# Patient Record
Sex: FEMALE | Race: WHITE | NOT HISPANIC OR LATINO | Employment: OTHER | URBAN - METROPOLITAN AREA
[De-identification: names, ages, dates, MRNs, and addresses within clinical notes are randomized per-mention and may not be internally consistent; named-entity substitution may affect disease eponyms.]

---

## 2017-01-02 DIAGNOSIS — R25.2 CRAMP OF LIMB: Primary | ICD-10-CM

## 2017-01-02 DIAGNOSIS — K21.9 GASTROESOPHAGEAL REFLUX DISEASE WITHOUT ESOPHAGITIS: Primary | ICD-10-CM

## 2017-01-02 NOTE — TELEPHONE ENCOUNTER
Patient called clinic, she would like to get Prilosec as a 90 days supply and not 30 day.   This 90 day supply Rx will replace 8/30/16 30 day supply Rx.     omeprazole (PRILOSEC) 20 MG capsule  Last Written Prescription Date: 8/30/16   Last Fill Quantity: 30,  # refills: 10    Last Office Visit with [facility], [facility] or [facility] prescribing provider: 11/28/16     Prescription approved per [facility] Refill Protocol.

## 2017-01-03 RX ORDER — TIZANIDINE 2 MG/1
2 TABLET ORAL 3 TIMES DAILY PRN
Qty: 60 TABLET | Refills: 0 | Status: SHIPPED | OUTPATIENT
Start: 2017-01-03 | End: 2017-03-08

## 2017-01-03 NOTE — TELEPHONE ENCOUNTER
tiZANidine (ZANAFLEX) 2 MG tablet    Last Written Prescription Date:  11/4/16  Last Fill Quantity: 60,   # refills: 0  Last Office Visit with [facility], [facility] or [facility] prescribing provider: 11/28/16    Future Office visit:       Routing refill request to provider for review/approval because:  Drug not on the [facility], [facility] or [facility] refill protocol or controlled substance

## 2017-02-06 DIAGNOSIS — G89.4 CHRONIC PAIN DISORDER: Primary | ICD-10-CM

## 2017-02-06 NOTE — TELEPHONE ENCOUNTER
Reason for Call:  Medication or medication refill:    Do you use a [facility]?  Name of the pharmacy and phone number for the current request:  [facility] #[id number] - [place], [place] - [address] [place], [place] [phone] (Phone)    Name of the medication requested: Oxycodone 5 mg    Other request: Patient requests that the Prescription be mailed to   [facility] #[id number] - [place], [place] - [address] [phone] (Phone)  [phone] (Fax               Can we leave a detailed message on this number? YES    Phone number patient can be reached at: Home number on file [phone] (home)    Best Time: When Prescription is approved    Call taken on 2/6/2017 at 10:54 AM by MINI KAUFFMAN

## 2017-02-07 ENCOUNTER — TELEPHONE (OUTPATIENT)
Dept: FAMILY MEDICINE | Facility: CLINIC | Age: 77
End: 2017-02-07

## 2017-02-07 RX ORDER — OXYCODONE HYDROCHLORIDE 5 MG/1
5 TABLET ORAL 2 TIMES DAILY PRN
Qty: 180 TABLET | Refills: 0 | Status: SHIPPED | OUTPATIENT
Start: 2017-02-07 | End: 2017-11-04

## 2017-02-07 NOTE — TELEPHONE ENCOUNTER
States small red bumps on both arms that itch.  Wrists to arms.  States uses Aveno lotion and that has not changed.  States someone else does laundry and does not know if laundry soap has changed denies: body soap, shampoo, perfume changes    Rash     Onset: started 2 weeks ago     Description:   Location: both arms wrist to elbow on the top  Character: red, dry, raised  Itching (Pruritis): YES    Progression of Symptoms:  same    Accompanying Signs & Symptoms:  Fever: no   Body aches or joint pain: no   Sore throat symptoms: no   Recent cold symptoms: no    History:   Previous similar rash: YES - had before that she thinks just went away.      Precipitating factors:   Exposure to similar rash: no   New exposures: None   Recent travel: no   Denies: fever, purple color, med changes, hives,      Therapies Tried and outcome: Aveno lotion which she has used previously without concern for about a month.       Advised to try a different lotion for a couple days to see if that helps relieve the rash.  hydrocortisone cream as directed OTC, avoid scratching, benadryl if itches.  Call clinic if does not get better or relief in 2 days.  Patient agreed with plan.    Please advise if further recommendations are appropriate,  Bhumika Amanda RN  [facility] [employer]

## 2017-02-07 NOTE — TELEPHONE ENCOUNTER
If does not improve, we can try a stronger steroid cream but she should try OTC hydrocortisone first.

## 2017-02-07 NOTE — TELEPHONE ENCOUNTER
Reason for Call:  Other call back    Detailed comments: Patient calling to report that she has bumps on both of her arms and that it itches.  She states she has been using a lotion (Aveeno) on it but it is not effective and actually causes more bumps.  She is requesting a call back with directives.    Phone Number Patient can be reached at: Home number on file [phone] (home)    Best Time: ASAP    Can we leave a detailed message on this number? YES    Call taken on 2/7/2017 at 12:10 PM by MINI KAUFFMAN

## 2017-02-07 NOTE — TELEPHONE ENCOUNTER
Controlled Substance Refill Request for oxycodone 5 mg  Patient is followed by BAM BARRON PA-C  for ongoing prescription of pain medication.  All refills should be approved by this provider, or covering partner.    Medication(s): OXycodone IR 5 mg.   Maximum quantity per month: 180 lasts almost a year  Clinic visit frequency required: Q 6  months     Controlled substance agreement:  Encounter-Level CSA:     There are no encounter-level csa.        Pain Clinic evaluation in the past: Yes       Date/Location:   \[facility]\""  Pain clinic 1992    DIRE Total Score(s):  No flowsheet data found.    Last [facility] website verification:  done on 2/7/2017   [email or web address]    In pt at \[facility]\""  Pain clinic 1992; narcotic Rx since then; low dose        See [facility] Dr. Granado 6/13    Problem List Complete:  No     PROVIDER TO CONSIDER COMPLETION OF PROBLEM LIST AND OVERVIEW/CONTROLLED SUBSTANCE AGREEMENT    Last Written Prescription Date:  4/4/2016  Last Fill Quantity: 180,   # refills: 0    Last Office Visit with [facility] primary care provider: 4/4/2016    Future Office visit:     Controlled substance agreement on file: No.     Processing:  yes   checked in past 6 months?  Yes 2/7/2017  Other request: Patient requests that the Prescription be mailed to   [facility][id number] - [facility] [address]

## 2017-03-08 DIAGNOSIS — R25.2 CRAMP OF LIMB: ICD-10-CM

## 2017-03-08 RX ORDER — TIZANIDINE 2 MG/1
2 TABLET ORAL 3 TIMES DAILY PRN
Qty: 60 TABLET | Refills: 3 | Status: SHIPPED | OUTPATIENT
Start: 2017-03-08 | End: 2017-10-20

## 2017-03-08 NOTE — TELEPHONE ENCOUNTER
tiZANidine (ZANAFLEX) 2 MG tablet      Last Written Prescription Date:  1/3/17  Last Fill Quantity: 60,   # refills: 0  Last Office Visit with [facility], [facility] or [facility] prescribing provider: 11/28/16  Future Office visit:       Routing refill request to provider for review/approval because:  Drug not on the [facility], [facility] or [facility] refill protocol or controlled substance

## 2017-03-22 ENCOUNTER — OFFICE VISIT (OUTPATIENT)
Dept: FAMILY MEDICINE | Facility: CLINIC | Age: 77
End: 2017-03-22
Payer: COMMERCIAL

## 2017-03-22 VITALS
DIASTOLIC BLOOD PRESSURE: 80 MMHG | BODY MASS INDEX: 26.61 KG/M2 | RESPIRATION RATE: 14 BRPM | SYSTOLIC BLOOD PRESSURE: 134 MMHG | HEART RATE: 78 BPM | TEMPERATURE: 96.7 F | WEIGHT: 155 LBS | OXYGEN SATURATION: 97 %

## 2017-03-22 DIAGNOSIS — R30.0 DYSURIA: ICD-10-CM

## 2017-03-22 DIAGNOSIS — Z23 NEED FOR PROPHYLACTIC VACCINATION WITH TETANUS-DIPHTHERIA (TD): ICD-10-CM

## 2017-03-22 DIAGNOSIS — N30.01 ACUTE CYSTITIS WITH HEMATURIA: Primary | ICD-10-CM

## 2017-03-22 DIAGNOSIS — Z23 NEED FOR PROPHYLACTIC VACCINATION AGAINST STREPTOCOCCUS PNEUMONIAE (PNEUMOCOCCUS): ICD-10-CM

## 2017-03-22 DIAGNOSIS — R82.90 NONSPECIFIC FINDING ON EXAMINATION OF URINE: ICD-10-CM

## 2017-03-22 LAB
ALBUMIN UR-MCNC: NEGATIVE MG/DL
APPEARANCE UR: CLEAR
BACTERIA #/AREA URNS HPF: ABNORMAL /HPF
BILIRUB UR QL STRIP: NEGATIVE
COLOR UR AUTO: YELLOW
GLUCOSE UR STRIP-MCNC: NEGATIVE MG/DL
HGB UR QL STRIP: ABNORMAL
KETONES UR STRIP-MCNC: NEGATIVE MG/DL
LEUKOCYTE ESTERASE UR QL STRIP: ABNORMAL
NITRATE UR QL: NEGATIVE
NON-SQ EPI CELLS #/AREA URNS LPF: ABNORMAL /LPF
PH UR STRIP: 5.5 PH (ref 5–7)
RBC #/AREA URNS AUTO: ABNORMAL /HPF (ref 0–2)
SP GR UR STRIP: 1.01 (ref 1–1.03)
URN SPEC COLLECT METH UR: ABNORMAL
UROBILINOGEN UR STRIP-ACNC: 0.2 EU/DL (ref 0.2–1)
WBC #/AREA URNS AUTO: >100 /HPF (ref 0–2)

## 2017-03-22 PROCEDURE — 87186 SC STD MICRODIL/AGAR DIL: CPT | Performed by: PHYSICIAN ASSISTANT

## 2017-03-22 PROCEDURE — G0009 ADMIN PNEUMOCOCCAL VACCINE: HCPCS | Performed by: PHYSICIAN ASSISTANT

## 2017-03-22 PROCEDURE — 90471 IMMUNIZATION ADMIN: CPT | Mod: 59 | Performed by: PHYSICIAN ASSISTANT

## 2017-03-22 PROCEDURE — 87086 URINE CULTURE/COLONY COUNT: CPT | Performed by: PHYSICIAN ASSISTANT

## 2017-03-22 PROCEDURE — 90715 TDAP VACCINE 7 YRS/> IM: CPT | Performed by: PHYSICIAN ASSISTANT

## 2017-03-22 PROCEDURE — 87088 URINE BACTERIA CULTURE: CPT | Performed by: PHYSICIAN ASSISTANT

## 2017-03-22 PROCEDURE — 81001 URINALYSIS AUTO W/SCOPE: CPT | Performed by: PHYSICIAN ASSISTANT

## 2017-03-22 PROCEDURE — 90670 PCV13 VACCINE IM: CPT | Performed by: PHYSICIAN ASSISTANT

## 2017-03-22 PROCEDURE — 99213 OFFICE O/P EST LOW 20 MIN: CPT | Mod: 25 | Performed by: PHYSICIAN ASSISTANT

## 2017-03-22 NOTE — NURSING NOTE
"Chief Complaint   Patient presents with     UTI       Initial /80 (BP Location: Left arm, Patient Position: Chair, Cuff Size: Adult Regular)  Pulse 78  Temp 96.7  F (35.9  C) (Tympanic)  Resp 14  Wt 155 lb (70.3 kg)  LMP  (LMP Unknown)  SpO2 97%  BMI 26.61 kg/m2 Estimated body mass index is 26.61 kg/(m^2) as calculated from the following:    Height as of 11/16/16: 5' 4\" (1.626 m).    Weight as of this encounter: 155 lb (70.3 kg).  Medication Reconciliation: complete    "

## 2017-03-22 NOTE — PROGRESS NOTES
SUBJECTIVE:                                                    Marcela Sandhu is a 76 year old female who presents to clinic today for the following health issues:    URINARY TRACT SYMPTOMS     Onset: 2 weeks    Description:   Painful urination (Dysuria): no   Blood in urine (Hematuria): no   Delay in urine (Hesitency): no     Intensity: moderate    Progression of Symptoms:  same    Accompanying Signs & Symptoms:  Fever/chills: no   Flank pain no   Nausea and vomiting: no   Any vaginal symptoms: vaginal itching  Abdominal/Pelvic Pain: YES- pressure   History:   History of frequent UTI's: no   History of kidney stones: no   Sexually Active: no   Possibility of pregnancy: No    Precipitating factors:   n/a         Therapies Tried and outcome: monistat  Reviewed and updated as needed this visit by clinical staff  Tobacco  Allergies  Meds  Problems  Med Hx  Surg Hx  Fam Hx  Soc Hx        Reviewed and updated as needed this visit by Provider  Allergies  Meds  Problems       Additional complaints: Due for vaccines today    HPI additional notes: Marcela presents today with   Chief Complaint   Patient presents with     UTI   Notes urinary urgency and frequency, some discomfort so took monistat thinking she had a yeast infection.  This did not improve her symptoms.  Last UTI was about 5 years ago.     ROS:  C: Negative for fever, chills, recent change in weight  CV: Negative for chest pain or peripheral edema  GI:NEGATIVE for nausea, abdominal pain, heartburn, or change in bowel habits  : POSITIVE for frequency, urgency and vaginal itching/ irritation. Negative for dysuria and hematuria  MS: Negative for flank pain  P: Negative for changes in mood or affect  ROS otherwise negative.    Chart Review:  History   Smoking Status     Never Smoker   Smokeless Tobacco     Never Used     PHQ-9 SCORE 3/30/2016 9/9/2016 3/22/2017   Total Score - - -   Total Score 3 0 1     PATRICIA-7 SCORE 2/10/2015 5/8/2015 10/21/2016    Total Score 0 6 -   Total Score - - 0     Patient Active Problem List   Diagnosis     Osteoporosis     Postmenopausal vaginal bleeding     Breast cancer (H)     RA (rheumatoid arthritis) (H)     Fibromyalgia     Restless legs syndrome     Health Care Home     Urethral prolapse     Pulmonary nodules     Chronic pain syndrome     Preventive measure     Constipation, chronic     Anemia     Hepatitis-C     Advance Care Planning     Major depression in complete remission (H)     Abnormal gait     Gastroesophageal reflux disease without esophagitis     Cramp of limb     Pain in both feet     Unspecified symptoms and signs involving cognitive functions and awareness     Past Surgical History:   Procedure Laterality Date     APPENDECTOMY       BACK SURGERY      2004   multiple back surgeries; 5 total     BACK SURGERY      5 back surgeries     BUNIONECTOMY CHEVRON AND PEBBLES BILATERAL, COMBINED  12/2/2011    Procedure:COMBINED BUNIONECTOMY CHEVRON AND PEBBLES BILATERAL; BILATERAL THIRD AND FOURTH CLAWTOE RECONSTRUCTION WITH EXOSTECTOMY, LEFT FIRST TARSOMETATARSAL JOINT and 2nd toe Right foot reconstruction; Surgeon:ASHWIN FARMER; Location: OR     COLONOSCOPY  10/18/2011    Procedure:COLONOSCOPY; COLONOSCOPY; Surgeon:BRENDA RODRIGUEZ; Location: GI     EYE SURGERY      eye lid surgery     GENITOURINARY SURGERY      bladder suspension     MASTECTOMY, BILATERAL      bilateral mastectomy     ORTHOPEDIC SURGERY      knee replacement, hand, elbow and foot surgery     Problem list, Medication list, Allergies, Medical/Social/Surg hx reviewed in [facility], updated as appropriate.   OBJECTIVE:                                                    /80 (BP Location: Left arm, Patient Position: Chair, Cuff Size: Adult Regular)  Pulse 78  Temp 96.7  F (35.9  C) (Tympanic)  Resp 14  Wt 155 lb (70.3 kg)  LMP  (LMP Unknown)  SpO2 97%  BMI 26.61 kg/m2  Body mass index is 26.61 kg/(m^2).  GENERAL: healthy, alert, in no acute  distress  HENT: Mucous mebranes moist.  MS: no gross deformities noted.  No CVA tenderness. No paralumbar tenderness.  SKIN: no suspicious lesions, no rashes  PSYCH: Alert and oriented times 3;  Able to articulate logical thoughts. Affect is normal.    Diagnostic test results:   Results for orders placed or performed in visit on 03/22/17 (from the past 24 hour(s))   UA reflex to Microscopic and Culture   Result Value Ref Range    Color Urine Yellow     Appearance Urine Clear     Glucose Urine Negative NEG mg/dL    Bilirubin Urine Negative NEG    Ketones Urine Negative NEG mg/dL    Specific Gravity Urine 1.010 1.003 - 1.035    Blood Urine Moderate (A) NEG    pH Urine 5.5 5.0 - 7.0 pH    Protein Albumin Urine Negative NEG mg/dL    Urobilinogen Urine 0.2 0.2 - 1.0 EU/dL    Nitrite Urine Negative NEG    Leukocyte Esterase Urine Large (A) NEG    Source Midstream Urine    Urine Microscopic   Result Value Ref Range    WBC Urine >100 (A) 0 - 2 /HPF    RBC Urine 10-25 (A) 0 - 2 /HPF    Squamous Epithelial /LPF Urine Few FEW /LPF    Bacteria Urine Few (A) NEG /HPF        ASSESSMENT/PLAN:                                                          ICD-10-CM    1. Acute cystitis with hematuria N30.01 cephALEXin (KEFLEX) 250 MG capsule   2. Dysuria R30.0 UA reflex to Microscopic and Culture     Urine Microscopic   3. Need for prophylactic vaccination against Streptococcus pneumoniae (pneumococcus) Z23 PNEUMOCOCCAL CONJ VACCINE 13 VALENT IM     VACCINE ADMINISTRATION, EACH ADDITIONAL   4. Need for prophylactic vaccination with tetanus-diphtheria (TD) Z23 TDAP (ADACEL AGES 11-64)     VACCINE ADMINISTRATION, INITIAL   5. Nonspecific finding on examination of urine R82.90 Urine Culture Aerobic Bacterial       Please see patient instructions for treatment details.    Follow up in 7-10 days if not improving as anticipated.    Fay Tang PA-C  [facility]

## 2017-03-22 NOTE — LETTER
[facility]  [address]  [address]  [place] [address]  [phone]                                                                                                           Marcela Sandhu  [address]   [place] [address]    March 24, 2017      Dear Marcela,    The results of your recent tests were reviewed and are enclosed.     -Urine culture is abnormal and grew out bacteria that are sensitive to the antibiotic you have been given.  Complete the medication as prescribed and if you experience new, worsening or persistent symptoms, you should call or return for a recheck.      Thank you for choosing [facility].  We appreciate the opportunity to serve you and look forward to supporting your healthcare needs in the future.    If you have any questions or concerns, please call me or my staff at [phone].      Sincerely,        Fay Tang PA-C

## 2017-03-22 NOTE — MR AVS SNAPSHOT
After Visit Summary   3/22/2017    Marcela Sandhu    MRN: [id number]           Patient Information     Date Of Birth          1940        Visit Information        Provider Department      3/22/2017 11:10 AM Fay Tang PA-C [facility]        Today's Diagnoses     Acute cystitis with hematuria    -  1    Dysuria        Need for prophylactic vaccination against Streptococcus pneumoniae (pneumococcus)        Need for prophylactic vaccination with tetanus-diphtheria (TD)        Nonspecific finding on examination of urine          Care Instructions    1. Start antibiotic today.  2. Increase fluids to help flush urinary tract.    3. Take ibuprofen (600mg every 6 hours with food or water) or tylenol (500mg every 6 hours) for pain.   4. OTC urinary pain reliever Azo/Uristat can be used to prevent discomfort. It changes the color of the urine (usually to orange or red) and may stain fabric. Do not use for longer than 48 hours since symptoms should have cleared by this time once antibiotics have been started.  If you are still having severe symptoms, you need to follow up with the clinic.  5. When culture results return, we will call and change your medication if needed.  Follow up immediately if you start to have high fever, severe back pain, nausea or vomiting.  To prevent future infections:  Drink plenty of water.   Do not delay urinating when you feel the need to urinate.   Keep the vaginal area clean. Wipe from front to back after using the toilet. Be sure to gently wash the genital area each time you bathe or shower. However, use soap only on the outside of your vagina. The chemicals in soap may cause additional irritation.   Urinate after intercourse. Never combine anal and vaginal intercourse.   Wear cotton underwear, which allows better air circulation than nylon. Wear pantyhose with cotton crotches.   Avoid tight clothes in the genital area, such  "as control-top pantyhose and tight jeans. Do not wear a wet bathing suit for long periods of time.             Follow-ups after your visit        Who to contact     If you have questions or need follow up information about today's clinic visit or your schedule please contact [facility] directly at [phone].  Normal or non-critical lab and imaging results will be communicated to you by FameBithart, letter or phone within 4 business days after the clinic has received the results. If you do not hear from us within 7 days, please contact the clinic through FameBithart or phone. If you have a critical or abnormal lab result, we will notify you by phone as soon as possible.  Submit refill requests through Animoto or call your pharmacy and they will forward the refill request to us. Please allow 3 business days for your refill to be completed.          Additional Information About Your Visit        Animoto Information     Animoto lets you send messages to your doctor, view your test results, renew your prescriptions, schedule appointments and more. To sign up, go to [email or web address] . Click on \"Log in\" on the left side of the screen, which will take you to the Welcome page. Then click on \"Sign up Now\" on the right side of the page.     You will be asked to enter the access code listed below, as well as some personal information. Please follow the directions to create your username and password.     Your access code is: [email or web address]  Expires: 2017 11:38 AM     Your access code will  in 90 days. If you need help or a new code, please call your [facility] clinic or [phone].        Care EveryWhere ID     This is your Care EveryWhere ID. This could be used by other organizations to access your [facility] medical records  [id number]        Your Vitals Were     Pulse Temperature Respirations Last Period Pulse Oximetry BMI (Body Mass Index)    78 96.7  F (35.9  C) (Tympanic) 14 (LMP " Unknown) 97% 26.61 kg/m2       Blood Pressure from Last 3 Encounters:   03/22/17 134/80   11/28/16 108/70   11/26/16 129/77    Weight from Last 3 Encounters:   03/22/17 155 lb (70.3 kg)   11/28/16 152 lb (68.9 kg)   11/26/16 152 lb 8 oz (69.2 kg)              We Performed the Following     DEPRESSION ACTION PLAN (DAP) Order [[id number]]     PNEUMOCOCCAL CONJ VACCINE 13 VALENT IM     TDAP (ADACEL AGES 11-64)     UA reflex to Microscopic and Culture     Urine Culture Aerobic Bacterial     Urine Microscopic     VACCINE ADMINISTRATION, EACH ADDITIONAL     VACCINE ADMINISTRATION, INITIAL          Today's Medication Changes          These changes are accurate as of: 3/22/17 11:59 PM.  If you have any questions, ask your nurse or doctor.               Start taking these medicines.        Dose/Directions    cephalexin 250 MG capsule   Commonly known as:  KEFLEX   Used for:  Acute cystitis with hematuria   Started by:  Fay Tang PA-C        Dose:  250 mg   Take 1 capsule (250 mg) by mouth 2 times daily   Quantity:  20 capsule   Refills:  0            Where to get your medicines      These medications were sent to [facility] #[id number] - [facility] [address]   [address] [facility] [address]     Phone:  [phone]     cephalexin 250 MG capsule                Primary Care Provider Office Phone # Fax #    Fay Tang PA-C [phone]       [facility] [address] [facility] [address]  [facility] [place] [address]        Thank you!     Thank you for choosing [facility]  for your care. Our goal is always to provide you with excellent care. Hearing back from our patients is one way we can continue to improve our services. Please take a few minutes to complete the written survey that you may receive in the mail after your visit with us. Thank you!             Your Updated Medication List - Protect others around you: Learn how  to safely use, store and throw away your medicines at www.disposemymeds.org.          This list is accurate as of: 3/22/17 11:59 PM.  Always use your most recent med list.                   Brand Name Dispense Instructions for use    AMOXICILLIN PO      Take 500 mg by mouth Only before dental procedures       buPROPion 150 MG 24 hr tablet    WELLBUTRIN XL    60 tablet    Take 1 tablet (150 mg) by mouth every morning for 2 weeks, then increase to 2 tabs PO.       CENTRUM PO      Take  by mouth.       cephalexin 250 MG capsule    KEFLEX    20 capsule    Take 1 capsule (250 mg) by mouth 2 times daily       clonazePAM 2 MG tablet    klonoPIN    90 tablet    Take 1 tablet (2 mg) by mouth nightly as needed (Taking at night for restless legs)       CULTURELLE PO          gabapentin 600 MG tablet    NEURONTIN    270 tablet    TAKE 1 TABLET THREE TIMES A DAY       glucosamine FORTE Caps      Take  by mouth.       l-lysine HCl 500 MG Tabs tablet      Take 500 mg by mouth daily.       methotrexate sodium 2.5 MG Tabs      Take 5 tablets once a week       NABUMETONE PO      Take 750 mg by mouth 2 times daily       omeprazole 20 MG CR capsule    priLOSEC    90 capsule    Take 1 capsule (20 mg) by mouth daily       oxyCODONE 5 MG IR tablet    ROXICODONE    180 tablet    Take 1 tablet (5 mg) by mouth 2 times daily as needed for moderate to severe pain       polyethylene glycol powder    MIRALAX/GLYCOLAX    850 g    Take 17 g by mouth daily       predniSONE 5 MG tablet    DELTASONE         * sertraline 50 MG tablet    ZOLOFT    90 tablet    TAKE 1 TABLET DAILY       * sertraline 100 MG tablet    ZOLOFT    90 tablet    TAKE 1 TABLET DAILY       tiZANidine 2 MG tablet    ZANAFLEX    60 tablet    Take 1 tablet (2 mg) by mouth 3 times daily as needed for muscle spasms       * Notice:  This list has 2 medication(s) that are the same as other medications prescribed for you. Read the directions carefully, and ask your doctor or other care  provider to review them with you.

## 2017-03-22 NOTE — PATIENT INSTRUCTIONS
1. Start antibiotic today.  2. Increase fluids to help flush urinary tract.    3. Take ibuprofen (600mg every 6 hours with food or water) or tylenol (500mg every 6 hours) for pain.   4. OTC urinary pain reliever Azo/Uristat can be used to prevent discomfort. It changes the color of the urine (usually to orange or red) and may stain fabric. Do not use for longer than 48 hours since symptoms should have cleared by this time once antibiotics have been started.  If you are still having severe symptoms, you need to follow up with the clinic.  5. When culture results return, we will call and change your medication if needed.  Follow up immediately if you start to have high fever, severe back pain, nausea or vomiting.  To prevent future infections:  Drink plenty of water.   Do not delay urinating when you feel the need to urinate.   Keep the vaginal area clean. Wipe from front to back after using the toilet. Be sure to gently wash the genital area each time you bathe or shower. However, use soap only on the outside of your vagina. The chemicals in soap may cause additional irritation.   Urinate after intercourse. Never combine anal and vaginal intercourse.   Wear cotton underwear, which allows better air circulation than nylon. Wear pantyhose with cotton crotches.   Avoid tight clothes in the genital area, such as control-top pantyhose and tight jeans. Do not wear a wet bathing suit for long periods of time.

## 2017-03-22 NOTE — NURSING NOTE
Screening Questionnaire for Adult Immunization    Are you sick today?   No   Do you have allergies to medications, food, a vaccine component or latex?   No   Have you ever had a serious reaction after receiving a vaccination?   No   Do you have a long-term health problem with heart disease, lung disease, asthma, kidney disease, metabolic disease (e.g. diabetes), anemia, or other blood disorder?   No   Do you have cancer, leukemia, HIV/AIDS, or any other immune system problem?   No   In the past 3 months, have you taken medications that affect  your immune system, such as prednisone, other steroids, or anticancer drugs; drugs for the treatment of rheumatoid arthritis, Crohn s disease, or psoriasis; or have you had radiation treatments?   No   Have you had a seizure, or a brain or other nervous system problem?   No   During the past year, have you received a transfusion of blood or blood     products, or been given immune (gamma) globulin or antiviral drug?   No   For women: Are you pregnant or is there a chance you could become        pregnant during the next month?   No   Have you received any vaccinations in the past 4 weeks?   No     Immunization questionnaire answers were all negative.      MNVFC doesn't apply on this patient    Per orders of JOAQUIN Ansari, injection of TDAP and prevnar 13 given by Denise Hammer. Patient instructed to remain in clinic for 20 minutes afterwards, and to report any adverse reaction to me immediately.       Screening performed by Denise Hammer on 3/22/2017 at 11:23 AM.

## 2017-03-23 ASSESSMENT — PATIENT HEALTH QUESTIONNAIRE - PHQ9: SUM OF ALL RESPONSES TO PHQ QUESTIONS 1-9: 1

## 2017-03-24 LAB
BACTERIA SPEC CULT: ABNORMAL
Lab: ABNORMAL
MICRO REPORT STATUS: ABNORMAL
MICROORGANISM SPEC CULT: ABNORMAL
SPECIMEN SOURCE: ABNORMAL

## 2017-04-18 ENCOUNTER — TRANSFERRED RECORDS (OUTPATIENT)
Dept: HEALTH INFORMATION MANAGEMENT | Facility: CLINIC | Age: 77
End: 2017-04-18

## 2017-04-29 DIAGNOSIS — F32.5 MAJOR DEPRESSION IN COMPLETE REMISSION (H): Chronic | ICD-10-CM

## 2017-05-01 NOTE — TELEPHONE ENCOUNTER
sertraline (ZOLOFT) 100 MG tablet     Last Written Prescription Date: 9/9/16  Last Fill Quantity: 90, # refills: 1  Last Office Visit with G primary care provider:  3/22/17        Last PHQ-9 score on record=   PHQ-9 SCORE 3/22/2017   Total Score -   Total Score 1

## 2017-05-02 RX ORDER — SERTRALINE HYDROCHLORIDE 100 MG/1
TABLET, FILM COATED ORAL
Qty: 90 TABLET | Refills: 3 | Status: SHIPPED | OUTPATIENT
Start: 2017-05-02 | End: 2017-11-04

## 2017-05-30 DIAGNOSIS — F32.5 MAJOR DEPRESSION IN COMPLETE REMISSION (H): Chronic | ICD-10-CM

## 2017-05-31 NOTE — TELEPHONE ENCOUNTER
sertraline (ZOLOFT) 100 MG tablet     Last Written Prescription Date: 5/2/17  Last Fill Quantity: 90, # refills: 3  Last Office Visit with G primary care provider:  3/22/17        Last PHQ-9 score on record=   PHQ-9 SCORE 3/22/2017   Total Score -   Total Score 1

## 2017-06-24 ENCOUNTER — HEALTH MAINTENANCE LETTER (OUTPATIENT)
Age: 77
End: 2017-06-24

## 2017-08-03 ENCOUNTER — TELEPHONE (OUTPATIENT)
Dept: FAMILY MEDICINE | Facility: CLINIC | Age: 77
End: 2017-08-03

## 2017-08-03 NOTE — TELEPHONE ENCOUNTER
Reason for Call: Medication Question - clonazePAM (KLONOPIN) 2 MG tablet    Detailed comments: Patient is questioning if she should continue to take this medication to treat her restless leg syndrome     Phone Number Patient can be reached at: Home number on file [phone] (home)    Best Time: Anytime    Can we leave a detailed message on this number? YES    Call taken on 8/3/2017 at 4:37 PM by Darrel Larsen

## 2017-08-04 NOTE — TELEPHONE ENCOUNTER
Patient called this AM and waiting for call back.  Would like someone to call her after 10:00 Am about her medication  And cutting back and getting off all together.

## 2017-08-04 NOTE — TELEPHONE ENCOUNTER
Taking klonopin for RLS, was being weaned off, down to her last one, is it okay for her to stop now? She is on other medication for spasms in her legs.     Routing to providers for review and advice.  Slime Leroy RN  08/04/17  10:57 AM

## 2017-08-08 DIAGNOSIS — F32.5 MAJOR DEPRESSION IN COMPLETE REMISSION (H): Chronic | ICD-10-CM

## 2017-08-08 NOTE — TELEPHONE ENCOUNTER
sertraline       Last Written Prescription Date: 5/31/17  Last Fill Quantity: 90; # refills: 0  Last Office Visit with [facility], [facility] or  Health prescribing provider:  3/22/17        Last PHQ-9 score on record=   PHQ-9 SCORE 3/22/2017   Total Score -   Total Score 1       Lab Results   Component Value Date    AST 16 11/26/2016     Lab Results   Component Value Date    ALT 20 11/26/2016

## 2017-08-30 ENCOUNTER — TELEPHONE (OUTPATIENT)
Dept: FAMILY MEDICINE | Facility: CLINIC | Age: 77
End: 2017-08-30

## 2017-08-30 DIAGNOSIS — G89.4 CHRONIC PAIN DISORDER: ICD-10-CM

## 2017-08-30 DIAGNOSIS — G89.4 CHRONIC PAIN SYNDROME: Chronic | ICD-10-CM

## 2017-08-30 RX ORDER — OXYCODONE HYDROCHLORIDE 5 MG/1
5 TABLET ORAL 2 TIMES DAILY PRN
Qty: 180 TABLET | Refills: 0 | Status: CANCELLED | OUTPATIENT
Start: 2017-08-30

## 2017-08-30 NOTE — TELEPHONE ENCOUNTER
Reason for Call:  Medication or medication refill:    Do you use a [facility]?  Name of the pharmacy and phone number for the current request:  [facility]    Name of the medication requested: oxyCODONE (ROXICODONE) 5 MG IR tablet    Other request:     Can we leave a detailed message on this number? YES    Phone number patient can be reached at: Home number on file [phone] (home)    Best Time:     Call taken on 8/30/2017 at 11:30 AM by DAVID JACOBO

## 2017-08-30 NOTE — TELEPHONE ENCOUNTER
Patient was informed OV needed for refill. She has scheduled OV 09/06/2017.  Please advice on refill.    Controlled Substance Refill Request for Oxycodone 5 mg      Problem List Complete:  Yes  Patient is followed by BAM BARRON PA-C  for ongoing prescription of pain medication.  All refills should be approved by this provider, or covering partner.    Medication(s): OXycodone IR 5 mg.   Maximum quantity per month: 180 lasts almost a year  Clinic visit frequency required: Q 6  months     Controlled substance agreement:  Encounter-Level CSA:     There are no encounter-level csa.        Pain Clinic evaluation in the past: Yes       Date/Location:   [facility]  Pain clinic 1992    DIRE Total Score(s):  No flowsheet data found.    Last [facility] website verification:  done on 4/4/16   [email or web address]    In pt at [facility]  Pain clinic 1992; narcotic Rx since then; low dose        See [facility] Dr. Granado 6/13     checked in past 6 months?  Yes 08/30/2017- patient filled Rx from doctor tramadol from Doctor Johan 08/07/2017 no other concerns

## 2017-08-30 NOTE — TELEPHONE ENCOUNTER
Oxycodone 5 mg      Last Written Prescription Date:  2/7/17  Last Fill Quantity: 1870,   # refills: 0  Last Office Visit with [facility], [facility] or [facility] prescribing provider: 3/22/17  Future Office visit:       Routing refill request to provider for review/approval because:  Drug not on the [facility], [facility] or [facility] refill protocol or controlled substance

## 2017-09-06 NOTE — TELEPHONE ENCOUNTER
Patient called back, she is not able to come in every 3 months for refills. She has decided to go off of oxycodone and see how she does. She will continue to take Extra Strength Tylenol. She will make an OV if she feels like she has to restart Oxycodone. KENIA sent to a providers team 1.

## 2017-10-11 ENCOUNTER — TELEPHONE (OUTPATIENT)
Dept: FAMILY MEDICINE | Facility: CLINIC | Age: 77
End: 2017-10-11

## 2017-10-11 NOTE — TELEPHONE ENCOUNTER
Patient reports she does have allergies and has not taken medication daily. Suggested she take allergy medication and follow up with provider if symptoms persist for possible referral. Pt verbalized agreement with plan.

## 2017-10-11 NOTE — TELEPHONE ENCOUNTER
Lack of taste is usually due to increased mucous production from a URI, if she does not have any cold or allergy symptoms, I do not know what could be causing it.

## 2017-10-11 NOTE — TELEPHONE ENCOUNTER
Patient reports lack of taste for a few days this morning she noticed upset stomach with diarrhea. Pt denies cold, medication or diet changes. Denies problems with smell. She is unable to schedule OV b/c she don't drive and dependent on someone else to bring her to clinic. She was advised to push fluids and BRAT diet for stomach symptoms. Please advice if any other recommendations for lack of taste.

## 2017-10-11 NOTE — TELEPHONE ENCOUNTER
Reason for call:  Patient reporting a symptom    Symptom or request: pt states that she hasnt had any taste buds for a week and woke up this morning with an upset stomach.  Pt would like to speak with a nurse about what she should do.    Duration (how long have symptoms been present): about a week    Have you been treated for this before? No    Additional comments: none    Phone Number patient can be reached at:  Home number on file [phone] (home)    Best Time:  Any     Can we leave a detailed message on this number:  YES    Call taken on 10/11/2017 at 9:51 AM by Arielle Concepcion

## 2017-10-17 ENCOUNTER — TRANSFERRED RECORDS (OUTPATIENT)
Dept: HEALTH INFORMATION MANAGEMENT | Facility: CLINIC | Age: 77
End: 2017-10-17

## 2017-10-20 DIAGNOSIS — R25.2 CRAMP OF LIMB: ICD-10-CM

## 2017-10-20 DIAGNOSIS — F32.5 MAJOR DEPRESSION IN COMPLETE REMISSION (H): Chronic | ICD-10-CM

## 2017-10-23 RX ORDER — TIZANIDINE 2 MG/1
TABLET ORAL
Qty: 60 TABLET | Refills: 2 | Status: SHIPPED | OUTPATIENT
Start: 2017-10-23 | End: 2018-01-08

## 2017-10-23 RX ORDER — BUPROPION HYDROCHLORIDE 300 MG/1
TABLET ORAL
Qty: 30 TABLET | Refills: 0 | Status: SHIPPED | OUTPATIENT
Start: 2017-10-23 | End: 2017-11-02

## 2017-10-23 ASSESSMENT — PATIENT HEALTH QUESTIONNAIRE - PHQ9: SUM OF ALL RESPONSES TO PHQ QUESTIONS 1-9: 0

## 2017-10-23 NOTE — TELEPHONE ENCOUNTER
zanaflex      Last Written Prescription Date:  3-8-17  Last Fill Quantity: 60,   # refills: 3  Last OV 3-22-17    Routing refill request to provider for review/approval because:  Drug not on the [facility], [facility] or [facility] refill protocol or controlled substance

## 2017-10-23 NOTE — TELEPHONE ENCOUNTER
Last Office Visit with [facility], [facility] or [facility] prescribing provider:  03/22/2017        Last PHQ-9 score on record=   PHQ-9 SCORE 3/22/2017   Total Score -   Total Score 1       Lab Results   Component Value Date    AST 16 11/26/2016     Lab Results   Component Value Date    ALT 20 11/26/2016     Prescription approved per [facility] Refill Protocol.

## 2017-11-04 ENCOUNTER — OFFICE VISIT (OUTPATIENT)
Dept: FAMILY MEDICINE | Facility: CLINIC | Age: 77
End: 2017-11-04
Payer: COMMERCIAL

## 2017-11-04 DIAGNOSIS — L30.9 DERMATITIS: ICD-10-CM

## 2017-11-04 DIAGNOSIS — F32.5 MAJOR DEPRESSION IN COMPLETE REMISSION (H): Chronic | ICD-10-CM

## 2017-11-04 DIAGNOSIS — Z00.00 ROUTINE GENERAL MEDICAL EXAMINATION AT A HEALTH CARE FACILITY: Primary | ICD-10-CM

## 2017-11-04 LAB
ANION GAP SERPL CALCULATED.3IONS-SCNC: 11 MMOL/L (ref 3–14)
BUN SERPL-MCNC: 21 MG/DL (ref 7–30)
CALCIUM SERPL-MCNC: 9.1 MG/DL (ref 8.5–10.1)
CHLORIDE SERPL-SCNC: 105 MMOL/L (ref 94–109)
CHOLEST SERPL-MCNC: 192 MG/DL
CO2 SERPL-SCNC: 23 MMOL/L (ref 20–32)
CREAT SERPL-MCNC: 1.12 MG/DL (ref 0.52–1.04)
GFR SERPL CREATININE-BSD FRML MDRD: 47 ML/MIN/1.7M2
GLUCOSE SERPL-MCNC: 84 MG/DL (ref 70–99)
HDLC SERPL-MCNC: 55 MG/DL
LDLC SERPL CALC-MCNC: 105 MG/DL
NONHDLC SERPL-MCNC: 137 MG/DL
POTASSIUM SERPL-SCNC: 3.8 MMOL/L (ref 3.4–5.3)
SODIUM SERPL-SCNC: 139 MMOL/L (ref 133–144)
TRIGL SERPL-MCNC: 159 MG/DL

## 2017-11-04 PROCEDURE — 36415 COLL VENOUS BLD VENIPUNCTURE: CPT | Performed by: PHYSICIAN ASSISTANT

## 2017-11-04 PROCEDURE — 80061 LIPID PANEL: CPT | Performed by: PHYSICIAN ASSISTANT

## 2017-11-04 PROCEDURE — 99397 PER PM REEVAL EST PAT 65+ YR: CPT | Performed by: PHYSICIAN ASSISTANT

## 2017-11-04 PROCEDURE — 80048 BASIC METABOLIC PNL TOTAL CA: CPT | Performed by: PHYSICIAN ASSISTANT

## 2017-11-04 RX ORDER — SERTRALINE HYDROCHLORIDE 100 MG/1
100 TABLET, FILM COATED ORAL DAILY
Qty: 90 TABLET | Refills: 3 | Status: SHIPPED | OUTPATIENT
Start: 2017-11-04 | End: 2018-05-01

## 2017-11-04 RX ORDER — TRIAMCINOLONE ACETONIDE 1 MG/G
CREAM TOPICAL
Qty: 15 G | Refills: 1 | Status: SHIPPED | OUTPATIENT
Start: 2017-11-04 | End: 2018-01-17

## 2017-11-04 RX ORDER — BUPROPION HYDROCHLORIDE 300 MG/1
TABLET ORAL
Qty: 90 TABLET | Refills: 3 | Status: SHIPPED | OUTPATIENT
Start: 2017-11-04 | End: 2019-01-05

## 2017-11-04 ASSESSMENT — ANXIETY QUESTIONNAIRES
1. FEELING NERVOUS, ANXIOUS, OR ON EDGE: NOT AT ALL
6. BECOMING EASILY ANNOYED OR IRRITABLE: NOT AT ALL
4. TROUBLE RELAXING: NOT AT ALL
GAD7 TOTAL SCORE: 0
5. BEING SO RESTLESS THAT IT IS HARD TO SIT STILL: NOT AT ALL
7. FEELING AFRAID AS IF SOMETHING AWFUL MIGHT HAPPEN: NOT AT ALL
7. FEELING AFRAID AS IF SOMETHING AWFUL MIGHT HAPPEN: NOT AT ALL
GAD7 TOTAL SCORE: 0
3. WORRYING TOO MUCH ABOUT DIFFERENT THINGS: NOT AT ALL
GAD7 TOTAL SCORE: 0
2. NOT BEING ABLE TO STOP OR CONTROL WORRYING: NOT AT ALL

## 2017-11-04 NOTE — LETTER
November 6, 2017      Marcela Sandhu  [address]   [place] [address]        Dear ,    We are writing to inform you of your test results.    - Your Cholesterol is normal.  - Your metabolic panel shows:  normal electrolytes (sodium, potassium, calcium) decreased but stable kidney function (creatinine and GFR) and a normal fasting blood sugar level (<100)    Resulted Orders   Basic metabolic panel   Result Value Ref Range    Sodium 139 133 - 144 mmol/L    Potassium 3.8 3.4 - 5.3 mmol/L    Chloride 105 94 - 109 mmol/L    Carbon Dioxide 23 20 - 32 mmol/L    Anion Gap 11 3 - 14 mmol/L    Glucose 84 70 - 99 mg/dL      Comment:      Fasting specimen    Urea Nitrogen 21 7 - 30 mg/dL    Creatinine 1.12 (H) 0.52 - 1.04 mg/dL    GFR Estimate 47 (L) >60 mL/min/1.7m2      Comment:      Non  GFR Calc    Calcium 9.1 8.5 - 10.1 mg/dL   Lipid panel reflex to direct LDL Fasting   Result Value Ref Range    Cholesterol 192 <200 mg/dL    Triglycerides 159 (H) <150 mg/dL      Comment:      Borderline high:  150-199 mg/dl  High:             200-499 mg/dl  Very high:       >499 mg/dl  Fasting specimen    HDL Cholesterol 55 >49 mg/dL    LDL Cholesterol Calculated 105 (H) <100 mg/dL      Comment:      Above desirable:  100-129 mg/dl  Borderline High:  130-159 mg/dL  High:             160-189 mg/dL  Very high:       >189 mg/dl    Non HDL Cholesterol 137 (H) <130 mg/dL      Comment:      Above Desirable:  130-159 mg/dl  Borderline high:  160-189 mg/dl  High:             190-219 mg/dl  Very high:       >219 mg/dl       If you have any questions or concerns, please call the clinic at the number listed above.       Sincerely,        Fay Tang PA-C

## 2017-11-04 NOTE — PROGRESS NOTES
"SUBJECTIVE:   CC: Marcela Sandhu is an 77 year old woman who presents for preventive health visit.      Well Child   Physical   Annual:     Getting at least 3 servings of Calcium per day::  NO    Bi-annual eye exam::  Yes    Dental care twice a year::  Yes    Sleep apnea or symptoms of sleep apnea::  None    Diet::  Low salt and Breakfast skipped    Taking medications regularly::  Yes    Medication side effects::  None    Additional concerns today::  No              {additional problems to add (Optional):224712}     Today's PHQ-2 Score: PHQ-2 ( 1999 [employer]) 11/4/2017   Q1: Little interest or pleasure in doing things 0   Q2: Feeling down, depressed or hopeless 0   PHQ-2 Score 0   Q1: Little interest or pleasure in doing things Not at all   Q2: Feeling down, depressed or hopeless Not at all   PHQ-2 Score 0      Abuse: Current or Past(Physical, Sexual or Emotional)- no  Do you feel safe in your environment - yes          Social History   Substance Use Topics     Smoking status: Never Smoker     Smokeless tobacco: Never Used     Alcohol use No      The patient does not drink >3 drinks per day nor >7 drinks per week.     Reviewed orders with patient.  Reviewed health maintenance and updated orders accordingly - Yes  {Chronicprobdata (Optional):454308}     {Mammo Decision Support (Optional):574499}    Pertinent mammograms are reviewed under the imaging tab.  History of abnormal Pap smear: {PAP HX:119675}     Reviewed and updated as needed this visit by clinical staff  Tobacco  Allergies  Meds  Med Hx  Surg Hx  Fam Hx  Soc Hx         Reviewed and updated as needed this visit by Provider         {HISTORY OPTIONS (Optional):208004}     Review of Systems  {FEMALE PREVENTATIVE ROS:750979}      OBJECTIVE:   LMP  (LMP Unknown)  Physical Exam  {Exam Choices:030208}     ASSESSMENT/PLAN:   {Diag Picklist:571540}     COUNSELING:  {FEMALE COUNSELING MESSAGES:102078::\"Reviewed preventive health counseling, as reflected in " "patient instructions\"}     {BP Counseling- Complete if BP >= 120/80  (Optional):009517}      reports that she has never smoked. She has never used smokeless tobacco.  {Tobacco Cessation -- Complete if patient is a smoker (Optional):928518}  Estimated body mass index is 26.61 kg/(m^2) as calculated from the following:    Height as of 11/16/16: 5' 4\" (1.626 m).    Weight as of 3/22/17: 155 lb (70.3 kg).   {Weight Management Plan (ACO) Complete if BMI is abnormal-  Ages 18-64  BMI >24.9.  Age 65+ with BMI <23 or >30 (Optional):897857}     Counseling Resources:  ATP IV Guidelines  Pooled Cohorts Equation Calculator  Breast Cancer Risk Calculator  FRAX Risk Assessment  ICSI Preventive Guidelines  Dietary Guidelines for Americans, 2010  USDA's MyPlate  ASA Prophylaxis  Lung CA Screening     Fay Tang PA-C  [facility]  Answers for HPI/ROS submitted by the patient on 11/4/2017   PHQ-2 Score: 0  PATRICIA 7 TOTAL SCORE: 0                Deleted by: Jackelyn Kline CMA    [11/04/2017 8:30 AM]                    "

## 2017-11-04 NOTE — MR AVS SNAPSHOT
After Visit Summary   11/4/2017    Marcela Sandhu    MRN: [id number]           Patient Information     Date Of Birth          1940        Visit Information        Provider Department      11/4/2017 8:20 AM Fay Tang PA-C [facility]        Today's Diagnoses     Routine general medical examination at a health care facility    -  1    Major depression in complete remission (H)        Dermatitis          Care Instructions      Preventive Health Recommendations    Female Ages 65 +    Yearly exam:     See your health care provider every year in order to  o Review health changes.   o Discuss preventive care.    o Review your medicines if your doctor has prescribed any.      You no longer need a yearly Pap test unless you've had an abnormal Pap test in the past 10 years. If you have vaginal symptoms, such as bleeding or discharge, be sure to talk with your provider about a Pap test.      Every 1 to 2 years, have a mammogram.  If you are over 69, talk with your health care provider about whether or not you want to continue having screening mammograms.      Every 10 years, have a colonoscopy. Or, have a yearly FIT test (stool test). These exams will check for colon cancer.       Have a cholesterol test every 5 years, or more often if your doctor advises it.       Have a diabetes test (fasting glucose) every three years. If you are at risk for diabetes, you should have this test more often.       At age 65, have a bone density scan (DEXA) to check for osteoporosis (brittle bone disease).    Shots:    Get a flu shot each year.    Get a tetanus shot every 10 years.    Talk to your doctor about your pneumonia vaccines. There are now two you should receive - Pneumovax (PPSV 23) and Prevnar (PCV 13).    Talk to your doctor about the shingles vaccine.    Talk to your doctor about the hepatitis B vaccine.    Nutrition:     Eat at least 5 servings of fruits and  vegetables each day.      Eat whole-grain bread, whole-wheat pasta and brown rice instead of white grains and rice.      Talk to your provider about Calcium and Vitamin D.     Lifestyle    Exercise at least 150 minutes a week (30 minutes a day, 5 days a week). This will help you control your weight and prevent disease.      Limit alcohol to one drink per day.      No smoking.       Wear sunscreen to prevent skin cancer.       See your dentist twice a year for an exam and cleaning.      See your eye doctor every 1 to 2 years to screen for conditions such as glaucoma, macular degeneration and cataracts.    Consider antihistamines to help with your allergy symptoms.    During the day choose one of the following:       Loratadine (Claritin)10mg daily       Fexofenadine (Allegra) 180mg daily                                                         Cetirizine (Zyrtec)10mg daily    For severe symptoms, you can also use diphenhydramine (Benardryl), 25-50 mg.        -Use only at bedtime because it will cause drowsiness.      For nasal congestion:    OTC pseudoephedrine (Sudafed)- 60 mg every 4-6 hours. Avoid using before bedtime as it may keep you awake.  May cause an increase in blood pressure.    OTC Afrin nasal spray- DO NOT use for longer than 3 days.  This will cause rebound congestion and worsening of symptoms.    OTC Nasacort Allergy 24h (Triamcinolone): medication that helps with chronic congestion.  Must be used daily and may take up to 2 weeks before improvement is noted.    Fluticasone (Flonase) nasal spray- prescription medication that helps with chronic congestion.  Must be used daily and may take up to 2 weeks before improvement is noted.  How to use nasal sprays:    Place the nozzle into each nostril and point away from the center of your nose.  Apply 1 or 2 sprays into each nostril.  Use once daily.      Some spotting of blood may occur, if these occurs with two sprays, you may need to decrease to 1  "spray.    If your nose is plugged with mucous, blow your nose or rinse with nasal saline before using your nasal spray.    Nasal Saline Rinses:  Rinses help keep your sinuses and nose moist. Mix a teaspoon of salt in eight ounces of fresh, warm water. Use a bulb syringe to gently squirt the water into your nose a few times a day. You can also buy ready-made saline nasal sprays.             Follow-ups after your visit        Who to contact     If you have questions or need follow up information about today's clinic visit or your schedule please contact [facility] directly at [phone].  Normal or non-critical lab and imaging results will be communicated to you by MyChart, letter or phone within 4 business days after the clinic has received the results. If you do not hear from us within 7 days, please contact the clinic through [facility] or phone. If you have a critical or abnormal lab result, we will notify you by phone as soon as possible.  Submit refill requests through [employer] or call your pharmacy and they will forward the refill request to us. Please allow 3 business days for your refill to be completed.          Additional Information About Your Visit        [facility] Information     [employer] lets you send messages to your doctor, view your test results, renew your prescriptions, schedule appointments and more. To sign up, go to [email or web address] . Click on \"Log in\" on the left side of the screen, which will take you to the Welcome page. Then click on \"Sign up Now\" on the right side of the page.     You will be asked to enter the access code listed below, as well as some personal information. Please follow the directions to create your username and password.     Your access code is: [email or web address]  Expires: 2018  8:45 AM     Your access code will  in 90 days. If you need help or a new code, please call your [facility]3] or [phone].        Care EveryWhere ID     " This is your Care EveryWhere ID. This could be used by other organizations to access your [facility] medical records  [id number]        Your Vitals Were     Last Period                   (LMP Unknown)            Blood Pressure from Last 3 Encounters:   03/22/17 134/80   11/28/16 108/70   11/26/16 129/77    Weight from Last 3 Encounters:   03/22/17 155 lb (70.3 kg)   11/28/16 152 lb (68.9 kg)   11/26/16 152 lb 8 oz (69.2 kg)              We Performed the Following     Basic metabolic panel     Lipid panel reflex to direct LDL Fasting          Today's Medication Changes          These changes are accurate as of: 11/4/17  8:45 AM.  If you have any questions, ask your nurse or doctor.               Start taking these medicines.        Dose/Directions    triamcinolone 0.1 % cream   Commonly known as:  KENALOG   Used for:  Dermatitis   Started by:  Fay Tang PA-C        Apply topically 1-3 times a day prn.   Quantity:  15 g   Refills:  1         These medicines have changed or have updated prescriptions.        Dose/Directions    buPROPion 300 MG 24 hr tablet   Commonly known as:  WELLBUTRIN XL   This may have changed:  See the new instructions.   Used for:  Major depression in complete remission (H)   Changed by:  Fay Tang PA-C        Take 1 tablet (300mg) by mouth once daily in the morning   Quantity:  90 tablet   Refills:  3       * sertraline 100 MG tablet   Commonly known as:  ZOLOFT   This may have changed:  Another medication with the same name was changed. Make sure you understand how and when to take each.   Used for:  Major depression in complete remission (H)   Changed by:  Fay Tang PA-C        TAKE 1 TABLET DAILY   Quantity:  90 tablet   Refills:  3       * sertraline 50 MG tablet   Commonly known as:  ZOLOFT   This may have changed:  See the new instructions.   Used for:  Major depression in complete remission (H)   Changed by:  Fay Tang  LINDA Ayers        Dose:  50 mg   Take 1 tablet (50 mg) by mouth daily take with 100 mg tab for 150 mg total.   Quantity:  90 tablet   Refills:  3       * Notice:  This list has 2 medication(s) that are the same as other medications prescribed for you. Read the directions carefully, and ask your doctor or other care provider to review them with you.      Stop taking these medicines if you haven't already. Please contact your care team if you have questions.     cephalexin 250 MG capsule   Commonly known as:  KEFLEX   Stopped by:  Fay Tang PA-C           oxyCODONE IR 5 MG tablet   Commonly known as:  ROXICODONE   Stopped by:  Fay Tang PA-C           polyethylene glycol powder   Commonly known as:  MIRALAX/GLYCOLAX   Stopped by:  Fay Tang PA-C                Where to get your medicines      These medications were sent to [facility][id number] - [facility] [address]  [address] [facility] [address]     Phone:  [phone]     buPROPion 300 MG 24 hr tablet    sertraline 50 MG tablet    triamcinolone 0.1 % cream                Primary Care Provider Office Phone # Fax #    Fay Tang PA-C [phone] [address] [facility] [address]  [facility] [address]        Equal Access to Services     [facility] AH: Hadii aad ku hadasho Soomaali, waaxda luqadaha, qaybta kaalmada adeegyada, gustavo stanley hayerica forrest . So [facility] [phone].    ATENCIÓN: Si habla español, tiene a bustillos disposición servicios gratuitos de asistencia lingüística. Llame al [phone].    We comply with applicable federal civil rights laws and [place] laws. We do not discriminate on the basis of race, color, national origin, age, disability, sex, sexual orientation, or gender identity.            Thank you!     Thank you for choosing [facility]  for your care. Our goal is always to provide  you with excellent care. Hearing back from our patients is one way we can continue to improve our services. Please take a few minutes to complete the written survey that you may receive in the mail after your visit with us. Thank you!             Your Updated Medication List - Protect others around you: Learn how to safely use, store and throw away your medicines at www.disposemymeds.org.          This list is accurate as of: 11/4/17  8:45 AM.  Always use your most recent med list.                   Brand Name Dispense Instructions for use Diagnosis    AMOXICILLIN PO      Take 500 mg by mouth Only before dental procedures        ARICEPT PO      Take 5 mg by mouth        buPROPion 300 MG 24 hr tablet    WELLBUTRIN XL    90 tablet    Take 1 tablet (300mg) by mouth once daily in the morning    Major depression in complete remission (H)       CENTRUM PO      Take  by mouth.        clonazePAM 2 MG tablet    klonoPIN    90 tablet    Take 1 tablet (2 mg) by mouth nightly as needed (Taking at night for restless legs)    Restless legs syndrome       CULTURELLE PO           gabapentin 600 MG tablet    NEURONTIN    270 tablet    TAKE 1 TABLET THREE TIMES A DAY    Chronic pain disorder       glucosamine FORTE Caps      Take  by mouth.        l-lysine HCl 500 MG Tabs tablet      Take 500 mg by mouth daily.        methotrexate sodium 2.5 MG Tabs      Take 5 tablets once a week        NABUMETONE PO      Take 750 mg by mouth 2 times daily        omeprazole 20 MG CR capsule    priLOSEC    90 capsule    Take 1 capsule (20 mg) by mouth daily    Gastroesophageal reflux disease without esophagitis       predniSONE 5 MG tablet    DELTASONE          * sertraline 100 MG tablet    ZOLOFT    90 tablet    TAKE 1 TABLET DAILY    Major depression in complete remission (H)       * sertraline 50 MG tablet    ZOLOFT    90 tablet    Take 1 tablet (50 mg) by mouth daily take with 100 mg tab for 150 mg total.    Major depression in complete remission  (H)       tiZANidine 2 MG tablet    ZANAFLEX    60 tablet    Take 1 tablet (2mg) by mouth three times daily as needed for muscle spasms    Cramp of limb       triamcinolone 0.1 % cream    KENALOG    15 g    Apply topically 1-3 times a day prn.    Dermatitis       * Notice:  This list has 2 medication(s) that are the same as other medications prescribed for you. Read the directions carefully, and ask your doctor or other care provider to review them with you.

## 2017-11-04 NOTE — PROGRESS NOTES
SUBJECTIVE:   Marcela Sandhu is a 77 year old female who presents for Preventive Visit.    re you in the first 12 months of your Medicare coverage?  No    Physical   Annual:     Getting at least 3 servings of Calcium per day::  NO    Bi-annual eye exam::  Yes    Dental care twice a year::  Yes    Sleep apnea or symptoms of sleep apnea::  None    Diet::  Low salt and Breakfast skipped    Taking medications regularly::  Yes    Medication side effects::  None    Additional concerns today::  No      COGNITIVE SCREEN  1) Repeat 3 items (Banana, Sunrise, Chair)    2) Clock draw: NORMAL  3) 3 item recall: Recalls 2 objects   Results: NORMAL clock, 1-2 items recalled: COGNITIVE IMPAIRMENT LESS LIKELY    Mini-CogTM Copyright S Nuria. Licensed by the author for use in [facility] [employer]; reprinted with permission (delia@[facility]). All rights reserved.      Dry skin on left elbow, will trial triamcinolone cream.    Left subjectivial hemorrhage  Runny nose.  Claritin will try once daily.                 Reviewed and updated as needed this visit by clinical staffTobacco  Allergies  Meds  Problems  Med Hx  Surg Hx  Fam Hx  Soc Hx          Reviewed and updated as needed this visit by Provider  Tobacco  Allergies  Meds  Problems  Med Hx  Surg Hx  Fam Hx  Soc Hx         Social History   Substance Use Topics     Smoking status: Never Smoker     Smokeless tobacco: Never Used     Alcohol use No       The patient does not drink >3 drinks per day nor >7 drinks per week.            Today's PHQ-2 Score:   PHQ-2 ( 1999 [employer]) 11/4/2017   Q1: Little interest or pleasure in doing things 0   Q2: Feeling down, depressed or hopeless 0   PHQ-2 Score 0   Q1: Little interest or pleasure in doing things Not at all   Q2: Feeling down, depressed or hopeless Not at all   PHQ-2 Score 0       Do you feel safe in your environment - Yes    Do you have a Health Care Directive?: No: Advance care planning reviewed with patient;  information given to patient to review.      Current providers sharing in care for this patient include: Patient Care Team:  Fay Tang PA-C as PCP - General (Physician Assistant)      Hearing impairment: No    Ability to successfully perform activities of daily living: Yes, no assistance needed     Fall risk:  Fallen 2 or more times in the past year?: No  Any fall with injury in the past year?: No      Home safety:  none identified      The following health maintenance items are reviewed in Epic and correct as of today:  Health Maintenance   Topic Date Due     FALL RISK ASSESSMENT  12/18/2016     INFLUENZA VACCINE (SYSTEM ASSIGNED)  09/01/2017     PATRICIA QUESTIONNAIRE 1 YEAR  10/21/2017     LIPID SCREEN Q5 YR FEMALE (SYSTEM ASSIGNED)  10/18/2017     DEPRESSION ACTION PLAN Q1 YR  03/22/2018     PHQ-9 Q6 MONTHS  04/20/2018     ADVANCE DIRECTIVE PLANNING Q5 YRS  10/23/2019     COLON CANCER SCREEN (SYSTEM ASSIGNED)  10/18/2021     TETANUS IMMUNIZATION (SYSTEM ASSIGNED)  03/22/2027     DEXA SCAN SCREENING (SYSTEM ASSIGNED)  Addressed     PNEUMOCOCCAL  Completed     BP Readings from Last 3 Encounters:   03/22/17 134/80   11/28/16 108/70   11/26/16 129/77    Wt Readings from Last 3 Encounters:   03/22/17 155 lb (70.3 kg)   11/28/16 152 lb (68.9 kg)   11/26/16 152 lb 8 oz (69.2 kg)                  Recent Labs   Lab Test  11/26/16   1019  03/30/16   0828  12/18/15   1204  07/21/15  10/22/12   1440  10/18/12   0914   08/25/11   1838   LDL   --    --    --    --    --    --   82   --   108*   HDL   --    --    --    --    --    --   50   --   57   TRIG   --    --    --    --    --    --   110   --    --    ALT  20   --   19   --    --   28  24   < >   --    CR  0.93  1.00   --    < >  0.9  0.64  1.00   < >   --    GFRESTIMATED  59*  54*   --    < >  65.4  >90  55*   --    --    GFRESTBLACK  71  66   --    < >   --   >90  66   --    --    POTASSIUM  3.8  4.0   --    --   3.7  3.3*  4.5   < >   --    TSH   --    " --    --    --   1.10   --    --    --    --     < > = values in this interval not displayed.            Mammogram Screening: Mammo discussed, not appropriate for or declined by this patient. Patient over age 75, has elected to stop mammography screening.Review of Systems  C: NEGATIVE for fever, chills, change in weight  INTEGUMENTARY/SKIN: POSITIVE for dry skin  E: NEGATIVE for vision changes or irritation  ENT/MOUTH: POSITIVE for rhinorrhea-clear  R: NEGATIVE for significant cough or SOB  B: NEGATIVE for masses, tenderness or discharge  CV: NEGATIVE for chest pain, palpitations or peripheral edema  GI: NEGATIVE for nausea, abdominal pain, heartburn, or change in bowel habits  : NEGATIVE for frequency, dysuria, or hematuria  M: NEGATIVE for significant arthralgias or myalgia  N: NEGATIVE for weakness, dizziness or paresthesias  E: NEGATIVE for temperature intolerance, skin/hair changes  H: NEGATIVE for bleeding problems  P: NEGATIVE for changes in mood or affect    OBJECTIVE:   LMP  (LMP Unknown) Estimated body mass index is 26.61 kg/(m^2) as calculated from the following:    Height as of 11/16/16: 5' 4\" (1.626 m).    Weight as of 3/22/17: 155 lb (70.3 kg).  Physical Exam  GENERAL APPEARANCE: healthy, alert and no distress  EYES: Eyes grossly normal to inspection, PERRL and conjunctivae and sclerae normal  HENT: ear canals and TM's normal, nose and mouth without ulcers or lesions, oropharynx clear and oral mucous membranes moist  NECK: no adenopathy, no asymmetry, masses, or scars and thyroid normal to palpation  RESP: lungs clear to auscultation - no rales, rhonchi or wheezes  CV: regular rate and rhythm, normal S1 S2, no S3 or S4, no murmur, click or rub, no peripheral edema and peripheral pulses strong  ABDOMEN: soft, nontender, no hepatosplenomegaly, no masses and bowel sounds normal  MS: no musculoskeletal defects are noted and gait is age appropriate without ataxia  SKIN: no suspicious lesions or " "rashes  NEURO: Normal strength and tone, sensory exam grossly normal, mentation intact and speech normal  PSYCH: mentation appears normal and affect normal/bright    ASSESSMENT / PLAN:       ICD-10-CM    1. Routine general medical examination at a health care facility Z00.00 Basic metabolic panel     Lipid panel reflex to direct LDL Fasting   2. 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 Dermatitis L30.9 triamcinolone (KENALOG) 0.1 % cream   Triamcinolone for left elbow dryness    Bilateral mastectomy, mammos not needed.    Claritin for rhinorrhea.      End of Life Planning:  Patient currently has an advanced directive: No.  I have verified the patient's ablity to prepare an advanced directive/make health care decisions.  Literature was provided to assist patient in preparing an advanced directive.    COUNSELING:  Reviewed preventive health counseling, as reflected in patient instructions      Estimated body mass index is 26.61 kg/(m^2) as calculated from the following:    Height as of 11/16/16: 5' 4\" (1.626 m).    Weight as of 3/22/17: 155 lb (70.3 kg).     reports that she has never smoked. She has never used smokeless tobacco.        Appropriate preventive services were discussed with this patient, including applicable screening as appropriate for cardiovascular disease, diabetes, osteopenia/osteoporosis, and glaucoma.  As appropriate for age/gender, discussed screening for colorectal cancer, prostate cancer, breast cancer, and cervical cancer. Checklist reviewing preventive services available has been given to the patient.    Reviewed patients plan of care and provided an AVS. The Basic Care Plan (routine screening as documented in Health Maintenance) for Marcela meets the Care Plan requirement. This Care Plan has been established and reviewed with the Patient.    Counseling " Resources:  ATP IV Guidelines  Pooled Cohorts Equation Calculator  Breast Cancer Risk Calculator  FRAX Risk Assessment  ICSI Preventive Guidelines  Dietary Guidelines for Americans, 2010  USDA's MyPlate  ASA Prophylaxis  Lung CA Screening    Fay Tang PA-C  [facility] XERXESAnswers for HPI/ROS submitted by the patient on 11/4/2017   PHQ-2 Score: 0  PATRICIA 7 TOTAL SCORE: 0

## 2017-11-04 NOTE — PATIENT INSTRUCTIONS
Preventive Health Recommendations    Female Ages 65 +    Yearly exam:     See your health care provider every year in order to  o Review health changes.   o Discuss preventive care.    o Review your medicines if your doctor has prescribed any.      You no longer need a yearly Pap test unless you've had an abnormal Pap test in the past 10 years. If you have vaginal symptoms, such as bleeding or discharge, be sure to talk with your provider about a Pap test.      Every 1 to 2 years, have a mammogram.  If you are over 69, talk with your health care provider about whether or not you want to continue having screening mammograms.      Every 10 years, have a colonoscopy. Or, have a yearly FIT test (stool test). These exams will check for colon cancer.       Have a cholesterol test every 5 years, or more often if your doctor advises it.       Have a diabetes test (fasting glucose) every three years. If you are at risk for diabetes, you should have this test more often.       At age 65, have a bone density scan (DEXA) to check for osteoporosis (brittle bone disease).    Shots:    Get a flu shot each year.    Get a tetanus shot every 10 years.    Talk to your doctor about your pneumonia vaccines. There are now two you should receive - Pneumovax (PPSV 23) and Prevnar (PCV 13).    Talk to your doctor about the shingles vaccine.    Talk to your doctor about the hepatitis B vaccine.    Nutrition:     Eat at least 5 servings of fruits and vegetables each day.      Eat whole-grain bread, whole-wheat pasta and brown rice instead of white grains and rice.      Talk to your provider about Calcium and Vitamin D.     Lifestyle    Exercise at least 150 minutes a week (30 minutes a day, 5 days a week). This will help you control your weight and prevent disease.      Limit alcohol to one drink per day.      No smoking.       Wear sunscreen to prevent skin cancer.       See your dentist twice a year for an exam and cleaning.      See your  eye doctor every 1 to 2 years to screen for conditions such as glaucoma, macular degeneration and cataracts.    Consider antihistamines to help with your allergy symptoms.    During the day choose one of the following:       Loratadine (Claritin)10mg daily       Fexofenadine (Allegra) 180mg daily                                                         Cetirizine (Zyrtec)10mg daily    For severe symptoms, you can also use diphenhydramine (Benardryl), 25-50 mg.        -Use only at bedtime because it will cause drowsiness.      For nasal congestion:    OTC pseudoephedrine (Sudafed)- 60 mg every 4-6 hours. Avoid using before bedtime as it may keep you awake.  May cause an increase in blood pressure.    OTC Afrin nasal spray- DO NOT use for longer than 3 days.  This will cause rebound congestion and worsening of symptoms.    OTC Nasacort Allergy 24h (Triamcinolone): medication that helps with chronic congestion.  Must be used daily and may take up to 2 weeks before improvement is noted.    Fluticasone (Flonase) nasal spray- prescription medication that helps with chronic congestion.  Must be used daily and may take up to 2 weeks before improvement is noted.  How to use nasal sprays:    Place the nozzle into each nostril and point away from the center of your nose.  Apply 1 or 2 sprays into each nostril.  Use once daily.      Some spotting of blood may occur, if these occurs with two sprays, you may need to decrease to 1 spray.    If your nose is plugged with mucous, blow your nose or rinse with nasal saline before using your nasal spray.    Nasal Saline Rinses:  Rinses help keep your sinuses and nose moist. Mix a teaspoon of salt in eight ounces of fresh, warm water. Use a bulb syringe to gently squirt the water into your nose a few times a day. You can also buy ready-made saline nasal sprays.

## 2017-11-04 NOTE — PROGRESS NOTES
"   SUBJECTIVE:   CC: Marcela Sandhu is an 77 year old woman who presents for preventive health visit.     Well Child     Physical   Annual:     Getting at least 3 servings of Calcium per day::  NO    Bi-annual eye exam::  Yes    Dental care twice a year::  Yes    Sleep apnea or symptoms of sleep apnea::  None    Diet::  Low salt and Breakfast skipped    Taking medications regularly::  Yes    Medication side effects::  None    Additional concerns today::  No          {additional problems to add (Optional):985984}    Today's PHQ-2 Score: PHQ-2 ( 1999 Pfizer) 11/4/2017   Q1: Little interest or pleasure in doing things 0   Q2: Feeling down, depressed or hopeless 0   PHQ-2 Score 0   Q1: Little interest or pleasure in doing things Not at all   Q2: Feeling down, depressed or hopeless Not at all   PHQ-2 Score 0       Abuse: Current or Past(Physical, Sexual or Emotional)- no  Do you feel safe in your environment - yes    Social History   Substance Use Topics     Smoking status: Never Smoker     Smokeless tobacco: Never Used     Alcohol use No     The patient does not drink >3 drinks per day nor >7 drinks per week.    Reviewed orders with patient.  Reviewed health maintenance and updated orders accordingly - Yes  {Chronicprobdata (Optional):141534}    {Mammo Decision Support (Optional):785275}    Pertinent mammograms are reviewed under the imaging tab.  History of abnormal Pap smear: {PAP HX:542619}    Reviewed and updated as needed this visit by clinical staff  Tobacco  Allergies  Meds  Med Hx  Surg Hx  Fam Hx  Soc Hx        Reviewed and updated as needed this visit by Provider        {HISTORY OPTIONS (Optional):042765}    Review of Systems  {FEMALE PREVENTATIVE ROS:957883}     OBJECTIVE:   LMP  (LMP Unknown)  Physical Exam  {Exam Choices:755031}    ASSESSMENT/PLAN:   {Diag Picklist:836366}    COUNSELING:  {FEMALE COUNSELING MESSAGES:999003::\"Reviewed preventive health counseling, as reflected in patient " "instructions\"}    {BP Counseling- Complete if BP >= 120/80  (Optional):668420}     reports that she has never smoked. She has never used smokeless tobacco.  {Tobacco Cessation -- Complete if patient is a smoker (Optional):255638}  Estimated body mass index is 26.61 kg/(m^2) as calculated from the following:    Height as of 11/16/16: 5' 4\" (1.626 m).    Weight as of 3/22/17: 155 lb (70.3 kg).   {Weight Management Plan (ACO) Complete if BMI is abnormal-  Ages 18-64  BMI >24.9.  Age 65+ with BMI <23 or >30 (Optional):902412}    Counseling Resources:  ATP IV Guidelines  Pooled Cohorts Equation Calculator  Breast Cancer Risk Calculator  FRAX Risk Assessment  ICSI Preventive Guidelines  Dietary Guidelines for Americans, 2010  USDA's MyPlate  ASA Prophylaxis  Lung CA Screening    Fay Tang PA-C  [facility]  Answers for HPI/ROS submitted by the patient on 11/4/2017   PHQ-2 Score: 0  PATRICIA 7 TOTAL SCORE: 0    "

## 2017-11-05 ASSESSMENT — ANXIETY QUESTIONNAIRES: GAD7 TOTAL SCORE: 0

## 2017-12-27 ENCOUNTER — OFFICE VISIT (OUTPATIENT)
Dept: FAMILY MEDICINE | Facility: CLINIC | Age: 77
End: 2017-12-27
Payer: COMMERCIAL

## 2017-12-27 VITALS
TEMPERATURE: 97.1 F | OXYGEN SATURATION: 99 % | DIASTOLIC BLOOD PRESSURE: 86 MMHG | RESPIRATION RATE: 14 BRPM | SYSTOLIC BLOOD PRESSURE: 122 MMHG | BODY MASS INDEX: 26.61 KG/M2 | WEIGHT: 155 LBS | HEART RATE: 92 BPM

## 2017-12-27 DIAGNOSIS — H61.22 IMPACTED CERUMEN OF LEFT EAR: ICD-10-CM

## 2017-12-27 DIAGNOSIS — B07.0 PLANTAR WARTS: Primary | ICD-10-CM

## 2017-12-27 PROCEDURE — 69210 REMOVE IMPACTED EAR WAX UNI: CPT | Performed by: PHYSICIAN ASSISTANT

## 2017-12-27 PROCEDURE — 17110 DESTRUCTION B9 LES UP TO 14: CPT | Performed by: PHYSICIAN ASSISTANT

## 2017-12-27 NOTE — PROCEDURES
Lesions were pared down using a #11 sterile blade.  Liquid nitrogen was applied for 10 seconds x3  to the skin lesions. The expected blistering/scabbing reaction was explained. Patient is not to pick at the areas. Patient reminded to expect hypopigmented scars from the procedure. Return in 2-3 weeks for repeat treatment if lesions fail to fully resolve.  At home treatment discussed in patient instructions.

## 2017-12-27 NOTE — MR AVS SNAPSHOT
After Visit Summary   12/27/2017    Marcela Sandhu    MRN: [id number]           Patient Information     Date Of Birth          1940        Visit Information        Provider Department      12/27/2017 10:30 AM Fay Tang PA-C [facility]        Today's Diagnoses     Plantar warts    -  1    Impacted cerumen of left ear           Follow-ups after your visit        Who to contact     If you have questions or need follow up information about today's clinic visit or your schedule please contact [facility] directly at [phone].  Normal or non-critical lab and imaging results will be communicated to you by MyChart, letter or phone within 4 business days after the clinic has received the results. If you do not hear from us within 7 days, please contact the clinic through MyChart or phone. If you have a critical or abnormal lab result, we will notify you by phone as soon as possible.  Submit refill requests through [facility] or call your pharmacy and they will forward the refill request to us. Please allow 3 business days for your refill to be completed.          Additional Information About Your Visit        Care EveryWhere ID     This is your Care EveryWhere ID. This could be used by other organizations to access your [facility] medical records  [id number]        Your Vitals Were     Pulse Temperature Respirations Last Period Pulse Oximetry BMI (Body Mass Index)    92 97.1  F (36.2  C) (Tympanic) 14 (LMP Unknown) 99% 26.61 kg/m2       Blood Pressure from Last 3 Encounters:   12/27/17 122/86   03/22/17 134/80   11/28/16 108/70    Weight from Last 3 Encounters:   12/27/17 155 lb (70.3 kg)   03/22/17 155 lb (70.3 kg)   11/28/16 152 lb (68.9 kg)              We Performed the Following     DESTRUCT BENIGN LESION, UP TO 14     PAF COMPLETED     REMOVE IMPACTED CERUMEN        Primary Care Provider Office Phone # Fax #    Fay  Andria Tang PA-C [phone]       [address]  [facility] [address]        Equal Access to Services     DEMETRIO WAN : Hadtamiko bill hinton hadalixo Sokimberlyali, waaxda luqadaha, qaybta kaalmada adeegyada, gustavo rodriguez laMahaderica wilson. So [place] -[phone].    ATENCIÓN: Si habla español, tiene a bustillos disposición servicios gratuitos de asistencia lingüística. Llame al [phone].    We comply with applicable federal civil rights laws and [place] laws. We do not discriminate on the basis of race, color, national origin, age, disability, sex, sexual orientation, or gender identity.            Thank you!     Thank you for choosing [facility]  for your care. Our goal is always to provide you with excellent care. Hearing back from our patients is one way we can continue to improve our services. Please take a few minutes to complete the written survey that you may receive in the mail after your visit with us. Thank you!             Your Updated Medication List - Protect others around you: Learn how to safely use, store and throw away your medicines at www.disposemymeds.org.          This list is accurate as of: 12/27/17 10:53 AM.  Always use your most recent med list.                   Brand Name Dispense Instructions for use Diagnosis    AMOXICILLIN PO      Take 500 mg by mouth Only before dental procedures        ARICEPT PO      Take 5 mg by mouth        buPROPion 300 MG 24 hr tablet    WELLBUTRIN XL    90 tablet    Take 1 tablet (300mg) by mouth once daily in the morning    Major depression in complete remission (H)       CENTRUM PO      Take  by mouth.        clonazePAM 2 MG tablet    klonoPIN    90 tablet    Take 1 tablet (2 mg) by mouth nightly as needed (Taking at night for restless legs)    Restless legs syndrome       CULTURELLE PO           gabapentin 600 MG tablet    NEURONTIN    270 tablet    TAKE 1 TABLET THREE TIMES A DAY    Chronic pain  disorder       glucosamine FORTE Caps      Take  by mouth.        l-lysine HCl 500 MG Tabs tablet      Take 500 mg by mouth daily.        methotrexate sodium 2.5 MG Tabs      Take 5 tablets once a week        NABUMETONE PO      Take 750 mg by mouth 2 times daily        predniSONE 5 MG tablet    DELTASONE          * sertraline 100 MG tablet    ZOLOFT    90 tablet    Take 1 tablet (100 mg) by mouth daily with 50 mg for 150 mg total.    Major depression in complete remission (H)       * sertraline 50 MG tablet    ZOLOFT    90 tablet    TAKE 1 TABLET DAILY    Major depression in complete remission (H)       tiZANidine 2 MG tablet    ZANAFLEX    60 tablet    Take 1 tablet (2mg) by mouth three times daily as needed for muscle spasms    Cramp of limb       triamcinolone 0.1 % cream    KENALOG    15 g    Apply topically 1-3 times a day prn.    Dermatitis       * Notice:  This list has 2 medication(s) that are the same as other medications prescribed for you. Read the directions carefully, and ask your doctor or other care provider to review them with you.

## 2017-12-27 NOTE — PROCEDURES
Cerumen is noted via otoscopic examination in the left external ear causing severe obstruction with impaction.  Removal deemed medically necessary due loss of hearing secondary to obstruction.  Cerumen was removed by provider with syringing and manual debridement with wax curette. Instructions for home care to prevent wax buildup are given. Pt tolerated procedure well without complication.

## 2017-12-27 NOTE — PROGRESS NOTES
SUBJECTIVE:   Marclea Sandhu is a 77 year old female who presents to clinic today for the following health issues:    WART(S)  Onset: 1 month    Description:   Location: big toe right foot  Number of warts: 1  Painful: YES    Accompanying Signs & Symptoms:  Signs of infection: no     History:   History of trauma: no   Prior warts: YES    Therapies Tried and outcome: none  Concern - ear pain  Onset: ongoing    Description:   Patient states that she had a cold and now just has left ear pain, no drainage    Intensity: mild, moderate    Progression of Symptoms:  worsening    Accompanying Signs & Symptoms:  See above    Previous history of similar problem:   N/a    Precipitating factors:   Worsened by: N/A    Alleviating factors:  Improved by: N/A    Therapies Tried and outcome: tylenol    Reviewed and updated as needed this visit by clinical staff  Tobacco  Allergies  Meds  Med Hx  Surg Hx  Fam Hx  Soc Hx      Reviewed and updated as needed this visit by Provider       Additional complaints: None    HPI additional notes: Marcela presents today with   Chief Complaint   Patient presents with     Ear Problem     Derm Problem   Left ear pain when blowing her nose, had her a cold about a month ago.           ROS:  C: Negative for fever, chills, recent change in weight  Skin: POSITIVE for verruca. Negative for warmth, swelling or redness  ENT/MOUTH:POSITIVE for congestion and hearing loss.  Negative for ear pain and sore throat.  Resp: Negative for significant cough or SOB  CV: Negative for chest pain or peripheral edema  GI: Negative for nausea, abdominal pain, heartburn, or change in bowel habits  MS: Negative for significant arthralgias or myalgias  NEURO: Negative  for headaches or dizziness.  P: Negative for changes in mood or affect  ROS all other systems negative.    Chart Review:  History   Smoking Status     Never Smoker   Smokeless Tobacco     Never Used     PHQ-9 SCORE 9/9/2016 3/22/2017 10/23/2017    Total Score - - -   Total Score 0 1 0     PATRICIA-7 SCORE 5/8/2015 10/21/2016 11/4/2017   Total Score 6 - -   Total Score - - 0 (minimal anxiety)   Total Score - 0 0     Patient Active Problem List   Diagnosis     Osteoporosis     Postmenopausal vaginal bleeding     Breast cancer (H)     RA (rheumatoid arthritis) (H)     Fibromyalgia     Restless legs syndrome     Health Care Home     Urethral prolapse     Pulmonary nodules     Chronic pain syndrome     Preventive measure     Constipation, chronic     Anemia     Hepatitis-C     Advance Care Planning     Major depression in complete remission (H)     Abnormal gait     Gastroesophageal reflux disease without esophagitis     Cramp of limb     Pain in both feet     Unspecified symptoms and signs involving cognitive functions and awareness     Past Surgical History:   Procedure Laterality Date     APPENDECTOMY       BACK SURGERY      2004   multiple back surgeries; 5 total     BACK SURGERY      5 back surgeries     BUNIONECTOMY CHEVRON AND PEBBLES BILATERAL, COMBINED  12/2/2011    Procedure:COMBINED BUNIONECTOMY CHEVRON AND PEBBLES BILATERAL; BILATERAL THIRD AND FOURTH CLAWTOE RECONSTRUCTION WITH EXOSTECTOMY, LEFT FIRST TARSOMETATARSAL JOINT and 2nd toe Right foot reconstruction; Surgeon:ASHWIN FARMER; Location: OR     COLONOSCOPY  10/18/2011    Procedure:COLONOSCOPY; COLONOSCOPY; Surgeon:BRENDA RODRIGUEZ; Location: GI     EYE SURGERY      eye lid surgery     GENITOURINARY SURGERY      bladder suspension     MASTECTOMY, BILATERAL      bilateral mastectomy     ORTHOPEDIC SURGERY      knee replacement, hand, elbow and foot surgery     Problem list, Medication list, Allergies, Medical/Social/Surg hx reviewed in [facility], updated as appropriate.   OBJECTIVE:                                                    /86  Pulse 92  Temp 97.1  F (36.2  C) (Tympanic)  Resp 14  Wt 155 lb (70.3 kg)  LMP  (LMP Unknown)  SpO2 99%  BMI 26.61 kg/m2  Body mass index is 26.61  kg/(m^2).  GENERAL: healthy, alert, in no acute distress  EYES: Grossly normal to inspection, EOMI, PERRL  HENT: Ear canals severe obstruction with cerumen left. TM pearly gray without effusion bilaterally visible after irrigation.  NECK: Non-tender, no adenopathy.  RESP: lungs clear to auscultation - no rales, no rhonchi, no wheezes  CV: regular rate and rhythm, normal S1 S2.  No peripheral edema.  SKIN:6 mm callous on right great toe with 3 mm verruca with exposed capillaries  PSYCH: Alert and oriented times 3;  Able to articulate logical thoughts. Affect is normal.    Diagnostic test results: none      ASSESSMENT/PLAN:                                                          ICD-10-CM    1. Plantar warts B07.0 DESTRUCT BENIGN LESION, UP TO 14   2. Impacted cerumen of left ear H61.22 REMOVE IMPACTED CERUMEN       Please see patient instructions for treatment details.    Follow up as needed.    Fay Tang PA-C  [facility]

## 2018-01-05 ENCOUNTER — TELEPHONE (OUTPATIENT)
Dept: FAMILY MEDICINE | Facility: CLINIC | Age: 78
End: 2018-01-05

## 2018-01-05 NOTE — TELEPHONE ENCOUNTER
Patient was called and informed to keep are clean with soap and water. Keep wart covered and change bandage if wet. Continue to monitor for signs of infections. Follow up with PCP Monday. Pt verbalized understanding and agreement with plan.

## 2018-01-05 NOTE — TELEPHONE ENCOUNTER
Big toe of right foot there is an open sore. Redness and irritation are around the area where the warts were removed. Wants to know how to treat them or if she should be seen again.

## 2018-01-05 NOTE — TELEPHONE ENCOUNTER
Pt called back. She made an appt with UMESH Tang for Monday.   She cannot get a ride sooner.  She does not have any bacitracin   and has no one to get it for her.  Anything she should do in the mean time?

## 2018-01-08 ENCOUNTER — OFFICE VISIT (OUTPATIENT)
Dept: FAMILY MEDICINE | Facility: CLINIC | Age: 78
End: 2018-01-08
Payer: COMMERCIAL

## 2018-01-08 VITALS
WEIGHT: 155 LBS | SYSTOLIC BLOOD PRESSURE: 116 MMHG | TEMPERATURE: 96.3 F | DIASTOLIC BLOOD PRESSURE: 74 MMHG | RESPIRATION RATE: 16 BRPM | OXYGEN SATURATION: 98 % | HEART RATE: 86 BPM | BODY MASS INDEX: 26.61 KG/M2

## 2018-01-08 DIAGNOSIS — B07.0 PLANTAR WARTS: ICD-10-CM

## 2018-01-08 DIAGNOSIS — M54.50 ACUTE BILATERAL LOW BACK PAIN WITHOUT SCIATICA: Primary | ICD-10-CM

## 2018-01-08 PROCEDURE — 99213 OFFICE O/P EST LOW 20 MIN: CPT | Performed by: PHYSICIAN ASSISTANT

## 2018-01-08 RX ORDER — TIZANIDINE 2 MG/1
TABLET ORAL
Qty: 60 TABLET | Refills: 2 | Status: SHIPPED | OUTPATIENT
Start: 2018-01-08 | End: 2018-06-09

## 2018-01-08 NOTE — NURSING NOTE
"Chief Complaint   Patient presents with     Wound Check       Initial /74  Pulse 86  Temp 96.3  F (35.7  C) (Tympanic)  Resp 16  Wt 155 lb (70.3 kg)  LMP  (LMP Unknown)  SpO2 98%  BMI 26.61 kg/m2 Estimated body mass index is 26.61 kg/(m^2) as calculated from the following:    Height as of 11/16/16: 5' 4\" (1.626 m).    Weight as of this encounter: 155 lb (70.3 kg).  Medication Reconciliation: complete    "

## 2018-01-08 NOTE — PROGRESS NOTES
SUBJECTIVE:   Marcela Sandhu is a 77 year old female who presents to clinic today for the following health issues:    wound      Duration: n/a    Description (location/character/radiation): patient states that after she had a wart removed on the bottom of her right foot, and is now a small wound. Drainage, some pain    Intensity:  mild, moderate    Accompanying signs and symptoms: see above    History (similar episodes/previous evaluation): None    Precipitating or alleviating factors: None    Therapies tried and outcome: bacitracin      -LBP banding all the way across, sharp pain, does not radiate, worsens when bending over to look at her foot.  Reviewed and updated as needed this visit by clinical staff  Tobacco  Allergies  Meds  Problems  Med Hx  Surg Hx  Fam Hx  Soc Hx        Reviewed and updated as needed this visit by Provider  Tobacco  Allergies  Meds  Problems  Med Hx  Surg Hx  Fam Hx  Soc Hx        Additional complaints: None    HPI additional notes: Marcela presents today with   Chief Complaint   Patient presents with     Wound Check   last seen 12/27/18 for cryotherapy on plantar wart.  Notes open sore now and is concerned that it may be infected.  Has had no pain or discharge.    Started having back pain yesterday on both sides, worse when bending over to look at her foot.     ROS:  C: Negative for fever, chills, recent change in weight  Skin: POSITIVE for verruca. Negative for discharge  ENT: Negative for ear, mouth and throat problems  MS: Positive for lower back pain  P: Negative for changes in mood or affect    Chart Review:  History   Smoking Status     Never Smoker   Smokeless Tobacco     Never Used     PHQ-9 SCORE 9/9/2016 3/22/2017 10/23/2017   Total Score - - -   Total Score 0 1 0     PATRICIA-7 SCORE 5/8/2015 10/21/2016 11/4/2017   Total Score 6 - -   Total Score - - 0 (minimal anxiety)   Total Score - 0 0     Patient Active Problem List   Diagnosis     Osteoporosis      Postmenopausal vaginal bleeding     Breast cancer (H)     RA (rheumatoid arthritis) (H)     Fibromyalgia     Restless legs syndrome     Health Care Home     Urethral prolapse     Pulmonary nodules     Chronic pain syndrome     Preventive measure     Constipation, chronic     Anemia     Hepatitis-C     Advance Care Planning     Major depression in complete remission (H)     Abnormal gait     Gastroesophageal reflux disease without esophagitis     Cramp of limb     Pain in both feet     Unspecified symptoms and signs involving cognitive functions and awareness     Past Surgical History:   Procedure Laterality Date     APPENDECTOMY       BACK SURGERY      2004   multiple back surgeries; 5 total     BACK SURGERY      5 back surgeries     BUNIONECTOMY CHEVRON AND PEBBLES BILATERAL, COMBINED  12/2/2011    Procedure:COMBINED BUNIONECTOMY CHEVRON AND PEBBLES BILATERAL; BILATERAL THIRD AND FOURTH CLAWTOE RECONSTRUCTION WITH EXOSTECTOMY, LEFT FIRST TARSOMETATARSAL JOINT and 2nd toe Right foot reconstruction; Surgeon:ASHWIN FARMER; Location: OR     COLONOSCOPY  10/18/2011    Procedure:COLONOSCOPY; COLONOSCOPY; Surgeon:BRENDA RODRIGUEZ; Location: GI     EYE SURGERY      eye lid surgery     GENITOURINARY SURGERY      bladder suspension     MASTECTOMY, BILATERAL      bilateral mastectomy     ORTHOPEDIC SURGERY      knee replacement, hand, elbow and foot surgery     Problem list, Medication list, Allergies, Medical/Social/Surg hx reviewed in [facility], updated as appropriate.   OBJECTIVE:                                                    /74  Pulse 86  Temp 96.3  F (35.7  C) (Tympanic)  Resp 16  Wt 155 lb (70.3 kg)  LMP  (LMP Unknown)  SpO2 98%  BMI 26.61 kg/m2  Body mass index is 26.61 kg/(m^2).  GENERAL: healthy, alert, in no acute distress  MS: tenderness to palpation of bilateral paralumbar muscles  SKIN: 3 mm exposed dermis at site of previous cryotherapy, small verruca still present, no surrounding erythema or  purulent discharge.  PSYCH: Alert and oriented times 3;  Able to articulate logical thoughts. Affect is normal.    Diagnostic test results: none      ASSESSMENT/PLAN:                                                          ICD-10-CM    1. Acute bilateral low back pain without sciatica M54.5 tiZANidine (ZANAFLEX) 2 MG tablet   2. Plantar warts B07.0      Will trial zanaflex and heat for back pain.    Discussed normal post cryotherapy changes on foot.  Keep covered with bacitracin daily, no sign of infection.    Please see patient instructions for treatment details.    Follow up in 7-10 days if not improving as anticipated, sooner PRN.    Fay Tang PA-C  [facility]

## 2018-01-08 NOTE — MR AVS SNAPSHOT
After Visit Summary   1/8/2018    Marcela Sandhu    MRN: [id number]           Patient Information     Date Of Birth          1940        Visit Information        Provider Department      1/8/2018 8:30 AM Fay Tang PA-C [facility]        Today's Diagnoses     Acute bilateral low back pain without sciatica    -  1    Plantar warts           Follow-ups after your visit        Who to contact     If you have questions or need follow up information about today's clinic visit or your schedule please contact [facility] directly at [phone].  Normal or non-critical lab and imaging results will be communicated to you by MyChart, letter or phone within 4 business days after the clinic has received the results. If you do not hear from us within 7 days, please contact the clinic through MyChart or phone. If you have a critical or abnormal lab result, we will notify you by phone as soon as possible.  Submit refill requests through [employer] or call your pharmacy and they will forward the refill request to us. Please allow 3 business days for your refill to be completed.          Additional Information About Your Visit        Care EveryWhere ID     This is your Care EveryWhere ID. This could be used by other organizations to access your [place] medical records  [id number]        Your Vitals Were     Pulse Temperature Respirations Last Period Pulse Oximetry BMI (Body Mass Index)    86 96.3  F (35.7  C) (Tympanic) 16 (LMP Unknown) 98% 26.61 kg/m2       Blood Pressure from Last 3 Encounters:   01/08/18 116/74   12/27/17 122/86   03/22/17 134/80    Weight from Last 3 Encounters:   01/08/18 155 lb (70.3 kg)   12/27/17 155 lb (70.3 kg)   03/22/17 155 lb (70.3 kg)              Today, you had the following     No orders found for display         Today's Medication Changes          These changes are accurate as of: 1/8/18  9:05 AM.  If  you have any questions, ask your nurse or doctor.               These medicines have changed or have updated prescriptions.        Dose/Directions    tiZANidine 2 MG tablet   Commonly known as:  ZANAFLEX   This may have changed:  See the new instructions.   Used for:  Acute bilateral low back pain without sciatica   Changed by:  Fay Tang PA-C        Take 1 tablet (2mg) by mouth three times daily as needed for muscle spasms   Quantity:  60 tablet   Refills:  2            Where to get your medicines      These medications were sent to [id number] - [facility] [address] AT [facility] [address]  [address], [facility] [address]     Phone:  [phone]     tiZANidine 2 MG tablet                Primary Care Provider Office Phone # Fax #    Fay Tang PA-C [phone] [address]  [facility] [address]        Equal Access to Services     KRISHNA WAN : Hadii aad ku hadasho Soomaali, waaxda luqadaha, qaybta kaalmada adeegyada, waxay idiin hayaan adeeg kharakath lakaterin . So [facility] [phone].    ATENCIÓN: Si habla español, tiene a bustillos disposición servicios gratuitos de asistencia lingüística. [facility] [phone].    We comply with applicable federal civil rights laws and [place] laws. We do not discriminate on the basis of race, color, national origin, age, disability, sex, sexual orientation, or gender identity.            Thank you!     Thank you for choosing [facility]  for your care. Our goal is always to provide you with excellent care. Hearing back from our patients is one way we can continue to improve our services. Please take a few minutes to complete the written survey that you may receive in the mail after your visit with us. Thank you!             Your Updated Medication List - Protect others around you: Learn how to safely use, store and throw away your medicines at  www.disposemymeds.org.          This list is accurate as of: 1/8/18  9:05 AM.  Always use your most recent med list.                   Brand Name Dispense Instructions for use Diagnosis    AMOXICILLIN PO      Take 500 mg by mouth Only before dental procedures        ARICEPT PO      Take 5 mg by mouth        buPROPion 300 MG 24 hr tablet    WELLBUTRIN XL    90 tablet    Take 1 tablet (300mg) by mouth once daily in the morning    Major depression in complete remission (H)       CENTRUM PO      Take  by mouth.        clonazePAM 2 MG tablet    klonoPIN    90 tablet    Take 1 tablet (2 mg) by mouth nightly as needed (Taking at night for restless legs)    Restless legs syndrome       CULTURELLE PO           gabapentin 600 MG tablet    NEURONTIN    270 tablet    TAKE 1 TABLET THREE TIMES A DAY    Chronic pain disorder       glucosamine FORTE Caps      Take  by mouth.        l-lysine HCl 500 MG Tabs tablet      Take 500 mg by mouth daily.        methotrexate sodium 2.5 MG Tabs      Take 5 tablets once a week        NABUMETONE PO      Take 750 mg by mouth 2 times daily        predniSONE 5 MG tablet    DELTASONE          * sertraline 100 MG tablet    ZOLOFT    90 tablet    Take 1 tablet (100 mg) by mouth daily with 50 mg for 150 mg total.    Major depression in complete remission (H)       * sertraline 50 MG tablet    ZOLOFT    90 tablet    TAKE 1 TABLET DAILY    Major depression in complete remission (H)       tiZANidine 2 MG tablet    ZANAFLEX    60 tablet    Take 1 tablet (2mg) by mouth three times daily as needed for muscle spasms    Acute bilateral low back pain without sciatica       triamcinolone 0.1 % cream    KENALOG    15 g    Apply topically 1-3 times a day prn.    Dermatitis       * Notice:  This list has 2 medication(s) that are the same as other medications prescribed for you. Read the directions carefully, and ask your doctor or other care provider to review them with you.

## 2018-01-17 ENCOUNTER — TELEPHONE (OUTPATIENT)
Dept: FAMILY MEDICINE | Facility: CLINIC | Age: 78
End: 2018-01-17

## 2018-01-17 ENCOUNTER — OFFICE VISIT (OUTPATIENT)
Dept: URGENT CARE | Facility: URGENT CARE | Age: 78
End: 2018-01-17
Payer: COMMERCIAL

## 2018-01-17 VITALS
OXYGEN SATURATION: 94 % | SYSTOLIC BLOOD PRESSURE: 132 MMHG | BODY MASS INDEX: 26.3 KG/M2 | WEIGHT: 153.2 LBS | DIASTOLIC BLOOD PRESSURE: 74 MMHG | HEART RATE: 85 BPM

## 2018-01-17 DIAGNOSIS — M54.50 ACUTE BILATERAL LOW BACK PAIN WITHOUT SCIATICA: Primary | ICD-10-CM

## 2018-01-17 PROCEDURE — 99214 OFFICE O/P EST MOD 30 MIN: CPT | Performed by: FAMILY MEDICINE

## 2018-01-17 RX ORDER — HYDROCODONE BITARTRATE AND ACETAMINOPHEN 5; 325 MG/1; MG/1
1 TABLET ORAL EVERY 6 HOURS PRN
Qty: 12 TABLET | Refills: 0 | Status: SHIPPED | OUTPATIENT
Start: 2018-01-17 | End: 2018-01-20

## 2018-01-17 RX ORDER — METHYLPREDNISOLONE 4 MG
TABLET, DOSE PACK ORAL
Qty: 21 TABLET | Refills: 0 | Status: SHIPPED | OUTPATIENT
Start: 2018-01-17 | End: 2018-01-23

## 2018-01-17 NOTE — PROGRESS NOTES
SUBJECTIVEHPI: Marcela Sandhu is a 77 year old female who presents for evaluation of back pain.  Symptoms began 2 week(s) ago, have been onset gradual and are worse.  Pain is located in the low back bilateral but L>R region, with radiation to radiates into the right buttocks and radiates into the left buttocks.  Recent injury:none recalled by the patient  Personal hx of back pain is previous herniated disc and previous spinal surgery.  Pain is exacerbated by: bending and changing position.  Pain is relieved by: none.  Associated sx include: none.  Red flag symptoms: negative.    Past Medical History:   Diagnosis Date     Chronic pain disorder 7/3/2013    In pt at [facility]  Pain clinic ; narcotic Rx since then     Constipation, chronic      Depression with anxiety      Hepatitis A      Hepatitis-C     onset ;  Rx interferon ; cured per pt     Rheumatoid arthritis(714.0)      Urethral prolapse     cause of hematuria---see urology for excision if gross hematuria       Past Surgical History:   Procedure Laterality Date     APPENDECTOMY       BACK SURGERY         multiple back surgeries; 5 total     BACK SURGERY      5 back surgeries     BUNIONECTOMY CHEVRON AND PEBBLES BILATERAL, COMBINED  2011    Procedure:COMBINED BUNIONECTOMY CHEVRON AND PEBBLES BILATERAL; BILATERAL THIRD AND FOURTH CLAWTOE RECONSTRUCTION WITH EXOSTECTOMY, LEFT FIRST TARSOMETATARSAL JOINT and 2nd toe Right foot reconstruction; Surgeon:ASHWIN FARMER; Location: OR     COLONOSCOPY  10/18/2011    Procedure:COLONOSCOPY; COLONOSCOPY; Surgeon:BRENDA RODRIGUEZ; Location: GI     EYE SURGERY      eye lid surgery     GENITOURINARY SURGERY      bladder suspension     MASTECTOMY, BILATERAL      bilateral mastectomy     ORTHOPEDIC SURGERY      knee replacement, hand, elbow and foot surgery       Family History   Problem Relation Age of Onset     Cancer - colorectal Mother 55      age 94     C.A.D. Father       age 89       Social  History   Substance Use Topics     Smoking status: Never Smoker     Smokeless tobacco: Never Used     Alcohol use No     ROS:CONSTITUTIONAL:NEGATIVE for fever, chills, change in weight  GI: NEGATIVE for nausea, abdominal pain, heartburn, or change in bowel habits  NEURO: NEGATIVE for weakness, dizziness or paresthesias    OBJECTIVE:  /74 (BP Location: Right arm, Patient Position: Chair, Cuff Size: Adult Regular)  Pulse 85  Wt 153 lb 3.2 oz (69.5 kg)  LMP  (LMP Unknown)  SpO2 94%  BMI 26.3 kg/m2  Back examination: Back symmetric, no curvature. ROM normal. No CVA tenderness., positive findings: limitation of motion - flexion: Moderate and extension: Moderate, paraspinal muscle spasm, tenderness to palpation.  Straight leg raise test: negative.  GENERAL APPEARANCE: healthy, alert and no distress  ABDOMEN:  soft, nontender, no HSM or masses and bowel sounds normal  NEURO: Normal strength and tone with no weakness or sensory deficit noted, reflexes normal   SKIN: no suspicious lesions or rashes      ICD-10-CM    1. Acute bilateral low back pain without sciatica M54.5 methylPREDNISolone (MEDROL DOSEPAK) 4 MG tablet     HYDROcodone-acetaminophen (NORCO) 5-325 MG per tablet     Has muscle relaxewrs  Continue prn heat or ice application.    F/U PCP/IM/FP if persists, ED if worse

## 2018-01-17 NOTE — TELEPHONE ENCOUNTER
Is she still taking oxycodone?  It might be helpful to have her visit the walk in spine PT clinic at [facility], they should be able to see her today.  I can put in an order if she would like.

## 2018-01-17 NOTE — NURSING NOTE
"Chief Complaint   Patient presents with     Musculoskeletal Problem     x 1 week - bilateral legs - Current 10/10- discomfort -        Initial /74 (BP Location: Right arm, Patient Position: Chair, Cuff Size: Adult Regular)  Pulse 85  Wt 153 lb 3.2 oz (69.5 kg)  LMP  (LMP Unknown)  SpO2 94%  BMI 26.3 kg/m2 Estimated body mass index is 26.3 kg/(m^2) as calculated from the following:    Height as of 11/16/16: 5' 4\" (1.626 m).    Weight as of this encounter: 153 lb 3.2 oz (69.5 kg).  Medication Reconciliation: complete     Lin Anna MA     "

## 2018-01-17 NOTE — TELEPHONE ENCOUNTER
Pt has stopped the oxycodone last fall sometime and has not had back pain until now. Pt willing to go to the walk in PT clinic at [facility]. Huddled with JOAQUIN Jimenez who will place an order.

## 2018-01-17 NOTE — TELEPHONE ENCOUNTER
Patient called reporting her wart on right bottom of toe has healed. But, because she was putting right foot over the left leg to check on the wart she started having 10/10 pain and difficulty twisting, pain in the hips/bottom, and pain across the whole stomach. Denies numbness or tingling. Patient sees arthritis doctor every 3 months who is prescribing NABUMETONE. Also, taking tizanidine and OTC tylenol with no relief. Pt is asking if she can take anything extra for pain or be referred somewhere? Will route to PCP.

## 2018-01-17 NOTE — MR AVS SNAPSHOT
After Visit Summary   1/17/2018    Marcela Sandhu    MRN: [id number]           Patient Information     Date Of Birth          1940        Visit Information        Provider Department      1/17/2018 1:05 PM Enoch Thomas,  [facility]        Today's Diagnoses     Acute bilateral low back pain without sciatica    -  1       Follow-ups after your visit        Who to contact     If you have questions or need follow up information about today's clinic visit or your schedule please contact [facility] directly at [phone].  Normal or non-critical lab and imaging results will be communicated to you by MyChart, letter or phone within 4 business days after the clinic has received the results. If you do not hear from us within 7 days, please contact the clinic through MyChart or phone. If you have a critical or abnormal lab result, we will notify you by phone as soon as possible.  Submit refill requests through [employer] or call your pharmacy and they will forward the refill request to us. Please allow 3 business days for your refill to be completed.          Additional Information About Your Visit        Care EveryWhere ID     This is your Care EveryWhere ID. This could be used by other organizations to access your [place] medical records  [id number]        Your Vitals Were     Pulse Last Period Pulse Oximetry BMI (Body Mass Index)          85 (LMP Unknown) 94% 26.3 kg/m2         Blood Pressure from Last 3 Encounters:   01/17/18 132/74   01/08/18 116/74   12/27/17 122/86    Weight from Last 3 Encounters:   01/17/18 153 lb 3.2 oz (69.5 kg)   01/08/18 155 lb (70.3 kg)   12/27/17 155 lb (70.3 kg)              Today, you had the following     No orders found for display         Today's Medication Changes          These changes are accurate as of: 1/17/18  1:38 PM.  If you have any questions, ask your nurse or doctor.               Start taking  these medicines.        Dose/Directions    HYDROcodone-acetaminophen 5-325 MG per tablet   Commonly known as:  NORCO   Used for:  Acute bilateral low back pain without sciatica   Started by:  Enoch Thomas DO        Dose:  1 tablet   Take 1 tablet by mouth every 6 hours as needed   Quantity:  12 tablet   Refills:  0       methylPREDNISolone 4 MG tablet   Commonly known as:  MEDROL DOSEPAK   Used for:  Acute bilateral low back pain without sciatica   Started by:  Enoch Thomas DO        Follow package instructions   Quantity:  21 tablet   Refills:  0            Where to get your medicines      These medications were sent to [id number] - [place], [place] - [address] AT [facility] [address]  [address], [facility] [address]    Hours:  24-hours Phone:  [phone]     methylPREDNISolone 4 MG tablet         Some of these will need a paper prescription and others can be bought over the counter.  Ask your nurse if you have questions.     Bring a paper prescription for each of these medications     HYDROcodone-acetaminophen 5-325 MG per tablet                Primary Care Provider Office Phone # Fax #    Fay Tang PA-C [phone]       [address] [facility] [address]  [facility] [address]        Equal Access to Services     DEMETRIO WAN AH: Hadii bill ku hadasho Soomaali, waaxda luqadaha, qaybta kaalmada adeegyada, waxay khushbooin haygelan yonatan wilson. So [facility] [phone].    ATENCIÓN: Si habla español, tiene a bustillos disposición servicios gratuitos de asistencia lingüística. Llame al [phone].    We comply with applicable federal civil rights laws and [place] laws. We do not discriminate on the basis of race, color, national origin, age, disability, sex, sexual orientation, or gender identity.            Thank you!     Thank you for choosing [facility]  for your care. Our goal is always to provide you with excellent  care. Hearing back from our patients is one way we can continue to improve our services. Please take a few minutes to complete the written survey that you may receive in the mail after your visit with us. Thank you!             Your Updated Medication List - Protect others around you: Learn how to safely use, store and throw away your medicines at www.disposemymeds.org.          This list is accurate as of: 1/17/18  1:38 PM.  Always use your most recent med list.                   Brand Name Dispense Instructions for use Diagnosis    AMOXICILLIN PO      Take 500 mg by mouth Only before dental procedures        ARICEPT PO      Take 5 mg by mouth        buPROPion 300 MG 24 hr tablet    WELLBUTRIN XL    90 tablet    Take 1 tablet (300mg) by mouth once daily in the morning    Major depression in complete remission (H)       CENTRUM PO      Take  by mouth.        clonazePAM 2 MG tablet    klonoPIN    90 tablet    Take 1 tablet (2 mg) by mouth nightly as needed (Taking at night for restless legs)    Restless legs syndrome       CULTURELLE PO           gabapentin 600 MG tablet    NEURONTIN    270 tablet    TAKE 1 TABLET THREE TIMES A DAY    Chronic pain disorder       glucosamine FORTE Caps      Take  by mouth.        HYDROcodone-acetaminophen 5-325 MG per tablet    NORCO    12 tablet    Take 1 tablet by mouth every 6 hours as needed    Acute bilateral low back pain without sciatica       l-lysine HCl 500 MG Tabs tablet      Take 500 mg by mouth daily.        methotrexate sodium 2.5 MG Tabs      Take 5 tablets once a week        methylPREDNISolone 4 MG tablet    MEDROL DOSEPAK    21 tablet    Follow package instructions    Acute bilateral low back pain without sciatica       NABUMETONE PO      Take 750 mg by mouth 2 times daily        predniSONE 5 MG tablet    DELTASONE          * sertraline 100 MG tablet    ZOLOFT    90 tablet    Take 1 tablet (100 mg) by mouth daily with 50 mg for 150 mg total.    Major depression in  complete remission (H)       * sertraline 50 MG tablet    ZOLOFT    90 tablet    TAKE 1 TABLET DAILY    Major depression in complete remission (H)       tiZANidine 2 MG tablet    ZANAFLEX    60 tablet    Take 1 tablet (2mg) by mouth three times daily as needed for muscle spasms    Acute bilateral low back pain without sciatica       * Notice:  This list has 2 medication(s) that are the same as other medications prescribed for you. Read the directions carefully, and ask your doctor or other care provider to review them with you.

## 2018-01-19 ENCOUNTER — TELEPHONE (OUTPATIENT)
Dept: URGENT CARE | Facility: URGENT CARE | Age: 78
End: 2018-01-19

## 2018-01-19 NOTE — TELEPHONE ENCOUNTER
patient is calling and is requesting a message be sent to Dr. Thomas. Patent states methylepednisolone medication is not working and patient would like to know if she can do water therapy at her place where she lives. Patient would like a follow up call.

## 2018-01-21 ENCOUNTER — NURSE TRIAGE (OUTPATIENT)
Dept: NURSING | Facility: CLINIC | Age: 78
End: 2018-01-21

## 2018-01-21 NOTE — TELEPHONE ENCOUNTER
It's hard to get up. Right foot wart. Used Compound W. In trying to see her foot, she twisted something in her back. Saw MD at [facility] urgent care. She has a history of back surgery. In urgent care given prednisone taper. Almost done with the meds. Pain is at an 8. Wants something stronger for pain. PT will be water therapy.  It hurts when she breathes.  Luzma Gann RN-[facility]

## 2018-01-21 NOTE — TELEPHONE ENCOUNTER
Reason for Disposition    [1] SEVERE abdominal pain AND [2] present > 1 hour    Protocols used: BACK PAIN-ADULT-AH    She can't get in until Tuesday at the clinic. She won't go to the ER. She wants to see Enoch Thomas again.  She was hoping they could prescribe more prednisone and a narcotic for pain. I advised they would want her to be seen again. I mentioned that she may want to talk with the MD about getting a refill on prednisone for an as needed treatment with flare ups in back pain.  Luzma Gann RN-[facility]

## 2018-01-23 ENCOUNTER — TRANSFERRED RECORDS (OUTPATIENT)
Dept: HEALTH INFORMATION MANAGEMENT | Facility: CLINIC | Age: 78
End: 2018-01-23

## 2018-01-23 ENCOUNTER — OFFICE VISIT (OUTPATIENT)
Dept: FAMILY MEDICINE | Facility: CLINIC | Age: 78
End: 2018-01-23
Payer: COMMERCIAL

## 2018-01-23 VITALS
OXYGEN SATURATION: 99 % | WEIGHT: 154 LBS | SYSTOLIC BLOOD PRESSURE: 130 MMHG | DIASTOLIC BLOOD PRESSURE: 76 MMHG | BODY MASS INDEX: 26.29 KG/M2 | HEART RATE: 84 BPM | RESPIRATION RATE: 16 BRPM | HEIGHT: 64 IN

## 2018-01-23 DIAGNOSIS — M79.7 FIBROMYALGIA: ICD-10-CM

## 2018-01-23 DIAGNOSIS — E78.2 MIXED HYPERLIPIDEMIA: ICD-10-CM

## 2018-01-23 DIAGNOSIS — M54.50 ACUTE BILATERAL LOW BACK PAIN WITHOUT SCIATICA: Primary | ICD-10-CM

## 2018-01-23 DIAGNOSIS — N18.30 CKD (CHRONIC KIDNEY DISEASE) STAGE 3, GFR 30-59 ML/MIN (H): ICD-10-CM

## 2018-01-23 DIAGNOSIS — E66.3 OVERWEIGHT (BMI 25.0-29.9): ICD-10-CM

## 2018-01-23 DIAGNOSIS — F32.5 MAJOR DEPRESSION IN COMPLETE REMISSION (H): Chronic | ICD-10-CM

## 2018-01-23 PROCEDURE — 99214 OFFICE O/P EST MOD 30 MIN: CPT | Performed by: FAMILY MEDICINE

## 2018-01-23 ASSESSMENT — ANXIETY QUESTIONNAIRES
1. FEELING NERVOUS, ANXIOUS, OR ON EDGE: NOT AT ALL
IF YOU CHECKED OFF ANY PROBLEMS ON THIS QUESTIONNAIRE, HOW DIFFICULT HAVE THESE PROBLEMS MADE IT FOR YOU TO DO YOUR WORK, TAKE CARE OF THINGS AT HOME, OR GET ALONG WITH OTHER PEOPLE: NOT DIFFICULT AT ALL
2. NOT BEING ABLE TO STOP OR CONTROL WORRYING: NOT AT ALL
3. WORRYING TOO MUCH ABOUT DIFFERENT THINGS: NOT AT ALL
6. BECOMING EASILY ANNOYED OR IRRITABLE: NOT AT ALL
7. FEELING AFRAID AS IF SOMETHING AWFUL MIGHT HAPPEN: NOT AT ALL
5. BEING SO RESTLESS THAT IT IS HARD TO SIT STILL: NOT AT ALL
GAD7 TOTAL SCORE: 0

## 2018-01-23 ASSESSMENT — PATIENT HEALTH QUESTIONNAIRE - PHQ9
5. POOR APPETITE OR OVEREATING: NOT AT ALL
SUM OF ALL RESPONSES TO PHQ QUESTIONS 1-9: 16

## 2018-01-23 NOTE — MR AVS SNAPSHOT
After Visit Summary   1/23/2018    Marcela Sandhu    MRN: [id number]           Patient Information     Date Of Birth          1940        Visit Information        Provider Department      1/23/2018 11:00 AM Chio Gracia MD [facility]        Today's Diagnoses     Major depression in complete remission (H)    -  1    Fibromyalgia        Mixed hyperlipidemia        CKD (chronic kidney disease) stage 3, GFR 30-59 ml/min        Overweight (BMI 25.0-29.9)        Acute bilateral low back pain without sciatica since early 1-18 w hx of 5 back surgeries in 1980s          Care Instructions    1. ICE  decreases inflammation & kills the pain coming from the nerves     WARM SOAKS/PACKS  Relax & loosen up tight muscles to reduce the pain of the        muscle tightness & spasm    2. Walking helps realign the back     3. No difference was  Noted by patients in a double blind study when given codeine, tylenol ( acetaminophen) or ibuprofen (all in identical pills). They felt no difference in pain relief. Since ibuprofen and the NSAIDs  causes kidney damage, esophageal damage with heartburn, and can increase the risk of esophageal and stomach cancer and ulcers,and colonic strictures. They also cause increased risk of heart attack .   I recommend that you use tylenol(acetaminophen) for pain. Use the acetaminophen ES  Which has 500mgm/tablet You can take up to 2 tablets 4 times a day as need for pain.  If this is not enough, you can add in ibuprofen or aleve(naprosyn) with 2 glasses of fluid and some food-to protect the stomach and esophagus. Please let us know if you are continuing to take ibuprofen or aleve, as we will need to periodically check your kidney function with a blood test.   take 2 tabs 4 x  Ad ay =8 a day   4. Take the 300mgm tab 2 x  A day of gabapentin for the present back pain     5. Filled in to get pool and other therapy at [facility]   SEE if  "this is physical therapy           Follow-ups after your visit        Who to contact     If you have questions or need follow up information about today's clinic visit or your schedule please contact [facility] directly at [phone].  Normal or non-critical lab and imaging results will be communicated to you by MyChart, letter or phone within 4 business days after the clinic has received the results. If you do not hear from us within 7 days, please contact the clinic through MyChart or phone. If you have a critical or abnormal lab result, we will notify you by phone as soon as possible.  Submit refill requests through [employer] or call your pharmacy and they will forward the refill request to us. Please allow 3 business days for your refill to be completed.          Additional Information About Your Visit        Care EveryWhere ID     This is your Care EveryWhere ID. This could be used by other organizations to access your [place] medical records  [id number]        Your Vitals Were     Pulse Respirations Height Last Period Pulse Oximetry BMI (Body Mass Index)    84 16 5' 4\" (1.626 m) (LMP Unknown) 99% 26.43 kg/m2       Blood Pressure from Last 3 Encounters:   01/23/18 130/76   01/17/18 132/74   01/08/18 116/74    Weight from Last 3 Encounters:   01/23/18 154 lb (69.9 kg)   01/17/18 153 lb 3.2 oz (69.5 kg)   01/08/18 155 lb (70.3 kg)              We Performed the Following     DEPRESSION ACTION PLAN (DAP)        Primary Care Provider Office Phone # Fax #    Fay Tang PA-C [phone]       [address] [address]  [facility] [address]        Equal Access to Services     DEMETRIO WAN : Hadii bill esteban Somateo, waaxda luqadaha, qaybta kaalmada ernie, gustavo wilson. So [facility] [phone].    ATENCIÓN: Si habla español, tiene a bustillos disposición servicios gratuitos de asistencia lingüística. Llame al [phone].    We " comply with applicable federal civil rights laws and [place] laws. We do not discriminate on the basis of race, color, national origin, age, disability, sex, sexual orientation, or gender identity.            Thank you!     Thank you for choosing [facility]  for your care. Our goal is always to provide you with excellent care. Hearing back from our patients is one way we can continue to improve our services. Please take a few minutes to complete the written survey that you may receive in the mail after your visit with us. Thank you!             Your Updated Medication List - Protect others around you: Learn how to safely use, store and throw away your medicines at www.disposemymeds.org.          This list is accurate as of: 1/23/18 11:52 AM.  Always use your most recent med list.                   Brand Name Dispense Instructions for use Diagnosis    AMOXICILLIN PO      Take 500 mg by mouth Only before dental procedures        ARICEPT PO      Take 5 mg by mouth        buPROPion 300 MG 24 hr tablet    WELLBUTRIN XL    90 tablet    Take 1 tablet (300mg) by mouth once daily in the morning    Major depression in complete remission (H)       CENTRUM PO      Take  by mouth.        clonazePAM 2 MG tablet    klonoPIN    90 tablet    Take 1 tablet (2 mg) by mouth nightly as needed (Taking at night for restless legs)    Restless legs syndrome       CULTURELLE PO           gabapentin 600 MG tablet    NEURONTIN    270 tablet    TAKE 1 TABLET THREE TIMES A DAY    Chronic pain disorder       glucosamine FORTE Caps      Take  by mouth.        l-lysine HCl 500 MG Tabs tablet      Take 500 mg by mouth daily.        methotrexate sodium 2.5 MG Tabs      Take 5 tablets once a week        methylPREDNISolone 4 MG tablet    MEDROL DOSEPAK    21 tablet    Follow package instructions    Acute bilateral low back pain without sciatica       NABUMETONE PO      Take 750 mg by mouth 2 times daily        predniSONE  5 MG tablet    DELTASONE          * sertraline 100 MG tablet    ZOLOFT    90 tablet    Take 1 tablet (100 mg) by mouth daily with 50 mg for 150 mg total.    Major depression in complete remission (H)       * sertraline 50 MG tablet    ZOLOFT    90 tablet    TAKE 1 TABLET DAILY    Major depression in complete remission (H)       tiZANidine 2 MG tablet    ZANAFLEX    60 tablet    Take 1 tablet (2mg) by mouth three times daily as needed for muscle spasms    Acute bilateral low back pain without sciatica       * Notice:  This list has 2 medication(s) that are the same as other medications prescribed for you. Read the directions carefully, and ask your doctor or other care provider to review them with you.

## 2018-01-23 NOTE — PATIENT INSTRUCTIONS
1. ICE  decreases inflammation & kills the pain coming from the nerves     WARM SOAKS/PACKS  Relax & loosen up tight muscles to reduce the pain of the        muscle tightness & spasm    2. Walking helps realign the back     3. No difference was  Noted by patients in a double blind study when given codeine, tylenol ( acetaminophen) or ibuprofen (all in identical pills). They felt no difference in pain relief. Since ibuprofen and the NSAIDs  causes kidney damage, esophageal damage with heartburn, and can increase the risk of esophageal and stomach cancer and ulcers,and colonic strictures. They also cause increased risk of heart attack .   I recommend that you use tylenol(acetaminophen) for pain. Use the acetaminophen ES  Which has 500mgm/tablet You can take up to 2 tablets 4 times a day as need for pain.  If this is not enough, you can add in ibuprofen or aleve(naprosyn) with 2 glasses of fluid and some food-to protect the stomach and esophagus. Please let us know if you are continuing to take ibuprofen or aleve, as we will need to periodically check your kidney function with a blood test.   take 2 tabs 4 x  Ad ay =8 a day   4. Take the 300mgm tab 2 x  A day of gabapentin for the present back pain     5. Filled in to get pool and other therapy at [facility]   SEE if this is physical therapy

## 2018-01-23 NOTE — PROGRESS NOTES
SUBJECTIVE:   Marcela Sandhu is a 77 year old female who presents to clinic today for the following health issues:      Musculoskeletal problem/pain:LBP felix Sciatica       Duration: ongoing simce 1-1-18     PMH of 5 back surgeries for Rt disc in 1980s     Description  Location: pt is having worsening pain in right side lower back and into buttocks and radiating into legs.  Pt also has a form for poll therapy    Intensity:  moderate    Accompanying signs and symptoms: none    History  Previous similar problem: YES  Previous evaluation:  YES    Precipitating or alleviating factors:  Trauma or overuse: no   Aggravating factors include: standing and walking    Therapies tried and outcome: rest/inactivity; prednisone on 1-17-18 , zaniflex, gabapentin 300mgm a day         Musculoskeletal problem/pain:Fibromyalgia       Duration: yrs     Description  Location: all over     Intensity:  moderate    Accompanying signs and symptoms: none    History  Previous similar problem: YES  Previous evaluation:  none    Precipitating or alleviating factors:  Trauma or overuse: no   Aggravating factors include: sitting, standing and walking    Therapies tried and outcome: exercises      OVERWEIGHT    -BMI= 26.5   -comorbid CKD , hi LDL       MIxed Hyperlipidemia Follow-Up      Rate your low fat/cholesterol diet?: not monitoring fat    Taking statin?  No    Other lipid medications/supplements?:  none    Depression and Anxiety Follow-Up    Status since last visit: Improved -0 in remission       Fur her associated symptoms:None    Complicating factors:     Significant life event: No     Current substance abuse: None    PHQ-9 Score and MyChart F/U Questions 3/22/2017 10/23/2017 1/23/2018   Total Score 1 0 0   Q9: Suicide Ideation Not at all Not at all Not at all     PATRICIA-7 SCORE 10/21/2016 11/4/2017 1/23/2018   Total Score - - -   Total Score - 0 (minimal anxiety) -   Total Score 0 0 0       PHQ-9  English  PHQ-9   Any  "Language  GAD7  Suicide Assessment Five-step Evaluation and Treatment (SAFE-T)        Chronic Kidney:Stage 3  Disease Follow-up      Current NSAID use?  No  -comorbid , hi LDL           Problem list and histories reviewed & adjusted, as indicated.  Additional history: as documented    Labs reviewed in EPIC    Reviewed and updated as needed this visit by clinical staff     Reviewed and updated as needed this visit by Provider         ROS:  C: NEGATIVE for fever, chills, change in weight  I: NEGATIVE for worrisome rashes, moles or lesions  E: NEGATIVE for vision changes or irritation  E/M: NEGATIVE for ear, mouth and throat problems  R: NEGATIVE for significant cough or SOB  B: NEGATIVE for masses, tenderness or discharge  CV: NEGATIVE for chest pain, palpitations or peripheral edema  GI: NEGATIVE for nausea, abdominal pain, heartburn, or change in bowel habits  : NEGATIVE for frequency, dysuria, or hematuria  MUSCULOSKELETAL:POSITIVE  for , arthralgias , back pain and myalgia  N: NEGATIVE for weakness, dizziness or paresthesias  E: NEGATIVE for temperature intolerance, skin/hair changes  H: NEGATIVE for bleeding problems  P: NEGATIVE for changes in mood or affect    OBJECTIVE:     /76  Pulse 84  Resp 16  Ht 5' 4\" (1.626 m)  Wt 154 lb (69.9 kg)  LMP  (LMP Unknown)  SpO2 99%  BMI 26.43 kg/m2  Body mass index is 26.43 kg/(m^2).  GENERAL: healthy, alert, from LBP =  distress, over weight, frail, elderly and fatigued  EYES: Eyes grossly normal to inspection, PERRL and conjunctivae and sclerae normal  RESP: lungs clear to auscultation - no rales, rhonchi or wheezes  CV: regular rate and rhythm, normal S1 S2, no S3 or S4, no murmur, click or rub, no peripheral edema and peripheral pulses strong  MS: no gross musculoskeletal defects noted, no edema  SKIN: no suspicious lesions or rashes  NEURO: Normal strength and tone, mentation intact and speech normal  PSYCH: mentation appears normal, affect " normal/bright    Diagnostic Test Results:  none     ASSESSMENT/PLAN:               ICD-10-CM    1. Acute bilateral low back pain without sciatica since early 1-18 w hx of 5 back surgeries in 1980s- w sciatica at that time  M54.5    2. Major depression in complete remission (H) F32.5    3. Fibromyalgia M79.7    4. Mixed hyperlipidemia E78.2    5. CKD (chronic kidney disease) stage 3, GFR 30-59 ml/min N18.3    6. Overweight (BMI 25.0-29.9) E66.3        Patient Instructions   1. ICE  decreases inflammation & kills the pain coming from the nerves     WARM SOAKS/PACKS  Relax & loosen up tight muscles to reduce the pain of the        muscle tightness & spasm    2. Walking helps realign the back     3. No difference was  Noted by patients in a double blind study when given codeine, tylenol ( acetaminophen) or ibuprofen (all in identical pills). They felt no difference in pain relief. Since ibuprofen and the NSAIDs  causes kidney damage, esophageal damage with heartburn, and can increase the risk of esophageal and stomach cancer and ulcers,and colonic strictures. They also cause increased risk of heart attack .   I recommend that you use tylenol(acetaminophen) for pain. Use the acetaminophen ES  Which has 500mgm/tablet You can take up to 2 tablets 4 times a day as need for pain.  If this is not enough, you can add in ibuprofen or aleve(naprosyn) with 2 glasses of fluid and some food-to protect the stomach and esophagus. Please let us know if you are continuing to take ibuprofen or aleve, as we will need to periodically check your kidney function with a blood test.   take 2 tabs 4 x  Ad ay =8 a day   4. Take the 300mgm tab 2 x  A day of gabapentin for the present back pain     5. Filled in to get pool and other therapy at [facility]   SEE if this is physical therapy       Chio Gracia MD  [facility]  Weight management plan: Discussed healthy diet and exercise guidelines and patient  will follow up in 3 months in clinic to re-evaluate.    Pt wanted narcotics   Discussed all with pt  And the patient expresses understanding  And hersister that they are NOT Indicated in fibromyalgia   Chio Gracia MD

## 2018-01-23 NOTE — NURSING NOTE
"Chief Complaint   Patient presents with     Musculoskeletal Problem       Initial /76  Pulse 84  Resp 16  Ht 5' 4\" (1.626 m)  Wt 154 lb (69.9 kg)  LMP  (LMP Unknown)  SpO2 99%  BMI 26.43 kg/m2 Estimated body mass index is 26.43 kg/(m^2) as calculated from the following:    Height as of this encounter: 5' 4\" (1.626 m).    Weight as of this encounter: 154 lb (69.9 kg).  Medication Reconciliation: mingo Concepicon CMA      "

## 2018-01-23 NOTE — LETTER
My Depression Action Plan  Name: Marcela Sandhu   Date of Birth 1940  Date: 1/23/2018    My doctor: Fay Tang   My clinic: [address] [address]  [phone]          GREEN    ZONE   Good Control    What it looks like:     Things are going generally well. You have normal up s and down s. You may even feel depressed from time to time, but bad moods usually last less than a day.   What you need to do:  1. Continue to care for yourself (see self care plan)  2. Check your depression survival kit and update it as needed  3. Follow your physician s recommendations including any medication.  4. Do not stop taking medication unless you consult with your physician first.           YELLOW         ZONE Getting Worse    What it looks like:     Depression is starting to interfere with your life.     It may be hard to get out of bed; you may be starting to isolate yourself from others.    Symptoms of depression are starting to last most all day and this has happened for several days.     You may have suicidal thoughts but they are not constant.   What you need to do:     1. Call your care team, your response to treatment will improve if you keep your care team informed of your progress. Yellow periods are signs an adjustment may need to be made.     2. Continue your self-care, even if you have to fake it!    3. Talk to someone in your support network    4. Open up your depression survival kit           RED    ZONE Medical Alert - Get Help    What it looks like:     Depression is seriously interfering with your life.     You may experience these or other symptoms: You can t get out of bed most days, can t work or engage in other necessary activities, you have trouble taking care of basic hygiene, or basic responsibilities, thoughts of suicide or death that will not go away, self-injurious behavior.     What  you need to do:  1. Call your care team and request a same-day appointment. If they are not available (weekends or after hours) call your local crisis line, emergency room or 911.      Electronically signed by: Chio Gracia, January 23, 2018    Depression Self Care Plan / Survival Kit    Self-Care for Depression  Here s the deal. Your body and mind are really not as separate as most people think.  What you do and think affects how you feel and how you feel influences what you do and think. This means if you do things that people who feel good do, it will help you feel better.  Sometimes this is all it takes.  There is also a place for medication and therapy depending on how severe your depression is, so be sure to consult with your medical provider and/ or Behavioral Health Consultant if your symptoms are worsening or not improving.     In order to better manage my stress, I will:    Exercise  Get some form of exercise, every day. This will help reduce pain and release endorphins, the  feel good  chemicals in your brain. This is almost as good as taking antidepressants!  This is not the same as joining a gym and then never going! (they count on that by the way ) It can be as simple as just going for a walk or doing some gardening, anything that will get you moving.      Hygiene   Maintain good hygiene (Get out of bed in the morning, Make your bed, Brush your teeth, Take a shower, and Get dressed like you were going to work, even if you are unemployed).  If your clothes don't fit try to get ones that do.    Diet  I will strive to eat foods that are good for me, drink plenty of water, and avoid excessive sugar, caffeine, alcohol, and other mood-altering substances.  Some foods that are helpful in depression are: complex carbohydrates, B vitamins, flaxseed, fish or fish oil, fresh fruits and vegetables.    Psychotherapy  I agree to participate in Individual Therapy (if recommended).    Medication  If prescribed  medications, I agree to take them.  Missing doses can result in serious side effects.  I understand that drinking alcohol, or other illicit drug use, may cause potential side effects.  I will not stop my medication abruptly without first discussing it with my provider.    Staying Connected With Others  I will stay in touch with my friends, family members, and my primary care provider/team.    Use your imagination  Be creative.  We all have a creative side; it doesn t matter if it s oil painting, sand castles, or mud pies! This will also kick up the endorphins.    Witness Beauty  (AKA stop and smell the roses) Take a look outside, even in mid-winter. Notice colors, textures. Watch the squirrels and birds.     Service to others  Be of service to others.  There is always someone else in need.  By helping others we can  get out of ourselves  and remember the really important things.  This also provides opportunities for practicing all the other parts of the program.    Humor  Laugh and be silly!  Adjust your TV habits for less news and crime-drama and more comedy.    Control your stress  Try breathing deep, massage therapy, biofeedback, and meditation. Find time to relax each day.     My support system    Clinic Contact:  Phone number:    Contact 1:  Phone number:    Contact 2:  Phone number:    Orthodox/:  Phone number:    Therapist:  Phone number:    [facility] crisis center:    Phone number:    Other community support:  Phone number:

## 2018-01-24 ASSESSMENT — ANXIETY QUESTIONNAIRES: GAD7 TOTAL SCORE: 0

## 2018-02-19 DIAGNOSIS — K21.9 GASTROESOPHAGEAL REFLUX DISEASE WITHOUT ESOPHAGITIS: ICD-10-CM

## 2018-02-19 NOTE — TELEPHONE ENCOUNTER
"Requested Prescriptions   Pending Prescriptions Disp Refills     omeprazole (PRILOSEC) 20 MG CR capsule [Pharmacy Med Name: Omeprazole Oral Capsule Delayed Release 20 MG]  Last Written Prescription Date:  8/30/16  Last Fill Quantity: 30,  # refills: 10   Last office visit: 1/23/2018 with prescribing provider:  Samia   Future Office Visit:     90 capsule 2     Sig: Take 1 capsule (20mg) by mouth once daily    PPI Protocol Failed    2/19/2018 10:48 AM       Failed - No diagnosis of osteoporosis on record       Passed - Not on Clopidogrel (unless Pantoprazole ordered)       Passed - Recent or future visit with authorizing provider's specialty    Patient had office visit in the last year or has a visit in the next 30 days with authorizing provider.  See \"Patient Info\" tab in inbasket, or \"Choose Columns\" in Meds & Orders section of the refill encounter.            Passed - Patient is age 18 or older       Passed - No active pregnacy on record       Passed - No positive pregnancy test in past 12 months          "

## 2018-03-12 ENCOUNTER — TRANSFERRED RECORDS (OUTPATIENT)
Dept: HEALTH INFORMATION MANAGEMENT | Facility: CLINIC | Age: 78
End: 2018-03-12

## 2018-03-20 ENCOUNTER — TRANSFERRED RECORDS (OUTPATIENT)
Dept: HEALTH INFORMATION MANAGEMENT | Facility: CLINIC | Age: 78
End: 2018-03-20

## 2018-04-08 DIAGNOSIS — R25.2 CRAMP OF LIMB: ICD-10-CM

## 2018-04-09 NOTE — TELEPHONE ENCOUNTER
TiZANidine HCl Oral Tablet 2 MG  Last Written Prescription Date:  01/08/2018  Last Fill Quantity: 60,   # refills: 2  Last Office Visit: 01/23/2018  Future Office visit:       Routing refill request to provider for review/approval because:  [facility] website verification:   08/30/2017g not on the [facility], [facility] or [facility] refill protocol or controlled substance

## 2018-04-11 RX ORDER — TIZANIDINE 2 MG/1
TABLET ORAL
Qty: 60 TABLET | Refills: 1 | Status: SHIPPED | OUTPATIENT
Start: 2018-04-11 | End: 2018-08-22

## 2018-05-01 DIAGNOSIS — F32.5 MAJOR DEPRESSION IN COMPLETE REMISSION (H): Chronic | ICD-10-CM

## 2018-05-02 RX ORDER — SERTRALINE HYDROCHLORIDE 100 MG/1
TABLET, FILM COATED ORAL
Qty: 90 TABLET | Refills: 3 | Status: SHIPPED | OUTPATIENT
Start: 2018-05-02 | End: 2018-06-09

## 2018-05-02 NOTE — TELEPHONE ENCOUNTER
"Requested Prescriptions   Pending Prescriptions Disp Refills     sertraline (ZOLOFT) 100 MG tablet [Pharmacy Med Name: SERTRALINE HCL TABS 100MG]  Last Written Prescription Date:  11/4/2017  Last Fill Quantity: 90 tablet,  # refills: 3   Last Office Visit  1/23/2018        with  [facility], [facility] or [facility] prescribing provider:     Future Office Visit:        90 tablet 3     Sig: TAKE 1 TABLET DAILY    SSRIs Protocol Failed    5/1/2018  7:31 PM       Failed - PHQ-9 score less than 5 in past 6 months    Please review last PHQ-9 score.   PHQ-9 SCORE 3/22/2017 10/23/2017 1/23/2018   Total Score - - -   Total Score 1 0 16     PATRICIA-7 SCORE 10/21/2016 11/4/2017 1/23/2018   Total Score - - -   Total Score - 0 (minimal anxiety) -   Total Score 0 0 0          Passed - Patient is age 18 or older       Passed - No active pregnancy on record       Passed - No positive pregnancy test in last 12 months       Passed - Recent (6 mo) or future (30 days) visit within the authorizing provider's specialty    Patient had office visit in the last 6 months or has a visit in the next 30 days with authorizing provider or within the authorizing provider's specialty.  See \"Patient Info\" tab in inbasket, or \"Choose Columns\" in Meds & Orders section of the refill encounter.              "

## 2018-05-23 ENCOUNTER — TELEPHONE (OUTPATIENT)
Dept: FAMILY MEDICINE | Facility: CLINIC | Age: 78
End: 2018-05-23

## 2018-05-24 ASSESSMENT — PATIENT HEALTH QUESTIONNAIRE - PHQ9: SUM OF ALL RESPONSES TO PHQ QUESTIONS 1-9: 2

## 2018-06-09 ENCOUNTER — OFFICE VISIT (OUTPATIENT)
Dept: FAMILY MEDICINE | Facility: CLINIC | Age: 78
End: 2018-06-09
Payer: COMMERCIAL

## 2018-06-09 VITALS
WEIGHT: 148 LBS | HEIGHT: 64 IN | BODY MASS INDEX: 25.27 KG/M2 | SYSTOLIC BLOOD PRESSURE: 130 MMHG | RESPIRATION RATE: 20 BRPM | OXYGEN SATURATION: 98 % | TEMPERATURE: 97.8 F | HEART RATE: 95 BPM | DIASTOLIC BLOOD PRESSURE: 64 MMHG

## 2018-06-09 DIAGNOSIS — H61.21 IMPACTED CERUMEN OF RIGHT EAR: ICD-10-CM

## 2018-06-09 DIAGNOSIS — R20.0 NUMBNESS OF TOES: ICD-10-CM

## 2018-06-09 DIAGNOSIS — F32.5 MAJOR DEPRESSION IN COMPLETE REMISSION (H): Primary | Chronic | ICD-10-CM

## 2018-06-09 DIAGNOSIS — G89.4 CHRONIC PAIN DISORDER: ICD-10-CM

## 2018-06-09 PROCEDURE — 99214 OFFICE O/P EST MOD 30 MIN: CPT | Performed by: PHYSICIAN ASSISTANT

## 2018-06-09 RX ORDER — SERTRALINE HYDROCHLORIDE 100 MG/1
200 TABLET, FILM COATED ORAL DAILY
Qty: 180 TABLET | Refills: 3 | Status: SHIPPED | OUTPATIENT
Start: 2018-06-09 | End: 2019-08-14

## 2018-06-09 ASSESSMENT — ANXIETY QUESTIONNAIRES
7. FEELING AFRAID AS IF SOMETHING AWFUL MIGHT HAPPEN: NOT AT ALL
5. BEING SO RESTLESS THAT IT IS HARD TO SIT STILL: NOT AT ALL
3. WORRYING TOO MUCH ABOUT DIFFERENT THINGS: NOT AT ALL
2. NOT BEING ABLE TO STOP OR CONTROL WORRYING: NOT AT ALL
GAD7 TOTAL SCORE: 0
IF YOU CHECKED OFF ANY PROBLEMS ON THIS QUESTIONNAIRE, HOW DIFFICULT HAVE THESE PROBLEMS MADE IT FOR YOU TO DO YOUR WORK, TAKE CARE OF THINGS AT HOME, OR GET ALONG WITH OTHER PEOPLE: NOT DIFFICULT AT ALL
6. BECOMING EASILY ANNOYED OR IRRITABLE: NOT AT ALL
1. FEELING NERVOUS, ANXIOUS, OR ON EDGE: NOT AT ALL

## 2018-06-09 ASSESSMENT — PATIENT HEALTH QUESTIONNAIRE - PHQ9: 5. POOR APPETITE OR OVEREATING: NOT AT ALL

## 2018-06-09 NOTE — MR AVS SNAPSHOT
"              After Visit Summary   6/9/2018    Marcela Sandhu    MRN: [id number]           Patient Information     Date Of Birth          1940        Visit Information        Provider Department      6/9/2018 8:20 AM Fay Tang PA-C [facility]        Today's Diagnoses     Major depression in complete remission (H)    -  1    Chronic pain disorder        Numbness of toes        Impacted cerumen of right ear           Follow-ups after your visit        Who to contact     If you have questions or need follow up information about today's clinic visit or your schedule please contact [facility] directly at [phone].  Normal or non-critical lab and imaging results will be communicated to you by MyChart, letter or phone within 4 business days after the clinic has received the results. If you do not hear from us within 7 days, please contact the clinic through MyChart or phone. If you have a critical or abnormal lab result, we will notify you by phone as soon as possible.  Submit refill requests through [facility] or call your pharmacy and they will forward the refill request to us. Please allow 3 business days for your refill to be completed.          Additional Information About Your Visit        Care EveryWhere ID     This is your Care EveryWhere ID. This could be used by other organizations to access your [facility] medical records  [id number]        Your Vitals Were     Pulse Temperature Respirations Height Last Period Pulse Oximetry    95 97.8  F (36.6  C) 20 5' 4\" (1.626 m) (LMP Unknown) 98%    Breastfeeding? BMI (Body Mass Index)                No 25.4 kg/m2           Blood Pressure from Last 3 Encounters:   06/09/18 130/64   01/23/18 130/76   01/17/18 132/74    Weight from Last 3 Encounters:   06/09/18 148 lb (67.1 kg)   01/23/18 154 lb (69.9 kg)   01/17/18 153 lb 3.2 oz (69.5 kg)              Today, you had the following     No " orders found for display         Today's Medication Changes          These changes are accurate as of 6/9/18  8:35 AM.  If you have any questions, ask your nurse or doctor.               These medicines have changed or have updated prescriptions.        Dose/Directions    gabapentin 600 MG tablet   Commonly known as:  NEURONTIN   This may have changed:  See the new instructions.   Used for:  Chronic pain disorder        TAKE 1 TABLET THREE TIMES A DAY   Quantity:  270 tablet   Refills:  3       sertraline 100 MG tablet   Commonly known as:  ZOLOFT   This may have changed:    - See the new instructions.  - Another medication with the same name was removed. Continue taking this medication, and follow the directions you see here.   Used for:  Major depression in complete remission (H)   Changed by:  Fay Tang PA-C        Dose:  200 mg   Take 2 tablets (200 mg) by mouth daily   Quantity:  180 tablet   Refills:  3       tiZANidine 2 MG tablet   Commonly known as:  ZANAFLEX   This may have changed:  Another medication with the same name was removed. Continue taking this medication, and follow the directions you see here.   Used for:  Cramp of limb   Changed by:  Fay Tang PA-C        TAKE ONE TABLET BY MOUTH THREE TIMES DAILY as needed for muscle spasms   Quantity:  60 tablet   Refills:  1         Stop taking these medicines if you haven't already. Please contact your care team if you have questions.     clonazePAM 2 MG tablet   Commonly known as:  klonoPIN   Stopped by:  Fay Tang PA-C                Where to get your medicines      These medications were sent to [id number] - [place], [place] - [address]  [address] [address]     Phone:  [phone]     sertraline 100 MG tablet                Primary Care Provider Office Phone # Fax #    Fay Tang PA-C [phone] [address]  [address]  [facility] [address]        Equal Access to Services     DEMETRIO WAN : Hadii bill hinton basilkee Ameliaali, waaxda luqadaha, qaybta ginaeliogustavo lua. So [facility] [phone].    ATENCIÓN: Si habla español, tiene a bustillos disposición servicios gratuitos de asistencia lingüística. Ramyaame al [phone].    We comply with applicable federal civil rights laws and [place] laws. We do not discriminate on the basis of race, color, national origin, age, disability, sex, sexual orientation, or gender identity.            Thank you!     Thank you for choosing [facility]  for your care. Our goal is always to provide you with excellent care. Hearing back from our patients is one way we can continue to improve our services. Please take a few minutes to complete the written survey that you may receive in the mail after your visit with us. Thank you!             Your Updated Medication List - Protect others around you: Learn how to safely use, store and throw away your medicines at www.disposemymeds.org.          This list is accurate as of 6/9/18  8:35 AM.  Always use your most recent med list.                   Brand Name Dispense Instructions for use Diagnosis    AMOXICILLIN PO      Take 500 mg by mouth Only before dental procedures        ARICEPT PO      Take 5 mg by mouth        buPROPion 300 MG 24 hr tablet    WELLBUTRIN XL    90 tablet    Take 1 tablet (300mg) by mouth once daily in the morning    Major depression in complete remission (H)       CENTRUM PO      Take  by mouth.        CULTURELLE PO           gabapentin 600 MG tablet    NEURONTIN    270 tablet    TAKE 1 TABLET THREE TIMES A DAY    Chronic pain disorder       glucosamine FORTE Caps      Take  by mouth.        l-lysine HCl 500 MG Tabs tablet      Take 500 mg by mouth daily.        methotrexate sodium 2.5 MG Tabs      Take 5 tablets once a week        NABUMETONE PO      Take 750 mg by  mouth 2 times daily        omeprazole 20 MG CR capsule    priLOSEC    90 capsule    Take 1 capsule (20mg) by mouth once daily    Gastroesophageal reflux disease without esophagitis       predniSONE 5 MG tablet    DELTASONE          sertraline 100 MG tablet    ZOLOFT    180 tablet    Take 2 tablets (200 mg) by mouth daily    Major depression in complete remission (H)       tiZANidine 2 MG tablet    ZANAFLEX    60 tablet    TAKE ONE TABLET BY MOUTH THREE TIMES DAILY as needed for muscle spasms    Cramp of limb

## 2018-06-09 NOTE — PROGRESS NOTES
SUBJECTIVE:   Marcela Sandhu is a 77 year old female who presents to clinic today for the following health issues:      Medication Followup of multiple meds    Taking Medication as prescribed: NO-off Klonopin and and a nutritional supplement, and Gabapentin is 0.5 tablet daily    Side Effects:  Has concerns    Medication Helping Symptoms:  Has concerns     Also talk re: other medical concerns  Reviewed and updated as needed this visit by clinical staff  Tobacco  Allergies  Meds  Problems  Med Hx  Surg Hx  Fam Hx  Soc Hx        Reviewed and updated as needed this visit by Provider  Tobacco  Allergies  Meds  Problems  Med Hx  Surg Hx  Fam Hx  Soc Hx        Additional complaints: None    HPI additional notes: Marcela presents today with   Chief Complaint   Patient presents with     Recheck Medication     Consult          ROS:  C: Negative for fever, chills, recent change in weight  Skin: Negative for worrisome rashes or lesions  ENT: Negative for ear, mouth and throat problems  Resp: Negative for significant cough or SOB  CV: Negative for chest pain or peripheral edema  GI: Negative for nausea, abdominal pain, heartburn, or change in bowel habits  MS: Positive for chronic pain  NEURO: POSITIVE for numbness, tingling, radicular pain.  P: Negative for changes in mood or affect  ROS all other systems negative.    Chart Review:  History   Smoking Status     Never Smoker   Smokeless Tobacco     Never Used     Recent Labs   Lab Test  11/04/17   0848  11/26/16   1019   12/18/15   1204  07/21/15  10/22/12   1440  10/18/12   0914   08/25/11   1838   LDL  105*   --    --    --    --    --    --   82   --   108*   HDL  55   --    --    --    --    --    --   50   --   57   TRIG  159*   --    --    --    --    --    --   110   --    --    ALT   --   20   --   19   --    --   28  24   < >   --    CR  1.12*  0.93   < >   --    < >  0.9  0.64  1.00   < >   --    GFRESTIMATED  47*  59*   < >   --    < >  65.4  >90   55*   --    --    GFRESTBLACK  57*  71   < >   --    < >   --   >90  66   --    --    POTASSIUM  3.8  3.8   < >   --    --   3.7  3.3*  4.5   < >   --    TSH   --    --    --    --    --   1.10   --    --    --    --     < > = values in this interval not displayed.      BP Readings from Last 3 Encounters:   06/09/18 130/64   01/23/18 130/76   01/17/18 132/74    Wt Readings from Last 3 Encounters:   06/09/18 148 lb (67.1 kg)   01/23/18 154 lb (69.9 kg)   01/17/18 153 lb 3.2 oz (69.5 kg)                  Patient Active Problem List   Diagnosis     Osteoporosis     Postmenopausal vaginal bleeding     Breast cancer (H)     RA (rheumatoid arthritis) (H)     Fibromyalgia     Restless legs syndrome     Health Care Home     Urethral prolapse     Pulmonary nodules     Chronic pain syndrome     Preventive measure     Constipation, chronic     Anemia     Hepatitis-C     Advance Care Planning     Major depression in complete remission (H)     Abnormal gait     Gastroesophageal reflux disease without esophagitis     Cramp of limb     Pain in both feet     Unspecified symptoms and signs involving cognitive functions and awareness     Mixed hyperlipidemia     Numbness of toes     Past Surgical History:   Procedure Laterality Date     APPENDECTOMY       BACK SURGERY      2004   multiple back surgeries; 5 total     BACK SURGERY      5 back surgeries     BUNIONECTOMY CHEVRON AND PEBBLES BILATERAL, COMBINED  12/2/2011    Procedure:COMBINED BUNIONECTOMY CHEVRON AND PEBBLES BILATERAL; BILATERAL THIRD AND FOURTH CLAWTOE RECONSTRUCTION WITH EXOSTECTOMY, LEFT FIRST TARSOMETATARSAL JOINT and 2nd toe Right foot reconstruction; Surgeon:ASHWIN FARMER; Location: OR     COLONOSCOPY  10/18/2011    Procedure:COLONOSCOPY; COLONOSCOPY; Surgeon:BRENDA RODRIGUEZ; Location: GI     EYE SURGERY      eye lid surgery     GENITOURINARY SURGERY      bladder suspension     MASTECTOMY, BILATERAL      bilateral mastectomy     ORTHOPEDIC SURGERY      knee  "replacement, hand, elbow and foot surgery     Problem list, Medication list, Allergies, Medical/Social/Surg hx reviewed in [facility], updated as appropriate.   OBJECTIVE:                                                    /64 (BP Location: Left arm, Patient Position: Chair, Cuff Size: Adult Regular)  Pulse 95  Temp 97.8  F (36.6  C)  Resp 20  Ht 5' 4\" (1.626 m)  Wt 148 lb (67.1 kg)  LMP  (LMP Unknown)  SpO2 98%  Breastfeeding? No  BMI 25.4 kg/m2  Body mass index is 25.4 kg/(m^2).  GENERAL: healthy, alert, in no acute distress  EYES: Grossly normal to inspection, EOMI, PERRL  HENT: Ear canals severe obstruction with cerumen right.   NECK: Non-tender, no adenopathy.  RESP: lungs clear to auscultation - no rales, no rhonchi, no wheezes  CV: regular rate and rhythm, normal S1 S2. No peripheral edema.  MS: ambulates with assistance onto exam table.  Uses cane or walker.  SKIN: no suspicious lesions, no rashes  PSYCH: Alert and oriented times 3;  Able to articulate logical thoughts. Affect is normal.    Diagnostic test results: none      ASSESSMENT/PLAN:                                                          ICD-10-CM    1. Major depression in complete remission (H) F32.5 sertraline (ZOLOFT) 100 MG tablet   2. Chronic pain disorder G89.4    3. Numbness of toes R20.0    4. Impacted cerumen of right ear H61.21          Pt has been doing well with zoloft 200 mg daily for the last month.  Will keep her at that dose, refill provided.    Having some right ear itching/pressure, discussed hard wax build up.  She has debrox at home which she will use for the next couple days and will come back early next week if she cannot get the wax out.  Discussed that I did not want to try and flush it out today because it looked hard and I didn't want to cause her more pain.    Discussed toe numbness is likely related to her DDD, she has no problems with movement or muscle atrophy.  Discussed inspecting her feet daily which she " already does.    Please see patient instructions for treatment details.    Follow up in 7-10 days if not improving as anticipated, sooner PRN.    Fay Tagn PA-C  [facility]

## 2018-06-10 ASSESSMENT — ANXIETY QUESTIONNAIRES: GAD7 TOTAL SCORE: 0

## 2018-06-10 ASSESSMENT — PATIENT HEALTH QUESTIONNAIRE - PHQ9: SUM OF ALL RESPONSES TO PHQ QUESTIONS 1-9: 0

## 2018-06-13 ENCOUNTER — TRANSFERRED RECORDS (OUTPATIENT)
Dept: HEALTH INFORMATION MANAGEMENT | Facility: CLINIC | Age: 78
End: 2018-06-13

## 2018-06-21 ENCOUNTER — TELEPHONE (OUTPATIENT)
Dept: FAMILY MEDICINE | Facility: CLINIC | Age: 78
End: 2018-06-21

## 2018-06-21 NOTE — TELEPHONE ENCOUNTER
Reason for call:  Patient reporting a symptom    Symptom or request: urination issues    Duration (how long have symptoms been present): few weeks to a months    Have you been treated for this before? No    Additional comments: Patient states when she goes to the bathroom she van go a little bit and then its just drips. Takes a while for her to go. No pain.    Phone Number patient can be reached at:  Home number on file [phone] (home)    Best Time:  Any. Patient states she will be home tonight until 4:45pm and then gone tomorrow morning but back tomorrow afternoon    Can we leave a detailed message on this number:  YES    Call taken on 6/21/2018 at 3:51 PM by ROB ZAFAR

## 2018-06-21 NOTE — TELEPHONE ENCOUNTER
Pt complains of slow urine flow while voiding. States sometimes she is in the bathroom for 10 minutes. Denies dysuria, hematuria, abdominal or back pain. Denies other symptoms. OV was scheduled for further evaluation symptoms.

## 2018-06-25 ENCOUNTER — OFFICE VISIT (OUTPATIENT)
Dept: FAMILY MEDICINE | Facility: CLINIC | Age: 78
End: 2018-06-25
Payer: COMMERCIAL

## 2018-06-25 VITALS
OXYGEN SATURATION: 96 % | HEART RATE: 79 BPM | WEIGHT: 150.5 LBS | RESPIRATION RATE: 16 BRPM | BODY MASS INDEX: 25.83 KG/M2 | SYSTOLIC BLOOD PRESSURE: 136 MMHG | TEMPERATURE: 98.1 F | DIASTOLIC BLOOD PRESSURE: 78 MMHG

## 2018-06-25 DIAGNOSIS — R35.0 URINARY FREQUENCY: ICD-10-CM

## 2018-06-25 DIAGNOSIS — R30.0 DYSURIA: Primary | ICD-10-CM

## 2018-06-25 LAB
ALBUMIN UR-MCNC: NEGATIVE MG/DL
APPEARANCE UR: CLEAR
BILIRUB UR QL STRIP: NEGATIVE
COLOR UR AUTO: YELLOW
GLUCOSE UR STRIP-MCNC: NEGATIVE MG/DL
HGB UR QL STRIP: NEGATIVE
KETONES UR STRIP-MCNC: NEGATIVE MG/DL
LEUKOCYTE ESTERASE UR QL STRIP: ABNORMAL
NITRATE UR QL: NEGATIVE
PH UR STRIP: 5 PH (ref 5–7)
RBC #/AREA URNS AUTO: NORMAL /HPF
SOURCE: ABNORMAL
SP GR UR STRIP: 1.01 (ref 1–1.03)
UROBILINOGEN UR STRIP-ACNC: 0.2 EU/DL (ref 0.2–1)
WBC #/AREA URNS AUTO: NORMAL /HPF

## 2018-06-25 PROCEDURE — 87086 URINE CULTURE/COLONY COUNT: CPT | Performed by: PHYSICIAN ASSISTANT

## 2018-06-25 PROCEDURE — 99213 OFFICE O/P EST LOW 20 MIN: CPT | Performed by: PHYSICIAN ASSISTANT

## 2018-06-25 PROCEDURE — 81001 URINALYSIS AUTO W/SCOPE: CPT | Performed by: PHYSICIAN ASSISTANT

## 2018-06-25 NOTE — MR AVS SNAPSHOT
After Visit Summary   6/25/2018    Marcela Sandhu    MRN: [id number]           Patient Information     Date Of Birth          1940        Visit Information        Provider Department      6/25/2018 9:10 AM Fay Tang PA-C [facility]        Today's Diagnoses     Dysuria    -  1    Urinary frequency           Follow-ups after your visit        Additional Services     UROLOGY ADULT REFERRAL       Your provider has referred you to: N: [place] [facility] [phone]   [email or web address]    Please be aware that coverage of these services is subject to the terms and limitations of your health insurance plan.  Call member services at your health plan with any benefit or coverage questions.      Please bring the following with you to your appointment:    (1) Any X-Rays, CTs or MRIs which have been performed.  Contact the facility where they were done to arrange for  prior to your scheduled appointment.    (2) List of current medications  (3) This referral request   (4) Any documents/labs given to you for this referral                  Who to contact     If you have questions or need follow up information about today's clinic visit or your schedule please contact [facility] directly at [phone].  Normal or non-critical lab and imaging results will be communicated to you by MyChart, letter or phone within 4 business days after the clinic has received the results. If you do not hear from us within 7 days, please contact the clinic through MyChart or phone. If you have a critical or abnormal lab result, we will notify you by phone as soon as possible.  Submit refill requests through [employer] or call your pharmacy and they will forward the refill request to us. Please allow 3 business days for your refill to be completed.          Additional Information About Your Visit        Care EveryWhere ID     This  is your Care EveryWhere ID. This could be used by other organizations to access your [facility] medical records  [id number]        Your Vitals Were     Pulse Temperature Respirations Last Period Pulse Oximetry BMI (Body Mass Index)    79 98.1  F (36.7  C) (Tympanic) 16 (LMP Unknown) 96% 25.83 kg/m2       Blood Pressure from Last 3 Encounters:   06/25/18 136/78   06/09/18 130/64   01/23/18 130/76    Weight from Last 3 Encounters:   06/25/18 150 lb 8 oz (68.3 kg)   06/09/18 148 lb (67.1 kg)   01/23/18 154 lb (69.9 kg)              We Performed the Following     UA reflex to Microscopic and Culture     Urine Culture Aerobic Bacterial     Urine Microscopic     UROLOGY ADULT REFERRAL          Today's Medication Changes          These changes are accurate as of 6/25/18  9:28 AM.  If you have any questions, ask your nurse or doctor.               These medicines have changed or have updated prescriptions.        Dose/Directions    gabapentin 600 MG tablet   Commonly known as:  NEURONTIN   This may have changed:  See the new instructions.   Used for:  Chronic pain disorder        TAKE 1 TABLET THREE TIMES A DAY   Quantity:  270 tablet   Refills:  3                Primary Care Provider Office Phone # Fax #    Fay Tang PA-C [phone]       [address] [address]        Equal Access to Services     DEMETRIO WAN : Hadii bill hinton hadasho Sokimberlyali, waaxda luqadaha, qaybta kaalmada adeclayyada, gustavo wilson. So [facility] [phone].    ATENCIÓN: Si habla español, tiene a bustillos disposición servicios gratuitos de asistencia lingüística. Llame al [phone].    We comply with applicable federal civil rights laws and [place] laws. We do not discriminate on the basis of race, color, national origin, age, disability, sex, sexual orientation, or gender identity.            Thank you!     Thank you for choosing [facility]  for  your care. Our goal is always to provide you with excellent care. Hearing back from our patients is one way we can continue to improve our services. Please take a few minutes to complete the written survey that you may receive in the mail after your visit with us. Thank you!             Your Updated Medication List - Protect others around you: Learn how to safely use, store and throw away your medicines at www.disposemymeds.org.          This list is accurate as of 6/25/18  9:28 AM.  Always use your most recent med list.                   Brand Name Dispense Instructions for use Diagnosis    AMOXICILLIN PO      Take 500 mg by mouth Only before dental procedures        ARICEPT PO      Take 5 mg by mouth        buPROPion 300 MG 24 hr tablet    WELLBUTRIN XL    90 tablet    Take 1 tablet (300mg) by mouth once daily in the morning    Major depression in complete remission (H)       CENTRUM PO      Take  by mouth.        CULTURELLE PO           gabapentin 600 MG tablet    NEURONTIN    270 tablet    TAKE 1 TABLET THREE TIMES A DAY    Chronic pain disorder       glucosamine FORTE Caps      Take  by mouth.        l-lysine HCl 500 MG Tabs tablet      Take 500 mg by mouth daily.        methotrexate sodium 2.5 MG Tabs      Take 5 tablets once a week        NABUMETONE PO      Take 750 mg by mouth 2 times daily        omeprazole 20 MG CR capsule    priLOSEC    90 capsule    Take 1 capsule (20mg) by mouth once daily    Gastroesophageal reflux disease without esophagitis       predniSONE 5 MG tablet    DELTASONE          sertraline 100 MG tablet    ZOLOFT    180 tablet    Take 2 tablets (200 mg) by mouth daily    Major depression in complete remission (H)       tiZANidine 2 MG tablet    ZANAFLEX    60 tablet    TAKE ONE TABLET BY MOUTH THREE TIMES DAILY as needed for muscle spasms    Cramp of limb

## 2018-06-25 NOTE — PROGRESS NOTES
SUBJECTIVE:   Marcela Sandhu is a 77 year old female who presents to clinic today for the following health issues:    Patient is accompanied by sister.    URINARY TRACT SYMPTOMS  Onset: several months    Description:   Painful urination (Dysuria): no   Blood in urine (Hematuria): no   Delay in urine (Hesitency): YES    Intensity: moderate    Progression of Symptoms:  worsening and intermittent    Accompanying Signs & Symptoms:  Fever/chills: no   Flank pain no   Nausea and vomiting: no   Any vaginal symptoms: none  Abdominal/Pelvic Pain: no     History:   History of frequent UTI's: no   History of kidney stones: no   Sexually Active: no   Possibility of pregnancy: No    Precipitating factors:   none    Therapies Tried and outcome: Increase fluid intake  Reviewed and updated as needed this visit by clinical staff  Tobacco  Allergies  Meds  Problems  Med Hx  Surg Hx  Fam Hx  Soc Hx        Reviewed and updated as needed this visit by Provider  Tobacco  Allergies  Meds  Problems  Med Hx  Surg Hx  Fam Hx  Soc Hx        Additional complaints: None    HPI additional notes: Marcela presents today with   Chief Complaint   Patient presents with     Urinary Problem     Lab Results   Component Value Date    CR 1.12 11/04/2017   Pt notes urinary frequency and sensation that she is not emptying her bladder.  Denies blood or CVA tenderness.       ROS:  C: Negative for fever, chills, recent change in weight  CV: Negative for chest pain or peripheral edema  : POSITIVE for frequency, urgency. Negative for dysuria and hematuria  MS: Negative for flank pain  P: Negative for changes in mood or affect  ROS otherwise negative.    Chart Review:  History   Smoking Status     Never Smoker   Smokeless Tobacco     Never Used     PHQ-9 SCORE 1/23/2018 5/23/2018 6/9/2018   Total Score - - -   Total Score 16 2 0     PATRICIA-7 SCORE 11/4/2017 1/23/2018 6/9/2018   Total Score - - -   Total Score 0 (minimal anxiety) - -   Total Score  0 0 0       Patient Active Problem List   Diagnosis     Osteoporosis     Postmenopausal vaginal bleeding     Breast cancer (H)     RA (rheumatoid arthritis) (H)     Fibromyalgia     Restless legs syndrome     Health Care Home     Urethral prolapse     Pulmonary nodules     Chronic pain syndrome     Preventive measure     Constipation, chronic     Anemia     Hepatitis-C     Advance Care Planning     Major depression in complete remission (H)     Abnormal gait     Gastroesophageal reflux disease without esophagitis     Cramp of limb     Pain in both feet     Unspecified symptoms and signs involving cognitive functions and awareness     Mixed hyperlipidemia     Numbness of toes     Past Surgical History:   Procedure Laterality Date     APPENDECTOMY       BACK SURGERY      2004   multiple back surgeries; 5 total     BACK SURGERY      5 back surgeries     BUNIONECTOMY [place] AND PEBBLES BILATERAL, COMBINED  12/2/2011    Procedure:COMBINED BUNIONECTOMY CHEVR[place] AND PEBBLES BILATERAL; BILATERAL THIRD AND FOURTH CLAWTOE RECONSTRUCTION WITH EXOSTECTOMY, LEFT FIRST TARSOMETATARSAL JOINT and 2nd toe Right foot reconstruction; Surgeon:ASHWIN FARMER; Location: OR     COLONOSCOPY  10/18/2011    Procedure:COLONOSCOPY; COLONOSCOPY; Surgeon:BRENDA RODRIGUEZ; Location: GI     EYE SURGERY      eye lid surgery     GENITOURINARY SURGERY      bladder suspension     MASTECTOMY, BILATERAL      bilateral mastectomy     ORTHOPEDIC SURGERY      knee replacement, hand, elbow and foot surgery     Problem list, Medication list, Allergies, Medical/Social/Surg hx reviewed in [facility], updated as appropriate.   OBJECTIVE:                                                    /78 (BP Location: Right arm, Patient Position: Sitting, Cuff Size: Adult Regular)  Pulse 79  Temp 98.1  F (36.7  C) (Tympanic)  Resp 16  Wt 150 lb 8 oz (68.3 kg)  LMP  (LMP Unknown)  SpO2 96%  BMI 25.83 kg/m2  Body mass index is 25.83 kg/(m^2).  GENERAL: healthy,  alert, in no acute distress  HENT: Mucous mebranes moist.  MS: no gross deformities noted.  No CVA tenderness.  SKIN: no suspicious lesions, no rashes  PSYCH: Alert and oriented times 3;  Able to articulate logical thoughts. Affect is normal.    Diagnostic test results:   Results for orders placed or performed in visit on 06/25/18 (from the past 24 hour(s))   UA reflex to Microscopic and Culture   Result Value Ref Range    Color Urine Yellow     Appearance Urine Clear     Glucose Urine Negative NEG^Negative mg/dL    Bilirubin Urine Negative NEG^Negative    Ketones Urine Negative NEG^Negative mg/dL    Specific Gravity Urine 1.010 1.003 - 1.035    Blood Urine Negative NEG^Negative    pH Urine 5.0 5.0 - 7.0 pH    Protein Albumin Urine Negative NEG^Negative mg/dL    Urobilinogen Urine 0.2 0.2 - 1.0 EU/dL    Nitrite Urine Negative NEG^Negative    Leukocyte Esterase Urine Trace (A) NEG^Negative    Source Midstream Urine    Urine Microscopic   Result Value Ref Range    WBC Urine 0 - 5 OTO5^0 - 5 /HPF    RBC Urine O - 2 OTO2^O - 2 /HPF        ASSESSMENT/PLAN:                                                          ICD-10-CM    1. Dysuria R30.0 UA reflex to Microscopic and Culture     Urine Microscopic     UROLOGY ADULT REFERRAL     Urine Culture Aerobic Bacterial   2. Urinary frequency R35.0      Pt having difficulty emptying her bladder.  Discussed negative UA but will send for culture just to be sure.  If result returns negative, will have pt follow up with urology with the referral she was given today for additional testing to determine why she is not emptying her bladder.  will call with results either way.      Please see patient instructions for treatment details.    Follow up in 7-10 days if not improving as anticipated.    Fay Tang PA-C  [facility]

## 2018-06-26 LAB
BACTERIA SPEC CULT: NORMAL
Lab: NORMAL
SPECIMEN SOURCE: NORMAL

## 2018-06-28 ENCOUNTER — TELEPHONE (OUTPATIENT)
Dept: FAMILY MEDICINE | Facility: CLINIC | Age: 78
End: 2018-06-28

## 2018-06-28 DIAGNOSIS — R35.0 URINARY FREQUENCY: Primary | ICD-10-CM

## 2018-06-28 NOTE — TELEPHONE ENCOUNTER
Left voice message asking pt to call triage back.       Urine culture is normal.  There is no need for antibiotics at this point.  If new, worsening or persistent symptoms, then you should call or return for a recheck.

## 2018-06-28 NOTE — TELEPHONE ENCOUNTER
Patient called reporting symptoms are persistant  URINARY TRACT SYMPTOMS  Onset: since last appointment    Description:   Painful urination (Dysuria): no   Blood in urine (Hematuria): no   Delay in urine (Hesitency): YES, says sits on the toilet for a long time, feels like she has to go out of the time, and says that the PCP said that she may need to have some tests run to see if there is some sort of blockage    Intensity: moderate    Progression of Symptoms:  same    Accompanying Signs & Symptoms:  Fever/chills: no   Flank pain no   Nausea and vomiting: no   Any vaginal symptoms: none  Abdominal/Pelvic Pain: no     History:   History of frequent UTI's: no   History of kidney stones: no   Sexually Active: no   Possibility of pregnancy: No    Precipitating factors:   None identified     Per chart review, patient has already had a urology referral placed. Gave the patient the information to schedule.         Slime Leroy, RN  Triage RN  [facility]

## 2018-06-28 NOTE — TELEPHONE ENCOUNTER
I forgot I was going to refer her to urology, [facility]: [facility] [place] [phone]   [email or web address]

## 2018-07-06 ENCOUNTER — TELEPHONE (OUTPATIENT)
Dept: FAMILY MEDICINE | Facility: CLINIC | Age: 78
End: 2018-07-06

## 2018-07-06 NOTE — TELEPHONE ENCOUNTER
Pt complains of right foot swelling an pain. Denies injury, redness, breathing problems or chest pain. Notes swelling is improved when she elevates leg. She was offered an appt for further evaluation but declines due to transportation problems. Advised she elevate legs, limit sodium intake, wear comfortable shoes. Stressed need to seek care at  or clinic if symptoms persist. She verbalized understanding and agreement with plan.

## 2018-07-06 NOTE — TELEPHONE ENCOUNTER
Reason for call:  Patient reporting a symptom    Symptom or request: Part of rt foot swollen    Duration (how long have symptoms been present): 2 days    Have you been treated for this before? No    Additional comments: Pt declined appt    Phone Number patient can be reached at:  Home number on file [phone] (home)    Best Time:  any    Can we leave a detailed message on this number:  YES    Call taken on 7/6/2018 at 11:14 AM by OLGA CARTY

## 2018-07-16 ENCOUNTER — TRANSFERRED RECORDS (OUTPATIENT)
Dept: HEALTH INFORMATION MANAGEMENT | Facility: CLINIC | Age: 78
End: 2018-07-16

## 2018-07-25 ENCOUNTER — TELEPHONE (OUTPATIENT)
Dept: FAMILY MEDICINE | Facility: CLINIC | Age: 78
End: 2018-07-25

## 2018-07-25 NOTE — TELEPHONE ENCOUNTER
Reason for call:  Patient reporting a symptom  Symptom or request: left foot itching  Duration (how long have symptoms been present): 3 days  Have you been treated for this before? No  Additional comments: please call patient  Phone Number patient can be reached at:  Home number on file [phone] (home)  Best Time:  any  Can we leave a detailed message on this number:  YES  Call taken on 7/25/2018 at 8:28 AM by WES LYMAN

## 2018-08-21 ENCOUNTER — TRANSFERRED RECORDS (OUTPATIENT)
Dept: HEALTH INFORMATION MANAGEMENT | Facility: CLINIC | Age: 78
End: 2018-08-21

## 2018-08-22 DIAGNOSIS — R25.2 CRAMP OF LIMB: ICD-10-CM

## 2018-08-22 NOTE — TELEPHONE ENCOUNTER
Requested Prescriptions   Pending Prescriptions Disp Refills     tiZANidine (ZANAFLEX) 2 MG tablet [Pharmacy Med Name: TiZANidine HCl Oral Tablet 2 MG]      Last Written Prescription Date:  4/11/18  Last Fill Quantity: 60,   # refills: 1  Last Office Visit: 6/25/18 Kitty  Future Office visit:       Routing refill request to provider for review/approval because:  Drug not on the [facility], [facility] or [facility] refill protocol or controlled substance   60 tablet 0     Sig: TAKE ONE TABLET BY MOUTH THREE TIMES DAILY as needed for muscle spasms    There is no refill protocol information for this order

## 2018-08-23 RX ORDER — TIZANIDINE 2 MG/1
TABLET ORAL
Qty: 60 TABLET | Refills: 3 | Status: SHIPPED | OUTPATIENT
Start: 2018-08-23 | End: 2019-03-01

## 2018-11-21 DIAGNOSIS — K21.9 GASTROESOPHAGEAL REFLUX DISEASE WITHOUT ESOPHAGITIS: ICD-10-CM

## 2018-11-21 NOTE — TELEPHONE ENCOUNTER
"Requested Prescriptions   Pending Prescriptions Disp Refills     omeprazole (PRILOSEC) 20 MG CR capsule [Pharmacy Med Name: Omeprazole Oral Capsule Delayed Release 20 MG]  Last Written Prescription Date:  02/19/2018  Last Fill Quantity: 90,  # refills: 2   Last office visit: 6/25/2018 with prescribing provider:  ANDERSON   Future Office Visit:     90 capsule 1     Sig: TAKE ONE CAPSULE BY MOUTH ONE TIME DAILY    PPI Protocol Failed    11/21/2018  3:58 PM       Failed - No diagnosis of osteoporosis on record       Passed - Not on Clopidogrel (unless Pantoprazole ordered)       Passed - Recent (12 mo) or future (30 days) visit within the authorizing provider's specialty    Patient had office visit in the last 12 months or has a visit in the next 30 days with authorizing provider or within the authorizing provider's specialty.  See \"Patient Info\" tab in inbasket, or \"Choose Columns\" in Meds & Orders section of the refill encounter.             Passed - Patient is age 18 or older       Passed - No active pregnacy on record       Passed - No positive pregnancy test in past 12 months          "

## 2018-12-27 ENCOUNTER — TELEPHONE (OUTPATIENT)
Dept: FAMILY MEDICINE | Facility: CLINIC | Age: 78
End: 2018-12-27

## 2018-12-27 NOTE — TELEPHONE ENCOUNTER
Our goal is to have forms completed within 72 hours, however some forms may require a visit or additional information.    What clinic location was the form placed at [facility] or [place].?     Who is the form from?   Where did the form come from? Faxed to clinic   The form was placed in the inbox of JOAQUIN Ansari      Please fax to [phone]  Phone number: [phone]    Additional comments: [facility] medical release & consent for exercise participation    Call take on 12/27/2018 at 3:24 PM by Joy Cornelius

## 2019-01-15 ENCOUNTER — OFFICE VISIT (OUTPATIENT)
Dept: FAMILY MEDICINE | Facility: CLINIC | Age: 79
End: 2019-01-15
Payer: COMMERCIAL

## 2019-01-15 VITALS
SYSTOLIC BLOOD PRESSURE: 136 MMHG | TEMPERATURE: 96 F | BODY MASS INDEX: 25.4 KG/M2 | RESPIRATION RATE: 14 BRPM | WEIGHT: 148 LBS | OXYGEN SATURATION: 97 % | DIASTOLIC BLOOD PRESSURE: 74 MMHG | HEART RATE: 91 BPM

## 2019-01-15 DIAGNOSIS — J01.00 ACUTE NON-RECURRENT MAXILLARY SINUSITIS: Primary | ICD-10-CM

## 2019-01-15 DIAGNOSIS — H61.23 EXCESSIVE CERUMEN IN BOTH EAR CANALS: ICD-10-CM

## 2019-01-15 PROCEDURE — 99213 OFFICE O/P EST LOW 20 MIN: CPT | Performed by: PHYSICIAN ASSISTANT

## 2019-01-15 RX ORDER — AMOXICILLIN 500 MG/1
500 CAPSULE ORAL 3 TIMES DAILY
Qty: 21 CAPSULE | Refills: 0 | Status: SHIPPED | OUTPATIENT
Start: 2019-01-15 | End: 2019-03-23

## 2019-01-15 NOTE — PROGRESS NOTES
SUBJECTIVE:   Marcela Sandhu is a 78 year old female who presents to clinic today for the following health issues:    ENT Symptoms             Symptoms: cc Present Absent Comment   Fever/Chills   x    Fatigue  x     Muscle Aches   x    Eye Irritation   x watering of eyes   Sneezing   x    Nasal Syed/Drg  x     Sinus Pressure/Pain   x    Loss of smell   x    Dental pain  x     Sore Throat  x     Swollen Glands  x     Ear Pain/Fullness  x  Right ear, jaw pain   Cough   x    Wheeze   x    Chest Pain   x    Shortness of breath   x    Rash   x    Other   x      Symptom duration:  2 weeks   Symptom severity:  moderate   Treatments tried:  warm salt water   Contacts:  none     Reviewed and updated as needed this visit by clinical staff  Tobacco  Allergies  Meds  Problems  Med Hx  Surg Hx  Fam Hx  Soc Hx        Reviewed and updated as needed this visit by Provider  Tobacco  Allergies  Meds  Problems  Med Hx  Surg Hx  Fam Hx  Soc Hx        Additional complaints: None    HPI additional notes: Marcela presents today with   Chief Complaint   Patient presents with     URI   Pain is all on the right side of her face.  Symptoms have been worsening since onset.  First symptom was ear fullness and sensation that she had water in her ear.           ROS:  C: Negative for fever, chills, recent change in weight  Skin: Negative for worrisome rashes or lesions  ENT/MOUTH:POSITIVE for runny nose, ear pressure, sore throat, post-nasal drainage, sinus pressure, hearing loss and dental pain.  Negative for tinnitus.  Resp: Negative for significant cough or SOB  MS: Negative for significant arthralgias or myalgias  NEURO: Negative  for headaches or dizziness.  P: Negative for changes in mood or affect  ROS otherwise negative.    Chart Review:  History   Smoking Status     Never Smoker   Smokeless Tobacco     Never Used     Recent Labs   Lab Test 11/04/17  0848 11/26/16  1019  12/18/15  1204  07/21/15 10/22/12  1440  10/18/12  0914  08/25/11  1838   *  --   --   --   --   --   --  82  --  108*   HDL 55  --   --   --   --   --   --  50  --  57   TRIG 159*  --   --   --   --   --   --  110  --   --    ALT  --  20  --  19  --   --  28 24   < >  --    CR 1.12* 0.93   < >  --    < > 0.9 0.64 1.00   < >  --    GFRESTIMATED 47* 59*   < >  --    < > 65.4 >90 55*   < >  --    GFRESTBLACK 57* 71   < >  --    < >  --  >90 66   < >  --    POTASSIUM 3.8 3.8   < >  --   --  3.7 3.3* 4.5   < >  --    TSH  --   --   --   --   --  1.10  --   --   --   --     < > = values in this interval not displayed.      BP Readings from Last 3 Encounters:   01/15/19 136/74   06/25/18 136/78   06/09/18 130/64    Wt Readings from Last 3 Encounters:   01/15/19 67.1 kg (148 lb)   06/25/18 68.3 kg (150 lb 8 oz)   06/09/18 67.1 kg (148 lb)                  Patient Active Problem List   Diagnosis     Osteoporosis     Postmenopausal vaginal bleeding     Breast cancer (H)     RA (rheumatoid arthritis) (H)     Fibromyalgia     Restless legs syndrome     Health Care Home     Urethral prolapse     Pulmonary nodules     Chronic pain syndrome     Preventive measure     Constipation, chronic     Anemia     Hepatitis-C     Advance Care Planning     Major depression in complete remission (H)     Abnormal gait     Gastroesophageal reflux disease without esophagitis     Cramp of limb     Pain in both feet     Unspecified symptoms and signs involving cognitive functions and awareness     Mixed hyperlipidemia     Numbness of toes     Past Surgical History:   Procedure Laterality Date     APPENDECTOMY       BACK SURGERY      2004   multiple back surgeries; 5 total     BACK SURGERY      5 back surgeries     BUNIONECTOMY [place] AND PEBBLES BILATERAL, COMBINED  12/2/2011    Procedure:COMBINED BUNIONECTOMY CHEVRON AND PEBBLES BILATERAL; BILATERAL THIRD AND FOURTH CLAWTOE RECONSTRUCTION WITH EXOSTECTOMY, LEFT FIRST TARSOMETATARSAL JOINT and 2nd toe Right foot reconstruction;  Surgeon:ASHWIN FARMER; Location: OR     COLONOSCOPY  10/18/2011    Procedure:COLONOSCOPY; COLONOSCOPY; Surgeon:BRENDA RODRIGUEZ; Location: GI     EYE SURGERY      eye lid surgery     GENITOURINARY SURGERY      bladder suspension     MASTECTOMY, BILATERAL      bilateral mastectomy     ORTHOPEDIC SURGERY      knee replacement, hand, elbow and foot surgery     Problem list, Medication list, Allergies, Medical/Social/Surg hx reviewed in [facility], updated as appropriate.   OBJECTIVE:                                                    /74   Pulse 91   Temp 96  F (35.6  C) (Tympanic)   Resp 14   Wt 67.1 kg (148 lb)   LMP  (LMP Unknown)   SpO2 97%   BMI 25.40 kg/m    Body mass index is 25.4 kg/m .  GENERAL: healthy, alert, in no acute distress  EYES: Grossly normal to inspection, EOMI, PERRL  HENT: Ear canals moderate obstruction with cerumen bilaterally. TM mildly injected  retracted with serous effusion bilaterally.  Nasal mucosa mildly edematous with purulent rhinorrhea.  No septal deviation.  Mouth- mucous membranes moist, no lesions or ulcerations. Pharynx pink. and No tonsillary hypertrophy. Uvula midline, clear post-nasal drainage.  Maxillary and frontal sinuses tender to palpation right  NECK:tender and 1+ posterior cervical LAD bilaterally  RESP: lungs clear to auscultation - no rales, no rhonchi, no wheezes  CV: regular rate and rhythm, normal S1 S2.  No peripheral edema.  SKIN: no suspicious lesions, no rashes  PSYCH: Alert and oriented times 3;  Able to articulate logical thoughts. Affect is normal.    Diagnostic test results: None     ASSESSMENT/PLAN:                                                          ICD-10-CM    1. Acute non-recurrent maxillary sinusitis J01.00 amoxicillin (AMOXIL) 500 MG capsule   2. Excessive cerumen in both ear canals H61.23       Will start on antibiotics for sinusitis, comfort care measures discussed.  Pt will  debrox for ear wax at her pharmacy, name  written down for her.  Will follow up with us if she cannot remove the wax on her own.    Please see patient instructions for treatment details.    Return in about 1 week (around 1/22/2019) for if not improving.      Fay Tang PA-C  [facility]

## 2019-02-12 ENCOUNTER — TRANSFERRED RECORDS (OUTPATIENT)
Dept: HEALTH INFORMATION MANAGEMENT | Facility: CLINIC | Age: 79
End: 2019-02-12

## 2019-03-01 DIAGNOSIS — R25.2 CRAMP OF LIMB: ICD-10-CM

## 2019-03-01 NOTE — TELEPHONE ENCOUNTER
tiZANidine HCl Oral Tablet 2 MG  Last Written Prescription Date:  08/23/18  Last Fill Quantity: 60,  # refills: 3   Last office visit: 1/15/2019 with prescribing provider:  01/15/19   Future Office Visit:    Requested Prescriptions   Pending Prescriptions Disp Refills     tiZANidine (ZANAFLEX) 2 MG tablet [Pharmacy Med Name: tiZANidine HCl Oral Tablet 2 MG] 60 tablet 2     Sig: Take 1 tablet by mouth 3 times daily as needed for muscle spasms    There is no refill protocol information for this order

## 2019-03-04 RX ORDER — TIZANIDINE 2 MG/1
TABLET ORAL
Qty: 60 TABLET | Refills: 2 | Status: SHIPPED | OUTPATIENT
Start: 2019-03-04 | End: 2019-03-05

## 2019-03-05 DIAGNOSIS — R25.2 CRAMP OF LIMB: ICD-10-CM

## 2019-03-05 NOTE — TELEPHONE ENCOUNTER
tiZANidine (ZANAFLEX) 2 MG tablet      Last Written Prescription Date:  03/04/19  Last Fill Quantity: 60,   # refills: 2  Last Office Visit: 01/15/19  Future Office visit:       Routing refill request to provider for review/approval because:  Drug not on the [facility], [facility] or [facility] refill protocol or controlled substance  Requested Prescriptions   Pending Prescriptions Disp Refills     tiZANidine (ZANAFLEX) 2 MG tablet 90 tablet 2     Sig: Take 1 tablet by mouth 3 times daily as needed for muscle spasms    There is no refill protocol information for this order

## 2019-03-06 RX ORDER — TIZANIDINE 2 MG/1
TABLET ORAL
Qty: 90 TABLET | Refills: 2 | Status: SHIPPED | OUTPATIENT
Start: 2019-03-06 | End: 2019-11-18

## 2019-03-23 ENCOUNTER — OFFICE VISIT (OUTPATIENT)
Dept: FAMILY MEDICINE | Facility: CLINIC | Age: 79
End: 2019-03-23
Payer: COMMERCIAL

## 2019-03-23 VITALS
WEIGHT: 150 LBS | HEART RATE: 81 BPM | SYSTOLIC BLOOD PRESSURE: 164 MMHG | OXYGEN SATURATION: 96 % | BODY MASS INDEX: 25.75 KG/M2 | RESPIRATION RATE: 16 BRPM | TEMPERATURE: 97.5 F | DIASTOLIC BLOOD PRESSURE: 82 MMHG

## 2019-03-23 DIAGNOSIS — K13.79 MOUTH SORES: Primary | ICD-10-CM

## 2019-03-23 PROCEDURE — 99213 OFFICE O/P EST LOW 20 MIN: CPT | Performed by: PHYSICIAN ASSISTANT

## 2019-03-23 ASSESSMENT — ENCOUNTER SYMPTOMS
CARDIOVASCULAR NEGATIVE: 1
GASTROINTESTINAL NEGATIVE: 1
RESPIRATORY NEGATIVE: 1
NEUROLOGICAL NEGATIVE: 1
MUSCULOSKELETAL NEGATIVE: 1
EYES NEGATIVE: 1
CONSTITUTIONAL NEGATIVE: 1
PSYCHIATRIC NEGATIVE: 1
ENDOCRINE NEGATIVE: 1

## 2019-03-23 NOTE — PROGRESS NOTES
SUBJECTIVE:   Marcela Sandhu is a 78 year old female who presents to clinic today for the following health issues:    Concern - sores in mouth  Onset: 1 year    Description:   Painful sores inside mouth. Has dentures.    Intensity: severe    Progression of Symptoms:  worsening    Accompanying Signs & Symptoms:  Biting tongue or side of cheeks, trouble sleeping, unable to eat solids or liquids due to pain    Previous history of similar problem:   No    Precipitating factors:   Worsened by: eating, sleeping    Alleviating factors:  Improved by: nothing    Therapies Tried and outcome: none    She gets sores in different locations in the mouth - they are painful and cause swelling  Now she has three on her lower lip  She does not have any adjacent to her dentures and does not have any pain when wearing the dentures    She had a skin lesion removed from her lip once that was diagnosed as precancerous   Today she denies any lesions that have been present for more than 1-2 weeks    Problem list and histories reviewed & adjusted, as indicated.  Additional history: as documented    Patient Active Problem List   Diagnosis     Osteoporosis     Postmenopausal vaginal bleeding     Breast cancer (H)     RA (rheumatoid arthritis) (H)     Fibromyalgia     Restless legs syndrome     Health Care Home     Urethral prolapse     Pulmonary nodules     Chronic pain syndrome     Preventive measure     Constipation, chronic     Anemia     Hepatitis-C     Advance Care Planning     Major depression in complete remission (H)     Abnormal gait     Gastroesophageal reflux disease without esophagitis     Cramp of limb     Pain in both feet     Unspecified symptoms and signs involving cognitive functions and awareness     Mixed hyperlipidemia     Numbness of toes     Past Surgical History:   Procedure Laterality Date     APPENDECTOMY       BACK SURGERY      2004   multiple back surgeries; 5 total     BACK SURGERY      5 back surgeries      BUNIONECTOMY CHEVRON AND PEBBLES BILATERAL, COMBINED  2011    Procedure:COMBINED BUNIONECTOMY CHEVRON AND PEBBLES BILATERAL; BILATERAL THIRD AND FOURTH CLAWTOE RECONSTRUCTION WITH EXOSTECTOMY, LEFT FIRST TARSOMETATARSAL JOINT and 2nd toe Right foot reconstruction; Surgeon:ASHWIN FARMER; Location: OR     COLONOSCOPY  10/18/2011    Procedure:COLONOSCOPY; COLONOSCOPY; Surgeon:BRENDA RODRIGUEZ; Location: GI     EYE SURGERY      eye lid surgery     GENITOURINARY SURGERY      bladder suspension     MASTECTOMY, BILATERAL      bilateral mastectomy     ORTHOPEDIC SURGERY      knee replacement, hand, elbow and foot surgery       Social History     Tobacco Use     Smoking status: Never Smoker     Smokeless tobacco: Never Used   Substance Use Topics     Alcohol use: No     Family History   Problem Relation Age of Onset     Cancer - colorectal Mother 55         age 94     C.A.D. Father          age 89         Current Outpatient Medications   Medication Sig Dispense Refill     AMOXICILLIN PO Take 500 mg by mouth Only before dental procedures       buPROPion (WELLBUTRIN XL) 300 MG 24 hr tablet TAKE 1 TABLET (300 mg) BY MOUTH ONCE DAILY IN THE MORNING 90 tablet 2     Donepezil HCl (ARICEPT PO) Take 5 mg by mouth       l-lysine HCl 500 MG TABS Take 500 mg by mouth daily.       Lactobacillus Rhamnosus, GG, (CULTURELLE PO)        magic mouthwash (ENTER INGREDIENTS IN COMMENTS) suspension Take 5 mLs by mouth every 4 hours as needed (sores in mouth) 237 mL 1     methotrexate sodium 2.5 MG TABS Take 5 tablets once a week       Multiple Vitamins-Minerals (CENTRUM PO) Take  by mouth.       NABUMETONE PO Take 750 mg by mouth 2 times daily       omeprazole (PRILOSEC) 20 MG CR capsule TAKE ONE CAPSULE BY MOUTH ONE TIME DAILY  90 capsule 1     predniSONE (DELTASONE) 5 MG tablet        sertraline (ZOLOFT) 100 MG tablet Take 2 tablets (200 mg) by mouth daily 180 tablet 3     tiZANidine (ZANAFLEX) 2 MG tablet Take 1 tablet by  mouth 3 times daily as needed for muscle spasms 90 tablet 2     Allergies   Allergen Reactions     Sulfa Drugs Cross Reactors Unknown     Vicodin [Hydrocodone-Acetaminophen] Nausea     Levaquin [Levofloxacin Hemihydrate] Other (See Comments) and Rash     chest pain       Reviewed and updated as needed this visit by clinical staff  Tobacco  Allergies  Meds  Med Hx  Surg Hx  Fam Hx  Soc Hx      Reviewed and updated as needed this visit by Provider         Review of Systems   Constitutional: Negative.    HENT:        As in HPI   Eyes: Negative.    Respiratory: Negative.    Cardiovascular: Negative.    Gastrointestinal: Negative.    Endocrine: Negative.    Genitourinary: Negative.    Musculoskeletal: Negative.    Skin: Negative.    Neurological: Negative.    Psychiatric/Behavioral: Negative.        OBJECTIVE:     /82 (BP Location: Right arm, Patient Position: Sitting, Cuff Size: Adult Regular)   Pulse 81   Temp 97.5  F (36.4  C) (Tympanic)   Resp 16   Wt 68 kg (150 lb)   LMP  (LMP Unknown)   SpO2 96%   BMI 25.75 kg/m    Body mass index is 25.75 kg/m .    Physical Exam   Constitutional: She is oriented to person, place, and time. She appears well-developed and well-nourished. No distress.   HENT:   Head: Normocephalic.   Right Ear: External ear normal.   Left Ear: External ear normal.   Nose: Nose normal.   Mouth/Throat: Oral lesions (multiple raised lesions along the inner right cheek and inner lower lip - minimally tender, no erythma or ulceration) present.   Eyes: Conjunctivae and EOM are normal.   Neck: Normal range of motion.   Pulmonary/Chest: Effort normal.   Neurological: She is alert and oriented to person, place, and time.   Skin: She is not diaphoretic.   Psychiatric: She has a normal mood and affect. Judgment normal.       No results found for this or any previous visit (from the past 24 hour(s)).    ASSESSMENT/PLAN:       ICD-10-CM    1. Mouth sores K13.79 magic mouthwash (ENTER  INGREDIENTS IN COMMENTS) suspension      These are most consistent with canker sores.    They are not consistent with denture sores  Since they move around, unlikely to be malignant  She will trial magic mouthwash and follow up as needed.     Return for a recheck if you are not improved.    There are no Patient Instructions on file for this visit.    Arturo Gimenez PA-C  [facility]

## 2019-03-25 ASSESSMENT — PATIENT HEALTH QUESTIONNAIRE - PHQ9: SUM OF ALL RESPONSES TO PHQ QUESTIONS 1-9: 6

## 2019-06-10 ENCOUNTER — TELEPHONE (OUTPATIENT)
Dept: FAMILY MEDICINE | Facility: CLINIC | Age: 79
End: 2019-06-10

## 2019-06-10 ENCOUNTER — OFFICE VISIT (OUTPATIENT)
Dept: FAMILY MEDICINE | Facility: CLINIC | Age: 79
End: 2019-06-10
Payer: COMMERCIAL

## 2019-06-10 VITALS
TEMPERATURE: 98.5 F | BODY MASS INDEX: 25.75 KG/M2 | HEART RATE: 81 BPM | OXYGEN SATURATION: 98 % | SYSTOLIC BLOOD PRESSURE: 128 MMHG | WEIGHT: 150 LBS | RESPIRATION RATE: 14 BRPM | DIASTOLIC BLOOD PRESSURE: 80 MMHG

## 2019-06-10 DIAGNOSIS — F32.5 MAJOR DEPRESSION IN COMPLETE REMISSION (H): Primary | Chronic | ICD-10-CM

## 2019-06-10 DIAGNOSIS — K13.79 MOUTH SORES: ICD-10-CM

## 2019-06-10 PROCEDURE — 99214 OFFICE O/P EST MOD 30 MIN: CPT | Performed by: PHYSICIAN ASSISTANT

## 2019-06-10 RX ORDER — MIRTAZAPINE 7.5 MG/1
7.5 TABLET, FILM COATED ORAL AT BEDTIME
Qty: 60 TABLET | Refills: 1 | Status: SHIPPED | OUTPATIENT
Start: 2019-06-10 | End: 2019-09-09

## 2019-06-10 ASSESSMENT — PATIENT HEALTH QUESTIONNAIRE - PHQ9: SUM OF ALL RESPONSES TO PHQ QUESTIONS 1-9: 17

## 2019-06-10 NOTE — TELEPHONE ENCOUNTER
Reason for Call:  Other call back    Detailed comments: Patient states she has been laid up for a while and is on depression medication and feels like she needs something more. Please call patient to advise.    Phone Number Patient can be reached at: Home number on file [phone] (home)    Best Time: any    Can we leave a detailed message on this number? YES    Call taken on 6/10/2019 at 8:42 AM by ROB ZAFAR

## 2019-06-10 NOTE — PROGRESS NOTES
Subjective     Marcela Sandhu is a 78 year old female who presents to clinic today for the following health issues:    HPI   Depression Followup    How are you doing with your depression since your last visit? Worsened-due to pain    Are you having other symptoms that might be associated with depression? No    Have you had a significant life event?  Health Concerns     Are you feeling anxious or having panic attacks?   Yes:  due to pain    Do you have any concerns with your use of alcohol or other drugs? No    Social History     Tobacco Use     Smoking status: Never Smoker     Smokeless tobacco: Never Used   Substance Use Topics     Alcohol use: No     Drug use: No     PHQ 6/9/2018 3/25/2019 6/10/2019   PHQ-9 Total Score 0 6 17   Q9: Thoughts of better off dead/self-harm past 2 weeks Not at all Not at all Nearly every day   F/U: Thoughts of suicide or self-harm - - No   F/U: Safety concerns - - No     PATRICIA-7 SCORE 11/4/2017 1/23/2018 6/9/2018   Total Score - - -   Total Score 0 (minimal anxiety) - -   Total Score 0 0 0     Suicide Assessment Five-step Evaluation and Treatment (SAFE-T)    Amount of exercise or physical activity: 3 days per week before the leg and hip pain began    Problems taking medications regularly: No    Medication side effects: none    Diet: regular (no restrictions)    PHQ-9 SCORE 6/9/2018 3/25/2019 6/10/2019   PHQ-9 Total Score - - -   PHQ-9 Total Score 0 6 17     PATRICIA-7 SCORE 11/4/2017 1/23/2018 6/9/2018   Total Score - - -   Total Score 0 (minimal anxiety) - -   Total Score 0 0 0       Reviewed and updated as needed this visit by Provider  Tobacco  Allergies  Meds  Problems  Med Hx  Surg Hx  Fam Hx  Soc Hx          Additional complaints: None    HPI additional notes: Marcela presents today with   Chief Complaint   Patient presents with     Depression     Here today with her friend.  Worsening of depression especially because of worsening pain.  Has been on wellbutrin and zoloft since  about 2004 or earlier.  Having trouble sleeping as well due to pain.     Review of Systems   C: Negative for fever, chills, recent change in weight  Skin: Negative for worrisome rashes or lesions  ENT: Negative for ear, mouth and throat problems  Resp: Negative for significant cough or SOB  CV: Negative for chest pain or peripheral edema  GI: Negative for nausea, abdominal pain, heartburn, or change in bowel habits  MS: POSITIVE for generalized fatigue and malaise.  PSYCHIATRIC: POSITIVE for depressed mood, anxiety or insomnia. Negative for {thoughts of self harm or suicide or concerns for safety of self or others  ROS all other systems negative.          History   Smoking Status     Never Smoker   Smokeless Tobacco     Never Used     Patient Active Problem List   Diagnosis     Osteoporosis     Postmenopausal vaginal bleeding     Breast cancer (H)     RA (rheumatoid arthritis) (H)     Fibromyalgia     Restless legs syndrome     Health Care Home     Urethral prolapse     Pulmonary nodules     Chronic pain syndrome     Preventive measure     Constipation, chronic     Anemia     Hepatitis-C     Advance Care Planning     Major depression in complete remission (H)     Abnormal gait     Gastroesophageal reflux disease without esophagitis     Cramp of limb     Pain in both feet     Unspecified symptoms and signs involving cognitive functions and awareness     Mixed hyperlipidemia     Numbness of toes     Past Surgical History:   Procedure Laterality Date     APPENDECTOMY       BACK SURGERY      2004   multiple back surgeries; 5 total     BACK SURGERY      5 back surgeries     BUNIONECTOMY CHEVRON AND PEBBLES BILATERAL, COMBINED  12/2/2011    Procedure:COMBINED BUNIONECTOMY CHEVRON AND PEBBLES BILATERAL; BILATERAL THIRD AND FOURTH CLAWTOE RECONSTRUCTION WITH EXOSTECTOMY, LEFT FIRST TARSOMETATARSAL JOINT and 2nd toe Right foot reconstruction; Surgeon:ASHWIN FARMER; Location:[facility] OR     COLONOSCOPY  10/18/2011     Procedure:COLONOSCOPY; COLONOSCOPY; Surgeon:BRENDA RODRIGUEZ; Location: GI     EYE SURGERY      eye lid surgery     GENITOURINARY SURGERY      bladder suspension     MASTECTOMY, BILATERAL      bilateral mastectomy     ORTHOPEDIC SURGERY      knee replacement, hand, elbow and foot surgery       Objective    /80   Pulse 81   Temp 98.5  F (36.9  C) (Tympanic)   Resp 14   Wt 68 kg (150 lb)   LMP  (LMP Unknown)   SpO2 98%   BMI 25.75 kg/m    Body mass index is 25.75 kg/m .  GENERAL: healthy, alert, in no acute distress  SKIN: no suspicious lesions, no rashes  PSYCH:Mental Status Exam  Behavior: pleasant, calm, passive and guarded  Speech: slowed  Mood: depressed  Affect: Dysphoric  Thought Processes: Logical and Goal directed  Thought Content: no evidence of suicidal or homicidal ideation and no overt psychosis  Insight: Good  Judgment: Good    Diagnostic test results: none         Assessment & Plan       Problem List Items Addressed This Visit        Medium    Major depression in complete remission (H) - Primary (Chronic)    Relevant Medications    mirtazapine (REMERON) 7.5 MG tablet      Other Visit Diagnoses     Mouth sores        Relevant Medications    magic mouthwash (ENTER INGREDIENTS IN COMMENTS) suspension         Will add on remeron at low dose to see if helps with mood and sleep.  Discussed possible side effects of weight gain.  If noting some improvement without side effects can increase to 15 mg after 1 week.  Will follow up for recheck in 1 month.  May consider switching her zoloft to a different medication like cymbalta at that point to see if it also helps with her pain.          Please see patient instructions for treatment details.    Return in about 1 month (around 7/10/2019) for Depression Recheck.      Fay Tang PA-C  [facility]

## 2019-06-10 NOTE — TELEPHONE ENCOUNTER
"Patient states she is being seen at [facility] for bilateral sacroiliitis. She had shots in her back, but they were not beneficial, so she is waiting for second round. Having to wait 18 days from initial injections; she is scheduled for second dose of injections one week from Tuesday.     Patient states she has been \"feeling down\" every day due to pain. Appointment scheduled with provider to discuss depression mgmt. Routing as FYI    PHQ-9 score:    PHQ-9 SCORE 6/10/2019   PHQ-9 Total Score -   PHQ-9 Total Score 17               "

## 2019-06-17 ENCOUNTER — TELEPHONE (OUTPATIENT)
Dept: FAMILY MEDICINE | Facility: CLINIC | Age: 79
End: 2019-06-17

## 2019-06-17 NOTE — TELEPHONE ENCOUNTER
Called patient and she states that she had one big episode of diarrhea that was very messy. Patient was informed that her Remeron was not the likely cause of the diarrhea, and that she may have a stomach bug. Patient will continue to take rhe medication and will keep us updated if she is continuing to have diarrhea.

## 2019-06-17 NOTE — TELEPHONE ENCOUNTER
Less than 1% of people of had diarrhea from this.  Is it possible she has a stomach bug?  There has been one going around.  Can she try it for three more days?

## 2019-06-17 NOTE — TELEPHONE ENCOUNTER
Reason for Call:  Other     Detailed comments: in last week given a RX fomirtazapine (REMERON) 7.5 MG tabletr gives her diarrhea  Is there something else she could take    Phone Number Patient can be reached at: Home number on file [phone] (home)    Best Time: today    Can we leave a detailed message on this number? YES    Call taken on 6/17/2019 at 8:28 AM by KERWIN TERAN

## 2019-06-22 DIAGNOSIS — K21.9 GASTROESOPHAGEAL REFLUX DISEASE WITHOUT ESOPHAGITIS: ICD-10-CM

## 2019-06-24 NOTE — TELEPHONE ENCOUNTER
"Requested Prescriptions   Pending Prescriptions Disp Refills     omeprazole (PRILOSEC) 20 MG DR capsule [Pharmacy Med Name: Omeprazole Oral Capsule Delayed Release 20 MG]  Last Written Prescription Date:  11/23/2018  Last Fill Quantity: 90 capsule,  # refills: 1   Last office visit: 6/10/2019 with prescribing provider:  Kitty   Future Office Visit:     90 capsule 0     Sig: TAKE ONE CAPSULE BY MOUTH ONE TIME DAILY       PPI Protocol Failed - 6/22/2019  2:53 PM        Failed - No diagnosis of osteoporosis on record        Passed - Not on Clopidogrel (unless Pantoprazole ordered)        Passed - Recent (12 mo) or future (30 days) visit within the authorizing provider's specialty     Patient had office visit in the last 12 months or has a visit in the next 30 days with authorizing provider or within the authorizing provider's specialty.  See \"Patient Info\" tab in inbasket, or \"Choose Columns\" in Meds & Orders section of the refill encounter.              Passed - Medication is active on med list        Passed - Patient is age 18 or older        Passed - No active pregnacy on record        Passed - No positive pregnancy test in past 12 months           "

## 2019-07-05 ENCOUNTER — OFFICE VISIT (OUTPATIENT)
Dept: FAMILY MEDICINE | Facility: CLINIC | Age: 79
End: 2019-07-05
Payer: COMMERCIAL

## 2019-07-05 VITALS
HEART RATE: 83 BPM | SYSTOLIC BLOOD PRESSURE: 124 MMHG | DIASTOLIC BLOOD PRESSURE: 80 MMHG | RESPIRATION RATE: 16 BRPM | WEIGHT: 154 LBS | TEMPERATURE: 98 F | BODY MASS INDEX: 26.43 KG/M2

## 2019-07-05 DIAGNOSIS — H61.23 BILATERAL IMPACTED CERUMEN: Primary | ICD-10-CM

## 2019-07-05 PROCEDURE — 99207 ZZC NO CHARGE LOS: CPT | Performed by: PHYSICIAN ASSISTANT

## 2019-07-05 PROCEDURE — 69210 REMOVE IMPACTED EAR WAX UNI: CPT | Performed by: PHYSICIAN ASSISTANT

## 2019-07-05 ASSESSMENT — ENCOUNTER SYMPTOMS
RESPIRATORY NEGATIVE: 1
CONSTITUTIONAL NEGATIVE: 1
GASTROINTESTINAL NEGATIVE: 1
EYES NEGATIVE: 1
ENDOCRINE NEGATIVE: 1
NEUROLOGICAL NEGATIVE: 1
MUSCULOSKELETAL NEGATIVE: 1
PSYCHIATRIC NEGATIVE: 1
CARDIOVASCULAR NEGATIVE: 1

## 2019-07-05 NOTE — PROGRESS NOTES
Subjective     Marcela Sandhu is a 78 year old female who presents to clinic today for the following health issues:    HPI   Ear Problem      Duration: 2 weeks    Description (location/character/radiation): lt ear    Intensity:  moderate    Accompanying signs and symptoms: pain and fullness    History (similar episodes/previous evaluation): None    Precipitating or alleviating factors: None    Therapies tried and outcome: debrux and cotton balls, tried flushing out ear at home     Hearing is effected by ear wax        Patient Active Problem List   Diagnosis     Osteoporosis     Postmenopausal vaginal bleeding     Breast cancer (H)     RA (rheumatoid arthritis) (H)     Fibromyalgia     Restless legs syndrome     Health Care Home     Urethral prolapse     Pulmonary nodules     Chronic pain syndrome     Preventive measure     Constipation, chronic     Anemia     Hepatitis-C     Advance Care Planning     Major depression in complete remission (H)     Abnormal gait     Gastroesophageal reflux disease without esophagitis     Cramp of limb     Pain in both feet     Unspecified symptoms and signs involving cognitive functions and awareness     Mixed hyperlipidemia     Numbness of toes     Past Surgical History:   Procedure Laterality Date     APPENDECTOMY       BACK SURGERY      2004   multiple back surgeries; 5 total     BACK SURGERY      5 back surgeries     BUNIONECTOMY CHEVRON AND PEBBLES BILATERAL, COMBINED  12/2/2011    Procedure:COMBINED BUNIONECTOMY CHEVRON AND PEBBLES BILATERAL; BILATERAL THIRD AND FOURTH CLAWTOE RECONSTRUCTION WITH EXOSTECTOMY, LEFT FIRST TARSOMETATARSAL JOINT and 2nd toe Right foot reconstruction; Surgeon:ASHWIN FARMER; Location: OR     COLONOSCOPY  10/18/2011    Procedure:COLONOSCOPY; COLONOSCOPY; Surgeon:BRENDA RODRIGUEZ; Location: GI     EYE SURGERY      eye lid surgery     GENITOURINARY SURGERY      bladder suspension     MASTECTOMY, BILATERAL      bilateral mastectomy     ORTHOPEDIC  SURGERY      knee replacement, hand, elbow and foot surgery       Social History     Tobacco Use     Smoking status: Never Smoker     Smokeless tobacco: Never Used   Substance Use Topics     Alcohol use: No     Family History   Problem Relation Age of Onset     Cancer - colorectal Mother 55         age 94     C.A.D. Father          age 89         Current Outpatient Medications   Medication Sig Dispense Refill     AMOXICILLIN PO Take 500 mg by mouth Only before dental procedures       buPROPion (WELLBUTRIN XL) 300 MG 24 hr tablet TAKE 1 TABLET (300 mg) BY MOUTH ONCE DAILY IN THE MORNING 90 tablet 2     Donepezil HCl (ARICEPT PO) Take 5 mg by mouth       l-lysine HCl 500 MG TABS Take 500 mg by mouth daily.       Lactobacillus Rhamnosus, GG, (CULTURELLE PO)        magic mouthwash (ENTER INGREDIENTS IN COMMENTS) suspension Take 5 mLs by mouth every 4 hours as needed (sores in mouth) 237 mL 1     methotrexate sodium 2.5 MG TABS Take 5 tablets once a week       mirtazapine (REMERON) 7.5 MG tablet Take 1 tablet (7.5 mg) by mouth At Bedtime , can increase to 15 mg PO at bedtime after 2 weeks. 60 tablet 1     Multiple Vitamins-Minerals (CENTRUM PO) Take  by mouth.       NABUMETONE PO Take 750 mg by mouth 2 times daily       omeprazole (PRILOSEC) 20 MG DR capsule TAKE ONE CAPSULE BY MOUTH ONE TIME DAILY  90 capsule 0     predniSONE (DELTASONE) 5 MG tablet        sertraline (ZOLOFT) 100 MG tablet Take 2 tablets (200 mg) by mouth daily 180 tablet 3     tiZANidine (ZANAFLEX) 2 MG tablet Take 1 tablet by mouth 3 times daily as needed for muscle spasms 90 tablet 2     Allergies   Allergen Reactions     Sulfa Drugs Cross Reactors Unknown     Vicodin [Hydrocodone-Acetaminophen] Nausea     Levaquin [Levofloxacin Hemihydrate] Other (See Comments) and Rash     chest pain       Reviewed and updated as needed this visit by Provider         Review of Systems   Constitutional: Negative.    HENT:        As in HPI   Eyes: Negative.     Respiratory: Negative.    Cardiovascular: Negative.    Gastrointestinal: Negative.    Endocrine: Negative.    Genitourinary: Negative.    Musculoskeletal: Negative.    Skin: Negative.    Neurological: Negative.    Psychiatric/Behavioral: Negative.          Objective    /80 (Cuff Size: Adult Regular)   Pulse 83   Temp 98  F (36.7  C) (Tympanic)   Resp 16   Wt 69.9 kg (154 lb)   LMP  (LMP Unknown)   BMI 26.43 kg/m    Physical Exam   Constitutional: She is oriented to person, place, and time. She appears well-developed and well-nourished. No distress.   HENT:   Head: Normocephalic.   Right Ear: External ear normal.   Left Ear: External ear normal.   Nose: Nose normal.   Bilateral cerumen impaction   Eyes: Conjunctivae and EOM are normal.   Neck: Normal range of motion.   Pulmonary/Chest: Effort normal.   Neurological: She is alert and oriented to person, place, and time.   Skin: She is not diaphoretic.   Psychiatric: She has a normal mood and affect. Judgment normal.       PROCEDURE NOTE:  Bilateral ear canals were irrigated with water, cerumen successfully removed, patient tolerated the procedure well and can hear clearly after the cerumen is removed.     Diagnostic Test Results:  No results found for this or any previous visit (from the past 24 hour(s)).        Assessment & Plan   Problem List Items Addressed This Visit     None      Visit Diagnoses     Bilateral impacted cerumen    -  Primary    Relevant Orders    HC REMOVAL IMPACTED CERUMEN IRRIGATION/LVG UNILAT (Completed)               Patient Instructions   I recommend getting the Shingrix vaccine.  This is a vaccine to prevent shingles and the pain associated with shingles.  It is more effective than previous shingles vaccines.  Ask for the vaccine at any pharmacy, just call ahead for availability.     https://www.cdc.gov/vaccines/vpd/shingles/public/shingrix/index.html        Return in about 2 weeks (around 7/19/2019) for Preventive Care  Visit.    Yamileth Gimenez PA-C  [facility]

## 2019-07-05 NOTE — PATIENT INSTRUCTIONS
I recommend getting the Shingrix vaccine.  This is a vaccine to prevent shingles and the pain associated with shingles.  It is more effective than previous shingles vaccines.  Ask for the vaccine at any pharmacy, just call ahead for availability.     https://www.cdc.gov/vaccines/vpd/shingles/public/shingrix/index.html

## 2019-08-13 DIAGNOSIS — F32.5 MAJOR DEPRESSION IN COMPLETE REMISSION (H): Chronic | ICD-10-CM

## 2019-08-14 RX ORDER — SERTRALINE HYDROCHLORIDE 100 MG/1
200 TABLET, FILM COATED ORAL DAILY
Qty: 180 TABLET | Refills: 3 | Status: SHIPPED | OUTPATIENT
Start: 2019-08-14 | End: 2020-10-15

## 2019-08-14 NOTE — TELEPHONE ENCOUNTER
Routing refill request to provider for review/approval because:  Labs out of range:  Phq9.    LAINA RodgersN, RN  [facility]

## 2019-09-09 ENCOUNTER — OFFICE VISIT (OUTPATIENT)
Dept: FAMILY MEDICINE | Facility: CLINIC | Age: 79
End: 2019-09-09
Payer: COMMERCIAL

## 2019-09-09 VITALS
HEART RATE: 86 BPM | SYSTOLIC BLOOD PRESSURE: 134 MMHG | TEMPERATURE: 96 F | WEIGHT: 159 LBS | OXYGEN SATURATION: 98 % | BODY MASS INDEX: 27.14 KG/M2 | HEIGHT: 64 IN | RESPIRATION RATE: 14 BRPM | DIASTOLIC BLOOD PRESSURE: 68 MMHG

## 2019-09-09 DIAGNOSIS — Z00.00 ROUTINE GENERAL MEDICAL EXAMINATION AT A HEALTH CARE FACILITY: Primary | ICD-10-CM

## 2019-09-09 DIAGNOSIS — K21.9 GASTROESOPHAGEAL REFLUX DISEASE WITHOUT ESOPHAGITIS: ICD-10-CM

## 2019-09-09 DIAGNOSIS — F32.5 MAJOR DEPRESSION IN COMPLETE REMISSION (H): Chronic | ICD-10-CM

## 2019-09-09 LAB
ALBUMIN SERPL-MCNC: 3.5 G/DL (ref 3.4–5)
ALP SERPL-CCNC: 46 U/L (ref 40–150)
ALT SERPL W P-5'-P-CCNC: 17 U/L (ref 0–50)
ANION GAP SERPL CALCULATED.3IONS-SCNC: 7 MMOL/L (ref 3–14)
AST SERPL W P-5'-P-CCNC: 18 U/L (ref 0–45)
BILIRUB SERPL-MCNC: 0.8 MG/DL (ref 0.2–1.3)
BUN SERPL-MCNC: 26 MG/DL (ref 7–30)
CALCIUM SERPL-MCNC: 9.3 MG/DL (ref 8.5–10.1)
CHLORIDE SERPL-SCNC: 108 MMOL/L (ref 94–109)
CHOLEST SERPL-MCNC: 214 MG/DL
CO2 SERPL-SCNC: 27 MMOL/L (ref 20–32)
CREAT SERPL-MCNC: 0.88 MG/DL (ref 0.52–1.04)
ERYTHROCYTE [DISTWIDTH] IN BLOOD BY AUTOMATED COUNT: 13.3 % (ref 10–15)
GFR SERPL CREATININE-BSD FRML MDRD: 64 ML/MIN/{1.73_M2}
GLUCOSE SERPL-MCNC: 96 MG/DL (ref 70–99)
HCT VFR BLD AUTO: 33.2 % (ref 35–47)
HDLC SERPL-MCNC: 50 MG/DL
HGB BLD-MCNC: 10.7 G/DL (ref 11.7–15.7)
LDLC SERPL CALC-MCNC: 142 MG/DL
MCH RBC QN AUTO: 34.9 PG (ref 26.5–33)
MCHC RBC AUTO-ENTMCNC: 32.2 G/DL (ref 31.5–36.5)
MCV RBC AUTO: 108 FL (ref 78–100)
NONHDLC SERPL-MCNC: 164 MG/DL
PLATELET # BLD AUTO: 181 10E9/L (ref 150–450)
POTASSIUM SERPL-SCNC: 3.8 MMOL/L (ref 3.4–5.3)
PROT SERPL-MCNC: 7.2 G/DL (ref 6.8–8.8)
RBC # BLD AUTO: 3.07 10E12/L (ref 3.8–5.2)
SODIUM SERPL-SCNC: 142 MMOL/L (ref 133–144)
TRIGL SERPL-MCNC: 111 MG/DL
WBC # BLD AUTO: 6.1 10E9/L (ref 4–11)

## 2019-09-09 PROCEDURE — 80061 LIPID PANEL: CPT | Performed by: PHYSICIAN ASSISTANT

## 2019-09-09 PROCEDURE — 99397 PER PM REEVAL EST PAT 65+ YR: CPT | Performed by: PHYSICIAN ASSISTANT

## 2019-09-09 PROCEDURE — 80053 COMPREHEN METABOLIC PANEL: CPT | Performed by: PHYSICIAN ASSISTANT

## 2019-09-09 PROCEDURE — 36415 COLL VENOUS BLD VENIPUNCTURE: CPT | Performed by: PHYSICIAN ASSISTANT

## 2019-09-09 PROCEDURE — 85027 COMPLETE CBC AUTOMATED: CPT | Performed by: PHYSICIAN ASSISTANT

## 2019-09-09 RX ORDER — BUPROPION HYDROCHLORIDE 300 MG/1
TABLET ORAL
Qty: 90 TABLET | Refills: 3 | Status: SHIPPED | OUTPATIENT
Start: 2019-09-09 | End: 2020-12-14

## 2019-09-09 RX ORDER — MIRTAZAPINE 7.5 MG/1
7.5 TABLET, FILM COATED ORAL AT BEDTIME
Qty: 90 TABLET | Refills: 3 | Status: SHIPPED | OUTPATIENT
Start: 2019-09-09 | End: 2020-08-24

## 2019-09-09 ASSESSMENT — ANXIETY QUESTIONNAIRES
6. BECOMING EASILY ANNOYED OR IRRITABLE: SEVERAL DAYS
3. WORRYING TOO MUCH ABOUT DIFFERENT THINGS: SEVERAL DAYS
IF YOU CHECKED OFF ANY PROBLEMS ON THIS QUESTIONNAIRE, HOW DIFFICULT HAVE THESE PROBLEMS MADE IT FOR YOU TO DO YOUR WORK, TAKE CARE OF THINGS AT HOME, OR GET ALONG WITH OTHER PEOPLE: NOT DIFFICULT AT ALL
2. NOT BEING ABLE TO STOP OR CONTROL WORRYING: SEVERAL DAYS
5. BEING SO RESTLESS THAT IT IS HARD TO SIT STILL: SEVERAL DAYS
GAD7 TOTAL SCORE: 6
1. FEELING NERVOUS, ANXIOUS, OR ON EDGE: SEVERAL DAYS
7. FEELING AFRAID AS IF SOMETHING AWFUL MIGHT HAPPEN: SEVERAL DAYS

## 2019-09-09 ASSESSMENT — PATIENT HEALTH QUESTIONNAIRE - PHQ9
SUM OF ALL RESPONSES TO PHQ QUESTIONS 1-9: 2
5. POOR APPETITE OR OVEREATING: NOT AT ALL

## 2019-09-09 ASSESSMENT — MIFFLIN-ST. JEOR: SCORE: 1187.25

## 2019-09-09 ASSESSMENT — ACTIVITIES OF DAILY LIVING (ADL): CURRENT_FUNCTION: TRANSPORTATION REQUIRES ASSISTANCE

## 2019-09-09 NOTE — PROGRESS NOTES
The patient was provided with suggestions to help her develop a healthy physical lifestyle.  The patient reports that she has difficulty with activities of daily living. I have asked that the patient make a follow up appointment in 4 weeks where this issue will be further evaluated and addressed.  Information on urinary incontinence and treatment options given to patient.

## 2019-09-09 NOTE — PROGRESS NOTES
"SUBJECTIVE:   Marcela Sandhu is a 79 year old female who presents for Preventive Visit.    Are you in the first 12 months of your Medicare coverage?  No    Healthy Habits:    In general, how would you rate your overall health?  Fair    Frequency of exercise:  2-3 days/week    Duration of exercise:  30-45 minutes    Do you usually eat at least 4 servings of fruit and vegetables a day, include whole grains    & fiber and avoid regularly eating high fat or \"junk\" foods?  Yes    Taking medications regularly:  No    Barriers to taking medications:  None    Medication side effects:  None    Ability to successfully perform activities of daily living:  Transportation requires assistance    Home Safety:  No safety concerns identified    Hearing Impairment:  No hearing concerns    In the past 6 months, have you been bothered by leaking of urine? Yes    In general, how would you rate your overall mental or emotional health?  Good      PHQ-2 Total Score:    Additional concerns today:  No    PHQ-9 SCORE 3/25/2019 6/10/2019 9/9/2019   PHQ-9 Total Score - - -   PHQ-9 Total Score 6 17 2     PATRICIA-7 SCORE 1/23/2018 6/9/2018 9/9/2019   Total Score - - -   Total Score - - -   Total Score 0 0 6      Do you feel safe in your environment? Yes    Do you have a Health Care Directive? Yes: Advance Directive has been received and scanned.      Fall risk  Fallen 2 or more times in the past year?: No  Any fall with injury in the past year?: No    Cognitive Screening   1) Repeat 3 items (Leader, Season, Table)    2) Clock draw: NORMAL  3) 3 item recall: Recalls 2 objects   Results: NORMAL clock, 1-2 items recalled: COGNITIVE IMPAIRMENT LESS LIKELY    Mini-CogTM Copyright DIONI Quiñones. Licensed by the author for use in [facility]; reprinted with permission (delia@.[facility]). All rights reserved.      Do you have sleep apnea, excessive snoring or daytime drowsiness?: no    Reviewed and updated as needed this visit by clinical staff  Tobacco "  Allergies  Meds  Problems  Med Hx  Surg Hx  Fam Hx  Soc Hx          Reviewed and updated as needed this visit by Provider  Tobacco  Allergies  Meds  Problems  Med Hx  Surg Hx  Fam Hx  Soc Hx         Social History     Tobacco Use     Smoking status: Never Smoker     Smokeless tobacco: Never Used   Substance Use Topics     Alcohol use: No     If you drink alcohol do you typically have >3 drinks per day or >7 drinks per week? No    No flowsheet data found.      Current providers sharing in care for this patient include:   Patient Care Team:  Fay Tang PA-C as PCP - General (Physician Assistant)  Fay Tang PA-C as Assigned PCP    The following health maintenance items are reviewed in Epic and correct as of today:  Health Maintenance   Topic Date Due     URINE DRUG SCREEN  1940     ZOSTER IMMUNIZATION (2 of 3) 08/03/2007     MEDICARE ANNUAL WELLNESS VISIT  11/04/2018     PATRICIA ASSESSMENT  06/09/2019     INFLUENZA VACCINE (1) 09/01/2019     ADVANCE CARE PLANNING  10/23/2019     PHQ-9  12/10/2019     FALL RISK ASSESSMENT  03/23/2020     LIPID  11/04/2022     DTAP/TDAP/TD IMMUNIZATION (5 - Td) 03/22/2027     DEXA  Completed     DEPRESSION ACTION PLAN  Completed     PNEUMOCOCCAL IMMUNIZATION 65+ HIGH/HIGHEST RISK  Completed     IPV IMMUNIZATION  Aged Out     MENINGITIS IMMUNIZATION  Aged Out     BP Readings from Last 3 Encounters:   09/09/19 134/68   07/05/19 124/80   06/10/19 128/80    Wt Readings from Last 3 Encounters:   09/09/19 72.1 kg (159 lb)   07/05/19 69.9 kg (154 lb)   06/10/19 68 kg (150 lb)                  Recent Labs   Lab Test 11/04/17  0848 11/26/16  1019  12/18/15  1204  07/21/15 10/22/12  1440 10/18/12  0914  08/25/11  1838   *  --   --   --   --   --   --  82  --  108*   HDL 55  --   --   --   --   --   --  50  --  57   TRIG 159*  --   --   --   --   --   --  110  --   --    ALT  --  20  --  19  --   --  28 24   < >  --    CR 1.12* 0.93   <  ">  --    < > 0.9 0.64 1.00   < >  --    GFRESTIMATED 47* 59*   < >  --    < > 65.4 >90 55*   < >  --    GFRESTBLACK 57* 71   < >  --    < >  --  >90 66   < >  --    POTASSIUM 3.8 3.8   < >  --   --  3.7 3.3* 4.5   < >  --    TSH  --   --   --   --   --  1.10  --   --   --   --     < > = values in this interval not displayed.      Mammogram Screening: Patient over age 75, has elected to stop mammography screening.    Review of Systems  Constitutional, HEENT, cardiovascular, pulmonary, GI, , musculoskeletal, neuro, skin, endocrine and psych systems are negative, except as otherwise noted.    OBJECTIVE:   /68   Pulse 86   Temp 96  F (35.6  C) (Tympanic)   Resp 14   Ht 1.619 m (5' 3.75\")   Wt 72.1 kg (159 lb)   LMP  (LMP Unknown)   SpO2 98%   BMI 27.51 kg/m   Estimated body mass index is 27.51 kg/m  as calculated from the following:    Height as of this encounter: 1.619 m (5' 3.75\").    Weight as of this encounter: 72.1 kg (159 lb).  Physical Exam  GENERAL APPEARANCE: healthy, alert and no distress  EYES: Eyes grossly normal to inspection, PERRL and conjunctivae and sclerae normal  HENT: ear canals and TM's normal, nose and mouth without ulcers or lesions, oropharynx clear and oral mucous membranes moist  NECK: no adenopathy, no asymmetry, masses, or scars and thyroid normal to palpation  RESP: lungs clear to auscultation - no rales, rhonchi or wheezes  BREAST: normal without masses, tenderness or nipple discharge and no palpable axillary masses or adenopathy  CV: regular rate and rhythm, normal S1 S2, no S3 or S4, no murmur, click or rub, no peripheral edema and peripheral pulses strong  ABDOMEN: soft, nontender, no hepatosplenomegaly, no masses and bowel sounds normal  MS: ambulates with walker, unable to do step to exam table.  Swollen DIP, PIP and MCP joints.  SKIN: no suspicious lesions or rashes  NEURO: Normal strength and tone, sensory exam grossly normal, mentation intact and speech " "normal  PSYCH: mentation appears normal and affect normal/bright    Diagnostic Test Results:  Labs reviewed in Epic    ASSESSMENT / PLAN:       ICD-10-CM    1. Routine general medical examination at a health care facility Z00.00 Lipid panel reflex to direct LDL Non-fasting     CBC with platelets     Comprehensive metabolic panel   2. 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 Gastroesophageal reflux disease without esophagitis K21.9 omeprazole (PRILOSEC) 20 MG DR capsule   Mirtazapine working well, will keep her on it.    Declines flu shot, gets at her home.    End of Life Planning:  Patient currently has an advanced directive: No.  I have verified the patient's ablity to prepare an advanced directive/make health care decisions.  Literature was provided to assist patient in preparing an advanced directive.    COUNSELING:  Reviewed preventive health counseling, as reflected in patient instructions    Estimated body mass index is 27.51 kg/m  as calculated from the following:    Height as of this encounter: 1.619 m (5' 3.75\").    Weight as of this encounter: 72.1 kg (159 lb).         reports that she has never smoked. She has never used smokeless tobacco.      Appropriate preventive services were discussed with this patient, including applicable screening as appropriate for cardiovascular disease, diabetes, osteopenia/osteoporosis, and glaucoma.  As appropriate for age/gender, discussed screening for colorectal cancer, prostate cancer, breast cancer, and cervical cancer. Checklist reviewing preventive services available has been given to the patient.    Reviewed patients plan of care and provided an AVS. The Basic Care Plan (routine screening as documented in Health Maintenance) for Marcela meets the Care Plan requirement. This Care Plan has been established and reviewed with the Patient and sister.    Counseling " Resources:  ATP IV Guidelines  Pooled Cohorts Equation Calculator  Breast Cancer Risk Calculator  FRAX Risk Assessment  ICSI Preventive Guidelines  Dietary Guidelines for Americans, 2010  USDA's MyPlate  ASA Prophylaxis  Lung CA Screening    Fay Tang PA-C  [facility]    Identified Health Risks:

## 2019-09-09 NOTE — LETTER
September 10, 2019      Marcela Casie Snadhu  [address]   [place] [place] [facility] [address]        Dear ,    We are writing to inform you of your test results.    Your test results fall within the expected range(s) or remain unchanged from previous results.  Please continue with current treatment plan.    Resulted Orders   Lipid panel reflex to direct LDL Non-fasting   Result Value Ref Range    Cholesterol 214 (H) <200 mg/dL      Comment:      Desirable:       <200 mg/dl    Triglycerides 111 <150 mg/dL      Comment:      Non Fasting    HDL Cholesterol 50 >49 mg/dL    LDL Cholesterol Calculated 142 (H) <100 mg/dL      Comment:      Above desirable:  100-129 mg/dl  Borderline High:  130-159 mg/dL  High:             160-189 mg/dL  Very high:       >189 mg/dl      Non HDL Cholesterol 164 (H) <130 mg/dL      Comment:      Above Desirable:  130-159 mg/dl  Borderline high:  160-189 mg/dl  High:             190-219 mg/dl  Very high:       >219 mg/dl     CBC with platelets   Result Value Ref Range    WBC 6.1 4.0 - 11.0 10e9/L    RBC Count 3.07 (L) 3.8 - 5.2 10e12/L    Hemoglobin 10.7 (L) 11.7 - 15.7 g/dL    Hematocrit 33.2 (L) 35.0 - 47.0 %     (H) 78 - 100 fl    MCH 34.9 (H) 26.5 - 33.0 pg    MCHC 32.2 31.5 - 36.5 g/dL    RDW 13.3 10.0 - 15.0 %    Platelet Count 181 150 - 450 10e9/L   Comprehensive metabolic panel   Result Value Ref Range    Sodium 142 133 - 144 mmol/L    Potassium 3.8 3.4 - 5.3 mmol/L    Chloride 108 94 - 109 mmol/L    Carbon Dioxide 27 20 - 32 mmol/L    Anion Gap 7 3 - 14 mmol/L    Glucose 96 70 - 99 mg/dL      Comment:      Non Fasting    Urea Nitrogen 26 7 - 30 mg/dL    Creatinine 0.88 0.52 - 1.04 mg/dL    GFR Estimate 64 >60 mL/min/[1.73_m2]      Comment:      Non  GFR Calc  Starting 12/18/2018, serum creatinine based estimated GFR (eGFR) will be   calculated using the Chronic Kidney Disease Epidemiology Collaboration   (CKD-EPI) equation.      GFR Estimate If Black 74 >60  mL/min/[1.73_m2]      Comment:       GFR Calc  Starting 12/18/2018, serum creatinine based estimated GFR (eGFR) will be   calculated using the Chronic Kidney Disease Epidemiology Collaboration   (CKD-EPI) equation.      Calcium 9.3 8.5 - 10.1 mg/dL    Bilirubin Total 0.8 0.2 - 1.3 mg/dL    Albumin 3.5 3.4 - 5.0 g/dL    Protein Total 7.2 6.8 - 8.8 g/dL    Alkaline Phosphatase 46 40 - 150 U/L    ALT 17 0 - 50 U/L    AST 18 0 - 45 U/L       If you have any questions or concerns, please call the clinic at the number listed above.       Sincerely,        Fay Tang PA-C

## 2019-09-09 NOTE — PATIENT INSTRUCTIONS
Patient Education   Personalized Prevention Plan  You are due for the preventive services outlined below.  Your care team is available to assist you in scheduling these services.  If you have already completed any of these items, please share that information with your care team to update in your medical record.  Health Maintenance Due   Topic Date Due     URINE DRUG SCREEN  1940     Zoster (Shingles) Vaccine (2 of 3) 08/03/2007     Annual Wellness Visit  11/04/2018     Anxiety Assessment  06/09/2019     Flu Vaccine (1) 09/01/2019     Your Health Risk Assessment indicates you feel you are not in good health    A healthy lifestyle helps keep the body fit and the mind alert. It helps protect you from disease, helps you fight disease, and helps prevent chronic disease (disease that doesn't go away) from getting worse. This is important as you get older and begin to notice twinges in muscles and joints and a decline in the strength and stamina you once took for granted. A healthy lifestyle includes good healthcare, good nutrition, weight control, recreation, and regular exercise. Avoid harmful substances and do what you can to keep safe. Another part of a healthy lifestyle is stay mentally active and socially involved.    Good healthcare     Have a wellness visit every year.     If you have new symptoms, let us know right away. Don't wait until the next checkup.     Take medicines exactly as prescribed and keep your medicines in a safe place. Tell us if your medicine causes problems.   Healthy diet and weight control     Eat 3 or 4 small, nutritious, low-fat, high-fiber meals a day. Include a variety of fruits, vegetables, and whole-grain foods.     Make sure you get enough calcium in your diet. Calcium, vitamin D, and exercise help prevent osteoporosis (bone thinning).     If you live alone, try eating with others when you can. That way you get a good meal and have company while you eat it.     Try to keep a  healthy weight. If you eat more calories than your body uses for energy, it will be stored as fat and you will gain weight.     Recreation   Recreation is not limited to sports and team events. It includes any activity that provides relaxation, interest, enjoyment, and exercise. Recreation provides an outlet for physical, mental, and social energy. It can give a sense of worth and achievement. It can help you stay healthy.    Mental Exercise and Social Involvement  Mental and emotional health is as important as physical health. Keep in touch with friends and family. Stay as active as possible. Continue to learn and challenge yourself.   Things you can do to stay mentally active are:    Learn something new, like a foreign language or musical instrument.     Play SCRABBLE or do crossword puzzles. If you cannot find people to play these games with you at home, you can play them with others on your computer through the Internet.     Join a games club--anything from card games to chess or checkers or lawn bowling.     Start a new hobby.     Go back to school.     Volunteer.     Read.   Keep up with world events.  Activities of Daily Living    Your Health Risk Assessment indicates you have difficulties with activities of daily living such as housework, bathing, preparing meals, taking medication, etc. Please make a follow up appointment for us to address this issue in more detail.    Urinary Incontinence, Female (Adult)  Urinary incontinence means loss of control of the bladder. This problem affects many women, especially as they get older. If you have incontinence, you may be embarrassed to ask for help. But know that this problem can be treated.  Types of Incontinence  There are different types of incontinence. Two of the main types are described here. You can have more than one type.    Stress incontinence. With this type, urine leaks when pressure (stress) is put on the bladder. This may happen when you cough, sneeze,  or laugh. Stress incontinence most often occurs because the pelvic floor muscles that support the bladder and urethra are weak. This can happen after pregnancy and vaginal childbirth or a hysterectomy. It can also be due to excess body weight or hormone changes.    Urge incontinence (also called overactive bladder). With this type, a sudden urge to urinate is felt often. This may happen even though there may not be much urine in the bladder. The need to urinate often during the night is common. Urge incontinence most often occurs because of bladder spasms. This may be due to bladder irritation or infection. Damage to bladder nerves or pelvic muscles, constipation, and certain medicines can also lead to urge incontinence.  Treatment of urinary incontinence depends on the cause. Further evaluation is needed to find the type you have. This will likely include an exam and certain tests. Based on the results, you and your healthcare provider can then plan treatment. Until a diagnosis is made, the home care tips below can help relieve symptoms.  Home care    Do pelvic floor muscle exercises, if they are prescribed. The pelvic floor muscles help support the bladder and urethra. Many women find that their symptoms improve when doing special exercises that strengthen these muscles. To do the exercises contract the muscles you would use to stop your stream of urine, but do this when you re not urinating. Hold for 10 seconds, then relax. Repeat 10 to 20 times in a row, at least 3 times a day. Your provider may give you other instructions for how to do the exercises and how often.    Keep a bladder diary. This helps track how often and how much you urinate over a set period of time. Bring this diary with you to your next visit with the provider. The information can help your provider learn more about your bladder problem.    Lose weight, if advised to by your provider. Excess weight puts pressure on the bladder. Your provider  can help you create a weight-loss plan that s right for you. This may include exercising more and making certain diet changes.    Don't consume foods and drinks that may irritate the bladder. These can include alcohol and caffeinated drinks.    Quit smoking. Smoking and other tobacco use can lead to chronic cough that strains the pelvic floor muscles. Smoking may also damage the bladder and urethra. Talk with your provider about treatments or methods you can use to quit smoking.    If drinking large amounts of fluid causes you to have symptoms, you may be advised to limit your fluid intake. You may also be advised to drink most of your fluids during the day and to limit fluids at night.    If you re worried about urine leakage or accidents, you may wear absorbent pads to catch urine. Change the pads often. This helps reduce discomfort. It may also reduce the risk of skin or bladder infections.  Follow-up care  Follow up with your healthcare provider, or as directed. It may take some to find the right treatment for your problem. Your treatment plan may include special therapies or medicines. Certain procedures or surgery may also be options. Be sure to discuss any questions you have with your provider.  When to seek medical advice  Call the healthcare provider right away if any of these occur:    Fever of 100.4 F (38 C) or higher, or as directed by your provider    Bladder pain or fullness    Abdominal swelling    Nausea or vomiting    Back pain    Weakness, dizziness or fainting  Date Last Reviewed: 10/1/2017    4005-0693 [employer] [address] [place] [address], [place] [address]. All rights reserved. This information is not intended as a substitute for professional medical care. Always follow your healthcare professional's instructions.            Declined

## 2019-09-10 ASSESSMENT — ANXIETY QUESTIONNAIRES: GAD7 TOTAL SCORE: 6

## 2019-10-18 ENCOUNTER — TRANSFERRED RECORDS (OUTPATIENT)
Dept: HEALTH INFORMATION MANAGEMENT | Facility: CLINIC | Age: 79
End: 2019-10-18

## 2019-11-18 DIAGNOSIS — R25.2 CRAMP OF LIMB: ICD-10-CM

## 2019-11-18 NOTE — TELEPHONE ENCOUNTER
tiZANidine (ZANAFLEX) 2 MG tablet    Last Written Prescription Date:  3/6/2019  Last Fill Quantity: 90 tablet,  # refills: 2   Last office visit: 9/9/2019 with prescribing provider:  Kitty Quinn Office Visit:        Routing refill request to provider for review/approval because:  Drug not on the [facility], P or [facility] refill protocol or controlled substance

## 2019-11-19 RX ORDER — TIZANIDINE 2 MG/1
TABLET ORAL
Qty: 90 TABLET | Refills: 0 | Status: SHIPPED | OUTPATIENT
Start: 2019-11-19 | End: 2020-02-11

## 2019-12-20 ENCOUNTER — TELEPHONE (OUTPATIENT)
Dept: FAMILY MEDICINE | Facility: CLINIC | Age: 79
End: 2019-12-20

## 2019-12-20 ASSESSMENT — PATIENT HEALTH QUESTIONNAIRE - PHQ9: SUM OF ALL RESPONSES TO PHQ QUESTIONS 1-9: 0

## 2019-12-20 NOTE — TELEPHONE ENCOUNTER
Panel Management Review      Patient has the following on her problem list:     Depression / Dysthymia review    Measure:  Needs PHQ-9 score of 4 or less during index window.  Administer PHQ-9 and if score is 5 or more, send encounter to provider for next steps.    5 - 7 month window range:     PHQ-9 SCORE 6/10/2019 9/9/2019 12/20/2019   PHQ-9 Total Score - - -   PHQ-9 Total Score 17 2 0       If PHQ-9 recheck is 5 or more, route to provider for next steps.    Patient is due for:  PHQ9      Composite cancer screening  Chart review shows that this patient is due/due soon for the following None  Summary:    Patient is due/failing the following:   phq9    Action needed:   Patient needs to do PHQ9.    Type of outreach:    Phone, spoke to patient.  Pt scored 0 on PHQ9    Questions for provider review:    None

## 2019-12-20 NOTE — TELEPHONE ENCOUNTER
----- Message from Fay Tang PA-C sent at 12/19/2019  1:16 PM CST -----  Please call pt.  They are due for Phq-9.

## 2020-02-10 DIAGNOSIS — R25.2 CRAMP OF LIMB: ICD-10-CM

## 2020-02-11 RX ORDER — TIZANIDINE 2 MG/1
TABLET ORAL
Qty: 90 TABLET | Refills: 0 | Status: SHIPPED | OUTPATIENT
Start: 2020-02-11 | End: 2020-04-27

## 2020-02-11 NOTE — TELEPHONE ENCOUNTER
tiZANidine (ZANAFLEX) 2 MG tablet     Last Written Prescription Date:  11/19/2019  Last Fill Quantity: 90 tablet,  # refills: 0   Last office visit: 9/9/2019 with prescribing provider:  Kitty Quinn Office Visit:        Routing refill request to provider for review/approval because:  Drug not on the [facility], P or [facility] refill protocol or controlled substance

## 2020-02-24 ENCOUNTER — NURSE TRIAGE (OUTPATIENT)
Dept: FAMILY MEDICINE | Facility: CLINIC | Age: 80
End: 2020-02-24

## 2020-02-24 NOTE — TELEPHONE ENCOUNTER
"Pt calling in to triage.    Hx: Fell a few weeks ago. -blood thinners, -LOC, -hit head. +mechanical falls. Has not had more falls \"since the one a few weeks ago\". Has been having ankle swelling since her fall. Today swelling did NOT get worse, but pt is now concerned about her ankle. States she was concerned about her butt because that was giving her the most pain.     Pt states she is being treated for back pain at [facility]. RN unable to see records in chart, but pt states, \"I broke my butt! Who knew you could even do that!\" Pt was started on [id number] TID on Saturday. Pt states this medication makes her tired, but she does not get dizzy or have falls. RN advised pt that it would be good to get pt into clinic for the falls and ankle swelling. Pt declined.    Pt endorses intact CMS. Able to bear weight along with her usual walker. States she has NOT been elevating, or icing. States she is taking Nabumetone which her arthritis provider had prescribed. Pt endorsed having ace wrap at home.     RN advised pt on RICE. Advised to stay off ankle as much as possible. RN advised pt to come in to clinic. Pt declined. States she does not want to leave her house. RN advised pt to follow up with this clinic, [facility], if pt changes her mind. Advised to do this if with RICE, swelling does not go down. Pt states she will likely follow up with [facility] since they are treating her back. RN additionally advised for pt to be seen at [facility] if pain or swelling becomes so severe pt is not able to put weight on ankle. Pt states understanding.    Pt states she is with her sister, who drives. Has a ride to come to clinic/[facility] if not getting better. Pt will call back if swelling gets worse or is not being resolved with RICE.     Additional Information    Negative: Major bleeding (actively dripping or spurting) that can't be stopped    Negative: Amputation or bone sticking through the skin    Negative: Looks like a dislocated joint (crooked or " "deformed)    Negative: Sounds like a life-threatening emergency to the triager    Negative: Wound looks infected    Negative: Caused by an animal bite    Negative: Puncture wound of foot    Negative: Toe injury is the main symptom    Negative: Cast problems or questions    Negative: Bullet, stabbed by knife or other serious penetrating wound    Negative: Can't stand (bear weight) or walk (e.g., 4 steps)    Negative: Skin is split open or gaping (length > 1/2 inch or 12 mm)    Negative: Bleeding won't stop after 10 minutes of direct pressure (using correct technique)    Negative: Dirt in the wound and not removed after 15 minutes of scrubbing    Negative: Numbness (new loss of sensation) of toe(s)    Negative: Looks infected (e.g., spreading redness, pus, red streak)    Negative: Sounds like a serious injury to the triager    Negative: SEVERE pain (e.g., excruciating)    Negative: A 'snap' or 'pop' was heard at the time of injury    Large swelling or bruise and size > palm of person's hand    Answer Assessment - Initial Assessment Questions  1. MECHANISM: \"How did the injury happen?\" (e.g., twisting injury, direct blow)       Pt believes she twisted her ankle    2. ONSET: \"When did the injury happen?\" (Minutes or hours ago)       A few weeks ago    3. LOCATION: \"Where is the injury located?\"       R ankle    4. APPEARANCE of INJURY: \"What does the injury look like?\"       Ankle swelling. Significantly more swollen than usual    5. WEIGHT-BEARING: \"Can you put weight on that foot?\" \"Can you walk (four steps or more)?\"        Yes, but it is painful    6. SIZE: For cuts, bruises, or swelling, ask: \"How large is it?\" (e.g., inches or centimeters;  entire joint)       Swelling from ankle up 6 inches    7. PAIN: \"Is there pain?\" If so, ask: \"How bad is the pain?\"    (e.g., Scale 1-10; or mild, moderate, severe)      Mild/moderate     8. TETANUS: For any breaks in the skin, ask: \"When was the last tetanus booster?\"      " "N/a    9. OTHER SYMPTOMS: \"Do you have any other symptoms?\"       No    10. PREGNANCY: \"Is there any chance you are pregnant?\" \"When was your last menstrual period?\"        n/a    Protocols used: ANKLE AND FOOT INJURY-A-OH      "

## 2020-03-02 ENCOUNTER — TELEPHONE (OUTPATIENT)
Dept: FAMILY MEDICINE | Facility: CLINIC | Age: 80
End: 2020-03-02

## 2020-03-02 NOTE — TELEPHONE ENCOUNTER
Reason for Call:  Other Information    Detailed comments: Pt states she recently fell on her bottom and was seen at [facility] for a broken bone/s. They recommended some Home Health Care with bathing for 6 weeks through [employer] and that they will be having someone contact Dr. Tang's office.    Phone Number Patient can be reached at: Home number on file [phone] (home)    Best Time: Any time, but not needed.    Can we leave a detailed message on this number? YES    Call taken on 3/2/2020 at 3:00 PM by Jhonny Gaxiola

## 2020-03-03 ENCOUNTER — TELEPHONE (OUTPATIENT)
Dept: FAMILY MEDICINE | Facility: CLINIC | Age: 80
End: 2020-03-03

## 2020-03-03 NOTE — TELEPHONE ENCOUNTER
Patient Contact (Concha, [facility])    Attempt # 1     Was call answered?  No.  Left message on voicemail with information to call the clinic back to speak with triage.

## 2020-03-03 NOTE — TELEPHONE ENCOUNTER
Concha called back requesting private pay [facility] 2x/week for bathing assistance. Family is requesting this. Patient has not been initiating some bathing tasks. Verbal Okay given, they will fax orders for provider's signaure. Last OV 9/9/19 and [employer] Faxed to [phone].

## 2020-03-03 NOTE — TELEPHONE ENCOUNTER
Reason for Call: Request for an order or referral:  Order or referral being requested: home care orders  Date needed: as soon as possible  Has the patient been seen by the PCP for this problem? YES  Additional comments: please call HARRY FROM [facility]  Phone number Patient can be reached at:  Other phone number:  [phone]  Best Time:  ANY  Can we leave a detailed message on this number?  YES  Call taken on 3/3/2020 at 9:44 AM by WES LYMAN

## 2020-03-04 ENCOUNTER — MEDICAL CORRESPONDENCE (OUTPATIENT)
Dept: HEALTH INFORMATION MANAGEMENT | Facility: CLINIC | Age: 80
End: 2020-03-04

## 2020-03-04 ENCOUNTER — TELEPHONE (OUTPATIENT)
Dept: FAMILY MEDICINE | Facility: CLINIC | Age: 80
End: 2020-03-04

## 2020-03-04 NOTE — TELEPHONE ENCOUNTER
Reason for Call:  Home Health Care    Pura with [facility] called regarding (reason for call): orders    Orders are needed for this patient. Skilled nursing and home health aide    PT: valery    OT: na    Skilled Nursin x every 60 days for recertification, assessment and supervisory visit for home health aide.  Also home health aide 2 x a week every 60 days.  This is private pay.    Pt Provider: UMESH Tang    Phone Number Homecare Nurse can be reached at: [phone]    Can we leave a detailed message on this number? YES    Phone number patient can be reached at: Home number on file [phone] (home)    Best Time: any    Call taken on 3/4/2020 at 2:20 PM by OLGA CARTY

## 2020-03-04 NOTE — TELEPHONE ENCOUNTER
Reason for Call:  Form, our goal is to have forms completed with 72 hours, however, some forms may require a visit or additional information.    Type of letter, form or note:  medical    Who is the form from?: Home care    Where did the form come from: form was faxed in    What clinic location was the form placed at?: [facility]    Where the form was placed: Given to physician    What number is listed as a contact on the form?: [phone] (P) [phone]       Additional comments: [facility]: Okay for Private Pay A twice weekly    Call taken on 3/4/2020 at 11:46 AM by Nkechi Pearce

## 2020-03-04 NOTE — TELEPHONE ENCOUNTER
Reason for Call:  Form, our goal is to have forms completed with 72 hours, however, some forms may require a visit or additional information.    Type of letter, form or note:  Home Health Certification    Who is the form from?: Home care    Where did the form come from: form was faxed in    What clinic location was the form placed at?: [facility]    Where the form was placed: Given to physician    What number is listed as a contact on the form?: [phone] (P) [phone]       Additional comments: [employer]: [facility] POC 3/4/20-5/2/20    Call taken on 3/4/2020 at 3:26 PM by Nkechi Pearce

## 2020-03-05 ENCOUNTER — TELEPHONE (OUTPATIENT)
Dept: FAMILY MEDICINE | Facility: CLINIC | Age: 80
End: 2020-03-05

## 2020-03-05 ENCOUNTER — MEDICAL CORRESPONDENCE (OUTPATIENT)
Dept: HEALTH INFORMATION MANAGEMENT | Facility: CLINIC | Age: 80
End: 2020-03-05

## 2020-03-05 DIAGNOSIS — Z53.9 DIAGNOSIS NOT YET DEFINED: Primary | ICD-10-CM

## 2020-03-05 PROCEDURE — G0180 MD CERTIFICATION HHA PATIENT: HCPCS | Performed by: FAMILY MEDICINE

## 2020-03-05 NOTE — TELEPHONE ENCOUNTER
Reason for Call:  Form, our goal is to have forms completed with 72 hours, however, some forms may require a visit or additional information.    Type of letter, form or note:  medical    Who is the form from?: Home care    Where did the form come from: form was faxed in    What clinic location was the form placed at?: [facility]    Where the form was placed: Given to physician    What number is listed as a contact on the form?: [phone] (P) [phone]       Additional comments: [employer]: Clarification Order for SN, HHA; [facility] 2xweekly    Call taken on 3/5/2020 at 10:58 AM by Nkechi Pearce

## 2020-04-24 DIAGNOSIS — R25.2 CRAMP OF LIMB: ICD-10-CM

## 2020-04-27 RX ORDER — TIZANIDINE 2 MG/1
TABLET ORAL
Qty: 90 TABLET | Refills: 0 | Status: SHIPPED | OUTPATIENT
Start: 2020-04-27 | End: 2020-05-11

## 2020-04-28 ENCOUNTER — NURSE TRIAGE (OUTPATIENT)
Dept: FAMILY MEDICINE | Facility: CLINIC | Age: 80
End: 2020-04-28

## 2020-04-28 DIAGNOSIS — R60.0 PERIPHERAL EDEMA: Primary | ICD-10-CM

## 2020-04-28 NOTE — TELEPHONE ENCOUNTER
Spoke with patient.    Provider message was shared.     Order was printed and faxed to [facility].     Patient will call to schedule appointment for today or tomorrow.

## 2020-04-28 NOTE — TELEPHONE ENCOUNTER
"Rhoda FISHER triaged per your request.    2 mo onset. Happens daily. No pain, no redness, no weeping. No numbness or tingling. R side only. Biggest concern from pt is that she can not put her pants on and \"I am tired of this swelling!\".   "

## 2020-04-28 NOTE — TELEPHONE ENCOUNTER
I'd like her to get an US to rule out a dvt.  Order signed to be printed and [facility] to [facility], would like her to try to get in tomorrow if possible:    [facility]: [address], [address]    [address] [place] [address]  Scheduling Phone: [phone]      Scheduling Fax: [phone]

## 2020-04-28 NOTE — TELEPHONE ENCOUNTER
"Triaging as per PCP.    \"I hurt my butt about 2 months ago, and I was in bed for 6 to 8 weeks. My leg was swollen even then. I wear size 12 jeans, but when I lay down by leg gets really big. I have to use my hands to get it down.\"    Pt had called [facility]. \"They said something about an ultrasound\".     ROM and sensation intact. No pain. No redness, just swelling. Knee all the way down to toes.     PCP please advise on next steps. Protocol says see today in clinic.     Additional Information    Negative: Sounds like a life-threatening emergency to the triager    Negative: Chest pain    Negative: Small area of swelling and followed an insect bite to the area    Negative: Followed a knee injury    Negative: Ankle or foot injury    Negative: Pregnant with leg swelling or edema    Negative: Difficulty breathing at rest    Negative: Entire foot is cool or blue in comparison to other side    Negative: SEVERE swelling (e.g., swelling extends above knee, entire leg is swollen, weeping fluid)    Negative: Thigh or calf pain and only 1 side and present > 1 hour    Negative: Thigh, calf, or ankle swelling in only one leg    Negative: Thigh, calf, or ankle swelling in both legs, but one side is definitely more swollen    Negative: Cast on leg or ankle and has increasing pain    Negative: Can't walk or can barely stand (new onset)    Negative: Fever and red area (or area very tender to touch)    Negative: Patient sounds very sick or weak to the triager    Negative: Swelling of face, arm or hands (Exception: slight puffiness of fingers during hot weather)    Negative: Pregnant > 20 weeks and sudden weight gain (i.e., > 2 lbs, 1 kg in one week)    MODERATE swelling of both ankles (e.g., swelling extends up to the knees) AND new onset or worsening    Answer Assessment - Initial Assessment Questions  1. ONSET: \"When did the swelling start?\" (e.g., minutes, hours, days)      2 months    2. LOCATION: \"What part of the leg is " "swollen?\"  \"Are both legs swollen or just one leg?\"      R leg, below the knee    3. SEVERITY: \"How bad is the swelling?\" (e.g., localized; mild, moderate, severe)   - Localized - small area of swelling localized to one leg   - MILD pedal edema - swelling limited to foot and ankle, pitting edema < 1/4 inch (6 mm) deep, rest and elevation eliminate most or all swelling   - MODERATE edema - swelling of lower leg to knee, pitting edema > 1/4 inch (6 mm) deep, rest and elevation only partially reduce swelling   - SEVERE edema - swelling extends above knee, facial or hand swelling present       \"It's hard as a rock\". No edema    4. REDNESS: \"Does the swelling look red or infected?\"      No redness    5. PAIN: \"Is the swelling painful to touch?\" If so, ask: \"How painful is it?\"   (Scale 1-10; mild, moderate or severe)      Very mild    6. FEVER: \"Do you have a fever?\" If so, ask: \"What is it, how was it measured, and when did it start?\"       No fever, assisted living checks twice a day    7. CAUSE: \"What do you think is causing the leg swelling?\"      Unknown    8. MEDICAL HISTORY: \"Do you have a history of heart failure, kidney disease, liver failure, or cancer?\"      No    9. RECURRENT SYMPTOM: \"Have you had leg swelling before?\" If so, ask: \"When was the last time?\" \"What happened that time?\"      Never    10. OTHER SYMPTOMS: \"Do you have any other symptoms?\" (e.g., chest pain, difficulty breathing)        No    11. PREGNANCY: \"Is there any chance you are pregnant?\" \"When was your last menstrual period?\"        N/a    Protocols used: LEG SWELLING AND EDEMA-A-OH      "

## 2020-04-28 NOTE — TELEPHONE ENCOUNTER
Patient Contact    Attempt # 1    Was call answered?  No.  Left message on voicemail with information to call me back.    On call back:    -Please relay message    Flagging to try and call back again before end of day

## 2020-04-28 NOTE — TELEPHONE ENCOUNTER
Pt was calling about leg swelling.   Tried calling her back after she hung up, but no answer, and no VM to leave a message.

## 2020-04-29 ENCOUNTER — TELEPHONE (OUTPATIENT)
Dept: FAMILY MEDICINE | Facility: CLINIC | Age: 80
End: 2020-04-29

## 2020-04-29 ENCOUNTER — TRANSFERRED RECORDS (OUTPATIENT)
Dept: HEALTH INFORMATION MANAGEMENT | Facility: CLINIC | Age: 80
End: 2020-04-29

## 2020-04-29 DIAGNOSIS — R60.0 PERIPHERAL EDEMA: Primary | ICD-10-CM

## 2020-04-29 RX ORDER — FUROSEMIDE 20 MG
20 TABLET ORAL DAILY
Qty: 14 TABLET | Refills: 0 | Status: SHIPPED | OUTPATIENT
Start: 2020-04-29 | End: 2020-05-11

## 2020-04-29 NOTE — TELEPHONE ENCOUNTER
Patient called back.    Informed patient provider sent prescription for a diuretic to [facility]. Advised patient to take for two weeks either in the morning or at lunch time.    If not improved after she finishes, she should come in to be seen.    Patient voiced understanding.    Annabelle RN-BSN-PHN  [facility]  [phone]

## 2020-04-29 NOTE — TELEPHONE ENCOUNTER
I'm sending a prescription for a diuretic to [facility] which I want her to take for two weeks to see if that helps with the swelling in her leg.  She should take it in the morning or at lunch time.  If not improved after she finishes, she should come in to be seen.

## 2020-05-20 ENCOUNTER — TELEPHONE (OUTPATIENT)
Dept: FAMILY MEDICINE | Facility: CLINIC | Age: 80
End: 2020-05-20

## 2020-05-20 NOTE — TELEPHONE ENCOUNTER
Reason for Call:  Other call back    Detailed comments: The patient called and stated that she has questions about a prescription for furosemide (LASIX) 20 MG tablet that she was recently prescribed. She would like a call back to discuss these questions.       Phone Number Patient can be reached at: Home number on file [phone] (home)    Best Time: Any    Can we leave a detailed message on this number? YES    Call taken on 5/20/2020 at 8:21 AM by Nkechi Rubalcava

## 2020-05-20 NOTE — TELEPHONE ENCOUNTER
Patient was called back. Pt took lasix (14 tablets) for right leg swelling starting 4/29/20. After 3 days the welling went down and the legs looked good. Pt stopped it and restarted again with new 30 tablet script. The right leg is still swollen after several days, no pain, no redness, and pt can't get her shoes on. Pt is wiling to give it a couple more days. Should patient try something else?

## 2020-05-22 ENCOUNTER — TRANSFERRED RECORDS (OUTPATIENT)
Dept: HEALTH INFORMATION MANAGEMENT | Facility: CLINIC | Age: 80
End: 2020-05-22

## 2020-05-29 ENCOUNTER — TELEPHONE (OUTPATIENT)
Dept: FAMILY MEDICINE | Facility: CLINIC | Age: 80
End: 2020-05-29

## 2020-05-29 NOTE — TELEPHONE ENCOUNTER
Patient Contact    Attempt # 2    Was call answered?  No.  Unable to leave message. Phone just continued to right and ring.

## 2020-05-29 NOTE — TELEPHONE ENCOUNTER
Patient Contact    Attempt # 1    Was call answered?  No.  No answer.     Triage: try again 5/29/2020.

## 2020-05-29 NOTE — TELEPHONE ENCOUNTER
Reason for Call:  Other call back    Detailed comments: The patient called and stated that she recently started taking furosemide (LASIX) 20 MG tablet for swelling in her leg. She was originally taking only one pill per day but she did not feel that it was helping her at all. She stated that Marii Tang had instructed her to try taking two pills per day for three days to see if that would help. She has taken three pills for the last three days and would like a call back to discuss how it is working for her and also what the best next steps for her would be.     Phone Number Patient can be reached at: Home number on file [phone] (home)    Best Time: Any    Can we leave a detailed message on this number? YES    Call taken on 5/29/2020 at 8:35 AM by Nkechi Rubalcava

## 2020-06-01 DIAGNOSIS — R60.0 PERIPHERAL EDEMA: ICD-10-CM

## 2020-06-01 RX ORDER — FUROSEMIDE 20 MG
20 TABLET ORAL DAILY
Qty: 30 TABLET | Refills: 3 | Status: SHIPPED | OUTPATIENT
Start: 2020-06-01 | End: 2021-02-18

## 2020-06-01 NOTE — TELEPHONE ENCOUNTER
Spoke with patient today.     Took 2 for 3 days. That really helped. She has now since reduced back down to 1 Lasix pill.     Also states that she needs a refill on her lasix medication.   This will be pended and sent to the refills team in another encounter. This encounter is being closed.

## 2020-06-01 NOTE — TELEPHONE ENCOUNTER
Spoke with patient today.     States that she is running low on the medication.     Would likes sent to [facility]    Pended and sent to the refill pool.

## 2020-07-21 ENCOUNTER — TRANSFERRED RECORDS (OUTPATIENT)
Dept: HEALTH INFORMATION MANAGEMENT | Facility: CLINIC | Age: 80
End: 2020-07-21

## 2020-07-24 ENCOUNTER — TELEPHONE (OUTPATIENT)
Dept: FAMILY MEDICINE | Facility: CLINIC | Age: 80
End: 2020-07-24

## 2020-07-24 ASSESSMENT — PATIENT HEALTH QUESTIONNAIRE - PHQ9: SUM OF ALL RESPONSES TO PHQ QUESTIONS 1-9: 0

## 2020-07-24 NOTE — TELEPHONE ENCOUNTER
----- Message from Fay Tang PA-C sent at 7/23/2020  2:37 PM CDT -----  Please contact pt.  They are due for Phq-9.

## 2020-07-24 NOTE — TELEPHONE ENCOUNTER
Panel Management Review      Patient has the following on her problem list:     Depression / Dysthymia review    Measure:  Needs PHQ-9 score of 4 or less during index window.  Administer PHQ-9 and if score is 5 or more, send encounter to provider for next steps.    5 - 7 month window range:     PHQ-9 SCORE 6/10/2019 9/9/2019 12/20/2019   PHQ-9 Total Score - - -   PHQ-9 Total Score 17 2 0       If PHQ-9 recheck is 5 or more, route to provider for next steps.    Patient is due for:  PHQ9      Composite cancer screening  Chart review shows that this patient is due/due soon for the following None  Summary:    Patient is due/failing the following:   PHQ9    Action needed:   Patient needs to do PHQ9.    Type of outreach:    Phone, spoke to patient.  see flowsheets    Questions for provider review:    None

## 2020-08-24 DIAGNOSIS — F32.5 MAJOR DEPRESSION IN COMPLETE REMISSION (H): Chronic | ICD-10-CM

## 2020-08-24 RX ORDER — MIRTAZAPINE 7.5 MG/1
7.5 TABLET, FILM COATED ORAL AT BEDTIME
Qty: 90 TABLET | Refills: 3 | Status: SHIPPED | OUTPATIENT
Start: 2020-08-24 | End: 2021-08-18

## 2020-09-11 DIAGNOSIS — K13.79 MOUTH SORES: ICD-10-CM

## 2020-10-12 DIAGNOSIS — R25.2 CRAMP OF LIMB: ICD-10-CM

## 2020-10-13 RX ORDER — TIZANIDINE 2 MG/1
TABLET ORAL
Qty: 90 TABLET | Refills: 3 | Status: SHIPPED | OUTPATIENT
Start: 2020-10-13 | End: 2021-11-30

## 2020-10-14 DIAGNOSIS — F32.5 MAJOR DEPRESSION IN COMPLETE REMISSION (H): Chronic | ICD-10-CM

## 2020-10-15 RX ORDER — SERTRALINE HYDROCHLORIDE 100 MG/1
200 TABLET, FILM COATED ORAL DAILY
Qty: 180 TABLET | Refills: 0 | Status: SHIPPED | OUTPATIENT
Start: 2020-10-15 | End: 2021-01-04

## 2020-10-15 NOTE — TELEPHONE ENCOUNTER
Routing refill request to provider for review/approval because:  Drug interaction warning  Patient needs to be seen because it has been more than 1 year since last office visit.

## 2020-10-30 ENCOUNTER — TELEPHONE (OUTPATIENT)
Dept: FAMILY MEDICINE | Facility: CLINIC | Age: 80
End: 2020-10-30

## 2020-12-12 DIAGNOSIS — F32.5 MAJOR DEPRESSION IN COMPLETE REMISSION (H): Chronic | ICD-10-CM

## 2020-12-14 RX ORDER — BUPROPION HYDROCHLORIDE 300 MG/1
TABLET ORAL
Qty: 90 TABLET | Refills: 3 | Status: SHIPPED | OUTPATIENT
Start: 2020-12-14 | End: 2021-11-22

## 2021-01-04 DIAGNOSIS — F32.5 MAJOR DEPRESSION IN COMPLETE REMISSION (H): Chronic | ICD-10-CM

## 2021-01-04 RX ORDER — SERTRALINE HYDROCHLORIDE 100 MG/1
200 TABLET, FILM COATED ORAL DAILY
Qty: 180 TABLET | Refills: 0 | Status: SHIPPED | OUTPATIENT
Start: 2021-01-04 | End: 2021-04-14

## 2021-01-04 NOTE — TELEPHONE ENCOUNTER
Routing refill request to provider for review/approval because:  Drug interaction warning  Patient needs to be seen because it has been more than 1 year since last office visit.    Routing to team to assist with scheduling annual visit.     Gail Rae RN Flex

## 2021-01-04 NOTE — TELEPHONE ENCOUNTER
Gave patient the message, she will call the [facility] to est care with a provider. Michelle Morgan

## 2021-02-17 PROBLEM — E78.2 MIXED HYPERLIPIDEMIA: Status: RESOLVED | Noted: 2018-01-23 | Resolved: 2021-02-17

## 2021-02-17 PROBLEM — R20.0 NUMBNESS OF TOES: Status: RESOLVED | Noted: 2018-06-09 | Resolved: 2021-02-17

## 2021-02-18 ENCOUNTER — OFFICE VISIT (OUTPATIENT)
Dept: INTERNAL MEDICINE | Facility: CLINIC | Age: 81
End: 2021-02-18
Payer: COMMERCIAL

## 2021-02-18 VITALS
HEART RATE: 100 BPM | HEIGHT: 64 IN | SYSTOLIC BLOOD PRESSURE: 135 MMHG | RESPIRATION RATE: 18 BRPM | DIASTOLIC BLOOD PRESSURE: 85 MMHG | WEIGHT: 154 LBS | BODY MASS INDEX: 26.29 KG/M2 | TEMPERATURE: 98.9 F | OXYGEN SATURATION: 97 %

## 2021-02-18 DIAGNOSIS — Z11.59 ENCOUNTER FOR HEPATITIS C SCREENING TEST FOR LOW RISK PATIENT: ICD-10-CM

## 2021-02-18 DIAGNOSIS — Z78.0 ASYMPTOMATIC MENOPAUSE: ICD-10-CM

## 2021-02-18 DIAGNOSIS — R04.0 EPISTAXIS: ICD-10-CM

## 2021-02-18 DIAGNOSIS — Z00.00 MEDICARE ANNUAL WELLNESS VISIT, SUBSEQUENT: Primary | ICD-10-CM

## 2021-02-18 DIAGNOSIS — Z13.220 SCREENING FOR CHOLESTEROL LEVEL: ICD-10-CM

## 2021-02-18 DIAGNOSIS — E55.9 VITAMIN D DEFICIENCY: ICD-10-CM

## 2021-02-18 DIAGNOSIS — Z13.1 SCREENING FOR DIABETES MELLITUS: ICD-10-CM

## 2021-02-18 DIAGNOSIS — M06.00 RHEUMATOID ARTHRITIS WITHOUT ELEVATED RHEUMATOID FACTOR (H): ICD-10-CM

## 2021-02-18 DIAGNOSIS — F32.5 MAJOR DEPRESSION IN COMPLETE REMISSION (H): ICD-10-CM

## 2021-02-18 PROCEDURE — 99397 PER PM REEVAL EST PAT 65+ YR: CPT | Performed by: INTERNAL MEDICINE

## 2021-02-18 SDOH — ECONOMIC STABILITY: TRANSPORTATION INSECURITY
IN THE PAST 12 MONTHS, HAS THE LACK OF TRANSPORTATION KEPT YOU FROM MEDICAL APPOINTMENTS OR FROM GETTING MEDICATIONS?: NOT ASKED

## 2021-02-18 SDOH — ECONOMIC STABILITY: INCOME INSECURITY: HOW HARD IS IT FOR YOU TO PAY FOR THE VERY BASICS LIKE FOOD, HOUSING, MEDICAL CARE, AND HEATING?: NOT ASKED

## 2021-02-18 SDOH — ECONOMIC STABILITY: FOOD INSECURITY: WITHIN THE PAST 12 MONTHS, YOU WORRIED THAT YOUR FOOD WOULD RUN OUT BEFORE YOU GOT MONEY TO BUY MORE.: NOT ASKED

## 2021-02-18 SDOH — ECONOMIC STABILITY: TRANSPORTATION INSECURITY
IN THE PAST 12 MONTHS, HAS LACK OF TRANSPORTATION KEPT YOU FROM MEETINGS, WORK, OR FROM GETTING THINGS NEEDED FOR DAILY LIVING?: NOT ASKED

## 2021-02-18 SDOH — ECONOMIC STABILITY: FOOD INSECURITY: WITHIN THE PAST 12 MONTHS, THE FOOD YOU BOUGHT JUST DIDN'T LAST AND YOU DIDN'T HAVE MONEY TO GET MORE.: NOT ASKED

## 2021-02-18 ASSESSMENT — MIFFLIN-ST. JEOR: SCORE: 1159.57

## 2021-02-18 ASSESSMENT — PATIENT HEALTH QUESTIONNAIRE - PHQ9: SUM OF ALL RESPONSES TO PHQ QUESTIONS 1-9: 3

## 2021-02-18 NOTE — PROGRESS NOTES
ASSESSMENT/PLAN                                                       (Z00.00) Medicare annual wellness visit, subsequent  (primary encounter diagnosis)  Comment: PMH, PSH, FH, SH, medications, allergies, immunizations, and preventative health measures reviewed and updated as appropriate.  Plan: see below for plans.      (Z78.0) Asymptomatic menopause  Plan: DEXA ordered - patient to schedule.     (Z13.220) Screening for cholesterol level  (Z13.1) Screening for diabetes mellitus  (E55.9) Vitamin D deficiency  (Z11.59) Encounter for hepatitis C screening test for low risk patient  Plan: fasting labs ordered - patient to schedule.     (R04.0) Epistaxis  Plan: referred to ENT for further evaluation - patient to schedule.     (M06.00) Rheumatoid arthritis without elevated rheumatoid factor (H)  Comment: well-controlled on current regimen; followed by rheumatology.     (F32.5) Major depression in complete remission (H)  Comment: well-controlled on current regimen.      Kelley Haney MD   [address] [address]  T: [phone], F: [phone]    SUBJECTIVE                                                      Marcela Sandhu is a very pleasant 80 year old female who presents for her AWV:    Accompanied by sister, who assists with history.    Complains of frequent nose bleeds.     PMH, PSH, FH, SH, medications, allergies, immunizations, preventative health, and health risk assessment reviewed.     Past Medical History:   Diagnosis Date     Acid reflux disease      History of hepatitis C virus infection     treated with interferon     MDD (major depressive disorder)      Osteoporosis      Peripheral neuropathy      Personal history of malignant neoplasm of breast 2007     Restless legs syndrome (RLS)      Rheumatoid arthritis without elevated rheumatoid factor (H)      Past Surgical History:   Procedure Laterality Date     APPENDECTOMY       ARTHROPLASTY  CARPOMETACARPAL (THUMB JOINT) Bilateral      ARTHROPLASTY KNEE Right      BLEPHAROPLASTY BILATERAL       BUNIONECTOMY CHEVRON AND PEBBLES BILATERAL, COMBINED  12/02/2011    Procedure:COMBINED BUNIONECTOMY CHEVRON AND PEBBLES BILATERAL; BILATERAL THIRD AND FOURTH CLAWTOE RECONSTRUCTION WITH EXOSTECTOMY, LEFT FIRST TARSOMETATARSAL JOINT and 2nd toe Right foot reconstruction; Surgeon:ASHWIN FARMER; Location: OR     CARPAL TUNNEL RELEASE RT/LT Bilateral      COLONOSCOPY  10/18/2011    Procedure:COLONOSCOPY; COLONOSCOPY; Surgeon:BRENDA RODRIGUEZ; Location: GI     ELBOW SURGERY      tendon lengthening surgery     FOOT SURGERY Bilateral     toe straightening     MASTECTOMY, BILATERAL       SLING BLADDER SUSPENSION WITH FASCIA ZACH       SPINE SURGERY      multiple lumbar surgeries; most hardware removed     Family History   Problem Relation Age of Onset     Hypertension Mother      Hypertension Father      Multiple myeloma Brother      Bone Cancer Brother      Cerebrovascular Disease Brother 60     Diabetes No family hx of      Myocardial Infarction No family hx of      Breast Cancer No family hx of      Ovarian Cancer No family hx of      Colon Cancer No family hx of      Social History     Occupational History     Occupation: Retired - [employer], 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    Adult son.    Two grandchildren.    Partial assisted living - [facility].     Occasional swimming.      Allergies   Allergen Reactions     Levaquin [Levofloxacin Hemihydrate] Other (See Comments) and Rash     chest pain     Sulfa Drugs GI Disturbance     Vicodin [Hydrocodone-Acetaminophen] Nausea     Current Outpatient Medications   Medication Sig     AMOXICILLIN PO Take 500 mg by mouth Only before dental procedures     buPROPion  (WELLBUTRIN XL) 300 MG 24 hr tablet TAKE 1 TABLET (300 MG) BY MOUTH ONCE DAILY IN THE MORNING     magic mouthwash (ENTER INGREDIENTS IN COMMENTS) suspension Take 5 mLs by mouth every 4 hours as needed     methotrexate sodium 2.5 MG TABS Take 5 tablets once a week     mirtazapine (REMERON) 7.5 MG tablet TAKE 1 TABLET (7.5 MG) BY MOUTH AT BEDTIME     NABUMETONE PO Take 750 mg by mouth 2 times daily     omeprazole (PRILOSEC) 20 MG DR capsule Take 1 capsule (20 mg) by mouth daily     predniSONE (DELTASONE) 5 MG tablet      sertraline (ZOLOFT) 100 MG tablet TAKE 2 TABLETS (200 MG) BY MOUTH DAILY     tiZANidine (ZANAFLEX) 2 MG tablet TAKE 1 TABLET BY MOUTH THREE TIMES DAILY AS NEEDED FOR MUSCLE SPASMS     Immunization History   Administered Date(s) Administered     COVID-19,PF,Moderna 01/09/2021, 01/20/2021     HEPA 06/17/2002, 12/06/2002     HepB 06/17/2002, 12/06/2002, 07/18/2007     Influenza (High Dose) 3 valent vaccine 10/08/2015, 10/11/2016, 09/28/2017     Influenza (IIV3) PF 04/16/2001, 12/10/2004, 10/24/2005, 12/04/2007, 09/24/2010, 09/04/2012, 09/03/2013, 10/01/2014     Influenza Vaccine Im 4yrs+ 4 Valent CCIIV4 09/26/2018     Influenza, Quad, High Dose, Pf, 65yr + 10/30/2020     Pneumo Conj 13-V (2010&after) 03/22/2017     Pneumococcal 23 valent 01/01/1997, 02/19/2007, 07/08/2013     TD (ADULT, 7+) 01/01/1997, 02/19/2007     TDAP Vaccine (Adacel) 03/22/2017     Tdap (Adacel,Boostrix) 01/01/2007     Zoster vaccine recombinant adjuvanted (SHINGRIX) 10/24/2019, 12/24/2019     Zoster vaccine, live 06/08/2007     PREVENTATIVE HEALTH                                                      BMI: within normal limits   Blood pressure: within normal limits   Breast CA screening: screening no longer indicated  Colon CA screening: screening no longer indicated  Lung CA screening: n/a   Dexa: DUE  Screening HCV: DUE  Screening HIV: n/a   Screening cholesterol: screening no longer indicated   Screening diabetes: DUE  Alcohol  "misuse screening: alcohol use reviewed - no intervention indicated at this time  Immunizations: reviewed; up to date     HEALTH RISK ASSESSMENT                                                      In general, how would you rate your overall physical health? poor  Outside of work, how many days during the week do you exercise? None  Outside of work, approximately how many minutes a day do you exercise? n/a     If you drink alcohol do you typically have >3 drinks per day or >7 drinks per week? No  Do you usually eat at least 4 servings of fruit and vegetables a day, include whole grains & fiber and avoid regularly eating high fat or \"junk\" foods? Yes     Do you have any problems taking medications regularly? No  Do you have any side effects from medications? No    Assistance with daily activities: YES - Housework requires assistance, laundry requires assistance, shopping requires assistance and transportation requires assistance    Safety concerns: No    Hearing concerns: No    In the past 6 months, have you been bothered by leaking of urine: Yes    In general, how would you rate your overall mental or emotional health: fair    Additional concerns today: YES - see above    OBJECTIVE                                                      BP (!) 150/82 (BP Location: Left arm, Patient Position: Chair, Cuff Size: Adult Regular)   Pulse 100   Temp 98.9  F (37.2  C) (Temporal)   Resp 18   Ht 1.619 m (5' 3.75\")   Wt 69.9 kg (154 lb)   LMP  (LMP Unknown)   SpO2 97%   BMI 26.64 kg/m    Constitutional: well-appearing  Psych: normal mood and affect    Cognitive impairment noted: No  ---  (Note was completed, in part, with [email or web address] voice-recognition software. Documentation was reviewed, but some grammatical, spelling, and word errors may remain.)    "

## 2021-03-03 ENCOUNTER — ANCILLARY PROCEDURE (OUTPATIENT)
Dept: BONE DENSITY | Facility: CLINIC | Age: 81
End: 2021-03-03
Attending: INTERNAL MEDICINE
Payer: COMMERCIAL

## 2021-03-03 DIAGNOSIS — E55.9 VITAMIN D DEFICIENCY: ICD-10-CM

## 2021-03-03 DIAGNOSIS — Z11.59 ENCOUNTER FOR HEPATITIS C SCREENING TEST FOR LOW RISK PATIENT: ICD-10-CM

## 2021-03-03 DIAGNOSIS — M85.80 OSTEOPENIA, UNSPECIFIED LOCATION: ICD-10-CM

## 2021-03-03 DIAGNOSIS — Z13.1 SCREENING FOR DIABETES MELLITUS: ICD-10-CM

## 2021-03-03 DIAGNOSIS — Z78.0 ASYMPTOMATIC MENOPAUSE: ICD-10-CM

## 2021-03-03 LAB
ALBUMIN SERPL-MCNC: 3.7 G/DL (ref 3.4–5)
ALP SERPL-CCNC: 46 U/L (ref 40–150)
ALT SERPL W P-5'-P-CCNC: 20 U/L (ref 0–50)
ANION GAP SERPL CALCULATED.3IONS-SCNC: 4 MMOL/L (ref 3–14)
AST SERPL W P-5'-P-CCNC: 25 U/L (ref 0–45)
BILIRUB SERPL-MCNC: 0.6 MG/DL (ref 0.2–1.3)
BUN SERPL-MCNC: 31 MG/DL (ref 7–30)
CALCIUM SERPL-MCNC: 9.8 MG/DL (ref 8.5–10.1)
CHLORIDE SERPL-SCNC: 110 MMOL/L (ref 94–109)
CO2 SERPL-SCNC: 26 MMOL/L (ref 20–32)
CREAT SERPL-MCNC: 1.12 MG/DL (ref 0.52–1.04)
GFR SERPL CREATININE-BSD FRML MDRD: 47 ML/MIN/{1.73_M2}
GLUCOSE SERPL-MCNC: 96 MG/DL (ref 70–99)
POTASSIUM SERPL-SCNC: 4.3 MMOL/L (ref 3.4–5.3)
PROT SERPL-MCNC: 7.5 G/DL (ref 6.8–8.8)
SODIUM SERPL-SCNC: 140 MMOL/L (ref 133–144)

## 2021-03-03 PROCEDURE — 36415 COLL VENOUS BLD VENIPUNCTURE: CPT | Performed by: INTERNAL MEDICINE

## 2021-03-03 PROCEDURE — 80053 COMPREHEN METABOLIC PANEL: CPT | Performed by: INTERNAL MEDICINE

## 2021-03-03 PROCEDURE — 87522 HEPATITIS C REVRS TRNSCRPJ: CPT | Performed by: INTERNAL MEDICINE

## 2021-03-03 PROCEDURE — 82306 VITAMIN D 25 HYDROXY: CPT | Performed by: INTERNAL MEDICINE

## 2021-03-03 PROCEDURE — 86803 HEPATITIS C AB TEST: CPT | Performed by: INTERNAL MEDICINE

## 2021-03-03 PROCEDURE — 77081 DXA BONE DENSITY APPENDICULR: CPT | Mod: 59 | Performed by: INTERNAL MEDICINE

## 2021-03-03 PROCEDURE — 77080 DXA BONE DENSITY AXIAL: CPT | Performed by: INTERNAL MEDICINE

## 2021-03-04 LAB
DEPRECATED CALCIDIOL+CALCIFEROL SERPL-MC: 83 UG/L (ref 20–75)
HCV AB SERPL QL IA: REACTIVE

## 2021-03-06 LAB
HCV RNA SERPL NAA+PROBE-ACNC: NORMAL [IU]/ML
HCV RNA SERPL NAA+PROBE-LOG IU: NORMAL LOG IU/ML

## 2021-04-12 DIAGNOSIS — F32.5 MAJOR DEPRESSION IN COMPLETE REMISSION (H): Chronic | ICD-10-CM

## 2021-04-12 RX ORDER — SERTRALINE HYDROCHLORIDE 100 MG/1
200 TABLET, FILM COATED ORAL DAILY
Qty: 180 TABLET | Refills: 0 | OUTPATIENT
Start: 2021-04-12

## 2021-04-14 DIAGNOSIS — F32.5 MAJOR DEPRESSION IN COMPLETE REMISSION (H): Chronic | ICD-10-CM

## 2021-04-14 RX ORDER — SERTRALINE HYDROCHLORIDE 100 MG/1
200 TABLET, FILM COATED ORAL DAILY
Qty: 180 TABLET | Refills: 0 | Status: SHIPPED | OUTPATIENT
Start: 2021-04-14 | End: 2021-07-27

## 2021-06-14 ENCOUNTER — TELEPHONE (OUTPATIENT)
Dept: INTERNAL MEDICINE | Facility: CLINIC | Age: 81
End: 2021-06-14

## 2021-06-14 NOTE — TELEPHONE ENCOUNTER
Pt calling stating that she has heartburn after she eats. Pt niece is an RN and told her to taking OTC heart burn probiotic which she has been taking for the last 2 weeks and states that it helped a lot.     Pt calling to make sure that it's okay for her to keep taking probiotic.     Pt would like a call back with Dr. Haney's response.     Julee Rojas RN

## 2021-07-09 ENCOUNTER — NURSE TRIAGE (OUTPATIENT)
Dept: INTERNAL MEDICINE | Facility: CLINIC | Age: 81
End: 2021-07-09

## 2021-07-09 DIAGNOSIS — K13.79 MOUTH SORES: ICD-10-CM

## 2021-07-09 NOTE — TELEPHONE ENCOUNTER
"Patient contacted clinic c/o significant number of painful mouth sores in her mouth, under her tongue and on her lips.See full assessment below. Patient has been taking methotrexate for arthritis for many years and has developed same sores many times in the past. Previous PCP prescribed Magic Mouthwash which provided good relief.  Is is on active med list.  Last fille 9/11 with 1 refill.  Last OV was 2/18/21    Care you okay with refill?  Larger bottle?  Thanks!!    Michelle Aviles, MSN, RN       Answer Assessment - Initial Assessment Questions  1. APPEARANCE of BLISTERS: \"Describe the sores.\"        Red sores inside her mouth, under her tongue, on her lips    Red, no pus or drainage    Is taking methotrexate.  Doesn't remember these sores prior to taking this med.  Has taken \"Mabig Mouthwash\" and this was effective in treating these sores.     2. SIZE: \"How large an area is involved with the cold sores?\" (e.g., inches, cm or compare to coins)      *No Answer*  3. LOCATION: \"Which part of the lip is involved?\"        Yes    4. ONSET: \"When did the fever blisters begin?        One months ago    5. RECURRENT BLISTERS: \"Have you had fever blisters before?\" If so, ask: \"When was the last time?\" \"How many times a year?\"    Yes, any time of the year.  Isn't sure what time of the year.        6. OTHER SYMPTOMS: \"Do you have any other symptoms?\" (e.g., fever, sores inside mouth)            7. PREGNANCY: \"Is there any chance you are pregnant?\" \"When was your last menstrual period?\"    NA*    Protocols used: COLD SORES - FEVER BLISTERS OF LIP-A-OH      "

## 2021-07-26 DIAGNOSIS — F32.5 MAJOR DEPRESSION IN COMPLETE REMISSION (H): Chronic | ICD-10-CM

## 2021-07-27 RX ORDER — SERTRALINE HYDROCHLORIDE 100 MG/1
200 TABLET, FILM COATED ORAL DAILY
Qty: 180 TABLET | Refills: 0 | Status: SHIPPED | OUTPATIENT
Start: 2021-07-27 | End: 2021-11-08

## 2021-07-27 NOTE — TELEPHONE ENCOUNTER
Routing refill request to provider for review/approval because:  Drug interaction warning  High warning for 2 medications     Arpit Corona RN  [facility] Triage Nurse

## 2021-08-12 ENCOUNTER — NURSE TRIAGE (OUTPATIENT)
Dept: INTERNAL MEDICINE | Facility: CLINIC | Age: 81
End: 2021-08-12

## 2021-08-12 NOTE — TELEPHONE ENCOUNTER
"S; \"I have extra saliva in my mouth. It makes me want to vomit. \"  O: Patient is only having the nausea issue after she takes her Prilosec.   A: Possible SE of medication.  P: Patient would like to eat breakfast, fast the rest of the    morning  ( 6 hours) take her PPI and wait 30 minutes before eating lunch. She will ballard a provider appointment if the nausea does not improve.             Additional Information    Negative: Shock suspected (e.g., cold/pale/clammy skin, too weak to stand, low BP, rapid pulse)    Negative: Sounds like a life-threatening emergency to the triager    Negative: [1] Nausea or vomiting AND [2] pregnancy < 20 weeks    Negative: Menstrual Period - Missed or Late (i.e., pregnancy suspected)    Negative: Heat exhaustion suspected (i.e., dehydration from heat exposure)    Negative: Motion sickness suspected (i.e., nausea with car, plane, boat, or train travel)    Negative: Anxiety or stress suspected (i.e., nausea with anxiety attacks or stressful situations)    Negative: Traumatic Brain Injury (TBI) suspected    Negative: Nausea (or Vomiting) in a cancer patient who is currently (or recently) receiving chemotherapy or radiation therapy, or cancer patient who has metastatic or end-stage cancer and is receiving palliative care    Negative: Vomiting occurs    Negative: Other symptom is present, see that guideline.  (e.g., chest pain, headache, dizziness, abdominal pain, colds, sore throat, etc.).    Negative: [1] Insulin-dependent diabetes (Type I) AND [2] glucose > 400 mg/dl (22 mmol/l)    Negative: [1] Drinking very little AND [2] dehydration suspected (e.g., no urine > 12 hours, very dry mouth, very lightheaded)    Negative: Patient sounds very sick or weak to the triager    Negative: Fever > 104 F (40 C)    Negative: [1] Fever > 101 F (38.3 C) AND [2] age > 60    Negative: [1] Fever > 100.0 F (37.8 C) AND [2] bedridden (e.g., nursing home patient, CVA, chronic illness, recovering from " surgery)    Negative: [1] Fever > 100.0 F (37.8 C) AND [2] diabetes mellitus or weak immune system (e.g., HIV positive, cancer chemo, splenectomy, organ transplant, chronic steroids)    Negative: Taking any of the following medications: digoxin (Lanoxin), lithium, theophylline, phenytoin (Dilantin)    Negative: Yellowish color of the skin or white of the eye (i.e., jaundice)    Negative: Fever present > 3 days (72 hours)    Protocols used: [facility]      Larisa Verdugo RN  [facility]

## 2021-08-16 DIAGNOSIS — F32.5 MAJOR DEPRESSION IN COMPLETE REMISSION (H): Chronic | ICD-10-CM

## 2021-08-17 NOTE — TELEPHONE ENCOUNTER
Routing refill request to provider for review/approval because:  Patient needs to be seen per protocol.     PHQ 12/20/2019 7/24/2020 2/18/2021   PHQ-9 Total Score 0 0 3   Q9: Thoughts of better off dead/self-harm past 2 weeks Not at all Not at all Not at all   F/U: Thoughts of suicide or self-harm - - -   F/U: Safety concerns - - -           Anne Mijares RN  [facility]

## 2021-08-18 RX ORDER — MIRTAZAPINE 7.5 MG/1
7.5 TABLET, FILM COATED ORAL AT BEDTIME
Qty: 90 TABLET | Refills: 3 | Status: SHIPPED | OUTPATIENT
Start: 2021-08-18 | End: 2022-06-27

## 2021-10-11 ENCOUNTER — OFFICE VISIT (OUTPATIENT)
Dept: INTERNAL MEDICINE | Facility: CLINIC | Age: 81
End: 2021-10-11
Payer: COMMERCIAL

## 2021-10-11 ENCOUNTER — TELEPHONE (OUTPATIENT)
Dept: INTERNAL MEDICINE | Facility: CLINIC | Age: 81
End: 2021-10-11

## 2021-10-11 VITALS
HEART RATE: 89 BPM | DIASTOLIC BLOOD PRESSURE: 86 MMHG | SYSTOLIC BLOOD PRESSURE: 138 MMHG | OXYGEN SATURATION: 97 % | RESPIRATION RATE: 18 BRPM | BODY MASS INDEX: 27.25 KG/M2 | WEIGHT: 157.5 LBS

## 2021-10-11 DIAGNOSIS — H60.393 INFECTIVE OTITIS EXTERNA, BILATERAL: ICD-10-CM

## 2021-10-11 DIAGNOSIS — H61.23 BILATERAL IMPACTED CERUMEN: Primary | ICD-10-CM

## 2021-10-11 PROCEDURE — 69209 REMOVE IMPACTED EAR WAX UNI: CPT | Mod: 50 | Performed by: PHYSICIAN ASSISTANT

## 2021-10-11 PROCEDURE — 99213 OFFICE O/P EST LOW 20 MIN: CPT | Mod: 25 | Performed by: PHYSICIAN ASSISTANT

## 2021-10-11 RX ORDER — NEOMYCIN SULFATE, POLYMYXIN B SULFATE AND HYDROCORTISONE 10; 3.5; 1 MG/ML; MG/ML; [USP'U]/ML
3 SUSPENSION/ DROPS AURICULAR (OTIC) 3 TIMES DAILY
Qty: 10 ML | Refills: 0 | Status: SHIPPED | OUTPATIENT
Start: 2021-10-11 | End: 2021-10-16

## 2021-10-11 NOTE — PROGRESS NOTES
"    Assessment & Plan     Bilateral impacted cerumen    - NC REMOVAL IMPACTED CERUMEN IRRIGATION/LVG UNILAT    Infective otitis externa, bilateral  Some bleeding of the external canal after cerumen removed  And canal irritation and redness   - neomycin-polymyxin-hydrocortisone (CORTISPORIN) 3.5-62920-0 otic suspension; Place 3 drops into both ears 3 times daily for 5 days             BMI:   Estimated body mass index is 27.25 kg/m  as calculated from the following:    Height as of 2/18/21: 1.619 m (5' 3.75\").    Weight as of this encounter: 71.4 kg (157 lb 8 oz).           No follow-ups on file.    LINDA [facility]   Marcela is a 81 year old who presents for the following health issues     HPI     Concern - Left ear   Onset: couple of weeks  Description: Left ear pain and pressure  Intensity: mild  Progression of Symptoms:    Accompanying Signs & Symptoms:   Previous history of similar problem:   Precipitating factors:        Worsened by:   Alleviating factors:        Improved by:   Therapies tried and outcome:         Review of Systems   Constitutional, HEENT, cardiovascular, pulmonary, gi and gu systems are negative, except as otherwise noted.      Objective    /86   Pulse 89   Resp 18   Wt 71.4 kg (157 lb 8 oz)   LMP  (LMP Unknown)   SpO2 97%   BMI 27.25 kg/m    Body mass index is 27.25 kg/m .  Physical Exam   GENERAL: healthy, alert and no distress  HENT: normal cephalic/atraumatic, right ear: occluded with wax and after lavage redness irritation and slight bleeding  and left ear: occluded with wax and redness irritation and slight bleeding   MS: no gross musculoskeletal defects noted, no edema  SKIN: no suspicious lesions or rashes                "

## 2021-10-11 NOTE — TELEPHONE ENCOUNTER
Pt is calling with Left sided ear pain, x 2 weeks. Also having left sided pain in arm - from shoulder down to fingers x 2 weeks.   Has arthritis, has been taking tylenol, heating pad on her shoulder. And applying Clinere - for ear wax removal.   But not helping.     Denies any chest pain, neck pain, no SOB, no numbness, tingling or weakness.    Appt scheduled today at 11:40am with ERIN Bagley to be seen in clinic.

## 2021-11-01 NOTE — TELEPHONE ENCOUNTER
Pt reports moisture between toes and itching in one foot with possible drainage. Unknown other symptoms. Advised she was foot with soap and water. Use cotton socks for possible tinea pedis. She may try OTC topical medications is symptoms persist, she can schedule OV for further evaluation and treatment. She verbalized understating and agreement with plan.   Normal muscle tone/strength

## 2021-11-04 ENCOUNTER — TRANSFERRED RECORDS (OUTPATIENT)
Dept: HEALTH INFORMATION MANAGEMENT | Facility: CLINIC | Age: 81
End: 2021-11-04
Payer: COMMERCIAL

## 2021-11-08 DIAGNOSIS — F32.5 MAJOR DEPRESSION IN COMPLETE REMISSION (H): Chronic | ICD-10-CM

## 2021-11-08 RX ORDER — SERTRALINE HYDROCHLORIDE 100 MG/1
200 TABLET, FILM COATED ORAL DAILY
Qty: 180 TABLET | Refills: 0 | Status: SHIPPED | OUTPATIENT
Start: 2021-11-08 | End: 2022-03-22

## 2021-11-08 NOTE — TELEPHONE ENCOUNTER
Routing refill request to provider for review/approval because:  PHQ-9 score:    PHQ 2/18/2021   PHQ-9 Total Score 3   Q9: Thoughts of better off dead/self-harm past 2 weeks Not at all   F/U: Thoughts of suicide or self-harm -   F/U: Safety concerns -               Tita Montgomery RN

## 2021-11-20 DIAGNOSIS — F32.5 MAJOR DEPRESSION IN COMPLETE REMISSION (H): Chronic | ICD-10-CM

## 2021-11-22 RX ORDER — BUPROPION HYDROCHLORIDE 300 MG/1
TABLET ORAL
Qty: 90 TABLET | Refills: 0 | Status: SHIPPED | OUTPATIENT
Start: 2021-11-22 | End: 2022-05-02

## 2021-11-22 NOTE — TELEPHONE ENCOUNTER
3 month hank refill approved.     MA/TC: Please assist patient in scheduling office visit.     Rupinder Vega RN on 11/22/2021 at 11:28 AM

## 2021-11-22 NOTE — TELEPHONE ENCOUNTER
Patient was notified of refill, she will call [facility] for appointment as she needs to go there as it is closer to home for her.

## 2021-11-26 DIAGNOSIS — F32.5 MAJOR DEPRESSION IN COMPLETE REMISSION (H): Chronic | ICD-10-CM

## 2021-11-26 RX ORDER — BUPROPION HYDROCHLORIDE 300 MG/1
TABLET ORAL
Qty: 90 TABLET | Refills: 0 | OUTPATIENT
Start: 2021-11-26

## 2021-11-26 NOTE — TELEPHONE ENCOUNTER
This is a duplicate request that has previously been approved or is in process. Sent denial notification to pharmacy.   Prescription denied per [facility] Refill Protocol.  Tita Montgomery RN

## 2021-11-29 DIAGNOSIS — R25.2 CRAMP OF LIMB: ICD-10-CM

## 2021-11-30 RX ORDER — TIZANIDINE 2 MG/1
TABLET ORAL
Qty: 90 TABLET | Refills: 1 | Status: SHIPPED | OUTPATIENT
Start: 2021-11-30 | End: 2022-08-31

## 2021-11-30 RX ORDER — TIZANIDINE 2 MG/1
TABLET ORAL
Qty: 90 TABLET | Refills: 3 | Status: SHIPPED | OUTPATIENT
Start: 2021-11-30 | End: 2023-02-02

## 2021-11-30 NOTE — TELEPHONE ENCOUNTER
Routing refill request to provider for review/approval because:  Drug not on the [facility] refill protocol     Arpit Corona RN  [facility] Triage Nurse

## 2021-11-30 NOTE — TELEPHONE ENCOUNTER
Routing refill request to provider for review/approval because:  Drug not on the [facility] refill protocol         Anne Mijares RN  [facility]

## 2021-11-30 NOTE — TELEPHONE ENCOUNTER
I spoke with patient last week about a different med refill and she states moving forward she will get her medication through [place] as it is closer to home for her.

## 2022-02-02 ENCOUNTER — TRANSCRIBE ORDERS (OUTPATIENT)
Dept: OTHER | Age: 82
End: 2022-02-02
Payer: COMMERCIAL

## 2022-02-02 DIAGNOSIS — R29.6 FALLS FREQUENTLY: ICD-10-CM

## 2022-02-02 DIAGNOSIS — G62.9 PERIPHERAL POLYNEUROPATHY: Primary | ICD-10-CM

## 2022-02-23 ENCOUNTER — NURSE TRIAGE (OUTPATIENT)
Dept: INTERNAL MEDICINE | Facility: CLINIC | Age: 82
End: 2022-02-23
Payer: COMMERCIAL

## 2022-02-23 NOTE — TELEPHONE ENCOUNTER
Marcela calling the clinic for ongoing insomnia issues for one month. Notes that she has never had these issues in the past, but that it started one month ago out of nowhere. Notes that she goes to bed at 10 pm every night and will just lay there most of the night until the morning. Wakes up at the same time every morning to go to exercise classes. Sometimes difficult to complete these classes d/t feeling tired. Does have day time sleepiness, but does not nap just tries to just go to bed but then just lays there. Denies any stress or pain. Only drinks one cup of coffee a day- in the AM. Has tried a night time routine and OTC melatonin, both of which have not helped.       Pt agreeable to VV. VV scheduled with Rupinder Quiles on 2/25/22    Reason for Disposition    Insomnia is an ongoing problem (> 2 weeks)    Additional Information    Negative: Difficulty breathing    Negative: Depression is suspected    Negative: Traumatic Brain Injury (TBI) is suspected    Negative: Alcohol abuse or dependence is suspected    Negative: Substance abuse or dependence suspected    Negative: Pain is causing insomnia and pain is not a chronic symptom (recurrent or ongoing AND present > 4 weeks)    Negative: Insomnia persists > 1 week and following Insomnia Care Advice    Negative: Insomnia interferes with work or school    Negative: Pain is causing insomnia and pain is a chronic symptom (recurrent or ongoing AND present > 4 weeks)    Negative: Patient wants to be seen    Negative: Restless legs or unpleasant feeling in legs and causes insomnia    Negative: Awakened by jerking leg movements    Negative: Loud snoring is an ongoing problem  (> 2 weeks)    Protocols used: INSOMNIA-A-OH

## 2022-02-25 ENCOUNTER — VIRTUAL VISIT (OUTPATIENT)
Dept: INTERNAL MEDICINE | Facility: CLINIC | Age: 82
End: 2022-02-25
Payer: COMMERCIAL

## 2022-02-25 DIAGNOSIS — F32.5 MAJOR DEPRESSION IN COMPLETE REMISSION (H): ICD-10-CM

## 2022-02-25 DIAGNOSIS — M06.00 RHEUMATOID ARTHRITIS WITHOUT ELEVATED RHEUMATOID FACTOR (H): ICD-10-CM

## 2022-02-25 DIAGNOSIS — G47.00 INSOMNIA, UNSPECIFIED TYPE: Primary | ICD-10-CM

## 2022-02-25 PROCEDURE — 99214 OFFICE O/P EST MOD 30 MIN: CPT | Mod: 95 | Performed by: NURSE PRACTITIONER

## 2022-02-25 RX ORDER — FOLIC ACID 1 MG/1
1 TABLET ORAL DAILY
COMMUNITY
End: 2024-09-23

## 2022-02-25 NOTE — PROGRESS NOTES
Marcela is a 81 year old who is being evaluated via a billable telephone visit.      What phone number would you like to be contacted at? [phone]  How would you like to obtain your AVS? Mail a copy    Assessment & Plan     Insomnia, unspecified type  - Increased symptoms for past month- reports previously was doing well with sleep hygiene.  Does take mirtazepine 7.5 mg at HS.  She is also taking Wellbutrin 300 mg + Sertraline 200 mg/day.  She reports mood is ok.   Has tried OTC sleep aids without relief.  Slept better last night.  Does endorse RA symptoms keeping her awake some nights.  - Discussed sleep hygiene again  - Cautioned about using OTC sleep aids that may contain Benadryl- should avoid due to side effects/age  - Would be leery of increasing her mirtazepine as patient is on high dose sertraline + wellbutrin  - Advised patient to take Melatonin 10 mg HS every night  - Advised patient to call her Rheumatology team to discuss her joint pain as that seems to be some of the issue/concern  - Follow up if symptoms are not improving/worsening.  Patient agrees to plan    Major depression in complete remission (H)  - Reports stable mood  - Continue mirtazepine, wellbutrin, sertraline    Rheumatoid arthritis without elevated rheumatoid factor (H)  - Follows with Rheumatology; maintained on Methotrexate 2.5 mg, 4 tabs/week; prednisone 5 mg daily; PRN nabumetone  - Advised patient to call her Rheumatologist to discuss her RA flare/symptoms.      Review of prior external note(s) from - Outside records from Rheumatology    See Patient Instructions    Return if symptoms worsen or fail to improve.    DARIO [facility] [place]   Marcela is a 81 year old who presents for the following health issues     HPI     Concern - insomnia  Onset: 1 month  Description: Hard to fall asleep/stay asleep- last night was better  Intensity: moderate  Progression of  "Symptoms:  improving  Accompanying Signs & Symptoms: RA flare, hurts all over  Previous history of similar problem: No  Precipitating factors:        Worsened by: RA symptoms  Alleviating factors:        Improved by: None  Therapies tried and outcome: \"Night time sleep aid- calcium...\"  \"25 mg, then 50 mg\"    Sleep concern:  Marcela is seen by telephone visit today for concerns of sleep.  Patient reports that for the past month she has been having difficulty sleeping.  She recalls hearing in the past, that going to bed at the same time every night improves sleep, so she did that and her sleep was great.  However, states all of a sudden, a month ago she started having issues with her sleep.  Reports having trouble falling asleep, so she would get up and walk.  Reports that she has had rheumatoid arthritis for many years and on Wednesday her joints were hurting everywhere so it was very difficult to sleep, she had to get up and move around frequently.  She reports that her joint pain is somewhat better.  She does endorse that several nights she has difficulty sleeping because of her RA and joint pain.  She reports that last night she was able to sleep better.  Reports that she got 1.5 hours of solid sleep, but then would wake up frequently however, would be able to fall asleep after waking up.  She does currently take Remeron at bedtime.  She is also taking Wellbutrin 300 mg and sertraline 200 mg for depression.  She reports that her mood is okay.   She reports that at one time during this month she was up for 48 hours.  She is not napping during the day.  She drinks 1 small cup of coffee in the morning.  She reports that she took an over-the-counter sleep aid, which she read the ingredients to me that contain some calcium-  she could not find other active ingredients in this.  She also endorses taking some type of medication that had a 50 mg tablet and a 25 mg tablet, both of which did not help.  She also took " melatonin 1 night, 10 mg, which did not do anything.    Review of Systems   CONSTITUTIONAL:NEGATIVE for fever, chills, change in weight  MUSCULOSKELETAL: POSITIVE  for joint pain; hx RA  PSYCHIATRIC: NEGATIVE for changes in mood or affect      Objective           Vitals:  No vitals were obtained today due to virtual visit.    Physical Exam   healthy, alert and no distress  PSYCH: Alert and oriented times 3; coherent speech, normal   rate and volume, able to articulate logical thoughts, able   to abstract reason, no tangential thoughts, no hallucinations   or delusions  Her affect is normal  RESP: No cough, no audible wheezing, able to talk in full sentences  Remainder of exam unable to be completed due to telephone visits    -Chart reviewed          Phone call duration: 18:49 minutes

## 2022-02-25 NOTE — PATIENT INSTRUCTIONS
"-Marcela, please do not take the over-the-counter medication with \"25\" mg or \"50\" mg.    -Please take the Melatonin 10 mg daily  -Please call your Rheumatologist to discuss your RA flare/symptoms  -Let me know if things are not improving  "

## 2022-03-19 DIAGNOSIS — F32.5 MAJOR DEPRESSION IN COMPLETE REMISSION (H): Chronic | ICD-10-CM

## 2022-03-22 RX ORDER — SERTRALINE HYDROCHLORIDE 100 MG/1
200 TABLET, FILM COATED ORAL DAILY
Qty: 180 TABLET | Refills: 0 | Status: SHIPPED | OUTPATIENT
Start: 2022-03-22 | End: 2022-06-27

## 2022-03-22 NOTE — TELEPHONE ENCOUNTER
Routing refill request to provider for review/approval because:  Patient overdue for PHQ-9    Routing to [facility], please reach out to patient to complete updated PHQ-9  Bibi Miguel RN

## 2022-03-28 ENCOUNTER — ANCILLARY PROCEDURE (OUTPATIENT)
Dept: GENERAL RADIOLOGY | Facility: CLINIC | Age: 82
End: 2022-03-28
Attending: PHYSICIAN ASSISTANT
Payer: COMMERCIAL

## 2022-03-28 ENCOUNTER — NURSE TRIAGE (OUTPATIENT)
Dept: INTERNAL MEDICINE | Facility: CLINIC | Age: 82
End: 2022-03-28
Payer: COMMERCIAL

## 2022-03-28 ENCOUNTER — OFFICE VISIT (OUTPATIENT)
Dept: URGENT CARE | Facility: URGENT CARE | Age: 82
End: 2022-03-28
Payer: COMMERCIAL

## 2022-03-28 ENCOUNTER — HOSPITAL ENCOUNTER (OUTPATIENT)
Dept: ULTRASOUND IMAGING | Facility: CLINIC | Age: 82
Discharge: HOME | End: 2022-03-28
Attending: PHYSICIAN ASSISTANT | Admitting: PHYSICIAN ASSISTANT
Payer: COMMERCIAL

## 2022-03-28 VITALS
HEART RATE: 86 BPM | BODY MASS INDEX: 24.22 KG/M2 | OXYGEN SATURATION: 99 % | SYSTOLIC BLOOD PRESSURE: 140 MMHG | WEIGHT: 140 LBS | DIASTOLIC BLOOD PRESSURE: 80 MMHG

## 2022-03-28 DIAGNOSIS — M71.22 BAKER'S CYST OF KNEE, LEFT: ICD-10-CM

## 2022-03-28 DIAGNOSIS — M79.605 LEG PAIN, POSTERIOR, LEFT: ICD-10-CM

## 2022-03-28 DIAGNOSIS — S89.92XA INJURY OF LEFT LOWER EXTREMITY, INITIAL ENCOUNTER: ICD-10-CM

## 2022-03-28 DIAGNOSIS — S89.92XA INJURY OF LEFT LOWER EXTREMITY, INITIAL ENCOUNTER: Primary | ICD-10-CM

## 2022-03-28 PROCEDURE — 93971 EXTREMITY STUDY: CPT | Mod: LT

## 2022-03-28 PROCEDURE — 99214 OFFICE O/P EST MOD 30 MIN: CPT | Performed by: PHYSICIAN ASSISTANT

## 2022-03-28 PROCEDURE — 73552 X-RAY EXAM OF FEMUR 2/>: CPT | Mod: LT | Performed by: RADIOLOGY

## 2022-03-28 NOTE — TELEPHONE ENCOUNTER
"Last Tuesday was at exercise class   Patient states   Sore, hurts from knee to groin- left   Taking pain pill not taking anything  Taking Wellbutrin 750 mg   'I cant I have chronic pain\"  No new weakness or numbness  Pain: 9/10.  Denies difficulty breathing chest pain  Writer advised OV, no Ox availability, writer offered to schedule at another location. Patient declined, requesting that pain medication be ordered. Writer notified patient they will need to be seen/assessed before a provider can order medications. Writer advised [place] [facility] [place], patient agreeable and will go to .     Kayla Aldrich RN  [facility]    Reason for Disposition    SEVERE pain (e.g., excruciating, unable to do any normal activities)    Additional Information    Negative: Looks like a broken bone or dislocated joint (e.g., crooked or deformed)    Negative: Sounds like a life-threatening emergency to the triager    Negative: Followed a hip injury    Negative: Followed a knee injury    Negative: Followed an ankle or foot injury    Negative: Back pain radiating (shooting) into leg(s)    Negative: Foot pain is the main symptom    Negative: Ankle pain is the main symptom    Negative: Knee pain is the main symptom    Negative: Leg swelling is the main symptom    Negative: Chest pain    Negative: Difficulty breathing    Negative: Entire foot is cool or blue in comparison to other side    Negative: Unable to walk    Negative: Fever and red area (or area very tender to touch)    Negative: Fever and swollen joint    Negative: Thigh or calf pain in only one leg and present > 1 hour    Negative: Thigh, calf, or ankle swelling in both legs, but one side is definitely more swollen    Negative: Thigh, calf, or ankle swelling in only one leg    Negative: History of prior 'blood clot' in leg or lungs (i.e., deep vein thrombosis, pulmonary embolism)    Negative: History of inherited increased risk of blood clots (e.g., factor 5 Leiden, " antithrombin 3, protein C or protein S deficiency, prothrombin mutation)    Negative: Major surgery in the past month    Negative: Hip or leg fracture (broken bone) in past month (or had cast on leg or ankle in past month)    Negative: Illness requiring prolonged bedrest in past month (e.g., immobilization, long hospital stay)    Negative: Long-distance travel in past month (e.g., car, bus, train, plane; with trip lasting 6 or more hours)    Negative: Cancer treatment in the past two months (or has cancer now)    Negative: Patient sounds very sick or weak to the triager    Protocols used: LEG PAIN-A-OH

## 2022-03-28 NOTE — PATIENT INSTRUCTIONS
Patient Education     Treatment for Baker s Cyst (Popliteal Cyst)  A Baker s cyst (popliteal cyst) is a fluid-filled sac that forms behind the knee.  Types of treatment  You likely won t need any treatment if you don t have any symptoms from your Baker s cyst. Some Baker s cysts go away without any treatment. If your cyst starts causing symptoms, you might need treatment at that time.  If you do have symptoms, you may be treated depending on the cause of your cyst. For example, you may need medicine for rheumatoid arthritis.  Other treatments for a Baker s cyst can include:    Over-the-counter pain medicines    Arthrocentesis, where a needle is used to remove extra fluid from the joint space    Steroid injection into the joint to reduce cyst size    Surgery to remove the cyst  Possible complications of a Baker s cyst  In rare cases, a Baker s cyst may cause complications. The cyst may get larger, which may cause redness and swelling. The cyst may also rupture, causing warmth, redness, and pain in your calf.  The symptoms may be the same as a blood clot in the veins of the legs. Your healthcare provider may need imaging tests of your leg to make sure you don t have a clot. Rupture can also lead to its own complications, such as:    Trapping of a tibial nerve. This causes calf pain and numbness behind the leg. It can be treated with arthrocentesis and steroid injections.    Blockage of the popliteal artery. This causes pain and lack of blood flow to the leg. It can be treated with arthrocentesis and steroid injections or surgery.    Compartment syndrome. This causes intense pain and problems moving the foot or toes. It is the result of pressure building in the muscles causing blood flow to decrease. Compartment syndrome is a medical emergency that requires immediate surgery. It can lead to permanent muscle damage if not treated right away.  When to call the healthcare provider  If your cyst starts causing mild  symptoms, plan to see your healthcare provider soon. See him or her right away if you have symptoms such as redness and swelling of your leg, or numbness and discoloration of the foot. These symptoms may mean your Baker s cyst has ruptured or causing other problems.  Jose E last reviewed this educational content on 5/1/2018 2000-2021 [employer]. All rights reserved. This information is not intended as a substitute for professional medical care. Always follow your healthcare professional's instructions.           Patient Education     Understanding Baker s Cyst (Popliteal Cyst)  A Baker s cyst (popliteal cyst) is a fluid-filled sac that forms behind the knee.  Parts of the knee  The knee is a complex joint that has many parts. The lower end of the thighbone (femur) rotates on the upper end of the shinbone (tibia). There are several small bursae around the knee joint. These are small sacs filled with a special fluid (synovial fluid) that cushions the rest of the joint. Between the bones is a space that also contains this fluid.   What causes a Baker s cyst?  It is caused when extra fluid from the knee joint flows into the small bursa that sits behind the knee. When this sac fills with too much fluid, it s called a Baker s cyst. This might happen when an injury or disease irritates the knee joint.    In adults, other problems with the knee joint often cause the Baker s cyst. Injury or a knee disorder can change the normal structure of the knee joint. This can cause a cyst to form.   The synovial fluid inside the joint space may build up as a result of injury or disease. As the pressure builds up, the fluid may bulge into the back of the knee. This can cause the cyst.   Symptoms of a Baker s cyst  A Baker s cyst often doesn t cause symptoms. A cyst will more often be seen on an imaging test, like MRI, done for other reasons. If you do have symptoms, they may include:     Pain in the back of the  knee    Knee stiffness    Sense of swelling or fullness behind the knee, especially when you straighten your leg    A swelling behind the knee that goes away when you bend your knee  These symptoms tend to get worse when standing for a long time, or being active.  Diagnosing a Baker s cyst  Your healthcare provider will ask you about your medical history and your symptoms. He or she will give you a physical exam, which will include a careful exam of your knee. It s important to make sure your symptoms are caused by a Baker s cyst and not a tumor or a blood clot.   If the cause of your symptoms is not clear, you may have imaging tests, such as:    Ultrasound, to look at the cyst in more detail or to look for a blood clot    X-ray, to get more information about the bones of the joint    MRI, if the diagnosis is still unclear after ultrasound, or if your health care provider is considering surgery  Jose E last reviewed this educational content on 6/1/2020 2000-2021 [employer]. All rights reserved. This information is not intended as a substitute for professional medical care. Always follow your healthcare professional's instructions.

## 2022-03-28 NOTE — PROGRESS NOTES
Assessment & Plan     Injury of left lower extremity, initial encounter  Patient started having pain after being in a work out class  Pain is behind knee and in the medial side leg  This could be attributed to a muscle strain, tear or pain contributed due to bakers cyst  RICE treatment: Rest, Ice, compression, elevation   Tylenol, ice compresses  - XR Femur Left 2 Views; Future  - Orthopedic  Referral; Future    Leg pain, posterior, left  Results for orders placed or performed during the hospital encounter of 03/28/22   US Lower Extremity Venous Duplex Left     Status: None    Narrative    ULTRASOUND VENOUS LOWER EXTREMITY UNILATERAL LEFT    3/28/2022 2:06 PM     HISTORY: Left lower extremity pain, swelling and edema.    COMPARISON: None.    TECHNIQUE: Ultrasound gray scale, Color Doppler flow, and spectral  Doppler waveform analysis performed.    FINDINGS:   The left common femoral, superficial femoral, popliteal and  segmentally visualized calf veins are patent, fully compressible and  demonstrate antegrade Doppler flow. The visualized cephalad great  saphenous vein is negative for thrombus.     Within the left popliteal fossa is a hypoechoic avascular fluid  collection measuring 2.2 x 2.2 x 1.1 cm.    The contralateral right common femoral vein is patent without deep  venous thrombosis.      Impression    IMPRESSION:   1.  The left lower extremity is negative for deep venous thrombosis.  2.  Left popliteal fossa Baker's cyst.    ANASTACIO WALLACE MD         SYSTEM ID:  [id number]   Results for orders placed or performed in visit on 03/28/22   XR Femur Left 2 Views     Status: None (Preliminary result)    Narrative    XR LEFT FEMUR TWO VIEWS   3/28/2022 12:19 PM     HISTORY: Left lower extremity pain after injury.    COMPARISON: None.      Impression    IMPRESSION: Hip joint space appears fairly well preserved with some  mild early acetabular marginal osteophyte formation. No acute fracture  or osseous  lesion.     Positive for Bakers Cyst  Referral to [facility]  appt made on 3/30/22 at 1: 30 with Dr. Castillo  Other treatments for a Baker s cyst can include:    Over-the-counter pain medicines    Arthrocentesis, where a needle is used to remove extra fluid from the joint space    Steroid injection into the joint to reduce cyst size    Surgery to remove the cyst  - XR Femur Left 2 Views; Future  - US Lower Extremity Venous Duplex Left; Future  - Orthopedic  Referral; Future    Baker's cyst of knee, left  Noted on ultrasound  Patient referred to [facility]  appt as above    - Orthopedic  Referral; Future     CONSULTATION/REFERRAL to [facility]    No follow-ups on file.    Kenji Miramontes PA-C  [facility]    Quinn Medrano is a 81 year old who presents for the following health issues     HPI     Marcela Sandhu, 81 year old, female presents to the urgent care today with:   Leg Pain (left, since last Tuesday from knee to groin)    Review of Systems   Constitutional, HEENT, cardiovascular, pulmonary, gi and gu systems are negative, except as otherwise noted.      Objective    BP (!) 140/80 (BP Location: Right arm, Patient Position: Sitting, Cuff Size: Adult Regular)   Pulse 86   Wt 63.5 kg (140 lb)   LMP  (LMP Unknown)   SpO2 99%   BMI 24.22 kg/m    Body mass index is 24.22 kg/m .  Physical Exam   GENERAL: healthy, alert and no distress  MS: Positive for left popliteal and medial leg tenderness with palpation.  Positive for left posterior leg and popliteal tenderness  SKIN: no suspicious lesions or rashes  NEURO: Normal strength and tone, mentation intact and speech normal  PSYCH: mentation appears normal, affect normal/bright  LYMPH: negative for lymph node swelling

## 2022-03-30 ENCOUNTER — TRANSFERRED RECORDS (OUTPATIENT)
Dept: HEALTH INFORMATION MANAGEMENT | Facility: CLINIC | Age: 82
End: 2022-03-30

## 2022-04-05 ENCOUNTER — MEDICAL CORRESPONDENCE (OUTPATIENT)
Dept: HEALTH INFORMATION MANAGEMENT | Facility: CLINIC | Age: 82
End: 2022-04-05
Payer: COMMERCIAL

## 2022-04-17 ENCOUNTER — MEDICAL CORRESPONDENCE (OUTPATIENT)
Dept: HEALTH INFORMATION MANAGEMENT | Facility: CLINIC | Age: 82
End: 2022-04-17
Payer: COMMERCIAL

## 2022-04-23 DIAGNOSIS — F32.5 MAJOR DEPRESSION IN COMPLETE REMISSION (H): Chronic | ICD-10-CM

## 2022-04-26 RX ORDER — BUPROPION HYDROCHLORIDE 300 MG/1
TABLET ORAL
Qty: 90 TABLET | Refills: 0 | OUTPATIENT
Start: 2022-04-26

## 2022-04-26 NOTE — TELEPHONE ENCOUNTER
Routing refill request to provider for review/approval because:  PHQ-9 score:    PHQ 2/18/2021   PHQ-9 Total Score 3   Q9: Thoughts of better off dead/self-harm past 2 weeks Not at all   F/U: Thoughts of suicide or self-harm -   F/U: Safety concerns -       Originally ordered by prior PCP      Bibi Miguel RN

## 2022-04-27 NOTE — TELEPHONE ENCOUNTER
She is overdue for her annual wellness exam and mood follow-up. Please ask her to schedule this appointment ASAP. Can refill medication temporarily if she anticipates running out prior to scheduled appointment.     Thank you.

## 2022-04-30 DIAGNOSIS — F32.5 MAJOR DEPRESSION IN COMPLETE REMISSION (H): Chronic | ICD-10-CM

## 2022-05-02 RX ORDER — BUPROPION HYDROCHLORIDE 300 MG/1
TABLET ORAL
Qty: 90 TABLET | Refills: 0 | Status: SHIPPED | OUTPATIENT
Start: 2022-05-02 | End: 2022-08-02

## 2022-05-02 NOTE — TELEPHONE ENCOUNTER
Routing refill request to provider for review/approval because:  Labs not current:  PHQ-9    Patient has upcoming appointment 8/31/22.   Toby KHAN RN

## 2022-06-02 ENCOUNTER — TRANSFERRED RECORDS (OUTPATIENT)
Dept: HEALTH INFORMATION MANAGEMENT | Facility: CLINIC | Age: 82
End: 2022-06-02
Payer: COMMERCIAL

## 2022-06-02 LAB
ALT SERPL-CCNC: 11 IU/L (ref 5–35)
AST SERPL-CCNC: 23 U/L (ref 5–34)
CREATININE (EXTERNAL): 1.2 MG/DL (ref 0.5–1.3)

## 2022-06-25 DIAGNOSIS — F32.5 MAJOR DEPRESSION IN COMPLETE REMISSION (H): Chronic | ICD-10-CM

## 2022-06-27 RX ORDER — MIRTAZAPINE 7.5 MG/1
7.5 TABLET, FILM COATED ORAL AT BEDTIME
Qty: 90 TABLET | Refills: 3 | Status: SHIPPED | OUTPATIENT
Start: 2022-06-27 | End: 2023-10-17

## 2022-06-27 RX ORDER — SERTRALINE HYDROCHLORIDE 100 MG/1
200 TABLET, FILM COATED ORAL DAILY
Qty: 180 TABLET | Refills: 0 | Status: SHIPPED | OUTPATIENT
Start: 2022-06-27 | End: 2022-10-03

## 2022-06-27 NOTE — TELEPHONE ENCOUNTER
Routing refill request to provider for review/approval because:  PHQ-9 score out of date    PHQ 2/18/2021   PHQ-9 Total Score 3   Q9: Thoughts of better off dead/self-harm past 2 weeks Not at all   F/U: Thoughts of suicide or self-harm -   F/U: Safety concerns -     Pt has appt 8/31/22          Bibi Miguel RN

## 2022-06-27 NOTE — TELEPHONE ENCOUNTER
Routing refill request to provider for review/approval because:  Drug interaction warning    Needs updated PHQ9  PHQ-9 score:    PHQ 2/18/2021   PHQ-9 Total Score 3   Q9: Thoughts of better off dead/self-harm past 2 weeks Not at all   F/U: Thoughts of suicide or self-harm -   F/U: Safety concerns -               Bibi Miguel RN

## 2022-07-28 DIAGNOSIS — K13.79 MOUTH SORES: ICD-10-CM

## 2022-07-30 DIAGNOSIS — F32.5 MAJOR DEPRESSION IN COMPLETE REMISSION (H): Chronic | ICD-10-CM

## 2022-08-02 RX ORDER — BUPROPION HYDROCHLORIDE 300 MG/1
TABLET ORAL
Qty: 30 TABLET | Refills: 0 | Status: SHIPPED | OUTPATIENT
Start: 2022-08-02 | End: 2022-08-07

## 2022-08-02 NOTE — TELEPHONE ENCOUNTER
Routing refill request to provider for review/approval because:  Failed protocol due to:   PHQ-9 score:    PHQ 2/18/2021   PHQ-9 Total Score 3   Q9: Thoughts of better off dead/self-harm past 2 weeks Not at all   F/U: Thoughts of suicide or self-harm -   F/U: Safety concerns -     Patient scheduled for an appt 8/31/22-med pended for one month    Alice Woodson RN

## 2022-08-08 DIAGNOSIS — K13.79 MOUTH SORES: ICD-10-CM

## 2022-08-30 PROBLEM — F32.5 MAJOR DEPRESSION IN COMPLETE REMISSION (H): Status: RESOLVED | Noted: 2022-02-25 | Resolved: 2022-08-30

## 2022-08-30 PROBLEM — M85.80 OSTEOPENIA: Status: ACTIVE | Noted: 2022-08-30

## 2022-08-31 ENCOUNTER — OFFICE VISIT (OUTPATIENT)
Dept: INTERNAL MEDICINE | Facility: CLINIC | Age: 82
End: 2022-08-31
Payer: COMMERCIAL

## 2022-08-31 VITALS
TEMPERATURE: 98.4 F | SYSTOLIC BLOOD PRESSURE: 176 MMHG | BODY MASS INDEX: 24.57 KG/M2 | WEIGHT: 143.9 LBS | HEART RATE: 72 BPM | OXYGEN SATURATION: 100 % | DIASTOLIC BLOOD PRESSURE: 78 MMHG | HEIGHT: 64 IN

## 2022-08-31 DIAGNOSIS — R73.01 IMPAIRED FASTING GLUCOSE: ICD-10-CM

## 2022-08-31 DIAGNOSIS — J31.0 CHRONIC RHINITIS: ICD-10-CM

## 2022-08-31 DIAGNOSIS — Z13.1 SCREENING FOR DIABETES MELLITUS: ICD-10-CM

## 2022-08-31 DIAGNOSIS — Z00.00 MEDICARE ANNUAL WELLNESS VISIT, SUBSEQUENT: Primary | ICD-10-CM

## 2022-08-31 DIAGNOSIS — I10 BENIGN ESSENTIAL HYPERTENSION: ICD-10-CM

## 2022-08-31 DIAGNOSIS — E55.9 VITAMIN D DEFICIENCY: ICD-10-CM

## 2022-08-31 PROBLEM — G31.84 MILD COGNITIVE IMPAIRMENT: Status: ACTIVE | Noted: 2022-08-31

## 2022-08-31 LAB
ALBUMIN SERPL-MCNC: 3.8 G/DL (ref 3.4–5)
ALP SERPL-CCNC: 48 U/L (ref 40–150)
ALT SERPL W P-5'-P-CCNC: 17 U/L (ref 0–50)
ANION GAP SERPL CALCULATED.3IONS-SCNC: 6 MMOL/L (ref 3–14)
AST SERPL W P-5'-P-CCNC: 26 U/L (ref 0–45)
BILIRUB SERPL-MCNC: 1 MG/DL (ref 0.2–1.3)
BUN SERPL-MCNC: 26 MG/DL (ref 7–30)
CALCIUM SERPL-MCNC: 9.1 MG/DL (ref 8.5–10.1)
CHLORIDE BLD-SCNC: 108 MMOL/L (ref 94–109)
CO2 SERPL-SCNC: 27 MMOL/L (ref 20–32)
CREAT SERPL-MCNC: 1.05 MG/DL (ref 0.52–1.04)
DEPRECATED CALCIDIOL+CALCIFEROL SERPL-MC: 85 UG/L (ref 20–75)
GFR SERPL CREATININE-BSD FRML MDRD: 53 ML/MIN/1.73M2
GLUCOSE BLD-MCNC: 95 MG/DL (ref 70–99)
HBA1C MFR BLD: 5.3 % (ref 0–5.6)
POTASSIUM BLD-SCNC: 4 MMOL/L (ref 3.4–5.3)
PROT SERPL-MCNC: 7.6 G/DL (ref 6.8–8.8)
SODIUM SERPL-SCNC: 141 MMOL/L (ref 133–144)

## 2022-08-31 PROCEDURE — 83036 HEMOGLOBIN GLYCOSYLATED A1C: CPT | Performed by: INTERNAL MEDICINE

## 2022-08-31 PROCEDURE — 82306 VITAMIN D 25 HYDROXY: CPT | Performed by: INTERNAL MEDICINE

## 2022-08-31 PROCEDURE — G0438 PPPS, INITIAL VISIT: HCPCS | Performed by: INTERNAL MEDICINE

## 2022-08-31 PROCEDURE — 99214 OFFICE O/P EST MOD 30 MIN: CPT | Mod: 25 | Performed by: INTERNAL MEDICINE

## 2022-08-31 PROCEDURE — 36415 COLL VENOUS BLD VENIPUNCTURE: CPT | Performed by: INTERNAL MEDICINE

## 2022-08-31 PROCEDURE — 80053 COMPREHEN METABOLIC PANEL: CPT | Performed by: INTERNAL MEDICINE

## 2022-08-31 RX ORDER — AMLODIPINE BESYLATE 5 MG/1
5 TABLET ORAL DAILY
Qty: 90 TABLET | Refills: 3 | Status: SHIPPED | OUTPATIENT
Start: 2022-08-31 | End: 2023-08-14

## 2022-08-31 RX ORDER — IPRATROPIUM BROMIDE 42 UG/1
2 SPRAY, METERED NASAL 4 TIMES DAILY
Qty: 15 ML | Refills: 3 | Status: SHIPPED | OUTPATIENT
Start: 2022-08-31 | End: 2023-11-01

## 2022-08-31 ASSESSMENT — ENCOUNTER SYMPTOMS
COUGH: 0
FREQUENCY: 0
ARTHRALGIAS: 1
HEARTBURN: 0
BREAST MASS: 0
NAUSEA: 0
FEVER: 0
HEMATOCHEZIA: 0
HEADACHES: 0
MYALGIAS: 1
PALPITATIONS: 0
DIZZINESS: 0
ABDOMINAL PAIN: 0
DYSURIA: 0
CHILLS: 0
CONSTIPATION: 0
WEAKNESS: 0
DIARRHEA: 0
SORE THROAT: 0
NERVOUS/ANXIOUS: 0
EYE PAIN: 0
PARESTHESIAS: 0
JOINT SWELLING: 1
SHORTNESS OF BREATH: 0
HEMATURIA: 0

## 2022-08-31 ASSESSMENT — ACTIVITIES OF DAILY LIVING (ADL): CURRENT_FUNCTION: NO ASSISTANCE NEEDED

## 2022-08-31 ASSESSMENT — PATIENT HEALTH QUESTIONNAIRE - PHQ9
10. IF YOU CHECKED OFF ANY PROBLEMS, HOW DIFFICULT HAVE THESE PROBLEMS MADE IT FOR YOU TO DO YOUR WORK, TAKE CARE OF THINGS AT HOME, OR GET ALONG WITH OTHER PEOPLE: NOT DIFFICULT AT ALL
SUM OF ALL RESPONSES TO PHQ QUESTIONS 1-9: 1
SUM OF ALL RESPONSES TO PHQ QUESTIONS 1-9: 1

## 2022-08-31 NOTE — LETTER
August 31, 2022      Marcela Sandhu  [address]   [place] [address]        Dear ,    We are writing to inform you of your test results.    Your test results fall within the expected range(s) or remain unchanged from previous results.  Please continue with current treatment plan.    Resulted Orders   Comprehensive metabolic panel   Result Value Ref Range    Sodium 141 133 - 144 mmol/L    Potassium 4.0 3.4 - 5.3 mmol/L    Chloride 108 94 - 109 mmol/L    Carbon Dioxide (CO2) 27 20 - 32 mmol/L    Anion Gap 6 3 - 14 mmol/L    Urea Nitrogen 26 7 - 30 mg/dL    Creatinine 1.05 (H) 0.52 - 1.04 mg/dL    Calcium 9.1 8.5 - 10.1 mg/dL    Glucose 95 70 - 99 mg/dL    Alkaline Phosphatase 48 40 - 150 U/L    AST 26 0 - 45 U/L    ALT 17 0 - 50 U/L    Protein Total 7.6 6.8 - 8.8 g/dL    Albumin 3.8 3.4 - 5.0 g/dL    Bilirubin Total 1.0 0.2 - 1.3 mg/dL    GFR Estimate 53 (L) >60 mL/min/1.73m2   Vitamin D Deficiency   Result Value Ref Range    Vitamin D, Total (25-Hydroxy) 85 (H) 20 - 75 ug/L   Hemoglobin A1c   Result Value Ref Range    Hemoglobin A1C 5.3 0.0 - 5.6 %       If you have any questions or concerns, please call the clinic at the number listed above.       Sincerely,      Kelley Haney MD

## 2022-08-31 NOTE — PROGRESS NOTES
ASSESSMENT/PLAN                                                       (Z00.00) Medicare annual wellness visit, subsequent  (primary encounter diagnosis)  Comment: PMH, PSH, FH, SH, medications, allergies, immunizations, and preventative health measures reviewed and updated as appropriate.  Plan: see below for plans.      (Z13.1) Screening for diabetes mellitus  (E55.9) Vitamin D deficiency  (R73.01) Impaired fasting glucose  Plan: non-fasting labs today.    (J31.0) Chronic rhinitis  Plan: TRIAL of ipratropium 0.06% nasal spray 4x/day as needed; if symptoms worsen, change, or do not improve, patient to contact MD.        (I10) Benign essential hypertension  Comment: new diagnosis.  Plan: START amlodipine 5 mg daily; patient to check blood pressure several times a week at assisted living facility and contact MD in 2-4 weeks for a blood pressure update.    Appropriate preventive services were discussed with this patient, including applicable screening as appropriate for cardiovascular disease, diabetes, osteopenia/osteoporosis, and glaucoma.  As appropriate for age/gender, discussed screening for colorectal cancer, prostate cancer, breast cancer, and cervical cancer. Checklist reviewing preventive services available has been given to the patient.    Reviewed patients plan of care. The Basic Care Plan (routine screening as documented in Health Maintenance) for Marcela Sandhu meets the Care Plan requirement. This Care Plan has been established and reviewed with the Patient.    Kelley Haney MD   [address] [address]  T: [phone], F: [phone]    SUBJECTIVE                                                      Marcela Sandhu is a very pleasant 82 year old female who presents for her subsequent AWV:    Blood pressure is significantly elevated today, even on repeat. No history of or treatment for high blood pressure in the past. She is asymptomatic from a blood  pressure perspective: no chest pain or palpitations, no shortness of breath, no light-headedness or dizziness, no headaches or vision changes.     Current providers (other than myself): Jorge/Wisam (neurology), outside rheumatologist    PMH, PSH, FH, , medications, allergies, immunizations, preventative health, and health risk assessment reviewed and updated as appropriate.    Past Medical History:   Diagnosis Date     Acid reflux disease      Benign essential tremor      History of hepatitis C virus infection     treated with interferon     MDD (major depressive disorder)      Osteopenia      Peripheral neuropathy      Personal history of malignant neoplasm of breast 2007     Restless legs syndrome (RLS)      Rheumatoid arthritis without elevated rheumatoid factor (H)      Past Surgical History:   Procedure Laterality Date     APPENDECTOMY       ARTHROPLASTY CARPOMETACARPAL (THUMB JOINT) Bilateral      ARTHROPLASTY KNEE Right      BLEPHAROPLASTY BILATERAL       BUNIONECTOMY CHEVRON AND PEBBLES BILATERAL, COMBINED  12/02/2011    Procedure:COMBINED BUNIONECTOMY CHEVRON AND PEBBLES BILATERAL; BILATERAL THIRD AND FOURTH CLAWTOE RECONSTRUCTION WITH EXOSTECTOMY, LEFT FIRST TARSOMETATARSAL JOINT and 2nd toe Right foot reconstruction; Surgeon:ASHWIN FARMER; Location: OR     CARPAL TUNNEL RELEASE RT/LT Bilateral      COLONOSCOPY  10/18/2011    Procedure:COLONOSCOPY; COLONOSCOPY; Surgeon:BRENDA RODRIGUEZ; Location: GI     ELBOW SURGERY      tendon lengthening surgery     FOOT SURGERY Bilateral     toe straightening     MASTECTOMY, BILATERAL       SLING BLADDER SUSPENSION WITH FASCIA ZACH       SPINE SURGERY      multiple lumbar surgeries; most hardware removed     Family History   Problem Relation Age of Onset     Hypertension Mother      Hypertension Father      Multiple myeloma Brother      Bone Cancer Brother      Cerebrovascular Disease Brother 60     Diabetes No family hx of      Myocardial Infarction No family  hx of      Breast Cancer No family hx of      Ovarian Cancer No family hx of      Colon Cancer No family hx of      Social History     Occupational History     Occupation: Retired - [employer], hearing [employer]   Tobacco Use     Smoking status: Never Smoker     Smokeless tobacco: Never Used   Vaping Use     Vaping Use: Never used   Substance and Sexual Activity     Alcohol use: No     Drug use: No     Sexual activity: Not Currently   Social History Narrative    .    Adult son.    Two grandchildren.    Partial assisted living - [facility].     Occasional swimming.      Allergies   Allergen Reactions     Acetaminophen GI Disturbance     Citalopram Unknown     Hydrocodone GI Disturbance     Levaquin [Levofloxacin Hemihydrate] Other (See Comments) and Rash     chest pain     Sulfa Drugs GI Disturbance     Current Outpatient Medications   Medication Sig     AMOXICILLIN PO Take 500 mg by mouth Only before dental procedures     buPROPion (WELLBUTRIN XL) 300 MG 24 hr tablet TAKE 1 TABLET (300 MG) BY MOUTH ONCE DAILY IN THE MORNING     folic acid (FOLVITE) 1 MG tablet Take 1 mg by mouth daily     magic mouthwash (ENTER INGREDIENTS IN COMMENTS) suspension Take 5 mLs by mouth every 4 hours as needed (for mouth sores)     magic mouthwash (ENTER INGREDIENTS IN COMMENTS) suspension Take 5 mLs by mouth every 4 hours as needed (mouth sores)     methotrexate sodium 2.5 MG TABS Take 4 tablets once a week     mirtazapine (REMERON) 7.5 MG tablet TAKE 1 TABLET (7.5 MG) BY MOUTH AT BEDTIME     NABUMETONE PO Take 750 mg by mouth 2 times daily     predniSONE (DELTASONE) 5 MG tablet      sertraline (ZOLOFT) 100 MG tablet TAKE 2 TABLETS (200 MG) BY MOUTH DAILY     tiZANidine (ZANAFLEX) 2 MG tablet TAKE 1 TABLET BY MOUTH THREE TIMES DAILY AS NEEDED FOR MUSCLE SPASMS     tiZANidine (ZANAFLEX) 2 MG tablet TAKE 1 TABLET BY MOUTH THREE TIMES DAILY AS NEEDED FOR MUSCLE SPASMS     omeprazole (PRILOSEC) 20 MG DR capsule Take 1  capsule (20 mg) by mouth daily (Patient not taking: No sig reported)     Immunization History   Administered Date(s) Administered     COVID-19,PF,Moderna 01/09/2021, 02/13/2021, 11/19/2021, 05/10/2022     HEPA 06/17/2002, 12/06/2002     HepB 06/17/2002, 12/06/2002, 07/18/2007     Influenza (High Dose) 3 valent vaccine 10/08/2015, 10/11/2016, 09/28/2017, 10/30/2020     Influenza (IIV3) PF 04/16/2001, 12/10/2004, 10/24/2005, 12/04/2007, 09/24/2010, 09/04/2012, 09/03/2013, 10/01/2014     Influenza Vaccine Im 4yrs+ 4 Valent CCIIV4 09/26/2018     Influenza, Quad, High Dose, Pf, 65yr+ (Fluzone HD) 10/30/2020     Pneumo Conj 13-V (2010&after) 03/22/2017     Pneumococcal 23 valent 01/01/1997, 02/19/2007, 07/08/2013     TD (ADULT, 7+) 01/01/1997, 02/19/2007     TDAP Vaccine (Adacel) 03/22/2017     Tdap (Adacel,Boostrix) 01/01/2007     Zoster vaccine recombinant adjuvanted (SHINGRIX) 10/24/2019, 12/24/2019     Zoster vaccine, live 06/08/2007     PREVENTATIVE HEALTH                                                      BMI: within normal limits   Blood pressure: elevated - not on medication  Breast CA screening: screening no longer indicated  Colon CA screening: screening no longer indicated  Lung CA screening: n/a   Dexa: up to date   Screening cholesterol: screening no longer indicated   Screening diabetes: DUE  Alcohol misuse screening: alcohol use reviewed - no intervention indicated at this time  Immunizations: reviewed; up to date     HEALTH RISK ASSESSMENT                                                      In general, how would you rate your overall physical health? good  Outside of work, how many days during the week do you exercise? 4-5 days/week  Outside of work, approximately how many minutes a day do you exercise? 15-30 minutes    If you drink alcohol do you typically have >3 drinks per day or >7 drinks per week? No  Do you usually eat at least 4 servings of fruit and vegetables a day, include whole grains & fiber  "and avoid regularly eating high fat or \"junk\" foods? Yes     Do you have any problems taking medications regularly? No  Do you have any side effects from medications? No    Assistance with daily activities: No    Safety concerns: No    Fall risk assessment: completed today (see ambulatory assessments)    Hearing concerns: No    In the past 6 months, have you been bothered by leaking of urine: Yes    In general, how would you rate your overall mental or emotional health: good    PHQ-2/PHQ-9 assessment: completed today (see ambulatory assessments)    Additional concerns today: No    OBJECTIVE                                                      BP (!) 184/72   Pulse 72   Temp 98.4  F (36.9  C) (Oral)   Ht 1.619 m (5' 3.75\")   Wt 65.3 kg (143 lb 14.4 oz)   LMP  (LMP Unknown)   SpO2 100%   BMI 24.89 kg/m    Constitutional: well-appearing  Head, Ears, and Eyes: normocephalic; normal external auditory canal and pinna; tympanic membranes visualized and normal; normal lids and conjunctivae  Neck: supple, symmetric, no thyromegaly or lymphadenopathy  Respiratory: normal respiratory effort; clear to auscultation bilaterally  Cardiovascular: regular rate and rhythm; no edema  Gastrointestinal: soft, non-tender, and non-distended; no organomegaly or masses  Musculoskeletal: normal gait and station  Psych: normal judgment and insight; normal mood and affect; recent and remote memory intact    Cognitive impairment noted: No  ---  (Note was completed, in part, with [employer] voice-recognition software. Documentation was reviewed, but some grammatical, spelling, and word errors may remain.)  "

## 2022-08-31 NOTE — PATIENT INSTRUCTIONS
Non fasting labs today.    START amlodipine 5mg once daily.    Please check blood pressure several times a week and call with an update in 2-4 weeks please.     TRIAL of ipratropium nasal spray up to 4x/day as needed for runny nose.

## 2022-09-19 ENCOUNTER — TELEPHONE (OUTPATIENT)
Dept: INTERNAL MEDICINE | Facility: CLINIC | Age: 82
End: 2022-09-19

## 2022-09-19 NOTE — TELEPHONE ENCOUNTER
Patient had a DEXA last year.    Repeat DEXA recommended 3 years after prior (so not due until 2024).

## 2022-09-19 NOTE — TELEPHONE ENCOUNTER
Pt called stated she would need a bone density scan ordered. Please contact pt once this has been ordered.

## 2022-09-21 NOTE — TELEPHONE ENCOUNTER
Spoke to patient to relay providers message. Patient misunderstood previous instructions and is agreeable to wait until 2024 for repeat DEXA scan.

## 2022-09-30 ENCOUNTER — TELEPHONE (OUTPATIENT)
Dept: INTERNAL MEDICINE | Facility: CLINIC | Age: 82
End: 2022-09-30

## 2022-09-30 DIAGNOSIS — F32.5 MAJOR DEPRESSION IN COMPLETE REMISSION (H): Chronic | ICD-10-CM

## 2022-09-30 NOTE — TELEPHONE ENCOUNTER
Patient calling report blood pressures. Feeling well. Denies side effects to medication. Taking medication as prescribed.    Blood pressure:  129/78 today. Assisted living nurse checked it with a machine.  Michelle Borges RN

## 2022-10-03 DIAGNOSIS — K13.79 MOUTH SORES: ICD-10-CM

## 2022-10-03 RX ORDER — SERTRALINE HYDROCHLORIDE 100 MG/1
200 TABLET, FILM COATED ORAL DAILY
Qty: 180 TABLET | Refills: 0 | Status: SHIPPED | OUTPATIENT
Start: 2022-10-03 | End: 2023-01-06

## 2022-10-05 NOTE — TELEPHONE ENCOUNTER
Received note that wanted to know where to send this. Called pt to clarify, this needs to be sent to [facility] in [place].     Fauzia Rosa  Lead     [facility]

## 2022-10-18 ENCOUNTER — NURSE TRIAGE (OUTPATIENT)
Dept: INTERNAL MEDICINE | Facility: CLINIC | Age: 82
End: 2022-10-18

## 2022-10-18 DIAGNOSIS — F33.1 MODERATE EPISODE OF RECURRENT MAJOR DEPRESSIVE DISORDER (H): Primary | ICD-10-CM

## 2022-10-18 NOTE — TELEPHONE ENCOUNTER
"Nurse Triage SBAR    Is this a 2nd Level Triage? NO    Situation:Pt called the clinic stating she is depressed and also needs a recommendation OTC nasal spray for her allergies.     Background: Pt stated she has seen a counselor in the past. Pt stated she has been on something for this in the past but that was a while back. Pt stated the only time she thought about hurting her self was when \"the virus came and they locked us in our apartments for a year. I almost couldn't do that\".      Assessment: Pt denied any current thoughts of hurting/killing her self or others.  Pt stated she wanted to get help before it got to the point of wanting to hurt herself. Pt stated exercise class usually cheers her up but it hasnt been. Current feelings are interfering with sleeping and eating.       Protocol Recommended Disposition:   See in Office Within 3 Days. Pt stated she just needs something to \"snap her out of this\". And asked \"cant she just give me something? I have been trying to do it on my own\". Informed pt that it is best to be assessed by a provider within the 3 days (as the protocol recommends) so the provider can fully assess pt to recommend a treatment plan. Pt asked this just get sent to Dr. Haney.    Routing to PCP for further recommendations.     Please call pt back with PCPs recommendations.     Can we leave a detailed message on this number? YES  Phone number patient can be reached at: Home number on file [phone] (home)    Vikki Booth RN  [facility] Triage        Routed to provider    Does the patient meet one of the following criteria for ADS visit consideration? 16+ years old, with an [facility] PCP     TIP  Providers, please consider if this condition is appropriate for management at one of our Acute and Diagnostic Services sites.     If patient is a good candidate, please use dotphrase <dot>triageresponse and select Refer to ADS to document.        Nasal spray- OTC?   Pt states she has been " "on something awhile back but just needs something to \"snap her out of this\". And asked \"cant you just give me something to help me until  Isnap out of this? I have been trying to do it on my own\".   Pt declined having current thoughts of hurting her self or others.     1. CONCERN: \"What happened that made you call today?\"      Pt states \"its getting really really bad\". Exercise class used to cheer her up and its not cheering her up anymore   2. DEPRESSION SYMPTOM SCREENING: \"How are you feeling overall?\" (e.g., decreased energy, increased sleeping or difficulty sleeping, difficulty concentrating, feelings of sadness, guilt, hopelessness, or worthlessness)      Pt states she doesn't sleep much at all. Pt states she doesn't eat much at all.   3. RISK OF HARM - SUICIDAL IDEATION:  \"Do you ever have thoughts of hurting or killing yourself?\"  (e.g., yes, no, no but preoccupation with thoughts about death)    - INTENT:  \"Do you have thoughts of hurting or killing yourself right NOW?\" (e.g., yes, no, N/A)    - PLAN: \"Do you have a specific plan for how you would do this?\" (e.g., gun, knife, overdose, no plan, N/A)      No current thoughts of hurting/killing her self. Pt stated the only time she thought about it was \"when the virus came and they locked us in our apartments for a year. I almost couldn't do that\".   4. RISK OF HARM - HOMICIDAL IDEATION:  \"Do you ever have thoughts of hurting or killing someone else?\"  (e.g., yes, no, no but preoccupation with thoughts about death)    - INTENT:  \"Do you have thoughts of hurting or killing someone right NOW?\" (e.g., yes, no, N/A)    - PLAN: \"Do you have a specific plan for how you would do this?\" (e.g., gun, knife, no plan, N/A)       Pt stated \"oh heavens no\".   5. FUNCTIONAL IMPAIRMENT: \"How have things been going for you overall? Have you had more difficulty than usual doing your normal daily activities?\"  (e.g., better, same, worse; self-care, school, work, interactions)    " "  No difficulties doing self-care. Exercise class doesn't currently cheer pt up like it normally does.   6. SUPPORT: \"Who is with you now?\" \"Who do you live with?\" \"Do you have family or friends who you can talk to?\"       On the phone pt stated she can talk to her sister. Pt stated her son is also a support person for her.   7. THERAPIST: \"Do you have a counselor or therapist? Name?\"      A couple years ago pt saw a counselor.   8. STRESSORS: \"Has there been any new stress or recent changes in your life?\"      No  9. ALCOHOL USE OR SUBSTANCE USE (DRUG USE): \"Do you drink alcohol or use any illegal drugs?\"      No  10. OTHER: \"Do you have any other physical symptoms right now?\" (e.g., fever)        No    Reason for Disposition    Depression is worsening (e.g.,sleeping poorly, less able to do activities of daily living)    Additional Information    Negative: Patient attempted suicide    Negative: Patient is threatening suicide now    Negative: Violent behavior, or threatening to physically hurt or kill someone    Negative: Patient is very confused (disoriented, slurred speech) and no other adult (e.g., friend or family member) available    Negative: Difficult to awaken or acting very confused (disoriented, slurred speech) and new-onset    Negative: Sounds like a life-threatening emergency to the triager    Negative: Suicide thoughts, threats, attempts, or questions    Negative: Questions or concerns about alcohol use, unhealthy alcohol use, binge drinking, intoxication, or withdrawal    Negative: Questions or concerns about substance use (drug use), unhealthy drug use, intoxication, or withdrawal    Negative: Anxiety is main problem or symptom    Negative: Depression and unable to do any of normal activities (e.g., self care, school, work; in comparison to baseline).    Negative: Very strange or confused behavior    Negative: Patient sounds very sick or weak to the triager    Negative: Fever > 101 F  (38.3 C)    " Negative: Sometimes has thoughts of suicide    Negative: Symptoms interfere with work or school    Protocols used: DEPRESSION-A-OH

## 2022-10-18 NOTE — TELEPHONE ENCOUNTER
Called and spoke to the patient. Relayed message from the provider. Patient states she would like to think about her options and will plan to call the clinic back tomorrow or triage will plan to reach out to the patient tomorrow.     Alice Woodson RN

## 2022-10-18 NOTE — TELEPHONE ENCOUNTER
Tough case.    She is already on high dose Zoloft and Wellbutrin XL. Would not recommend increasing either of these further.    We could add a medication like Abilify to enhance the effects of Zoloft and Wellbutrin.    We could try transitioning to a new selective serotonin reuptake inhibitor (from Zoloft to Prozac).    I have placed a referral for counseling - patient will be contacted to schedule.      We could also have her meet with psychiatry for medication optimization.    No quick fix here unfortunately.

## 2022-10-19 RX ORDER — ARIPIPRAZOLE 2 MG/1
TABLET ORAL
Qty: 150 TABLET | Refills: 0 | Status: SHIPPED | OUTPATIENT
Start: 2022-10-19 | End: 2023-01-02

## 2022-10-19 NOTE — TELEPHONE ENCOUNTER
Called and spoke to the patient. Relayed message from the provider. Patient expressed understanding and had no additional questions at this time.    Scheduled patient for a follow-up appointment with Dr. Haney. Patient requested to do a virtual visit.   Appointments in Next Year    Jan 02, 2023  1:00 PM  (Arrive by 12:40 PM)  Provider Visit with Kelley Haney MD  [facility] ([facility] ) [phone]        Alice Woodson RN

## 2022-10-19 NOTE — TELEPHONE ENCOUNTER
"Patient Contact    Attempt # 2    Was call answered?  Yes.  \"May I please speak with <patient name>\"  Is patient available?   Yes. Patient states she is heading out the door and will like a call back later in the day. Triage RN will try to contact patient again later this afternoon or tomorrow.     Tad Alfaro RN      "

## 2022-10-19 NOTE — TELEPHONE ENCOUNTER
Patient states she takes two different medications for depression and would like to try Abilify to enhance the effects of her current mediations. Patient would like medication sent to [facility].    Patient states she does not need counseling or physiatry referrals at this time.     Tad Alfaro RN

## 2022-10-19 NOTE — TELEPHONE ENCOUNTER
Abilify prescribed.    2mg once daily x 1 month then 4mg (two 2mg tabs taken together) once daily x 2 months.    Please schedule mood follow-up with for 2-3 months from now (office or virtual visit). Okay to use lunch or virtual slots.    ---    Most common side effects of Abilify include headaches, nausea, fatigue, and dizziness. It may also interfere with sleep patterns.

## 2022-10-20 RX ORDER — ARIPIPRAZOLE 5 MG/1
5 TABLET ORAL DAILY
Qty: 90 TABLET | Refills: 3 | Status: SHIPPED | OUTPATIENT
Start: 2022-10-20 | End: 2023-11-15

## 2022-10-20 NOTE — TELEPHONE ENCOUNTER
Pharmacy called in and states that insurance will only cover 1 tab of the 2mg of Abilify per day    Pharmacy will dispense the first 30 days    Wondering if Dr. Haney would like a PA started for the 2mg of ability 2 tabs daily or send in the suggested alterative of 1 5mg tablet after 30 days on 2mg ?    Arpit Corona RN

## 2022-10-20 NOTE — TELEPHONE ENCOUNTER
"Called and relayed Dr. Mccarthy message to pt. Pt stated \"okay\" and will also verify the dosage/instructions with the pharmacist when she picks it up.   "

## 2022-12-26 ENCOUNTER — NURSE TRIAGE (OUTPATIENT)
Dept: INTERNAL MEDICINE | Facility: CLINIC | Age: 82
End: 2022-12-26

## 2022-12-26 NOTE — TELEPHONE ENCOUNTER
Provider Response to 2nd Level Triage Request    I have reviewed the RN documentation. My recommendation is:  Recommend Urgent care if still feeling not well. Otherwise if worsening then 911 and ED

## 2022-12-26 NOTE — TELEPHONE ENCOUNTER
Nurse Triage SBAR    Is this a 2nd Level Triage? YES, LICENSED PRACTITIONER REVIEW IS REQUIRED    Situation: Patient calling to report that at about 7:15 she had an episode of spinning and weakness. States that she had blurry vision for a few seconds. States that she did vomit a small amount once. Sat down on her walker. States that she then laid down on her bed. Currently sitting in a chair. Denies chest pain, SOB or one sided weakness. Feels overall a bit weak, but no further spinning. BP around 7:30 was 150/85.  States that she forgot to eat supper last night.  This morning after episode had toast, coffee and is drinking a protein shake.     Background: Denies any previous episodes like this. Was alone yesterday. Son is in [place].  Did not eat supper.     Assessment: Episode of vertigo. Possible TIA.     Protocol Recommended Disposition:   Go To ED/UCC Now (Or To Office With PCP Approval)    Recommendation: Please advise disposition.    Writer did advise if symptoms returned to call 911.    Routed to provider    Does the patient meet one of the following criteria for ADS visit consideration? 16+ years old, with an [facility] PCP     TIP  Providers, please consider if this condition is appropriate for management at one of our Acute and Diagnostic Services sites.     If patient is a good candidate, please use dotphrase <dot>triageresponse and select Refer to ADS to document.      Reason for Disposition    Loss of vision or double vision  (Exception: Similar to previous migraines.)    Additional Information    Negative: SEVERE difficulty breathing (e.g., struggling for each breath, speaks in single words)    Negative: Shock suspected (e.g., cold/pale/clammy skin, too weak to stand, low BP, rapid pulse)    Negative: Difficult to awaken or acting confused (e.g., disoriented, slurred speech)    Negative: Fainted, and still feels dizzy afterwards    Negative: Overdose (accidental or intentional) of medications    Negative:  "New neurologic deficit that is present now: * Weakness of the face, arm, or leg on one side of the body * Numbness of the face, arm, or leg on one side of the body * Loss of speech or garbled speech    Negative: Heart beating < 50 beats per minute OR > 140 beats per minute    Negative: Sounds like a life-threatening emergency to the triager    Negative: Chest pain    Negative: Rectal bleeding, bloody stool, or tarry-black stool    Negative: Vomiting is main symptom    Negative: Diarrhea is main symptom    Negative: Headache is main symptom    Negative: Heat exhaustion suspected (i.e., dehydration from heat exposure)    Negative: Patient states that they are having an anxiety or panic attack    Negative: Dizziness from low blood sugar (i.e., < 60 mg/dl or 3.5 mmol/l)    Negative: SEVERE dizziness (e.g., unable to stand, requires support to walk, feels like passing out now)    Negative: SEVERE headache or neck pain    Negative: Spinning or tilting sensation (vertigo) present now and one or more stroke risk factors (i.e., hypertension, diabetes mellitus, prior stroke/TIA, heart attack, age over 60) (Exception: prior physician evaluation for this AND no different/worse than usual)    Negative: Neurologic deficit that was brief (now gone), ANY of the following:* Weakness of the face, arm, or leg on one side of the body* Numbness of the face, arm, or leg on one side of the body* Loss of speech or garbled speech    Answer Assessment - Initial Assessment Questions  1. DESCRIPTION: \"Describe your dizziness.\"      Walked in the kitchen this morning and suddenly felt weak and the room spun around.   2. LIGHTHEADED: \"Do you feel lightheaded?\" (e.g., somewhat faint, woozy, weak upon standing)      Real light headed. Worse with standing. Did vomit once.   3. VERTIGO: \"Do you feel like either you or the room is spinning or tilting?\" (i.e. vertigo)      Room spun around once.   4. SEVERITY: \"How bad is it?\"  \"Do you feel like you " "are going to faint?\" \"Can you stand and walk?\"    - MILD: Feels slightly dizzy, but walking normally.    - MODERATE: Feels unsteady when walking, but not falling; interferes with normal activities (e.g., school, work).    - SEVERE: Unable to walk without falling, or requires assistance to walk without falling; feels like passing out now.       Was severe for 2-5 minutes. Room spun around and had to sit on the walker chair. First time this ever happened.   5. ONSET:  \"When did the dizziness begin?\"      About 7:15 this morning.  6. AGGRAVATING FACTORS: \"Does anything make it worse?\" (e.g., standing, change in head position)      Standing. Not worsening change in heart.  7. HEART RATE: \"Can you tell me your heart rate?\" \"How many beats in 15 seconds?\"  (Note: not all patients can do this)        Unable to check pulse. Denies palpitations. BP at 7:30 150/85  8. CAUSE: \"What do you think is causing the dizziness?\"      \"No idea\". Maybe because didn't eat supper last night.\" Wasn't hungry. Forgot to eat.  9. RECURRENT SYMPTOM: \"Have you had dizziness before?\" If Yes, ask: \"When was the last time?\" \"What happened that time?\"      no  10. OTHER SYMPTOMS: \"Do you have any other symptoms?\" (e.g., fever, chest pain, vomiting, diarrhea, bleeding)        Vomited once. Unusual for her, states that she has been having daily bowel movements.   11. PREGNANCY: \"Is there any chance you are pregnant?\" \"When was your last menstrual period?\"        NA    Protocols used: DIZZINESS-A-OH    Michelle Borges RN      "

## 2022-12-26 NOTE — TELEPHONE ENCOUNTER
Spoke to patient to relay providers message. Patient declines UC/ED/911, states her symptoms have gone away and she feels good.     Patient does not drive, agrees to call 911 if symptoms return.

## 2022-12-27 ENCOUNTER — TRANSFERRED RECORDS (OUTPATIENT)
Dept: HEALTH INFORMATION MANAGEMENT | Facility: CLINIC | Age: 82
End: 2022-12-27

## 2022-12-28 ENCOUNTER — APPOINTMENT (OUTPATIENT)
Dept: GENERAL RADIOLOGY | Facility: CLINIC | Age: 82
End: 2022-12-28
Attending: EMERGENCY MEDICINE
Payer: COMMERCIAL

## 2022-12-28 ENCOUNTER — NURSE TRIAGE (OUTPATIENT)
Dept: INTERNAL MEDICINE | Facility: CLINIC | Age: 82
End: 2022-12-28

## 2022-12-28 ENCOUNTER — HOSPITAL ENCOUNTER (EMERGENCY)
Facility: CLINIC | Age: 82
Discharge: HOME | End: 2022-12-28
Attending: EMERGENCY MEDICINE | Admitting: EMERGENCY MEDICINE
Payer: COMMERCIAL

## 2022-12-28 VITALS
DIASTOLIC BLOOD PRESSURE: 84 MMHG | RESPIRATION RATE: 20 BRPM | SYSTOLIC BLOOD PRESSURE: 153 MMHG | BODY MASS INDEX: 25.21 KG/M2 | HEART RATE: 103 BPM | WEIGHT: 137 LBS | HEIGHT: 62 IN | OXYGEN SATURATION: 97 % | TEMPERATURE: 98.3 F

## 2022-12-28 DIAGNOSIS — J02.9 ACUTE PHARYNGITIS, UNSPECIFIED ETIOLOGY: ICD-10-CM

## 2022-12-28 DIAGNOSIS — R05.1 ACUTE COUGH: ICD-10-CM

## 2022-12-28 LAB
ANION GAP SERPL CALCULATED.3IONS-SCNC: 8 MMOL/L (ref 3–14)
BASOPHILS # BLD AUTO: 0 10E3/UL (ref 0–0.2)
BASOPHILS NFR BLD AUTO: 0 %
BUN SERPL-MCNC: 33 MG/DL (ref 7–30)
CALCIUM SERPL-MCNC: 9.3 MG/DL (ref 8.5–10.1)
CHLORIDE BLD-SCNC: 104 MMOL/L (ref 94–109)
CO2 SERPL-SCNC: 25 MMOL/L (ref 20–32)
CREAT SERPL-MCNC: 1.01 MG/DL (ref 0.52–1.04)
DEPRECATED S PYO AG THROAT QL EIA: NEGATIVE
EOSINOPHIL # BLD AUTO: 0 10E3/UL (ref 0–0.7)
EOSINOPHIL NFR BLD AUTO: 0 %
ERYTHROCYTE [DISTWIDTH] IN BLOOD BY AUTOMATED COUNT: 13.9 % (ref 10–15)
FLUAV RNA SPEC QL NAA+PROBE: NEGATIVE
FLUBV RNA RESP QL NAA+PROBE: NEGATIVE
GFR SERPL CREATININE-BSD FRML MDRD: 56 ML/MIN/1.73M2
GLUCOSE BLD-MCNC: 117 MG/DL (ref 70–99)
GROUP A STREP BY PCR: NOT DETECTED
HCT VFR BLD AUTO: 31.6 % (ref 35–47)
HGB BLD-MCNC: 10.2 G/DL (ref 11.7–15.7)
IMM GRANULOCYTES # BLD: 0 10E3/UL
IMM GRANULOCYTES NFR BLD: 1 %
LYMPHOCYTES # BLD AUTO: 0.8 10E3/UL (ref 0.8–5.3)
LYMPHOCYTES NFR BLD AUTO: 11 %
MCH RBC QN AUTO: 34.2 PG (ref 26.5–33)
MCHC RBC AUTO-ENTMCNC: 32.3 G/DL (ref 31.5–36.5)
MCV RBC AUTO: 106 FL (ref 78–100)
MONOCYTES # BLD AUTO: 0.3 10E3/UL (ref 0–1.3)
MONOCYTES NFR BLD AUTO: 4 %
NEUTROPHILS # BLD AUTO: 6 10E3/UL (ref 1.6–8.3)
NEUTROPHILS NFR BLD AUTO: 84 %
NRBC # BLD AUTO: 0 10E3/UL
NRBC BLD AUTO-RTO: 0 /100
PLATELET # BLD AUTO: 219 10E3/UL (ref 150–450)
POTASSIUM BLD-SCNC: 3.8 MMOL/L (ref 3.4–5.3)
RBC # BLD AUTO: 2.98 10E6/UL (ref 3.8–5.2)
RSV RNA SPEC NAA+PROBE: NEGATIVE
SARS-COV-2 RNA RESP QL NAA+PROBE: NEGATIVE
SODIUM SERPL-SCNC: 137 MMOL/L (ref 133–144)
WBC # BLD AUTO: 7.1 10E3/UL (ref 4–11)

## 2022-12-28 PROCEDURE — 258N000003 HC RX IP 258 OP 636: Performed by: EMERGENCY MEDICINE

## 2022-12-28 PROCEDURE — 36415 COLL VENOUS BLD VENIPUNCTURE: CPT | Performed by: EMERGENCY MEDICINE

## 2022-12-28 PROCEDURE — 99284 EMERGENCY DEPT VISIT MOD MDM: CPT | Mod: CS,25

## 2022-12-28 PROCEDURE — 87637 SARSCOV2&INF A&B&RSV AMP PRB: CPT | Performed by: EMERGENCY MEDICINE

## 2022-12-28 PROCEDURE — 96360 HYDRATION IV INFUSION INIT: CPT

## 2022-12-28 PROCEDURE — 96361 HYDRATE IV INFUSION ADD-ON: CPT

## 2022-12-28 PROCEDURE — 71045 X-RAY EXAM CHEST 1 VIEW: CPT

## 2022-12-28 PROCEDURE — C9803 HOPD COVID-19 SPEC COLLECT: HCPCS

## 2022-12-28 PROCEDURE — 250N000013 HC RX MED GY IP 250 OP 250 PS 637: Performed by: EMERGENCY MEDICINE

## 2022-12-28 PROCEDURE — 85014 HEMATOCRIT: CPT | Performed by: EMERGENCY MEDICINE

## 2022-12-28 PROCEDURE — 80048 BASIC METABOLIC PNL TOTAL CA: CPT | Performed by: EMERGENCY MEDICINE

## 2022-12-28 PROCEDURE — 87651 STREP A DNA AMP PROBE: CPT | Performed by: EMERGENCY MEDICINE

## 2022-12-28 RX ORDER — SODIUM CHLORIDE 9 MG/ML
INJECTION, SOLUTION INTRAVENOUS CONTINUOUS
Status: DISCONTINUED | OUTPATIENT
Start: 2022-12-28 | End: 2022-12-28 | Stop reason: HOSPADM

## 2022-12-28 RX ORDER — DIPHENHYDRAMINE HYDROCHLORIDE AND LIDOCAINE HYDROCHLORIDE AND ALUMINUM HYDROXIDE AND MAGNESIUM HYDRO
10 KIT EVERY 6 HOURS PRN
Status: DISCONTINUED | OUTPATIENT
Start: 2022-12-28 | End: 2022-12-28 | Stop reason: HOSPADM

## 2022-12-28 RX ADMIN — SODIUM CHLORIDE: 9 INJECTION, SOLUTION INTRAVENOUS at 15:38

## 2022-12-28 RX ADMIN — SODIUM CHLORIDE 1000 ML: 9 INJECTION, SOLUTION INTRAVENOUS at 14:49

## 2022-12-28 RX ADMIN — DIPHENHYDRAMINE HYDROCHLORIDE AND LIDOCAINE HYDROCHLORIDE AND ALUMINUM HYDROXIDE AND MAGNESIUM HYDRO 10 ML: KIT at 15:38

## 2022-12-28 ASSESSMENT — ACTIVITIES OF DAILY LIVING (ADL)
ADLS_ACUITY_SCORE: 35
ADLS_ACUITY_SCORE: 35

## 2022-12-28 NOTE — TELEPHONE ENCOUNTER
"  TO PCP    Nurse Triage SBAR    Is this a 2nd Level Triage? YES, LICENSED PRACTITIONER REVIEW IS REQUIRED    Situation: Patient states severe sore throat, difficulty swallowing, only can swallow water.    Background:Congestion, cold like symptoms, no known exposure to strep    Assessment:Per protocol, patient to be seen today, no appts available.     Protocol Recommended Disposition:   See in Office Today    Recommendation:Provider second level triage routing     Routed to provider    Does the patient meet one of the following criteria for ADS visit consideration? 16+ years old, with an [facility] PCP     TIP  Providers, please consider if this condition is appropriate for management at one of our Acute and Diagnostic Services sites.     If patient is a good candidate, please use dotphrase <dot>triageresponse and select Refer to ADS to document.    Amanda Young RN on 12/28/2022 at 12:00 PM    Reason for Disposition    SEVERE sore throat pain    Additional Information    Negative: SEVERE difficulty breathing (e.g., struggling for each breath, speaks in single words)    Negative: Sounds like a life-threatening emergency to the triager    Negative: Throat culture results, call about    Negative: Productive cough is main symptom    Negative: Runny nose is main symptom    Negative: Drooling or spitting out saliva (because can't swallow)    Negative: Unable to open mouth completely    Negative: Drinking very little and has signs of dehydration (e.g., no urine > 12 hours, very dry mouth, very lightheaded)    Negative: Patient sounds very sick or weak to the triager    Negative: Difficulty breathing (per caller) but not severe    Negative: Fever > 103 F (39.4 C)    Negative: Refuses to drink anything for > 12 hours    Answer Assessment - Initial Assessment Questions  1. ONSET: \"When did the throat start hurting?\" (Hours or days ago)       About three days ago  2. SEVERITY: \"How bad is the sore throat?\" (Scale 1-10; mild, " "moderate or severe)    - MILD (1-3):  doesn't interfere with eating or normal activities    - MODERATE (4-7): interferes with eating some solids and normal activities    - SEVERE (8-10):  excruciating pain, interferes with most normal activities    - SEVERE DYSPHAGIA: can't swallow liquids, drooling      Severe now  3. STREP EXPOSURE: \"Has there been any exposure to strep within the past week?\" If Yes, ask: \"What type of contact occurred?\"       no  4.  VIRAL SYMPTOMS: \"Are there any symptoms of a cold, such as a runny nose, cough, hoarse voice or red eyes?\"       Runny nose chronic, cough, stuffy  5. FEVER: \"Do you have a fever?\" If Yes, ask: \"What is your temperature, how was it measured, and when did it start?\"      no  6. PUS ON THE TONSILS: \"Is there pus on the tonsils in the back of your throat?\"      no  7. OTHER SYMPTOMS: \"Do you have any other symptoms?\" (e.g., difficulty breathing, headache, rash)      no  8. PREGNANCY: \"Is there any chance you are pregnant?\" \"When was your last menstrual period?\"      N/A    Protocols used: SORE THROAT-A-OH      "

## 2022-12-28 NOTE — ED TRIAGE NOTES
Pt reports she started to have ear pain which moved to throat pain. Now having SOB. Pt has episode of dizziness which has resolved.

## 2022-12-28 NOTE — ED PROVIDER NOTES
History     Chief Complaint:    Pharyngitis and Shortness of Breath      HPI   Marcela Sandhu is a 82 year old female who presents with sore throat and cough for the past 3 days.  She also feels light headed.  She is here with her younger sister from assisted living.  She hasn't been drinking due to the sore throat.  Sister notes she occasionally gets mouth sores due to her rheumatoid arthritis medication.  Patient denies fever or chills.  She isn't sure if she has body aches as she has chronic pain.  Later she says she doesn't feel well, so I would assume so.  She has a cough as well.    Review of Systems  + sore throat, light headed, cough  - fever, abdominal pain, chest pain  All other systems negative    Allergies:      Acetaminophen  Citalopram  Hydrocodone  Levaquin [Levofloxacin Hemihydrate]  Sulfa Drugs      Medications:      amLODIPine (NORVASC) 5 MG tablet  AMOXICILLIN PO  ARIPiprazole (ABILIFY) 2 MG tablet  ARIPiprazole (ABILIFY) 5 MG tablet  buPROPion (WELLBUTRIN XL) 300 MG 24 hr tablet  folic acid (FOLVITE) 1 MG tablet  ipratropium (ATROVENT) 0.06 % nasal spray  magic mouthwash (ENTER INGREDIENTS IN COMMENTS) suspension  magic mouthwash (ENTER INGREDIENTS IN COMMENTS) suspension  methotrexate sodium 2.5 MG TABS  mirtazapine (REMERON) 7.5 MG tablet  NABUMETONE PO  predniSONE (DELTASONE) 5 MG tablet  sertraline (ZOLOFT) 100 MG tablet  tiZANidine (ZANAFLEX) 2 MG tablet        Past Medical History:        Past Medical History:   Diagnosis Date     Acid reflux disease      Benign essential hypertension      Benign essential tremor      History of hepatitis C virus infection      MDD (major depressive disorder)      Mild cognitive impairment      Osteopenia      Peripheral neuropathy      Personal history of malignant neoplasm of breast 2007     Restless legs syndrome (RLS)      Rheumatoid arthritis without elevated rheumatoid factor (H)      Patient Active Problem List    Diagnosis Date Noted     Mild  cognitive impairment 08/31/2022     Priority: Medium     Benign essential hypertension 08/31/2022     Priority: Medium     Osteopenia 08/30/2022     Priority: Medium     Benign essential tremor      Priority: Medium     Rheumatoid arthritis without elevated rheumatoid factor (H)      Priority: Medium     Fibromyalgia      Priority: Medium     MDD (major depressive disorder)      Priority: Medium     Peripheral neuropathy      Priority: Medium     Acid reflux disease      Priority: Medium     Personal history of malignant neoplasm of breast      Priority: Medium     Restless legs syndrome (RLS)      Priority: Medium     History of hepatitis C virus infection      Priority: Medium     treated with interferon          Past Surgical History:        Past Surgical History:   Procedure Laterality Date     APPENDECTOMY       ARTHROPLASTY CARPOMETACARPAL (THUMB JOINT) Bilateral      ARTHROPLASTY KNEE Right      BLEPHAROPLASTY BILATERAL       BUNIONECTOMY CHEVRON AND PEBBLES BILATERAL, COMBINED  12/02/2011    Procedure:COMBINED BUNIONECTOMY CHEVRON AND PEBBLES BILATERAL; BILATERAL THIRD AND FOURTH CLAWTOE RECONSTRUCTION WITH EXOSTECTOMY, LEFT FIRST TARSOMETATARSAL JOINT and 2nd toe Right foot reconstruction; Surgeon:ASHWIN FARMER; Location: OR     CARPAL TUNNEL RELEASE RT/LT Bilateral      COLONOSCOPY  10/18/2011    Procedure:COLONOSCOPY; COLONOSCOPY; Surgeon:BRENDA RODRIGUEZ; Location: GI     ELBOW SURGERY      tendon lengthening surgery     FOOT SURGERY Bilateral     toe straightening     MASTECTOMY, BILATERAL       SLING BLADDER SUSPENSION WITH FASCIA ZACH       SPINE SURGERY      multiple lumbar surgeries; most hardware removed       Family History:        Family History   Problem Relation Age of Onset     Hypertension Mother      Hypertension Father      Multiple myeloma Brother      Bone Cancer Brother      Cerebrovascular Disease Brother 60     Diabetes No family hx of      Myocardial Infarction No family hx of   "    Breast Cancer No family hx of      Ovarian Cancer No family hx of      Colon Cancer No family hx of        Social History:    Kelley Haney  The patient presents to the ED with sister from assisted living    Physical Exam     Patient Vitals for the past 24 hrs:   BP Temp Temp src Pulse Resp SpO2 Height Weight   12/28/22 1630 (!) 153/84 -- -- 103 -- 97 % -- --   12/28/22 1600 138/74 -- -- 100 -- 99 % -- --   12/28/22 1530 (!) 140/69 -- -- 94 -- 98 % -- --   12/28/22 1515 133/76 -- -- 91 -- 100 % -- --   12/28/22 1500 (!) 149/73 -- -- 94 -- 99 % -- --   12/28/22 1418 (!) 160/72 98.3  F (36.8  C) Temporal 114 20 96 % 1.585 m (5' 2.4\") 62.1 kg (137 lb)       Physical Exam  GENERAL: well developed, pleasant  HEAD: atraumatic  EYES: pupils reactive, extraocular muscles intact, conjunctivae normal  ENT:  mucus membranes dry, shallow ulcer right posterior pharynx without abscess  NECK:  trachea midline, normal range of motion  RESPIRATORY: no tachypnea, breath sounds clear to auscultation   CVS: normal S1/S2, no murmurs, intact distal pulses  ABDOMEN: soft, nontender, nondistention  MUSCULOSKELETAL: no deformities  SKIN: warm and dry, no acute rashes or ulceration  NEURO: GCS 15, cranial nerves intact, alert and oriented x3  PSYCH:  Mood/affect normal        Emergency Department Course     Imaging:  XR Chest Port 1 View   Final Result   IMPRESSION: No airspace opacities, pleural effusions, or pneumothorax. Nonenlarged cardiac silhouette.        Report per radiology    Laboratory:  Labs Ordered and Resulted from Time of ED Arrival to Time of ED Departure   BASIC METABOLIC PANEL - Abnormal       Result Value    Sodium 137      Potassium 3.8      Chloride 104      Carbon Dioxide (CO2) 25      Anion Gap 8      Urea Nitrogen 33 (*)     Creatinine 1.01      Calcium 9.3      Glucose 117 (*)     GFR Estimate 56 (*)    CBC WITH PLATELETS AND DIFFERENTIAL - Abnormal    WBC Count 7.1      RBC Count 2.98 (*)     Hemoglobin 10.2 " (*)     Hematocrit 31.6 (*)      (*)     MCH 34.2 (*)     MCHC 32.3      RDW 13.9      Platelet Count 219      % Neutrophils 84      % Lymphocytes 11      % Monocytes 4      % Eosinophils 0      % Basophils 0      % Immature Granulocytes 1      NRBCs per 100 WBC 0      Absolute Neutrophils 6.0      Absolute Lymphocytes 0.8      Absolute Monocytes 0.3      Absolute Eosinophils 0.0      Absolute Basophils 0.0      Absolute Immature Granulocytes 0.0      Absolute NRBCs 0.0     INFLUENZA A/B & SARS-COV2 PCR MULTIPLEX - Normal    Influenza A PCR Negative      Influenza B PCR Negative      RSV PCR Negative      SARS CoV2 PCR Negative     STREPTOCOCCUS A RAPID SCREEN W REFELX TO PCR - Normal    Group A Strep antigen Negative         Procedures:  na    Emergency Department Course:             Reviewed:    I reviewed nursing notes, vitals, past medical history and Care Everywhere    Assessments:   I obtained history and examined the patient as noted above.    I rechecked the patient and explained findings.       Consults:   na         Interventions:    Medications   0.9% sodium chloride BOLUS (0 mLs Intravenous Stopped 12/28/22 1537)       Disposition:  The patient was discharged to home.     Impression & Plan            CMS Diagnoses:        Medical Decision Making:  Patient presents with sore throat, cough and light headedness.  She has a small superficial ulcer in her posterior pharynx and sounds consistent with mouth sores that she has gotten in the past due to her RA medications.  The rest of her workup is normal.  Her pulse ox is normal.  She was hydrated and given magic mouth wash.  She has refills that she can get at the pharmacy per her report.  She feels well to go home.    Critical Care time:  none    Covid-19  Marcela Sandhu was evaluated during a global COVID-19 pandemic, which necessitated consideration that the patient might be at risk for infection with the SARS-CoV-2 virus that causes COVID-19.    Applicable protocols for evaluation were followed during the patient's care.   COVID-19 was considered as part of the patient's evaluation.    Diagnosis:    ICD-10-CM    1. Acute pharyngitis, unspecified etiology  J02.9       2. Acute cough  R05.1           Discharge Medications:  Discharge Medication List as of 12/28/2022  5:02 PM            Scribe Disclosure:  Diego FISHER MD, am serving as a scribe at 2:44 PM on 12/28/2022 to document services personally performed by Diego Alexander MD based on my observations and the provider's statements to me.      Diego Alexander MD  12/29/22 0840

## 2022-12-29 DIAGNOSIS — K13.79 MOUTH SORES: ICD-10-CM

## 2022-12-29 NOTE — TELEPHONE ENCOUNTER
Calling for a refill of magic mouthwash, patient reports she is entirely out and having difficulty swallowing cold beverages/foods without warming them.    Pended refill for [facility]. Routing to [facility] refill [place].    Allie Saha RN  -[facility]

## 2022-12-30 NOTE — TELEPHONE ENCOUNTER
Marcela called stating that she is out of the magic mouthwash and needs it refilled today. Pharmacy changed per patient's request to [facility] as they are open later.    Kathy Burns RN  [facility] Triage Team

## 2022-12-31 ENCOUNTER — HOSPITAL ENCOUNTER (EMERGENCY)
Facility: CLINIC | Age: 82
Discharge: HOME | End: 2022-12-31
Attending: EMERGENCY MEDICINE | Admitting: EMERGENCY MEDICINE
Payer: COMMERCIAL

## 2022-12-31 ENCOUNTER — APPOINTMENT (OUTPATIENT)
Dept: CT IMAGING | Facility: CLINIC | Age: 82
End: 2022-12-31
Attending: EMERGENCY MEDICINE
Payer: COMMERCIAL

## 2022-12-31 VITALS
DIASTOLIC BLOOD PRESSURE: 90 MMHG | RESPIRATION RATE: 18 BRPM | SYSTOLIC BLOOD PRESSURE: 170 MMHG | TEMPERATURE: 98.6 F | BODY MASS INDEX: 24.74 KG/M2 | HEART RATE: 102 BPM | OXYGEN SATURATION: 96 % | WEIGHT: 137 LBS

## 2022-12-31 DIAGNOSIS — J02.9 PHARYNGITIS, UNSPECIFIED ETIOLOGY: ICD-10-CM

## 2022-12-31 LAB
ANION GAP SERPL CALCULATED.3IONS-SCNC: 7 MMOL/L (ref 3–14)
BASOPHILS # BLD AUTO: 0.1 10E3/UL (ref 0–0.2)
BASOPHILS NFR BLD AUTO: 1 %
BUN SERPL-MCNC: 18 MG/DL (ref 7–30)
CALCIUM SERPL-MCNC: 8.6 MG/DL (ref 8.5–10.1)
CHLORIDE BLD-SCNC: 107 MMOL/L (ref 94–109)
CO2 SERPL-SCNC: 26 MMOL/L (ref 20–32)
CREAT SERPL-MCNC: 0.92 MG/DL (ref 0.52–1.04)
EOSINOPHIL # BLD AUTO: 0.1 10E3/UL (ref 0–0.7)
EOSINOPHIL NFR BLD AUTO: 1 %
ERYTHROCYTE [DISTWIDTH] IN BLOOD BY AUTOMATED COUNT: 14.4 % (ref 10–15)
GFR SERPL CREATININE-BSD FRML MDRD: 63 ML/MIN/1.73M2
GLUCOSE BLD-MCNC: 103 MG/DL (ref 70–99)
HCT VFR BLD AUTO: 32.2 % (ref 35–47)
HGB BLD-MCNC: 10.1 G/DL (ref 11.7–15.7)
IMM GRANULOCYTES # BLD: 0 10E3/UL
IMM GRANULOCYTES NFR BLD: 1 %
LYMPHOCYTES # BLD AUTO: 0.7 10E3/UL (ref 0.8–5.3)
LYMPHOCYTES NFR BLD AUTO: 14 %
MCH RBC QN AUTO: 33.9 PG (ref 26.5–33)
MCHC RBC AUTO-ENTMCNC: 31.4 G/DL (ref 31.5–36.5)
MCV RBC AUTO: 108 FL (ref 78–100)
MONOCYTES # BLD AUTO: 0.4 10E3/UL (ref 0–1.3)
MONOCYTES NFR BLD AUTO: 9 %
NEUTROPHILS # BLD AUTO: 3.4 10E3/UL (ref 1.6–8.3)
NEUTROPHILS NFR BLD AUTO: 74 %
NRBC # BLD AUTO: 0 10E3/UL
NRBC BLD AUTO-RTO: 0 /100
PLATELET # BLD AUTO: 192 10E3/UL (ref 150–450)
POTASSIUM BLD-SCNC: 3.6 MMOL/L (ref 3.4–5.3)
RBC # BLD AUTO: 2.98 10E6/UL (ref 3.8–5.2)
SODIUM SERPL-SCNC: 140 MMOL/L (ref 133–144)
WBC # BLD AUTO: 4.7 10E3/UL (ref 4–11)

## 2022-12-31 PROCEDURE — 96374 THER/PROPH/DIAG INJ IV PUSH: CPT | Mod: 59

## 2022-12-31 PROCEDURE — 99285 EMERGENCY DEPT VISIT HI MDM: CPT | Mod: 25

## 2022-12-31 PROCEDURE — 250N000009 HC RX 250: Performed by: EMERGENCY MEDICINE

## 2022-12-31 PROCEDURE — 250N000011 HC RX IP 250 OP 636: Performed by: EMERGENCY MEDICINE

## 2022-12-31 PROCEDURE — 85025 COMPLETE CBC W/AUTO DIFF WBC: CPT | Performed by: EMERGENCY MEDICINE

## 2022-12-31 PROCEDURE — 250N000013 HC RX MED GY IP 250 OP 250 PS 637: Performed by: EMERGENCY MEDICINE

## 2022-12-31 PROCEDURE — 70491 CT SOFT TISSUE NECK W/DYE: CPT

## 2022-12-31 PROCEDURE — 80048 BASIC METABOLIC PNL TOTAL CA: CPT | Performed by: EMERGENCY MEDICINE

## 2022-12-31 PROCEDURE — 36415 COLL VENOUS BLD VENIPUNCTURE: CPT | Performed by: EMERGENCY MEDICINE

## 2022-12-31 RX ORDER — DEXAMETHASONE SODIUM PHOSPHATE 10 MG/ML
10 INJECTION, SOLUTION INTRAMUSCULAR; INTRAVENOUS ONCE
Status: COMPLETED | OUTPATIENT
Start: 2022-12-31 | End: 2022-12-31

## 2022-12-31 RX ORDER — IOPAMIDOL 755 MG/ML
80 INJECTION, SOLUTION INTRAVASCULAR ONCE
Status: COMPLETED | OUTPATIENT
Start: 2022-12-31 | End: 2022-12-31

## 2022-12-31 RX ADMIN — IOPAMIDOL 80 ML: 755 INJECTION, SOLUTION INTRAVENOUS at 15:26

## 2022-12-31 RX ADMIN — SODIUM CHLORIDE 80 ML: 9 INJECTION, SOLUTION INTRAVENOUS at 15:27

## 2022-12-31 RX ADMIN — AMOXICILLIN AND CLAVULANATE POTASSIUM 1 TABLET: 875; 125 TABLET, FILM COATED ORAL at 17:38

## 2022-12-31 RX ADMIN — DEXAMETHASONE SODIUM PHOSPHATE 10 MG: 10 INJECTION, SOLUTION INTRAMUSCULAR; INTRAVENOUS at 17:38

## 2022-12-31 RX ADMIN — ALUMINUM HYDROXIDE, MAGNESIUM HYDROXIDE, AND SIMETHICONE 30 ML: 200; 200; 20 SUSPENSION ORAL at 13:24

## 2022-12-31 ASSESSMENT — ENCOUNTER SYMPTOMS
TROUBLE SWALLOWING: 1
SORE THROAT: 1

## 2022-12-31 ASSESSMENT — ACTIVITIES OF DAILY LIVING (ADL)
ADLS_ACUITY_SCORE: 35

## 2022-12-31 NOTE — DISCHARGE INSTRUCTIONS
Tylenol and or ibuprofen as needed for pain relief  Magic mouthwash as needed for additional pain relief  Take antibiotic as prescribed twice daily

## 2022-12-31 NOTE — ED TRIAGE NOTES
Patient is having increased issues with swallowing after her visit on Wednesday. She feels like there might be somethng stuck in her throat.  See below on Discharge MD note     Triage Assessment     Row Name 12/31/22 1209       Triage Assessment (Adult)    Airway WDL X  lesion in her throat that is making it hard to swallow                Patient presents with sore throat, cough and light headedness.  She has a small superficial ulcer in her posterior pharynx and sounds consistent with mouth sores that she has gotten in the past due to her RA medications.  The rest of her workup is normal.  Her pulse ox is normal.  She was hydrated and given magic mouth wash.  She has refills that she can get at the pharmacy per her report.  She feels well to go home.

## 2022-12-31 NOTE — ED PROVIDER NOTES
History     Chief Complaint:  Lesion in her throat       The history is provided by the patient and a relative.      Marcela Sandhu is a 82 year old female who presents with throat pain. The patient states that starting Monday (5 days ago), she has felt like something has been stuck in her throat. She was seen here on Wednesday, but it has worsened since then; had negative strep test at that time.  Patient notes significant pain with swallowing though is still able to swallow both solids and liquids.  No history of fever.  She has been treating with Magic mouthwash with only minimal improvement.  Patient notes history of developing oral ulcers with her RA medications.    Independent Historian: Yes      ROS:  Review of Systems   Constitutional: Negative for fever.   HENT: Positive for sore throat and trouble swallowing.    Respiratory: Negative for shortness of breath.    Cardiovascular: Negative for chest pain.   All other systems reviewed and are negative.    Allergies:  Acetaminophen  Citalopram  Hydrocodone  Levaquin  Sulfa Drugs     Medications:    Norvasc  Amoxicillin  Abilify  Wellbutrin  Atrovent  Magic mouthwash  Methotrexate sodium  Remeron  Nabumetone  Deltasone  Zoloft  Zanaflex    Past Medical History:    GERD  Hypertension  Tremor  Hepatitis C  Depression  Mild cognitive impairment  Osteopenia  Peripheral neuropathy  Malignant neoplasm of breast  RLS  Rheumatoid arthritis    Past Surgical History:     Appendectomy  Carpometacarpal arthroplasty  Knee arthroplasty  Bilateral blepharoplasty  Bunionectomy checollin and jean bilateral  Carpal tunnel release  Colonoscopy  Elbow surgery  Foot surgery  Bilateral mastectomy  Bladder sling suspension  Spine surgery    Family History:     Brother: Bone cancer, Cerebrovascular disease, multiple myeloma  Father: hypertension  Mother: Hypertension    Social History:  Reports that she has never smoked. She has never used smokeless tobacco. She reports that she  does not drink alcohol and does not use drugs.  PCP: Kelley Haney     Physical Exam     Patient Vitals for the past 24 hrs:   BP Temp Temp src Pulse Resp SpO2 Weight   12/31/22 1659 -- -- -- -- -- 96 % --   12/31/22 1634 -- -- -- -- -- 97 % --   12/31/22 1630 (!) 170/90 -- -- 102 -- -- --   12/31/22 1326 -- -- -- -- -- 98 % --   12/31/22 1325 -- -- -- -- -- 96 % --   12/31/22 1247 -- -- -- -- -- 98 % --   12/31/22 1236 -- -- -- -- -- 98 % --   12/31/22 1210 (!) 176/78 98.6  F (37  C) Temporal 85 18 -- 62.1 kg (137 lb)        Physical Exam  General: Patient in mild distress.  Alert and cooperative with exam. Normal mentation  HEENT: NC/AT. Conjunctiva without injection or scleral icterus. External ears normal. Moderate posterior oropharynx erythema with right-sided asymmetric tonsillar swelling with mildly deviated uvula.  Small shallow ulceration over the right peritonsillar area  Respiratory: Breathing comfortably on room air  CV: Normal rate, all extremities well perfused  GI:  Non-distended abdomen  Skin: Warm, dry, no rashes/open wounds on exposed skin  Musculoskeletal: No obvious deformities  Neuro: Alert, answers questions appropriately. No gross motor deficits      Emergency Department Course   ECG:  No results found for this or any previous visit.    Imaging:  Soft tissue neck CT w contrast   Final Result   IMPRESSION:    1.  Nonspecific heterogeneous enlargement of the right palatine tonsil with asymmetric edema extending to the right aryepiglottic fold. Findings likely infectious/inflammatory. No evidence of abscess.   2.  A few borderline enlarged right cervical lymph nodes, likely reactive.         Report per radiology    Laboratory:  Labs Ordered and Resulted from Time of ED Arrival to Time of ED Departure   BASIC METABOLIC PANEL - Abnormal       Result Value    Sodium 140      Potassium 3.6      Chloride 107      Carbon Dioxide (CO2) 26      Anion Gap 7      Urea Nitrogen 18      Creatinine 0.92       Calcium 8.6      Glucose 103 (*)     GFR Estimate 63     CBC WITH PLATELETS AND DIFFERENTIAL - Abnormal    WBC Count 4.7      RBC Count 2.98 (*)     Hemoglobin 10.1 (*)     Hematocrit 32.2 (*)      (*)     MCH 33.9 (*)     MCHC 31.4 (*)     RDW 14.4      Platelet Count 192      % Neutrophils 74      % Lymphocytes 14      % Monocytes 9      % Eosinophils 1      % Basophils 1      % Immature Granulocytes 1      NRBCs per 100 WBC 0      Absolute Neutrophils 3.4      Absolute Lymphocytes 0.7 (*)     Absolute Monocytes 0.4      Absolute Eosinophils 0.1      Absolute Basophils 0.1      Absolute Immature Granulocytes 0.0      Absolute NRBCs 0.0        Emergency Department Course & Assessments:     Interventions:  Medications   lidocaine (viscous) (XYLOCAINE) 2 % 15 mL, alum & mag hydroxide-simethicone (MAALOX) 15 mL GI Cocktail (30 mLs Oral Given 12/31/22 1324)   iopamidol (ISOVUE-370) solution 80 mL (80 mLs Intravenous Given 12/31/22 1526)   sodium chloride 0.9 % bag 500mL for CT scan flush use (80 mLs Intravenous Given 12/31/22 1527)   amoxicillin-clavulanate (AUGMENTIN) 875-125 MG per tablet 1 tablet (1 tablet Oral Given 12/31/22 1738)   dexamethasone PF (DECADRON) injection 10 mg (10 mg Intravenous Given 12/31/22 1738)      Consultations/Discussion of Management or Tests:  1708 I spoke with Dr. Lewis, ENT, regarding the patient following up.     Disposition:  The patient was discharged to home.     Impression & Plan    Medical Decision Making:  Patient is a 82-year-old female presents with 5-day history of persistent/worsening sore throat.  On evaluation patient is noted to have asymmetric right tonsillar/peritonsillar swelling with small overlying shallow ulceration and mildly deviated uvula.  CT imaging obtained and noted as above without evidence of fluid collection.  Recent strep test was negative.  Discussed with Dr. Lewis of ENT who is in agreement with Decadron (provided 10 mg in ED) and treatment with  Augmentin.  Patient will follow closely in ENT clinic for reevaluation.  Return precautions were discussed.  Continue Magic mouthwash as needed for symptom relief; additional supportive care discussed.  Patient discharged home.    Diagnosis:    ICD-10-CM    1. Pharyngitis, unspecified etiology  J02.9            Discharge Medications:  Discharge Medication List as of 12/31/2022  5:40 PM      START taking these medications    Details   amoxicillin-clavulanate (AUGMENTIN) 875-125 MG tablet Take 1 tablet by mouth 2 times daily for 7 days, Disp-14 tablet, R-0, E-Prescribe              Scribe Disclosure:  ILakhwinder, am serving as a scribe at 1:04 PM on 12/31/2022 to document services personally performed by Elmo Toney DO based on my observations and the provider's statements to me.        Elmo Toney DO  01/01/23 1037

## 2023-01-01 ASSESSMENT — ENCOUNTER SYMPTOMS
FEVER: 0
SHORTNESS OF BREATH: 0

## 2023-01-02 ENCOUNTER — VIRTUAL VISIT (OUTPATIENT)
Dept: INTERNAL MEDICINE | Facility: CLINIC | Age: 83
End: 2023-01-02
Payer: COMMERCIAL

## 2023-01-02 DIAGNOSIS — F33.42 RECURRENT MAJOR DEPRESSIVE DISORDER, IN FULL REMISSION (H): Primary | ICD-10-CM

## 2023-01-02 DIAGNOSIS — M06.00 RHEUMATOID ARTHRITIS WITHOUT ELEVATED RHEUMATOID FACTOR (H): ICD-10-CM

## 2023-01-02 PROCEDURE — 99214 OFFICE O/P EST MOD 30 MIN: CPT | Mod: 95 | Performed by: INTERNAL MEDICINE

## 2023-01-02 ASSESSMENT — ANXIETY QUESTIONNAIRES
2. NOT BEING ABLE TO STOP OR CONTROL WORRYING: NOT AT ALL
3. WORRYING TOO MUCH ABOUT DIFFERENT THINGS: NOT AT ALL
5. BEING SO RESTLESS THAT IT IS HARD TO SIT STILL: NOT AT ALL
GAD7 TOTAL SCORE: 0
GAD7 TOTAL SCORE: 0
6. BECOMING EASILY ANNOYED OR IRRITABLE: NOT AT ALL
1. FEELING NERVOUS, ANXIOUS, OR ON EDGE: NOT AT ALL
7. FEELING AFRAID AS IF SOMETHING AWFUL MIGHT HAPPEN: NOT AT ALL
IF YOU CHECKED OFF ANY PROBLEMS ON THIS QUESTIONNAIRE, HOW DIFFICULT HAVE THESE PROBLEMS MADE IT FOR YOU TO DO YOUR WORK, TAKE CARE OF THINGS AT HOME, OR GET ALONG WITH OTHER PEOPLE: NOT DIFFICULT AT ALL

## 2023-01-02 ASSESSMENT — PATIENT HEALTH QUESTIONNAIRE - PHQ9
5. POOR APPETITE OR OVEREATING: NOT AT ALL
SUM OF ALL RESPONSES TO PHQ QUESTIONS 1-9: 1

## 2023-01-02 NOTE — PROGRESS NOTES
TELEPHONE VISIT                                                      ASSESSMENT/PLAN                                                      (F33.42) Recurrent major depressive disorder, in full remission (H)  (primary encounter diagnosis)  Comment: significant improvement with the addition of Abilify.  Plan: continue present management.    (M06.00) Rheumatoid arthritis without elevated rheumatoid factor (H)  Comment: stable on current regimen (methotrexate and low-dose daily prednisone); followed by rheumatology.    Total time of call between patient and provider was 5 minutes. Provider location: office. Patient location: home.    Kelley Haney MD   [address] [address]  T: [phone], F: [phone]    SUBJECTIVE                                                      Marcela Sandhu is a very pleasant 82 year old female who requested a telephone visit to discuss mood:    Abilify was added to patient's regimen a couple months ago due to poorly controlled depression.  Her Wellbutrin  mg daily, Zoloft 200 mg daily, and Remeron 7.5 mg at night were continued without change.  Patient reports significant improvement in her mood since adding Abilify. No adverse side effects. Patient would like to continue at current dose; declines dose increase at this time.    ---    (Note was completed, in part, with [employer] voice-recognition software. Documentation reviewed, but some grammatical, spelling, and word errors may remain.)

## 2023-01-05 DIAGNOSIS — F32.5 MAJOR DEPRESSION IN COMPLETE REMISSION (H): Chronic | ICD-10-CM

## 2023-01-06 RX ORDER — SERTRALINE HYDROCHLORIDE 100 MG/1
200 TABLET, FILM COATED ORAL DAILY
Qty: 180 TABLET | Refills: 1 | Status: SHIPPED | OUTPATIENT
Start: 2023-01-06 | End: 2023-08-14

## 2023-01-31 ENCOUNTER — TRANSFERRED RECORDS (OUTPATIENT)
Dept: HEALTH INFORMATION MANAGEMENT | Facility: CLINIC | Age: 83
End: 2023-01-31

## 2023-02-02 DIAGNOSIS — R25.2 CRAMP OF LIMB: ICD-10-CM

## 2023-02-02 RX ORDER — TIZANIDINE 2 MG/1
TABLET ORAL
Qty: 90 TABLET | Refills: 3 | Status: SHIPPED | OUTPATIENT
Start: 2023-02-02 | End: 2024-03-29

## 2023-02-06 ENCOUNTER — HOSPITAL ENCOUNTER (OUTPATIENT)
Dept: GENERAL RADIOLOGY | Facility: CLINIC | Age: 83
Discharge: HOME | End: 2023-02-06
Attending: OTOLARYNGOLOGY | Admitting: OTOLARYNGOLOGY
Payer: COMMERCIAL

## 2023-02-06 DIAGNOSIS — R13.19 OTHER DYSPHAGIA: ICD-10-CM

## 2023-02-06 DIAGNOSIS — R07.0 THROAT DISCOMFORT: ICD-10-CM

## 2023-02-06 PROCEDURE — 74220 X-RAY XM ESOPHAGUS 1CNTRST: CPT

## 2023-02-08 ENCOUNTER — TELEPHONE (OUTPATIENT)
Dept: INTERNAL MEDICINE | Facility: CLINIC | Age: 83
End: 2023-02-08
Payer: COMMERCIAL

## 2023-02-08 NOTE — TELEPHONE ENCOUNTER
CC: Patient calling regarding results of recent XR esophagram.    States she viewed results via [employer] but did not understand. Per chart review, no result note placed by PCP.        Routing to PCP to request result note to be placed regarding XR esophagram and to route back to triage to follow up with patient - patient would like a call regarding these results    Callback [phone] - ok to leave detailed VM     Ayse Martini RN  [facility]

## 2023-02-09 NOTE — TELEPHONE ENCOUNTER
Called and spoke to the patient. Relayed message from provider. Pt verbalized understanding. Pt reports she will follow up with her ENT specialist.     No further questions from pt at this time.     Tad Alfaro RN

## 2023-03-27 ENCOUNTER — OFFICE VISIT (OUTPATIENT)
Dept: INTERNAL MEDICINE | Facility: CLINIC | Age: 83
End: 2023-03-27
Payer: COMMERCIAL

## 2023-03-27 VITALS
HEIGHT: 62 IN | OXYGEN SATURATION: 96 % | BODY MASS INDEX: 23.85 KG/M2 | SYSTOLIC BLOOD PRESSURE: 150 MMHG | DIASTOLIC BLOOD PRESSURE: 82 MMHG | RESPIRATION RATE: 16 BRPM | HEART RATE: 87 BPM | WEIGHT: 129.6 LBS | TEMPERATURE: 98.3 F

## 2023-03-27 DIAGNOSIS — Z01.818 PREOP GENERAL PHYSICAL EXAM: Primary | ICD-10-CM

## 2023-03-27 DIAGNOSIS — R13.19 OTHER DYSPHAGIA: ICD-10-CM

## 2023-03-27 DIAGNOSIS — R07.0 THROAT DISCOMFORT: ICD-10-CM

## 2023-03-27 DIAGNOSIS — I10 BENIGN ESSENTIAL HYPERTENSION: ICD-10-CM

## 2023-03-27 DIAGNOSIS — M06.00 RHEUMATOID ARTHRITIS WITHOUT ELEVATED RHEUMATOID FACTOR (H): ICD-10-CM

## 2023-03-27 PROCEDURE — 99214 OFFICE O/P EST MOD 30 MIN: CPT | Performed by: PHYSICIAN ASSISTANT

## 2023-03-27 NOTE — PROGRESS NOTES
[address] [address]  Phone: [phone]  Primary Provider: Kelley Haney  Pre-op Performing Provider: ROSELYN CONTE      PREOPERATIVE EVALUATION:  Today's date: 3/27/2023    Marcela Sandhu is a 82 year old female who presents for a preoperative evaluation.  No flowsheet data found.  Surgical Information:  Surgery/Procedure: Esophagoscopy, gastroscopy, duodenoscopy (EGD), combined  Surgery Location: [facility]  Surgeon: Dr Aguero  Surgery Date: 3/31/2023  Time of Surgery: 815am  Where patient plans to recover: At home with family  Fax number for surgical facility: Note does not need to be faxed, will be available electronically in Epic.    Assessment & Plan     The proposed surgical procedure is considered LOW risk.    Preop general physical exam      Throat discomfort      Other dysphagia      Benign essential hypertension      Rheumatoid arthritis without elevated rheumatoid factor (H)               Risks and Recommendations:  The patient has the following additional risks and recommendations for perioperative complications:   - No identified additional risk factors other than previously addressed    Medication Instructions:  Patient is to take all scheduled medications on the day of surgery EXCEPT for modifications listed below:   - Calcium Channel Blockers: May be continued on the day of surgery.   - nabumetone (Relafen): HOLD 10 days before surgery.    - SSRIs, SNRIs, TCAs, Antipsychotics: Continue without modification.   Does not take Relafen routinely     RECOMMENDATION:  APPROVAL GIVEN to proceed with proposed procedure, without further diagnostic evaluation.            Subjective     HPI related to upcoming procedure: throat discomfort, mild mobility issues     Preop Questions 3/27/2023   1. Have you ever had a heart attack or stroke? No   2. Have you ever had surgery on your heart or blood vessels, such as a  stent placement, a coronary artery bypass, or surgery on an artery in your head, neck, heart, or legs? No   3. Do you have chest pain with activity? No   4. Do you have a history of  heart failure? No   5. Do you currently have a cold, bronchitis or symptoms of other infection? No   6. Do you have a cough, shortness of breath, or wheezing? No   7. Do you or anyone in your family have previous history of blood clots? No   8. Do you or does anyone in your family have a serious bleeding problem such as prolonged bleeding following surgeries or cuts? No   9. Have you ever had problems with anemia or been told to take iron pills? No   10. Have you had any abnormal blood loss such as black, tarry or bloody stools, or abnormal vaginal bleeding? No   11. Have you ever had a blood transfusion? YES -    11a. Have you ever had a transfusion reaction? No   12. Are you willing to have a blood transfusion if it is medically needed before, during, or after your surgery? Yes   13. Have you or any of your relatives ever had problems with anesthesia? No   14. Do you have sleep apnea, excessive snoring or daytime drowsiness? No   15. Do you have any artifical heart valves or other implanted medical devices like a pacemaker, defibrillator, or continuous glucose monitor? No   16. Do you have artificial joints? YES - knee   17. Are you allergic to latex? No       Health Care Directive:  Patient has a Health Care Directive on file      Preoperative Review of :   reviewed - no record of controlled substances prescribed.      Status of Chronic Conditions:  See problem list for active medical problems.  Problems all longstanding and stable, except as noted/documented.  See ROS for pertinent symptoms related to these conditions.    HYPERTENSION - Patient has longstanding history of HTN , currently denies any symptoms referable to elevated blood pressure. Specifically denies chest pain, palpitations, dyspnea, orthopnea, PND or peripheral  edema. Blood pressure readings have been slight elevated. Current medication regimen is as listed below. Patient denies any side effects of medication.       Review of Systems  CONSTITUTIONAL: NEGATIVE for fever, chills, change in weight  ENT/MOUTH: NEGATIVE for ear, mouth and throat problems  RESP: NEGATIVE for significant cough or SOB  CV: NEGATIVE for chest pain, palpitations or peripheral edema  MUSCULOSKELETAL: NEGATIVE for significant arthralgias or myalgia  ENDOCRINE: NEGATIVE for temperature intolerance, skin/hair changes  HEME/ALLERGY/IMMUNE: NEGATIVE for bleeding problems  PSYCHIATRIC: NEGATIVE for changes in mood or affect  ROS otherwise negative    Patient Active Problem List    Diagnosis Date Noted     Mild cognitive impairment 08/31/2022     Priority: Medium     Benign essential hypertension 08/31/2022     Priority: Medium     Osteopenia 08/30/2022     Priority: Medium     Benign essential tremor      Priority: Medium     Rheumatoid arthritis without elevated rheumatoid factor (H)      Priority: Medium     Fibromyalgia      Priority: Medium     MDD (major depressive disorder)      Priority: Medium     Peripheral neuropathy      Priority: Medium     Acid reflux disease      Priority: Medium     Personal history of malignant neoplasm of breast      Priority: Medium     Restless legs syndrome (RLS)      Priority: Medium     History of hepatitis C virus infection      Priority: Medium     treated with interferon        Past Medical History:   Diagnosis Date     Acid reflux disease      Benign essential hypertension      Benign essential tremor      History of hepatitis C virus infection     treated with interferon     MDD (major depressive disorder)      Mild cognitive impairment      Osteopenia      Peripheral neuropathy      Personal history of malignant neoplasm of breast 2007     Restless legs syndrome (RLS)      Rheumatoid arthritis without elevated rheumatoid factor (H)      Past Surgical History:    Procedure Laterality Date     APPENDECTOMY       ARTHROPLASTY CARPOMETACARPAL (THUMB JOINT) Bilateral      ARTHROPLASTY KNEE Right      BLEPHAROPLASTY BILATERAL       BUNIONECTOMY CHEVRON AND PEBBLES BILATERAL, COMBINED  12/02/2011    Procedure:COMBINED BUNIONECTOMY CHEVRON AND PEBBLES BILATERAL; BILATERAL THIRD AND FOURTH CLAWTOE RECONSTRUCTION WITH EXOSTECTOMY, LEFT FIRST TARSOMETATARSAL JOINT and 2nd toe Right foot reconstruction; Surgeon:ASHWIN FARMER; Location:[facility]     CARPAL TUNNEL RELEASE RT/LT Bilateral      COLONOSCOPY  10/18/2011    Procedure:COLONOSCOPY; COLONOSCOPY; Surgeon:BRENDA RODRIGUEZ; [facility]     ELBOW SURGERY      tendon lengthening surgery     FOOT SURGERY Bilateral     toe straightening     MASTECTOMY, BILATERAL       SLING BLADDER SUSPENSION WITH FASCIA ZACH       SPINE SURGERY      multiple lumbar surgeries; most hardware removed     Current Outpatient Medications   Medication Sig Dispense Refill     amLODIPine (NORVASC) 5 MG tablet Take 1 tablet (5 mg) by mouth daily 90 tablet 3     ARIPiprazole (ABILIFY) 5 MG tablet Take 1 tablet (5 mg) by mouth daily 90 tablet 3     buPROPion (WELLBUTRIN XL) 300 MG 24 hr tablet TAKE 1 TABLET (300 MG) BY MOUTH ONCE DAILY IN THE MORNING 90 tablet 1     folic acid (FOLVITE) 1 MG tablet Take 1 mg by mouth daily       ipratropium (ATROVENT) 0.06 % nasal spray Spray 2 sprays into both nostrils 4 times daily 15 mL 3     magic mouthwash (ENTER INGREDIENTS IN COMMENTS) suspension Take 5 mLs by mouth every 4 hours as needed (mouth sores) 582 mL 1     methotrexate sodium 2.5 MG TABS Take 4 tablets once a week       mirtazapine (REMERON) 7.5 MG tablet TAKE 1 TABLET (7.5 MG) BY MOUTH AT BEDTIME 90 tablet 3     NABUMETONE PO Take 750 mg by mouth 2 times daily       predniSONE (DELTASONE) 5 MG tablet        sertraline (ZOLOFT) 100 MG tablet TAKE 2 TABLETS (200 MG) BY MOUTH DAILY 180 tablet 1     tiZANidine (ZANAFLEX) 2 MG tablet TAKE 1 TABLET BY MOUTH THREE  "TIMES DAILY AS NEEDED FOR MUSCLE SPASMS 90 tablet 3       Allergies   Allergen Reactions     Acetaminophen GI Disturbance     Citalopram Unknown     Hydrocodone GI Disturbance     Levaquin [Levofloxacin Hemihydrate] Other (See Comments) and Rash     chest pain     Sulfa Drugs GI Disturbance        Social History     Tobacco Use     Smoking status: Never     Smokeless tobacco: Never   Substance Use Topics     Alcohol use: No       History   Drug Use No         Objective     BP (!) 150/82   Pulse 87   Temp 98.3  F (36.8  C) (Tympanic)   Resp 16   Ht 1.585 m (5' 2.4\")   Wt 58.8 kg (129 lb 9.6 oz)   LMP  (LMP Unknown)   SpO2 96%   BMI 23.40 kg/m      Physical Exam  GENERAL APPEARANCE: healthy, alert and no distress  HENT: ear canals and TM's normal and nose and mouth without ulcers or lesions  RESP: lungs clear to auscultation - no rales, rhonchi or wheezes  CV: regular rates and rhythm  NEURO: Normal strength and tone, sensory exam grossly normal, mentation intact and speech normal  PSYCH: mentation appears normal and affect normal/bright    Recent Labs   Lab Test 12/31/22  1323 12/28/22  1445 08/31/22  0929   HGB 10.1* 10.2*  --     219  --     137 141   POTASSIUM 3.6 3.8 4.0   CR 0.92 1.01 1.05*   A1C  --   --  5.3        Diagnostics:  No labs were ordered during this visit.   No EKG required for low risk surgery (cataract, skin procedure, breast biopsy, etc).    Revised Cardiac Risk Index (RCRI):  The patient has the following serious cardiovascular risks for perioperative complications:   - No serious cardiac risks = 0 points     RCRI Interpretation: 0 points: Class I (very low risk - 0.4% complication rate)           Signed Electronically by: Jailene Bagley PA-C  Copy of this evaluation report is provided to requesting physician.      "

## 2023-03-27 NOTE — H&P (VIEW-ONLY)
[address] [address]  Phone: [phone]  Primary Provider: Kelley Haney  Pre-op Performing Provider: ROSELYN CONTE      PREOPERATIVE EVALUATION:  Today's date: 3/27/2023    Marcela Sandhu is a 82 year old female who presents for a preoperative evaluation.  No flowsheet data found.  Surgical Information:  Surgery/Procedure: Esophagoscopy, gastroscopy, duodenoscopy (EGD), combined  Surgery Location: [facility]  Surgeon: Dr Aguero  Surgery Date: 3/31/2023  Time of Surgery: 815am  Where patient plans to recover: At home with family  Fax number for surgical facility: Note does not need to be faxed, will be available electronically in Epic.    Assessment & Plan     The proposed surgical procedure is considered LOW risk.    Preop general physical exam      Throat discomfort      Other dysphagia      Benign essential hypertension      Rheumatoid arthritis without elevated rheumatoid factor (H)               Risks and Recommendations:  The patient has the following additional risks and recommendations for perioperative complications:   - No identified additional risk factors other than previously addressed    Medication Instructions:  Patient is to take all scheduled medications on the day of surgery EXCEPT for modifications listed below:   - Calcium Channel Blockers: May be continued on the day of surgery.   - nabumetone (Relafen): HOLD 10 days before surgery.    - SSRIs, SNRIs, TCAs, Antipsychotics: Continue without modification.   Does not take Relafen routinely     RECOMMENDATION:  APPROVAL GIVEN to proceed with proposed procedure, without further diagnostic evaluation.            Subjective     HPI related to upcoming procedure: throat discomfort, mild mobility issues     Preop Questions 3/27/2023   1. Have you ever had a heart attack or stroke? No   2. Have you ever had surgery on your heart or blood vessels, such as a  stent placement, a coronary artery bypass, or surgery on an artery in your head, neck, heart, or legs? No   3. Do you have chest pain with activity? No   4. Do you have a history of  heart failure? No   5. Do you currently have a cold, bronchitis or symptoms of other infection? No   6. Do you have a cough, shortness of breath, or wheezing? No   7. Do you or anyone in your family have previous history of blood clots? No   8. Do you or does anyone in your family have a serious bleeding problem such as prolonged bleeding following surgeries or cuts? No   9. Have you ever had problems with anemia or been told to take iron pills? No   10. Have you had any abnormal blood loss such as black, tarry or bloody stools, or abnormal vaginal bleeding? No   11. Have you ever had a blood transfusion? YES -    11a. Have you ever had a transfusion reaction? No   12. Are you willing to have a blood transfusion if it is medically needed before, during, or after your surgery? Yes   13. Have you or any of your relatives ever had problems with anesthesia? No   14. Do you have sleep apnea, excessive snoring or daytime drowsiness? No   15. Do you have any artifical heart valves or other implanted medical devices like a pacemaker, defibrillator, or continuous glucose monitor? No   16. Do you have artificial joints? YES - knee   17. Are you allergic to latex? No       Health Care Directive:  Patient has a Health Care Directive on file      Preoperative Review of :   reviewed - no record of controlled substances prescribed.      Status of Chronic Conditions:  See problem list for active medical problems.  Problems all longstanding and stable, except as noted/documented.  See ROS for pertinent symptoms related to these conditions.    HYPERTENSION - Patient has longstanding history of HTN , currently denies any symptoms referable to elevated blood pressure. Specifically denies chest pain, palpitations, dyspnea, orthopnea, PND or peripheral  edema. Blood pressure readings have been slight elevated. Current medication regimen is as listed below. Patient denies any side effects of medication.       Review of Systems  CONSTITUTIONAL: NEGATIVE for fever, chills, change in weight  ENT/MOUTH: NEGATIVE for ear, mouth and throat problems  RESP: NEGATIVE for significant cough or SOB  CV: NEGATIVE for chest pain, palpitations or peripheral edema  MUSCULOSKELETAL: NEGATIVE for significant arthralgias or myalgia  ENDOCRINE: NEGATIVE for temperature intolerance, skin/hair changes  HEME/ALLERGY/IMMUNE: NEGATIVE for bleeding problems  PSYCHIATRIC: NEGATIVE for changes in mood or affect  ROS otherwise negative    Patient Active Problem List    Diagnosis Date Noted     Mild cognitive impairment 08/31/2022     Priority: Medium     Benign essential hypertension 08/31/2022     Priority: Medium     Osteopenia 08/30/2022     Priority: Medium     Benign essential tremor      Priority: Medium     Rheumatoid arthritis without elevated rheumatoid factor (H)      Priority: Medium     Fibromyalgia      Priority: Medium     MDD (major depressive disorder)      Priority: Medium     Peripheral neuropathy      Priority: Medium     Acid reflux disease      Priority: Medium     Personal history of malignant neoplasm of breast      Priority: Medium     Restless legs syndrome (RLS)      Priority: Medium     History of hepatitis C virus infection      Priority: Medium     treated with interferon        Past Medical History:   Diagnosis Date     Acid reflux disease      Benign essential hypertension      Benign essential tremor      History of hepatitis C virus infection     treated with interferon     MDD (major depressive disorder)      Mild cognitive impairment      Osteopenia      Peripheral neuropathy      Personal history of malignant neoplasm of breast 2007     Restless legs syndrome (RLS)      Rheumatoid arthritis without elevated rheumatoid factor (H)      Past Surgical History:    Procedure Laterality Date     APPENDECTOMY       ARTHROPLASTY CARPOMETACARPAL (THUMB JOINT) Bilateral      ARTHROPLASTY KNEE Right      BLEPHAROPLASTY BILATERAL       BUNIONECTOMY CHEVRON AND PEBBLES BILATERAL, COMBINED  12/02/2011    Procedure:COMBINED BUNIONECTOMY CHEVRON AND PEBBLES BILATERAL; BILATERAL THIRD AND FOURTH CLAWTOE RECONSTRUCTION WITH EXOSTECTOMY, LEFT FIRST TARSOMETATARSAL JOINT and 2nd toe Right foot reconstruction; Surgeon:ASHWIN FARMER; Location:[facility]     CARPAL TUNNEL RELEASE RT/LT Bilateral      COLONOSCOPY  10/18/2011    Procedure:COLONOSCOPY; COLONOSCOPY; Surgeon:BRENDA RODRIGUEZ; [facility]: [facility]     ELBOW SURGERY      tendon lengthening surgery     FOOT SURGERY Bilateral     toe straightening     MASTECTOMY, BILATERAL       SLING BLADDER SUSPENSION WITH FASCIA ZACH       SPINE SURGERY      multiple lumbar surgeries; most hardware removed     Current Outpatient Medications   Medication Sig Dispense Refill     amLODIPine (NORVASC) 5 MG tablet Take 1 tablet (5 mg) by mouth daily 90 tablet 3     ARIPiprazole (ABILIFY) 5 MG tablet Take 1 tablet (5 mg) by mouth daily 90 tablet 3     buPROPion (WELLBUTRIN XL) 300 MG 24 hr tablet TAKE 1 TABLET (300 MG) BY MOUTH ONCE DAILY IN THE MORNING 90 tablet 1     folic acid (FOLVITE) 1 MG tablet Take 1 mg by mouth daily       ipratropium (ATROVENT) 0.06 % nasal spray Spray 2 sprays into both nostrils 4 times daily 15 mL 3     magic mouthwash (ENTER INGREDIENTS IN COMMENTS) suspension Take 5 mLs by mouth every 4 hours as needed (mouth sores) 582 mL 1     methotrexate sodium 2.5 MG TABS Take 4 tablets once a week       mirtazapine (REMERON) 7.5 MG tablet TAKE 1 TABLET (7.5 MG) BY MOUTH AT BEDTIME 90 tablet 3     NABUMETONE PO Take 750 mg by mouth 2 times daily       predniSONE (DELTASONE) 5 MG tablet        sertraline (ZOLOFT) 100 MG tablet TAKE 2 TABLETS (200 MG) BY MOUTH DAILY 180 tablet 1     tiZANidine (ZANAFLEX) 2 MG tablet TAKE 1 TABLET BY MOUTH THREE  "TIMES DAILY AS NEEDED FOR MUSCLE SPASMS 90 tablet 3       Allergies   Allergen Reactions     Acetaminophen GI Disturbance     Citalopram Unknown     Hydrocodone GI Disturbance     Levaquin [Levofloxacin Hemihydrate] Other (See Comments) and Rash     chest pain     Sulfa Drugs GI Disturbance        Social History     Tobacco Use     Smoking status: Never     Smokeless tobacco: Never   Substance Use Topics     Alcohol use: No       History   Drug Use No         Objective     BP (!) 150/82   Pulse 87   Temp 98.3  F (36.8  C) (Tympanic)   Resp 16   Ht 1.585 m (5' 2.4\")   Wt 58.8 kg (129 lb 9.6 oz)   LMP  (LMP Unknown)   SpO2 96%   BMI 23.40 kg/m      Physical Exam  GENERAL APPEARANCE: healthy, alert and no distress  HENT: ear canals and TM's normal and nose and mouth without ulcers or lesions  RESP: lungs clear to auscultation - no rales, rhonchi or wheezes  CV: regular rates and rhythm  NEURO: Normal strength and tone, sensory exam grossly normal, mentation intact and speech normal  PSYCH: mentation appears normal and affect normal/bright    Recent Labs   Lab Test 12/31/22  1323 12/28/22  1445 08/31/22  0929   HGB 10.1* 10.2*  --     219  --     137 141   POTASSIUM 3.6 3.8 4.0   CR 0.92 1.01 1.05*   A1C  --   --  5.3        Diagnostics:  No labs were ordered during this visit.   No EKG required for low risk surgery (cataract, skin procedure, breast biopsy, etc).    Revised Cardiac Risk Index (RCRI):  The patient has the following serious cardiovascular risks for perioperative complications:   - No serious cardiac risks = 0 points     RCRI Interpretation: 0 points: Class I (very low risk - 0.4% complication rate)           Signed Electronically by: Jailene Bagley PA-C  Copy of this evaluation report is provided to requesting physician.      "

## 2023-03-27 NOTE — PATIENT INSTRUCTIONS
For informational purposes only. Not to replace the advice of your health care provider. Copyright   2003,  [facility]. All rights reserved. Clinically reviewed by Regine Raymond MD. [employer] [id number] - REV .  Preparing for Your Surgery  Getting started  A nurse will call you to review your health history and instructions. They will give you an arrival time based on your scheduled surgery time. Please be ready to share:    Your doctor's clinic name and phone number    Your medical, surgical, and anesthesia history    A list of allergies and sensitivities    A list of medicines, including herbal treatments and over-the-counter drugs    Whether the patient has a legal guardian (ask how to send us the papers in advance)  Please tell us if you're pregnant--or if there's any chance you might be pregnant. Some surgeries may injure a fetus (unborn baby), so they require a pregnancy test. Surgeries that are safe for a fetus don't always need a test, and you can choose whether to have one.   If you have a child who's having surgery, please ask for a copy of Preparing for Your Child's Surgery.    Preparing for surgery    Within 10 to 30 days of surgery: Have a pre-op exam (sometimes called an H&P, or History and Physical). This can be done at a clinic or pre-operative center.  ? If you're having a , you may not need this exam. Talk to your care team.    At your pre-op exam, talk to your care team about all medicines you take. If you need to stop any medicines before surgery, ask when to start taking them again.  ? We do this for your safety. Many medicines can make you bleed too much during surgery. Some change how well surgery (anesthesia) drugs work.    Call your insurance company to let them know you're having surgery. (If you don't have insurance, call [phone].)    Call your clinic if there's any change in your health. This includes signs of a cold or flu (sore throat, runny nose,  cough, rash, fever). It also includes a scrape or scratch near the surgery site.    If you have questions on the day of surgery, call your hospital or surgery center.  Eating and drinking guidelines  For your safety: Unless your surgeon tells you otherwise, follow the guidelines below.    Eat and drink as usual until 8 hours before you arrive for surgery. After that, no food or milk.    Drink clear liquids until 2 hours before you arrive. These are liquids you can see through, like water, Gatorade, and Propel Water. They also include plain black coffee and tea (no cream or milk), candy, and breath mints. You can spit out gum when you arrive.    If you drink alcohol: Stop drinking it the night before surgery.    If your care team tells you to take medicine on the morning of surgery, it's okay to take it with a sip of water.  Preventing infection    Shower or bathe the night before and morning of your surgery. Follow the instructions your clinic gave you. (If no instructions, use regular soap.)    Don't shave or clip hair near your surgery site. We'll remove the hair if needed.    Don't smoke or vape the morning of surgery. You may chew nicotine gum up to 2 hours before surgery. A nicotine patch is okay.  ? Note: Some surgeries require you to completely quit smoking and nicotine. Check with your surgeon.    Your care team will make every effort to keep you safe from infection. We will:  ? Clean our hands often with soap and water (or an alcohol-based hand rub).  ? Clean the skin at your surgery site with a special soap that kills germs.  ? Give you a special gown to keep you warm. (Cold raises the risk of infection.)  ? Wear special hair covers, masks, gowns and gloves during surgery.  ? Give antibiotic medicine, if prescribed. Not all surgeries need antibiotics.  What to bring on the day of surgery    Photo ID and insurance card    Copy of your health care directive, if you have one    Glasses and hearing aids (bring  cases)  ? You can't wear contacts during surgery    Inhaler and eye drops, if you use them (tell us about these when you arrive)    CPAP machine or breathing device, if you use them    A few personal items, if spending the night    If you have . . .  ? A pacemaker, ICD (cardiac defibrillator) or other implant: Bring the ID card.  ? An implanted stimulator: Bring the remote control.  ? A legal guardian: Bring a copy of the certified (court-stamped) guardianship papers.  Please remove any jewelry, including body piercings. Leave jewelry and other valuables at home.  If you're going home the day of surgery    You must have a responsible adult drive you home. They should stay with you overnight as well.    If you don't have someone to stay with you, and you aren't safe to go home alone, we may keep you overnight. Insurance often won't pay for this.  After surgery  If it's hard to control your pain or you need more pain medicine, please call your surgeon's office.  Questions?   If you have any questions for your care team, list them here: _________________________________________________________________________________________________________________________________________________________________________ ____________________________________ ____________________________________ ____________________________________

## 2023-03-31 ENCOUNTER — ANESTHESIA (OUTPATIENT)
Dept: GASTROENTEROLOGY | Facility: CLINIC | Age: 83
End: 2023-03-31
Payer: COMMERCIAL

## 2023-03-31 ENCOUNTER — ANESTHESIA EVENT (OUTPATIENT)
Dept: GASTROENTEROLOGY | Facility: CLINIC | Age: 83
End: 2023-03-31
Payer: COMMERCIAL

## 2023-03-31 ENCOUNTER — HOSPITAL ENCOUNTER (OUTPATIENT)
Facility: CLINIC | Age: 83
Discharge: HOME | End: 2023-03-31
Attending: INTERNAL MEDICINE | Admitting: INTERNAL MEDICINE
Payer: COMMERCIAL

## 2023-03-31 VITALS
HEART RATE: 95 BPM | RESPIRATION RATE: 38 BRPM | OXYGEN SATURATION: 97 % | SYSTOLIC BLOOD PRESSURE: 154 MMHG | DIASTOLIC BLOOD PRESSURE: 78 MMHG

## 2023-03-31 LAB — UPPER GI ENDOSCOPY: NORMAL

## 2023-03-31 PROCEDURE — 258N000003 HC RX IP 258 OP 636: Performed by: NURSE ANESTHETIST, CERTIFIED REGISTERED

## 2023-03-31 PROCEDURE — 250N000011 HC RX IP 250 OP 636: Performed by: NURSE ANESTHETIST, CERTIFIED REGISTERED

## 2023-03-31 PROCEDURE — 88342 IMHCHEM/IMCYTCHM 1ST ANTB: CPT | Mod: 26 | Performed by: STUDENT IN AN ORGANIZED HEALTH CARE EDUCATION/TRAINING PROGRAM

## 2023-03-31 PROCEDURE — 43239 EGD BIOPSY SINGLE/MULTIPLE: CPT | Performed by: INTERNAL MEDICINE

## 2023-03-31 PROCEDURE — 999N000010 HC STATISTIC ANES STAT CODE-CRNA PER MINUTE: Performed by: INTERNAL MEDICINE

## 2023-03-31 PROCEDURE — 88305 TISSUE EXAM BY PATHOLOGIST: CPT | Mod: TC | Performed by: INTERNAL MEDICINE

## 2023-03-31 PROCEDURE — 88305 TISSUE EXAM BY PATHOLOGIST: CPT | Mod: 26 | Performed by: STUDENT IN AN ORGANIZED HEALTH CARE EDUCATION/TRAINING PROGRAM

## 2023-03-31 PROCEDURE — 250N000009 HC RX 250: Performed by: NURSE ANESTHETIST, CERTIFIED REGISTERED

## 2023-03-31 PROCEDURE — 370N000017 HC ANESTHESIA TECHNICAL FEE, PER MIN: Performed by: INTERNAL MEDICINE

## 2023-03-31 RX ORDER — SODIUM CHLORIDE, SODIUM LACTATE, POTASSIUM CHLORIDE, CALCIUM CHLORIDE 600; 310; 30; 20 MG/100ML; MG/100ML; MG/100ML; MG/100ML
INJECTION, SOLUTION INTRAVENOUS CONTINUOUS PRN
Status: DISCONTINUED | OUTPATIENT
Start: 2023-03-31 | End: 2023-03-31

## 2023-03-31 RX ORDER — LIDOCAINE 40 MG/G
CREAM TOPICAL
Status: CANCELLED | OUTPATIENT
Start: 2023-03-31

## 2023-03-31 RX ORDER — DEXMEDETOMIDINE HYDROCHLORIDE 4 UG/ML
INJECTION, SOLUTION INTRAVENOUS PRN
Status: DISCONTINUED | OUTPATIENT
Start: 2023-03-31 | End: 2023-03-31

## 2023-03-31 RX ORDER — LIDOCAINE HYDROCHLORIDE 20 MG/ML
INJECTION, SOLUTION INFILTRATION; PERINEURAL PRN
Status: DISCONTINUED | OUTPATIENT
Start: 2023-03-31 | End: 2023-03-31

## 2023-03-31 RX ORDER — NALOXONE HYDROCHLORIDE 0.4 MG/ML
0.2 INJECTION, SOLUTION INTRAMUSCULAR; INTRAVENOUS; SUBCUTANEOUS
Status: CANCELLED | OUTPATIENT
Start: 2023-03-31

## 2023-03-31 RX ORDER — NALOXONE HYDROCHLORIDE 0.4 MG/ML
0.4 INJECTION, SOLUTION INTRAMUSCULAR; INTRAVENOUS; SUBCUTANEOUS
Status: CANCELLED | OUTPATIENT
Start: 2023-03-31

## 2023-03-31 RX ORDER — ONDANSETRON 2 MG/ML
4 INJECTION INTRAMUSCULAR; INTRAVENOUS EVERY 6 HOURS PRN
Status: CANCELLED | OUTPATIENT
Start: 2023-03-31

## 2023-03-31 RX ORDER — ONDANSETRON 2 MG/ML
4 INJECTION INTRAMUSCULAR; INTRAVENOUS
Status: CANCELLED | OUTPATIENT
Start: 2023-03-31

## 2023-03-31 RX ORDER — PROCHLORPERAZINE MALEATE 5 MG
5 TABLET ORAL EVERY 6 HOURS PRN
Status: CANCELLED | OUTPATIENT
Start: 2023-03-31

## 2023-03-31 RX ORDER — FLUMAZENIL 0.1 MG/ML
0.2 INJECTION, SOLUTION INTRAVENOUS
Status: CANCELLED | OUTPATIENT
Start: 2023-03-31 | End: 2023-03-31

## 2023-03-31 RX ORDER — PROPOFOL 10 MG/ML
INJECTION, EMULSION INTRAVENOUS PRN
Status: DISCONTINUED | OUTPATIENT
Start: 2023-03-31 | End: 2023-03-31

## 2023-03-31 RX ORDER — ONDANSETRON 4 MG/1
4 TABLET, ORALLY DISINTEGRATING ORAL EVERY 6 HOURS PRN
Status: CANCELLED | OUTPATIENT
Start: 2023-03-31

## 2023-03-31 RX ORDER — PROPOFOL 10 MG/ML
INJECTION, EMULSION INTRAVENOUS CONTINUOUS PRN
Status: DISCONTINUED | OUTPATIENT
Start: 2023-03-31 | End: 2023-03-31

## 2023-03-31 RX ADMIN — PROPOFOL 200 MCG/KG/MIN: 10 INJECTION, EMULSION INTRAVENOUS at 08:44

## 2023-03-31 RX ADMIN — PROPOFOL 20 MG: 10 INJECTION, EMULSION INTRAVENOUS at 08:46

## 2023-03-31 RX ADMIN — LIDOCAINE HYDROCHLORIDE 50 MG: 20 INJECTION, SOLUTION INFILTRATION; PERINEURAL at 08:44

## 2023-03-31 RX ADMIN — SODIUM CHLORIDE, POTASSIUM CHLORIDE, SODIUM LACTATE AND CALCIUM CHLORIDE: 600; 310; 30; 20 INJECTION, SOLUTION INTRAVENOUS at 08:42

## 2023-03-31 RX ADMIN — DEXMEDETOMIDINE HYDROCHLORIDE 8 MCG: 200 INJECTION INTRAVENOUS at 08:44

## 2023-03-31 RX ADMIN — TOPICAL ANESTHETIC 1 SPRAY: 200 SPRAY DENTAL; PERIODONTAL at 08:42

## 2023-03-31 RX ADMIN — PROPOFOL 10 MG: 10 INJECTION, EMULSION INTRAVENOUS at 08:52

## 2023-03-31 ASSESSMENT — ENCOUNTER SYMPTOMS
SEIZURES: 0
DYSRHYTHMIAS: 0

## 2023-03-31 ASSESSMENT — COPD QUESTIONNAIRES: COPD: 0

## 2023-03-31 ASSESSMENT — LIFESTYLE VARIABLES: TOBACCO_USE: 0

## 2023-03-31 ASSESSMENT — ACTIVITIES OF DAILY LIVING (ADL): ADLS_ACUITY_SCORE: 35

## 2023-03-31 NOTE — ANESTHESIA CARE TRANSFER NOTE
Patient: Marcela Sandhu    Procedure: Procedure(s):  Esophagoscopy, gastroscopy, duodenoscopy (EGD), combined       Diagnosis: Throat discomfort [R07.0]  Diagnosis Additional Information: No value filed.    Anesthesia Type:   MAC     Note:    Oropharynx: oropharynx clear of all foreign objects  Level of Consciousness: awake  Oxygen Supplementation: room air    Independent Airway: airway patency satisfactory and stable  Dentition: dentition unchanged  Vital Signs Stable: post-procedure vital signs reviewed and stable  Report to RN Given: handoff report given  Destination: endo #34.          Vitals:  Vitals Value Taken Time   /78 03/31/23 0930   Temp     Pulse 95 03/31/23 0931   Resp 23 03/31/23 0931   SpO2 96 % 03/31/23 0928   Vitals shown include unvalidated device data.    Electronically Signed By: DARIO Colón CRNA  March 31, 2023  9:32 AM

## 2023-03-31 NOTE — ANESTHESIA PREPROCEDURE EVALUATION
Anesthesia Pre-Procedure Evaluation    Patient: Marcela Sandhu   MRN: [id number] : 1940        Procedure : Procedure(s):  Esophagoscopy, gastroscopy, duodenoscopy (EGD), combined          Past Medical History:   Diagnosis Date     Acid reflux disease      Benign essential hypertension      Benign essential tremor      Cancer (H)     Breast cancer     History of hepatitis C virus infection     treated with interferon     MDD (major depressive disorder)      Mild cognitive impairment      Osteopenia      Peripheral neuropathy      Personal history of malignant neoplasm of breast      Restless legs syndrome (RLS)      Rheumatoid arthritis without elevated rheumatoid factor (H)       Past Surgical History:   Procedure Laterality Date     APPENDECTOMY       ARTHROPLASTY CARPOMETACARPAL (THUMB JOINT) Bilateral      ARTHROPLASTY KNEE Right      BLEPHAROPLASTY BILATERAL       BUNIONECTOMY CHEVRON AND PEBBLES BILATERAL, COMBINED  2011    Procedure:COMBINED BUNIONECTOMY CHEVRON AND PEBBLES BILATERAL; BILATERAL THIRD AND FOURTH CLAWTOE RECONSTRUCTION WITH EXOSTECTOMY, LEFT FIRST TARSOMETATARSAL JOINT and 2nd toe Right foot reconstruction; Surgeon:ASHWIN FARMER; Location: OR     CARPAL TUNNEL RELEASE RT/LT Bilateral      COLONOSCOPY  10/18/2011    Procedure:COLONOSCOPY; COLONOSCOPY; Surgeon:BRENDA RODRIGUEZ; Location: GI     ELBOW SURGERY      tendon lengthening surgery     FOOT SURGERY Bilateral     toe straightening     MASTECTOMY, BILATERAL       SLING BLADDER SUSPENSION WITH FASCIA ZACH       SPINE SURGERY      multiple lumbar surgeries; most hardware removed      Allergies   Allergen Reactions     Acetaminophen GI Disturbance     Citalopram Unknown     Hydrocodone GI Disturbance     Levaquin [Levofloxacin Hemihydrate] Other (See Comments) and Rash     chest pain     Sulfa Drugs GI Disturbance      Social History     Tobacco Use     Smoking status: Never     Smokeless tobacco: Never   Substance  Use Topics     Alcohol use: No      Wt Readings from Last 1 Encounters:   03/27/23 58.8 kg (129 lb 9.6 oz)        Anesthesia Evaluation            ROS/MED HX  ENT/Pulmonary:    (-) tobacco use, asthma, COPD and sleep apnea   Neurologic: Comment: Restless legs    (+) peripheral neuropathy,  (-) no seizures and no CVA   Cardiovascular:     (+) hypertension----- (-) CAD, CHF and arrhythmias   METS/Exercise Tolerance:     Hematologic:       Musculoskeletal: Comment: RA  (+) arthritis,     GI/Hepatic:     (+) GERD, Asymptomatic on medication, hepatitis type C,  (-) liver disease   Renal/Genitourinary:    (-) renal disease   Endo:    (-) Type I DM and Type II DM   Psychiatric/Substance Use:     (+) psychiatric history depression     Infectious Disease:       Malignancy:   (+) Malignancy, History of Breast.Breast CA status post Surgery.        Other: Comment: Fibromyalgia           Physical Exam    Airway        Mallampati: II   TM distance: > 3 FB   Neck ROM: full   Mouth opening: > 3 cm    Respiratory Devices and Support         Dental     Comment: Upper denture    (+) Modest Abnormalities - crowns, retainers, 1 or 2 missing teeth      Cardiovascular          Rhythm and rate: regular     Pulmonary           breath sounds clear to auscultation           OUTSIDE LABS:  CBC:   Lab Results   Component Value Date    WBC 4.7 12/31/2022    WBC 7.1 12/28/2022    HGB 10.1 (L) 12/31/2022    HGB 10.2 (L) 12/28/2022    HCT 32.2 (L) 12/31/2022    HCT 31.6 (L) 12/28/2022     12/31/2022     12/28/2022     BMP:   Lab Results   Component Value Date     12/31/2022     12/28/2022    POTASSIUM 3.6 12/31/2022    POTASSIUM 3.8 12/28/2022    CHLORIDE 107 12/31/2022    CHLORIDE 104 12/28/2022    CO2 26 12/31/2022    CO2 25 12/28/2022    BUN 18 12/31/2022    BUN 33 (H) 12/28/2022    CR 0.92 12/31/2022    CR 1.01 12/28/2022     (H) 12/31/2022     (H) 12/28/2022     COAGS: No results found for: PTT, INR,  FIBR  POC: No results found for: BGM, HCG, HCGS  HEPATIC:   Lab Results   Component Value Date    ALBUMIN 3.8 08/31/2022    PROTTOTAL 7.6 08/31/2022    ALT 17 08/31/2022    AST 26 08/31/2022    ALKPHOS 48 08/31/2022    BILITOTAL 1.0 08/31/2022     OTHER:   Lab Results   Component Value Date    A1C 5.3 08/31/2022    DENIS 8.6 12/31/2022    LIPASE 269 12/18/2015    AMYLASE 92 12/18/2015    TSH 1.10 07/21/2015       Anesthesia Plan    ASA Status:  2      Anesthesia Type: MAC.              Consents    Anesthesia Plan(s) and associated risks, benefits, and realistic alternatives discussed. Questions answered and patient/representative(s) expressed understanding.    - Discussed:     - Discussed with:  Patient         Postoperative Care       PONV prophylaxis: Ondansetron (or other 5HT-3)     Comments:                Dequan Myers MD

## 2023-04-04 LAB
PATH REPORT.COMMENTS IMP SPEC: NORMAL
PATH REPORT.COMMENTS IMP SPEC: NORMAL
PATH REPORT.FINAL DX SPEC: NORMAL
PATH REPORT.GROSS SPEC: NORMAL
PATH REPORT.MICROSCOPIC SPEC OTHER STN: NORMAL
PATH REPORT.RELEVANT HX SPEC: NORMAL
PHOTO IMAGE: NORMAL

## 2023-04-19 ENCOUNTER — VIRTUAL VISIT (OUTPATIENT)
Dept: INTERNAL MEDICINE | Facility: CLINIC | Age: 83
End: 2023-04-19
Payer: COMMERCIAL

## 2023-04-19 ENCOUNTER — NURSE TRIAGE (OUTPATIENT)
Dept: INTERNAL MEDICINE | Facility: CLINIC | Age: 83
End: 2023-04-19

## 2023-04-19 DIAGNOSIS — R05.1 ACUTE COUGH: Primary | ICD-10-CM

## 2023-04-19 PROCEDURE — 99441 PR PHYSICIAN TELEPHONE EVALUATION 5-10 MIN: CPT | Mod: 95 | Performed by: INTERNAL MEDICINE

## 2023-04-19 RX ORDER — CEFDINIR 300 MG/1
300 CAPSULE ORAL 2 TIMES DAILY
Qty: 10 CAPSULE | Refills: 0 | Status: SHIPPED | OUTPATIENT
Start: 2023-04-19 | End: 2023-10-31

## 2023-04-19 NOTE — PROGRESS NOTES
Marcela is a 82 year old who is being evaluated via a billable telephone visit.      What phone number would you like to be contacted at? [phone]  How would you like to obtain your AVS? MyChart  Distant Location (provider location):  On-site    Assessment & Plan   Acute cough  Concern for bacterial sinus infection. Will treat with round of cefdinir. Encouraged supportive cares. If no improvement or symptoms worsen, recommend in-person evaluation with any available provider.  - cefdinir (OMNICEF) 300 MG capsule; Take 1 capsule (300 mg) by mouth 2 times daily    Jakob Lam MD  [facility]   Marcela is a 82 year old who presents for a same day acute care virtual telephone visit with chief concern of: cough. Ongoing for over a week. Feels like her sinuses are stuffed up. Had a negative COVID-19 test during this time. Chest feels okay, but feels like sinuses are dripping down and coughing. No f/c.    Review of Systems   Constitutional, HEENT, cardiovascular, pulmonary systems are negative, except as otherwise noted.      Objective       Vitals: No vitals were obtained today due to virtual visit.    Physical Exam   healthy, alert and no distress  PSYCH: Alert and oriented times 3; coherent speech, normal rate and volume, able to articulate logical thoughts, able to abstract reason, no tangential thoughts, no hallucinations or delusions. Her affect is normal.  RESP: No cough, no audible wheezing, able to talk in full sentences  Remainder of exam unable to be completed due to telephone visits    Phone call duration: 6 minutes

## 2023-04-19 NOTE — TELEPHONE ENCOUNTER
"Pt has had productive cough with mild shortness of breath for about 7 days. Cough is productive, crackles at times. Pt alert and oriented, calm, cooperative. Denies respiratory distress. Pt wondering if she needs an antibiotic?  Pt triaged and dispo'd to see HCP within 4 hours. Pt agrees; scheduled VV. Routing to provider for review - please advise, appointment is a little outside advised scheduling window.     1. ONSET: \"When did the cough begin?\"       A week ago  2. SEVERITY: \"How bad is the cough today?\"       mild  3. SPUTUM: \"Describe the color of your sputum\" (none, dry cough; clear, white, yellow, green)      Yes, green   4. HEMOPTYSIS: \"Are you coughing up any blood?\" If so ask: \"How much?\" (flecks, streaks, tablespoons, etc.)      No   5. DIFFICULTY BREATHING: \"Are you having difficulty breathing?\" If Yes, ask: \"How bad is it?\" (e.g., mild, moderate, severe)     - MILD: No SOB at rest, mild SOB with walking, speaks normally in sentences, can lie down, no retractions, pulse < 100.     - MODERATE: SOB at rest, SOB with minimal exertion and prefers to sit, cannot lie down flat, speaks in phrases, mild retractions, audible wheezing, pulse 100-120.     - SEVERE: Very SOB at rest, speaks in single words, struggling to breathe, sitting hunched forward, retractions, pulse > 120       yes  6. FEVER: \"Do you have a fever?\" If Yes, ask: \"What is your temperature, how was it measured, and when did it start?\"      Denies   7. CARDIAC HISTORY: \"Do you have any history of heart disease?\" (e.g., heart attack, congestive heart failure)       Denies   8. LUNG HISTORY: \"Do you have any history of lung disease?\"  (e.g., pulmonary embolus, asthma, emphysema)      Denies   9. PE RISK FACTORS: \"Do you have a history of blood clots?\" (or: recent major surgery, recent prolonged travel, bedridden)      Denies   10. OTHER SYMPTOMS: \"Do you have any other symptoms?\" (e.g., runny nose, wheezing, chest pain)        Lots of congestion, " "crackles with cough  11. PREGNANCY: \"Is there any chance you are pregnant?\" \"When was your last menstrual period?\"        Denies   12. TRAVEL: \"Have you traveled out of the country in the last month?\" (e.g., travel history, exposures)        denies    Reason for Disposition    [1] MILD difficulty breathing (e.g., minimal/no SOB at rest, SOB with walking, pulse <100) AND [2] still present when not coughing    Additional Information    Negative: SEVERE difficulty breathing (e.g., struggling for each breath, speaks in single words)    Negative: Bluish (or gray) lips or face now    Negative: [1] Difficulty breathing AND [2] exposure to flames, smoke, or fumes    Negative: [1] Stridor AND [2] difficulty breathing    Negative: Sounds like a life-threatening emergency to the triager    Negative: [1] Previous asthma attacks AND [2] this feels like asthma attack    Negative: Dry cough (non-productive;  no sputum or minimal clear sputum)    Negative: [1] MODERATE difficulty breathing (e.g., speaks in phrases, SOB even at rest, pulse 100-120) AND [2] still present when not coughing    Negative: Chest pain  (Exception: MILD central chest pain, present only when coughing)    Negative: Patient sounds very sick or weak to the triager    Protocols used: COUGH - ACUTE [id number]      "

## 2023-04-25 ENCOUNTER — APPOINTMENT (OUTPATIENT)
Dept: CT IMAGING | Facility: CLINIC | Age: 83
End: 2023-04-25
Attending: EMERGENCY MEDICINE
Payer: COMMERCIAL

## 2023-04-25 ENCOUNTER — HOSPITAL ENCOUNTER (EMERGENCY)
Facility: CLINIC | Age: 83
Discharge: HOME | End: 2023-04-25
Attending: EMERGENCY MEDICINE | Admitting: EMERGENCY MEDICINE
Payer: COMMERCIAL

## 2023-04-25 ENCOUNTER — APPOINTMENT (OUTPATIENT)
Dept: GENERAL RADIOLOGY | Facility: CLINIC | Age: 83
End: 2023-04-25
Attending: EMERGENCY MEDICINE
Payer: COMMERCIAL

## 2023-04-25 VITALS
SYSTOLIC BLOOD PRESSURE: 162 MMHG | OXYGEN SATURATION: 95 % | HEART RATE: 99 BPM | TEMPERATURE: 99.8 F | RESPIRATION RATE: 16 BRPM | DIASTOLIC BLOOD PRESSURE: 80 MMHG

## 2023-04-25 DIAGNOSIS — R05.2 SUBACUTE COUGH: ICD-10-CM

## 2023-04-25 LAB
ALBUMIN SERPL BCG-MCNC: 4.3 G/DL (ref 3.5–5.2)
ALP SERPL-CCNC: 60 U/L (ref 35–104)
ALT SERPL W P-5'-P-CCNC: 12 U/L (ref 10–35)
ANION GAP SERPL CALCULATED.3IONS-SCNC: 11 MMOL/L (ref 7–15)
AST SERPL W P-5'-P-CCNC: 25 U/L (ref 10–35)
BASOPHILS # BLD AUTO: 0 10E3/UL (ref 0–0.2)
BASOPHILS NFR BLD AUTO: 0 %
BILIRUB SERPL-MCNC: 0.2 MG/DL
BUN SERPL-MCNC: 29 MG/DL (ref 8–23)
CALCIUM SERPL-MCNC: 9.6 MG/DL (ref 8.8–10.2)
CHLORIDE SERPL-SCNC: 105 MMOL/L (ref 98–107)
CREAT SERPL-MCNC: 1.1 MG/DL (ref 0.51–0.95)
DEPRECATED HCO3 PLAS-SCNC: 28 MMOL/L (ref 22–29)
EOSINOPHIL # BLD AUTO: 0 10E3/UL (ref 0–0.7)
EOSINOPHIL NFR BLD AUTO: 0 %
ERYTHROCYTE [DISTWIDTH] IN BLOOD BY AUTOMATED COUNT: 13.3 % (ref 10–15)
GFR SERPL CREATININE-BSD FRML MDRD: 51 ML/MIN/1.73M2
GLUCOSE SERPL-MCNC: 127 MG/DL (ref 70–99)
HCT VFR BLD AUTO: 31.4 % (ref 35–47)
HGB BLD-MCNC: 10.2 G/DL (ref 11.7–15.7)
IMM GRANULOCYTES # BLD: 0 10E3/UL
IMM GRANULOCYTES NFR BLD: 0 %
LYMPHOCYTES # BLD AUTO: 0.7 10E3/UL (ref 0.8–5.3)
LYMPHOCYTES NFR BLD AUTO: 14 %
MCH RBC QN AUTO: 34.1 PG (ref 26.5–33)
MCHC RBC AUTO-ENTMCNC: 32.5 G/DL (ref 31.5–36.5)
MCV RBC AUTO: 105 FL (ref 78–100)
MONOCYTES # BLD AUTO: 0.1 10E3/UL (ref 0–1.3)
MONOCYTES NFR BLD AUTO: 2 %
NEUTROPHILS # BLD AUTO: 4.4 10E3/UL (ref 1.6–8.3)
NEUTROPHILS NFR BLD AUTO: 84 %
NRBC # BLD AUTO: 0 10E3/UL
NRBC BLD AUTO-RTO: 0 /100
NT-PROBNP SERPL-MCNC: 289 PG/ML (ref 0–1800)
PLATELET # BLD AUTO: 223 10E3/UL (ref 150–450)
POTASSIUM SERPL-SCNC: 4.3 MMOL/L (ref 3.4–5.3)
PROT SERPL-MCNC: 7 G/DL (ref 6.4–8.3)
RBC # BLD AUTO: 2.99 10E6/UL (ref 3.8–5.2)
SARS-COV-2 RNA RESP QL NAA+PROBE: NEGATIVE
SODIUM SERPL-SCNC: 144 MMOL/L (ref 136–145)
WBC # BLD AUTO: 5.3 10E3/UL (ref 4–11)

## 2023-04-25 PROCEDURE — 99285 EMERGENCY DEPT VISIT HI MDM: CPT | Mod: 25,CS

## 2023-04-25 PROCEDURE — U0005 INFEC AGEN DETEC AMPLI PROBE: HCPCS | Performed by: EMERGENCY MEDICINE

## 2023-04-25 PROCEDURE — 85025 COMPLETE CBC W/AUTO DIFF WBC: CPT | Performed by: EMERGENCY MEDICINE

## 2023-04-25 PROCEDURE — 83880 ASSAY OF NATRIURETIC PEPTIDE: CPT | Performed by: EMERGENCY MEDICINE

## 2023-04-25 PROCEDURE — 71046 X-RAY EXAM CHEST 2 VIEWS: CPT

## 2023-04-25 PROCEDURE — C9803 HOPD COVID-19 SPEC COLLECT: HCPCS

## 2023-04-25 PROCEDURE — 71250 CT THORAX DX C-: CPT

## 2023-04-25 PROCEDURE — 36415 COLL VENOUS BLD VENIPUNCTURE: CPT | Performed by: EMERGENCY MEDICINE

## 2023-04-25 PROCEDURE — 80053 COMPREHEN METABOLIC PANEL: CPT | Performed by: EMERGENCY MEDICINE

## 2023-04-25 RX ORDER — DOXYCYCLINE 100 MG/1
100 CAPSULE ORAL 2 TIMES DAILY
Qty: 10 CAPSULE | Refills: 0 | Status: SHIPPED | OUTPATIENT
Start: 2023-04-25 | End: 2023-11-01

## 2023-04-25 ASSESSMENT — ENCOUNTER SYMPTOMS
FEVER: 0
COUGH: 1
SHORTNESS OF BREATH: 0

## 2023-04-25 ASSESSMENT — ACTIVITIES OF DAILY LIVING (ADL): ADLS_ACUITY_SCORE: 35

## 2023-04-25 NOTE — ED NOTES
Rapid Assessment Note    History:   The patient was seen five days ago via telemedicine and was prescribed antibiotics. The patient was told to stop taking her antibiotic if she began experiencing bloody stools. She states that she is now experiencing pink stools and diarrhea. She states that she has experienced a cough for one week. She is experiencing at least 4-5 episodes of diarrhea each day. She denies experiencing shortness of breath, or fever. She is taking Cefdinir 300 mg twice daily, prescribed on 4/19/23, she has two pills left. The patient presents to the ED with her sister. The patient's sister notes reduced appetite. She is not aware of any personal history of C.diff colitis.    Exam:   Gen:  Productive sounding cough  Resp:  Crackles in bases, occas exp wheeze  CV:  RRR  Abd:  Non tender    Plan of Care:   I evaluated the patient and developed an initial plan of care. I discussed this plan and explained that I, or one of my partners, would be returning to complete the evaluation.     I, Stephanie Broussard, am serving as a scribe to document services personally performed by Anca Anderson MD, based on my observations and the provider's statements to me.    11/5/2018  [employer]    Portions of this medical record were completed by a scribe. UPON MY REVIEW AND AUTHENTICATION BY ELECTRONIC SIGNATURE, this confirms (a) I performed the applicable clinical services, and (b) the record is accurate.      Anca Anderson MD  04/25/23 2912

## 2023-04-25 NOTE — ED TRIAGE NOTES
Pt has had a cough for a week or so now . On some kind of antibiotic - per report - was told to stop the abx if stool became pink.   Last night stool was pink .   Pt stated that the cough is worse .  Low grade temp at home in the 99.8 range.      Triage Assessment     Row Name 04/25/23 3235       Triage Assessment (Adult)    Airway WDL WDL       Respiratory WDL    Respiratory WDL X;cough    Cough Frequency frequent    Cough Type congested       Skin Circulation/Temperature WDL    Skin Circulation/Temperature WDL WDL       Cardiac WDL    Cardiac WDL X  tachy       Peripheral/Neurovascular WDL    Peripheral Neurovascular WDL WDL       Cognitive/Neuro/Behavioral WDL    Cognitive/Neuro/Behavioral WDL WDL

## 2023-04-25 NOTE — DISCHARGE INSTRUCTIONS
Based on your CT results you need to have a repeat CT done in 3 months to ensure that the small nodule seen today have resolved

## 2023-04-25 NOTE — ED PROVIDER NOTES
History     Chief Complaint:  Cough    The history is provided by the patient and a relative.      Marcela Sandhu is a 82 year old female with a history of hypertension, breast cancer s/p bilateral mastectomy, rheumatoid arthritis who presents with a persistent cough for the past 3 weeks. The cough has neither worsened nor improved since the onset. 6 days ago on 4/19, the patient had a virtual internal medicine visit and was prescribed a course of cefdinir (300 mg BID) for a suspected sinus infection. Despite taking the cefdinir as prescribed, the cough has not improved. This prompts her presentation to the ED today. No associated shortness of breath or fever. Denies history of kidney disease.    Independent Historian:    Sister provided some additional history as above.    Review of External Notes: none    ROS:  Review of Systems   Constitutional: Negative for fever.   Respiratory: Positive for cough. Negative for shortness of breath.    All other systems reviewed and are negative.    Allergies:  Acetaminophen  Citalopram  Hydrocodone  Levofloxacin  Sulfa antibiotics   Latex    Medications:    Amlodipine  Abilify  Wellbutrin  Methotrexate  Remeron  Nabumetone  Prednisone  Zoloft  Zanaflex    Past Medical History:    Acid reflux disease   Hypertension   Benign essential tremor   Hepatitis C virus infection   Depression   Osteopenia  Peripheral neuropathy   Malignant neoplasm of breast   Restless legs syndrome  Rheumatoid arthritis   Fibromyalgia  Anxiety  Dementia     Past Surgical History:    Appendectomy  Thumb arthroplasty, bilateral  Knee arthroplasty, right  Blepharoplasty, bilateral   Bunionectomy, bilateral  Carpal tunnel release, bilateral   Elbow surgery, bilateral   Toe straightening, bilateral  Mastectomy, bilateral   Bladder sling placement  Cataract surgery, bilateral   Mohs procedure  Lumbar spine surgery x5  Cholecystectomy   [place] teeth extraction     Family History:    Mother: hypertension, colon  cancer  Father: hypertension, alcohol abuse, arthritis  Brothers: multiple myeloma, cerebrovascular disease, dementia, prostate cancer, bladder cancer, COPD, stroke  Sisters: colon polyps, arthritis, neurological disorder    Social History:  The patient presents to the ED with her sister.  The patient presents to the ED via private car.   PCP: Kelley Haney     Physical Exam     Patient Vitals for the past 24 hrs:   BP Temp Temp src Pulse Resp SpO2   04/25/23 1634 -- -- -- -- -- 96 %   04/25/23 1632 -- -- -- -- -- 95 %   04/25/23 1603 -- -- -- -- -- 95 %   04/25/23 1549 (!) 162/80 -- -- 99 -- --   04/25/23 1540 -- -- -- -- -- 96 %   04/25/23 1524 (!) 176/80 -- -- 98 -- 95 %   04/25/23 1326 (!) 151/74 99.8  F (37.7  C) Temporal 110 16 93 %      Physical Exam  General/Appearance: appears stated age, well-groomed, appears comfortable  Eyes: EOMI, no scleral injection, no icterus  ENT: MMM  Neck: supple, nl ROM, no stiffness  Cardiovascular: RRR, nl S1S2, no m/r/g, 2+ pulses in all 4 extremities, cap refill <2sec  Respiratory: CTAB, good air movement throughout, no wheezes/rhonchi/rales, no increased WOB, no retractions, continuous cough  Back: no lesions  GI: abd soft, non-distended, nttp,  no HSM, no rebound, no guarding, nl BS  MSK: HAYDEN, good tone, no bony abnormality  Skin: warm and well-perfused, no rash, no edema, no ecchymosis, nl turgor  Neuro: GCS 15, alert and oriented, no gross focal neuro deficits  Psych: interacts appropriately  Heme: no petechia, no purpura, no active bleeding    Emergency Department Course     Imaging:  Chest CT w/o contrast   Final Result   IMPRESSION:    1.  There are multiple bilateral indeterminant pulmonary nodules. One   of the largest is at the medial left lower lobe. These could relate to   an atypical infectious, inflammatory, or neoplastic etiology.   Recommend surveillance imaging and consideration for further workup.   See below for follow up imaging guidelines.   2.  No  other acute abnormality identified.      Recommendations for an incidental lung nodule > 8mm:     Low risk patients: Consider follow-up CT in 3 months, PET/CT, and/or   tissue sampling.     High risk patients: Same as for low risk patients.      *Low Risk: Minimal or absent history of smoking or other known risk   factors.   *Nonsolid (ground-glass) or partly solid nodules may require longer   follow-up to exclude indolent adenocarcinoma.   *Recommendations based on Guidelines for the Management of Incidental   Pulmonary Nodules Detected at CT: From the Fleischner Society 2017,   Radiology 2017.      YOSEF BERUMEN MD            SYSTEM ID:  [id number]      XR Chest 2 Views   Final Result   IMPRESSION: No acute cardiopulmonary disease. Hyperinflated lungs can   be seen with COPD.      YOSEF BERUMEN MD            SYSTEM ID:  [id number]      Report per radiology    Laboratory:  Labs Ordered and Resulted from Time of ED Arrival to Time of ED Departure   COMPREHENSIVE METABOLIC PANEL - Abnormal       Result Value    Sodium 144      Potassium 4.3      Chloride 105      Carbon Dioxide (CO2) 28      Anion Gap 11      Urea Nitrogen 29.0 (*)     Creatinine 1.10 (*)     Calcium 9.6      Glucose 127 (*)     Alkaline Phosphatase 60      AST 25      ALT 12      Protein Total 7.0      Albumin 4.3      Bilirubin Total 0.2      GFR Estimate 51 (*)    CBC WITH PLATELETS AND DIFFERENTIAL - Abnormal    WBC Count 5.3      RBC Count 2.99 (*)     Hemoglobin 10.2 (*)     Hematocrit 31.4 (*)      (*)     MCH 34.1 (*)     MCHC 32.5      RDW 13.3      Platelet Count 223      % Neutrophils 84      % Lymphocytes 14      % Monocytes 2      % Eosinophils 0      % Basophils 0      % Immature Granulocytes 0      NRBCs per 100 WBC 0      Absolute Neutrophils 4.4      Absolute Lymphocytes 0.7 (*)     Absolute Monocytes 0.1      Absolute Eosinophils 0.0      Absolute Basophils 0.0      Absolute Immature Granulocytes 0.0      Absolute NRBCs 0.0      COVID-19 VIRUS (CORONAVIRUS) BY PCR - Normal    SARS CoV2 PCR Negative     NT PROBNP INPATIENT - Normal    N terminal Pro BNP Inpatient 289     ENTERIC BACTERIA AND VIRUS PANEL BY JOSE CARLOS STOOL   C. DIFFICILE TOXIN B PCR WITH REFLEX TO C. DIFFICILE ANTIGEN AND TOXINS A/B EIA      Emergency Department Course & Assessments:       Social Determinants of Health affecting care:  None    Assessments:  1532 I obtained history and performed an examination of the patient.   1632 The patient was rechecked and updated. Discussed results and plan of care.     Disposition:  The patient was discharged to home.     Impression & Plan      Medical Decision Making:  Marcela Sandhu is a 82 year old female with a remote history of breast cancer status post mastectomy who presents today with 3 weeks of a cough.  This has not improved on cefdinir twice daily for the past several days.  Here she has low-grade fever with normal white count.  CT of the chest was obtained which shows abnormalities concerning for a broad differential including infectious versus inflammatory versus something such as metastatic cancer.  All of this was discussed in detail with her and her sister who is at her bedside.  Plan at this point in time is to treated as though it may be infectious with increased antibiotics but instructions to follow-up with her PCP within the next 3 months to have repeat CT scan done to ensure resolution of the lesions.  Otherwise there is nothing on visit today to suggest a cardiac cause of her cough.  She and her sister both feel comfortable with discharge.    Diagnosis:  No diagnosis found.     Discharge Medications:  New Prescriptions    No medications on file      Scribe Disclosure:  I, Mari Rubalcava, am serving as a scribe at 4:15 PM on 4/25/2023 to document services personally performed by Naya Cowan MD based on my observations and the provider's statements to me.     4/25/2023   Naya Cowan MD,  Naya Briggs MD  04/25/23 7945

## 2023-04-27 ENCOUNTER — TELEPHONE (OUTPATIENT)
Dept: INTERNAL MEDICINE | Facility: CLINIC | Age: 83
End: 2023-04-27
Payer: COMMERCIAL

## 2023-04-27 NOTE — TELEPHONE ENCOUNTER
Reason for Call:  Appointment Request    Patient requesting this type of appt:  Follow-Up apt from CT scan.  Was seen in ED on 04-25-23 and had a CT Scan.  Patient was recommended to complete a follow-up apt in July with pcp.  No apts available until September.      Requested provider: Kelley Haney    Reason patient unable to be scheduled: Not within requested timeframe    When does patient want to be seen/preferred time: 3 Months (end of July)     Comments:     Could we send this information to you in [facility] or would you prefer to receive a phone call?:   Patient would prefer a phone call   Okay to leave a detailed message?: Yes at Home number on file [phone] (home)    Call taken on 4/27/2023 at 8:42 AM by Ирина Cruz

## 2023-05-15 DIAGNOSIS — F32.5 MAJOR DEPRESSION IN COMPLETE REMISSION (H): Chronic | ICD-10-CM

## 2023-05-15 RX ORDER — BUPROPION HYDROCHLORIDE 300 MG/1
TABLET ORAL
Qty: 90 TABLET | Refills: 1 | Status: SHIPPED | OUTPATIENT
Start: 2023-05-15 | End: 2023-11-06

## 2023-05-24 ENCOUNTER — NURSE TRIAGE (OUTPATIENT)
Dept: INTERNAL MEDICINE | Facility: CLINIC | Age: 83
End: 2023-05-24
Payer: COMMERCIAL

## 2023-05-24 NOTE — TELEPHONE ENCOUNTER
"Pt requesting message be sent to provider.   Pt has been having nightmares/sweats x 1 wk.  Care advice given: bedtime routine and avoid going to bed late.  Routing to PCP for any further recommendations?     1. REASON FOR CALL: \"What is your main concern right now?\"      Pt called reporting nightmares/night sweats  2. ONSET: \"When did the sleep issues start?\"      One week ago  3. SEVERITY: \"How bad is the sleep issues?\"      moderate  4. FEVER: \"Do you have a fever?\"      Denies   5. OTHER SYMPTOMS: \"Do you have any other new symptoms?\"      Denies   6. TREATMENTS AND RESPONSE: \"What have you done so far to try to make this better? What medicines have you used?\"      No change to diet, activity, or bedtime routine. No change in meds and denies felling unwell otherwise.       Reason for Disposition    Patient's symptoms are safe to treat at home per nursing judgment    Additional Information    Negative: Sounds like a life-threatening emergency to the triager    Negative: Nursing judgment    Negative: Nursing judgment    Negative: Nursing judgment    Negative: Nursing judgment    Negative: Nursing judgment    Negative: Nursing judgment    Negative: Patient wants to be seen    Negative: Nursing judgment    Negative: Nursing judgment    Negative: Nursing judgment    Negative: Nursing judgment    Negative: Nursing judgment    Negative: Information only call about a Well Adult (no illness or injury)    Negative: Caller can't be reached by phone    Protocols used: NO PROTOCOL [id number]      "

## 2023-05-24 NOTE — TELEPHONE ENCOUNTER
Spoke to pt about below.  Pt states she is going to turn her ac on and see if that helps.  Will call if further symptoms .

## 2023-07-27 ENCOUNTER — OFFICE VISIT (OUTPATIENT)
Dept: INTERNAL MEDICINE | Facility: CLINIC | Age: 83
End: 2023-07-27
Payer: COMMERCIAL

## 2023-07-27 VITALS
HEIGHT: 62 IN | HEART RATE: 74 BPM | TEMPERATURE: 99.6 F | DIASTOLIC BLOOD PRESSURE: 72 MMHG | RESPIRATION RATE: 16 BRPM | BODY MASS INDEX: 21.75 KG/M2 | SYSTOLIC BLOOD PRESSURE: 154 MMHG | OXYGEN SATURATION: 97 % | WEIGHT: 118.2 LBS

## 2023-07-27 DIAGNOSIS — K22.4 ESOPHAGEAL DYSMOTILITY: ICD-10-CM

## 2023-07-27 DIAGNOSIS — R91.8 PULMONARY NODULES: ICD-10-CM

## 2023-07-27 DIAGNOSIS — R05.3 CHRONIC COUGH: ICD-10-CM

## 2023-07-27 DIAGNOSIS — F41.9 ANXIETY: ICD-10-CM

## 2023-07-27 DIAGNOSIS — R63.4 UNINTENTIONAL WEIGHT LOSS: ICD-10-CM

## 2023-07-27 DIAGNOSIS — R07.0 THROAT PAIN: ICD-10-CM

## 2023-07-27 DIAGNOSIS — R09.A2 GLOBUS SENSATION: Primary | ICD-10-CM

## 2023-07-27 LAB
ALBUMIN SERPL BCG-MCNC: 4.8 G/DL (ref 3.5–5.2)
ALP SERPL-CCNC: 38 U/L (ref 35–104)
ALT SERPL W P-5'-P-CCNC: 11 U/L (ref 0–50)
ANION GAP SERPL CALCULATED.3IONS-SCNC: 12 MMOL/L (ref 7–15)
AST SERPL W P-5'-P-CCNC: 27 U/L (ref 0–45)
BILIRUB SERPL-MCNC: 0.3 MG/DL
BUN SERPL-MCNC: 24.2 MG/DL (ref 8–23)
CALCIUM SERPL-MCNC: 10.1 MG/DL (ref 8.8–10.2)
CHLORIDE SERPL-SCNC: 103 MMOL/L (ref 98–107)
CREAT SERPL-MCNC: 1.09 MG/DL (ref 0.51–0.95)
DEPRECATED HCO3 PLAS-SCNC: 28 MMOL/L (ref 22–29)
ERYTHROCYTE [DISTWIDTH] IN BLOOD BY AUTOMATED COUNT: 14.4 % (ref 10–15)
GFR SERPL CREATININE-BSD FRML MDRD: 51 ML/MIN/1.73M2
GLUCOSE SERPL-MCNC: 105 MG/DL (ref 70–99)
HCT VFR BLD AUTO: 34.2 % (ref 35–47)
HGB BLD-MCNC: 10.9 G/DL (ref 11.7–15.7)
MCH RBC QN AUTO: 34.4 PG (ref 26.5–33)
MCHC RBC AUTO-ENTMCNC: 31.9 G/DL (ref 31.5–36.5)
MCV RBC AUTO: 108 FL (ref 78–100)
PLATELET # BLD AUTO: 173 10E3/UL (ref 150–450)
POTASSIUM SERPL-SCNC: 4.4 MMOL/L (ref 3.4–5.3)
PROT SERPL-MCNC: 7.5 G/DL (ref 6.4–8.3)
RBC # BLD AUTO: 3.17 10E6/UL (ref 3.8–5.2)
SODIUM SERPL-SCNC: 143 MMOL/L (ref 136–145)
TSH SERPL DL<=0.005 MIU/L-ACNC: 0.95 UIU/ML (ref 0.3–4.2)
WBC # BLD AUTO: 4.3 10E3/UL (ref 4–11)

## 2023-07-27 PROCEDURE — 84443 ASSAY THYROID STIM HORMONE: CPT | Performed by: INTERNAL MEDICINE

## 2023-07-27 PROCEDURE — 80053 COMPREHEN METABOLIC PANEL: CPT | Performed by: INTERNAL MEDICINE

## 2023-07-27 PROCEDURE — 99214 OFFICE O/P EST MOD 30 MIN: CPT | Performed by: INTERNAL MEDICINE

## 2023-07-27 PROCEDURE — 36415 COLL VENOUS BLD VENIPUNCTURE: CPT | Performed by: INTERNAL MEDICINE

## 2023-07-27 PROCEDURE — 85027 COMPLETE CBC AUTOMATED: CPT | Performed by: INTERNAL MEDICINE

## 2023-07-27 RX ORDER — HYDROXYZINE HYDROCHLORIDE 10 MG/1
10 TABLET, FILM COATED ORAL 3 TIMES DAILY PRN
Qty: 30 TABLET | Refills: 3 | Status: SHIPPED | OUTPATIENT
Start: 2023-07-27 | End: 2024-02-15

## 2023-07-27 ASSESSMENT — PATIENT HEALTH QUESTIONNAIRE - PHQ9
10. IF YOU CHECKED OFF ANY PROBLEMS, HOW DIFFICULT HAVE THESE PROBLEMS MADE IT FOR YOU TO DO YOUR WORK, TAKE CARE OF THINGS AT HOME, OR GET ALONG WITH OTHER PEOPLE: NOT DIFFICULT AT ALL
SUM OF ALL RESPONSES TO PHQ QUESTIONS 1-9: 0
SUM OF ALL RESPONSES TO PHQ QUESTIONS 1-9: 0

## 2023-07-27 ASSESSMENT — PAIN SCALES - GENERAL: PAINLEVEL: MILD PAIN (2)

## 2023-07-27 NOTE — PATIENT INSTRUCTIONS
Labs today.    Please call [phone] to schedule your CT lung.     TRIAL of hydroxyzine as needed for anxiety.     Blood pressure checks at home - set yourself up for success!     - use a good quality blood pressure machine ([employer] brand name recommended)  - use an appropriately sized, upper arm/brachial blood pressure cuff   - wait 2-3 hours after taking your blood pressure medication before checking blood pressure   - take your blood pressure in a quiet room  - take your blood pressure while seated with your feet flat on the ground  - avoid stimuli right before checking (coffee, exercise, heated discussions)  - avoid stimuli during your blood pressure check (TV, talking, loud music)  - once seated and blood pressure cuff is on, wait 5 minutes before checking blood pressure     Goal blood pressure is 130s/70s or less.

## 2023-07-27 NOTE — PROGRESS NOTES
ASSESSMENT/PLAN                                                      (R09.89) Globus sensation  (primary encounter diagnosis)  (R05.3) Chronic cough  (K22.4) Esophageal dysmotility  (R07.0) Throat pain  Comment: ENT evaluation unremarkable; GI evaluation significant for esophageal dysmotility likely causing or contributing to chronic cough, globus sensation, and throat pain.  Plan: if work-up below is unremarkable, consider referral to [facility] esophageal dysmotility clinic for further evaluation and treatment.    (R91.8) Pulmonary nodules  Comment: may be causing or contributing to chronic cough; if malignant, may explain unintentional weight loss.  Plan: CT Chest ordered - patient to schedule.     (R63.4) Unintentional weight loss  Comment: certainly concerning for either food avoidance in the setting of above symptoms and/or malignancy.  Plan: CT chest as above; labs today to evaluate for potential organic causes of/contributors to unintentional weight loss.    (F41.9) Anxiety  Plan: TRIAL of hydroxyzine as needed for acute anxiety; if symptoms worsen, change, or do not improve, patient to contact MD.      Kelley Haney MD   [address] [address]  T: [phone], F: [phone]    SUBJECTIVE                                                      Marcela Sandhu is a very pleasant 82 year old female who presents for follow-up:    Accompanied by daughter-in-law, Ebony.    Patient developed throat discomfort, globus sensation, and ongoing cough earlier this year.    X-ray esophagram demonstrated esophageal dysmotility. EGD confirmed abnormal esophageal motility and tertiary contractions.    ENT evaluation with flexible laryngoscopy unremarkable.    CT chest demonstrated multiple bilateral pulmonary nodules. 3-month follow-up recommended.    Unfortunately, there has been no significant improvement in patient's symptoms since onset.    Additionally, daughter-in-law reports  "that patient has lost 25 pounds in the last year.    PMH significant for history of breast cancer in 2007 s/p bilateral mastectomy and RA on methotrexate and low-dose daily prednisone.    Unrelated to above, patient requests a medication to help with acute anxiety during the day.  he is currently on Zoloft 100 mg daily and Remeron 7.5mg at night for depression.    OBJECTIVE                                                      BP (!) 154/72   Pulse 74   Temp 99.6  F (37.6  C) (Oral)   Resp 16   Ht 1.575 m (5' 2\")   Wt 53.6 kg (118 lb 3.2 oz)   LMP  (LMP Unknown)   SpO2 97%   BMI 21.62 kg/m    Constitutional: frail-appearing elderly woman  Respiratory: normal respiratory effort; clear to auscultation bilaterally; occasional cough during visit  Cardiovascular: regular rate and rhythm  Psych: normal judgment and insight; normal mood and affect; recent and remote memory intact    ---    (Note documentation was completed, in part, with [employer] voice-recognition software. Documentation was reviewed, but some grammatical, spelling, and word errors may remain.)    "

## 2023-08-01 ENCOUNTER — PATIENT OUTREACH (OUTPATIENT)
Dept: CARE COORDINATION | Facility: CLINIC | Age: 83
End: 2023-08-01
Payer: COMMERCIAL

## 2023-08-02 ENCOUNTER — NURSE TRIAGE (OUTPATIENT)
Dept: NURSING | Facility: CLINIC | Age: 83
End: 2023-08-02

## 2023-08-02 ENCOUNTER — OFFICE VISIT (OUTPATIENT)
Dept: FAMILY MEDICINE | Facility: CLINIC | Age: 83
End: 2023-08-02
Payer: COMMERCIAL

## 2023-08-02 VITALS
DIASTOLIC BLOOD PRESSURE: 88 MMHG | BODY MASS INDEX: 21.88 KG/M2 | SYSTOLIC BLOOD PRESSURE: 139 MMHG | TEMPERATURE: 98.7 F | WEIGHT: 118.9 LBS | RESPIRATION RATE: 16 BRPM | OXYGEN SATURATION: 97 % | HEART RATE: 120 BPM | HEIGHT: 62 IN

## 2023-08-02 DIAGNOSIS — N39.0 URINARY TRACT INFECTION WITHOUT HEMATURIA, SITE UNSPECIFIED: Primary | ICD-10-CM

## 2023-08-02 LAB
ALBUMIN UR-MCNC: NEGATIVE MG/DL
APPEARANCE UR: CLEAR
BACTERIA #/AREA URNS HPF: ABNORMAL /HPF
BILIRUB UR QL STRIP: NEGATIVE
COLOR UR AUTO: YELLOW
GLUCOSE UR STRIP-MCNC: NEGATIVE MG/DL
HGB UR QL STRIP: NEGATIVE
KETONES UR STRIP-MCNC: NEGATIVE MG/DL
LEUKOCYTE ESTERASE UR QL STRIP: ABNORMAL
NITRATE UR QL: POSITIVE
PH UR STRIP: 6 [PH] (ref 5–7)
RBC #/AREA URNS AUTO: ABNORMAL /HPF
SP GR UR STRIP: 1.02 (ref 1–1.03)
SQUAMOUS #/AREA URNS AUTO: ABNORMAL /LPF
UROBILINOGEN UR STRIP-ACNC: 0.2 E.U./DL
WBC #/AREA URNS AUTO: ABNORMAL /HPF
WBC CLUMPS #/AREA URNS HPF: PRESENT /HPF

## 2023-08-02 PROCEDURE — 81001 URINALYSIS AUTO W/SCOPE: CPT | Performed by: PHYSICIAN ASSISTANT

## 2023-08-02 PROCEDURE — 87186 SC STD MICRODIL/AGAR DIL: CPT | Performed by: PHYSICIAN ASSISTANT

## 2023-08-02 PROCEDURE — 99214 OFFICE O/P EST MOD 30 MIN: CPT | Performed by: PHYSICIAN ASSISTANT

## 2023-08-02 PROCEDURE — 87086 URINE CULTURE/COLONY COUNT: CPT | Performed by: PHYSICIAN ASSISTANT

## 2023-08-02 RX ORDER — CEPHALEXIN 500 MG/1
500 CAPSULE ORAL 2 TIMES DAILY
Qty: 14 CAPSULE | Refills: 0 | Status: SHIPPED | OUTPATIENT
Start: 2023-08-02 | End: 2023-08-09

## 2023-08-02 NOTE — PROGRESS NOTES
"  Assessment & Plan     Urinary tract infection without hematuria, site unspecified    Treat with Keflex. Will await culture for sensitivities.    - UA Macroscopic with reflex to Microscopic and Culture - Lab Collect; Future  - UA Macroscopic with reflex to Microscopic and Culture - Lab Collect  - Urine Microscopic Exam  - Urine Culture  - cephALEXin (KEFLEX) 500 MG capsule; Take 1 capsule (500 mg) by mouth 2 times daily for 7 days                 LINDA Mcintosh [facility]    Quinn Medrano is a 82 year old, presenting for the following health issues:  No chief complaint on file.      HPI     Genitourinary - Female  Onset/Duration: 2 months ago  Description:   Painful urination (Dysuria): No           Frequency: YES  Blood in urine (Hematuria): No  Delay in urine (Hesitency): YES  Intensity: moderate  Progression of Symptoms:  same  Accompanying Signs & Symptoms:  Fever/chills: No  Flank pain: No  Nausea and vomiting: No  Vaginal symptoms: none  Abdominal/Pelvic Pain: No  History:   History of frequent UTI s: YES  History of kidney stones: No  Sexually Active: No  Possibility of pregnancy: No  Precipitating or alleviating factors: None  Therapies tried and outcome: drinking more water- not helpful        Review of Systems   Constitutional, HEENT, cardiovascular, pulmonary, gi and gu systems are negative, except as otherwise noted.        Objective    /88 (BP Location: Right arm, Patient Position: Sitting, Cuff Size: Adult Regular)   Pulse 120   Temp 98.7  F (37.1  C) (Oral)   Resp 16   Ht 1.575 m (5' 2\")   Wt 53.9 kg (118 lb 14.4 oz)   LMP  (LMP Unknown)   SpO2 97%   BMI 21.75 kg/m    Body mass index is 21.75 kg/m .        Physical Exam   GENERAL: healthy, alert and no distress  EYES: Eyes grossly normal to inspection, PERRL and conjunctivae and sclerae normal  RESP: lungs clear to auscultation - no rales, rhonchi or wheezes  CV: regular rate and rhythm, normal " S1 S2, no S3 or S4, no murmur, click or rub, no peripheral edema and peripheral pulses strong  ABDOMEN: soft, nontender, no hepatosplenomegaly, no masses and bowel sounds normal  MS: no gross musculoskeletal defects noted, no edema  SKIN: no suspicious lesions or rashes  NEURO: Normal strength and tone, mentation intact and speech normal  BACK: no CVA tenderness  PSYCH: mentation appears normal, affect normal/bright    Results for orders placed or performed in visit on 08/02/23 (from the past 24 hour(s))   UA Macroscopic with reflex to Microscopic and Culture - Lab Collect    Specimen: Urine, Clean Catch   Result Value Ref Range    Color Urine Yellow Colorless, Straw, Light Yellow, Yellow    Appearance Urine Clear Clear    Glucose Urine Negative Negative mg/dL    Bilirubin Urine Negative Negative    Ketones Urine Negative Negative mg/dL    Specific Gravity Urine 1.020 1.003 - 1.035    Blood Urine Negative Negative    pH Urine 6.0 5.0 - 7.0    Protein Albumin Urine Negative Negative mg/dL    Urobilinogen Urine 0.2 0.2, 1.0 E.U./dL    Nitrite Urine Positive (A) Negative    Leukocyte Esterase Urine Large (A) Negative   Urine Microscopic Exam   Result Value Ref Range    Bacteria Urine Moderate (A) None Seen /HPF    RBC Urine 0-2 0-2 /HPF /HPF    WBC Urine 10-25 (A) 0-5 /HPF /HPF    Squamous Epithelials Urine None Seen None Seen /LPF    WBC Clumps Urine Present (A) None Seen /HPF    Narrative    Microscopic exam performed on unspun urine.

## 2023-08-02 NOTE — TELEPHONE ENCOUNTER
Triage call  Patient calling to report she has been having frequency of urination and not going very much with each time.  Apparently this has been going on for a few weeks and last night she thought maybe this could be a bladder infection.  She denies being dehydrated or fever.    Per protocol see in office today. Care advice given.  Verbalizes understanding and agrees with plan. Transferred to scheduling  for a appointment.      Rox Bryant RN   [facility] Nurse Advisor  6:48 AM 8/2/2023     Reason for Disposition   Urinating more frequently than usual (i.e., frequency)    Additional Information   Negative: Shock suspected (e.g., cold/pale/clammy skin, too weak to stand, low BP, rapid pulse)   Negative: Sounds like a life-threatening emergency to the triager   Negative: Followed a female genital area injury (e.g., vagina, vulva)   Negative: Followed a male genital area injury (penis, scrotum)   Negative: Vaginal discharge   Negative: Pus (white, yellow) or bloody discharge from end of penis   Negative: Pain or burning with passing urine (urination) and pregnant   Negative: Pain or burning with passing urine (urination) and female   Negative: Pain or burning with passing urine (urination) and male   Negative: Pain or itching in the vulvar area   Negative: Pain in scrotum is main symptom   Negative: Blood in the urine is main symptom   Negative: Symptoms arising from use of a urinary catheter (e.g., coude, Zarate)   Negative: Unable to urinate (or only a few drops) > 4 hours and bladder feels very full (e.g., palpable bladder or strong urge to urinate)   Negative: Decreased urination and drinking very little and dehydration suspected (e.g., dark urine, no urine > 12 hours, very dry mouth, very lightheaded)   Negative: Patient sounds very sick or weak to the triager   Negative: Fever > 100.4 F  (38.0 C)   Negative: Side (flank) or lower back pain present   Negative: Can't control passage of urine (i.e.,  urinary incontinence) and new-onset (< 2 weeks) or worsening    Protocols used: Urinary Symptoms-A-OH

## 2023-08-03 LAB — BACTERIA UR CULT: ABNORMAL

## 2023-08-10 ENCOUNTER — OFFICE VISIT (OUTPATIENT)
Dept: URGENT CARE | Facility: URGENT CARE | Age: 83
End: 2023-08-10
Payer: COMMERCIAL

## 2023-08-10 ENCOUNTER — NURSE TRIAGE (OUTPATIENT)
Dept: INTERNAL MEDICINE | Facility: CLINIC | Age: 83
End: 2023-08-10
Payer: COMMERCIAL

## 2023-08-10 ENCOUNTER — ANCILLARY PROCEDURE (OUTPATIENT)
Dept: CT IMAGING | Facility: CLINIC | Age: 83
End: 2023-08-10
Attending: INTERNAL MEDICINE
Payer: COMMERCIAL

## 2023-08-10 VITALS
TEMPERATURE: 97.2 F | WEIGHT: 118 LBS | BODY MASS INDEX: 21.58 KG/M2 | HEART RATE: 82 BPM | DIASTOLIC BLOOD PRESSURE: 70 MMHG | RESPIRATION RATE: 16 BRPM | SYSTOLIC BLOOD PRESSURE: 150 MMHG | OXYGEN SATURATION: 98 %

## 2023-08-10 DIAGNOSIS — N39.0 URINARY TRACT INFECTION WITH HEMATURIA, SITE UNSPECIFIED: ICD-10-CM

## 2023-08-10 DIAGNOSIS — R31.9 URINARY TRACT INFECTION WITH HEMATURIA, SITE UNSPECIFIED: ICD-10-CM

## 2023-08-10 DIAGNOSIS — Z92.25 PERSONAL HISTORY OF IMMUNOSUPPRESSIVE THERAPY: ICD-10-CM

## 2023-08-10 DIAGNOSIS — I10 BENIGN ESSENTIAL HYPERTENSION: ICD-10-CM

## 2023-08-10 DIAGNOSIS — R30.0 DYSURIA: Primary | ICD-10-CM

## 2023-08-10 DIAGNOSIS — R91.8 PULMONARY NODULES: ICD-10-CM

## 2023-08-10 LAB
ALBUMIN UR-MCNC: NEGATIVE MG/DL
AMORPH CRY #/AREA URNS HPF: ABNORMAL /HPF
APPEARANCE UR: CLEAR
BACTERIA #/AREA URNS HPF: ABNORMAL /HPF
BILIRUB UR QL STRIP: NEGATIVE
COLOR UR AUTO: YELLOW
GLUCOSE UR STRIP-MCNC: NEGATIVE MG/DL
HGB UR QL STRIP: NEGATIVE
KETONES UR STRIP-MCNC: NEGATIVE MG/DL
LEUKOCYTE ESTERASE UR QL STRIP: ABNORMAL
NITRATE UR QL: NEGATIVE
PH UR STRIP: 7.5 [PH] (ref 5–7)
RBC #/AREA URNS AUTO: ABNORMAL /HPF
SP GR UR STRIP: 1.02 (ref 1–1.03)
SQUAMOUS #/AREA URNS AUTO: ABNORMAL /LPF
UROBILINOGEN UR STRIP-ACNC: 0.2 E.U./DL
WBC #/AREA URNS AUTO: ABNORMAL /HPF

## 2023-08-10 PROCEDURE — 71250 CT THORAX DX C-: CPT

## 2023-08-10 PROCEDURE — 87086 URINE CULTURE/COLONY COUNT: CPT | Performed by: PHYSICIAN ASSISTANT

## 2023-08-10 PROCEDURE — 99214 OFFICE O/P EST MOD 30 MIN: CPT | Performed by: PHYSICIAN ASSISTANT

## 2023-08-10 PROCEDURE — 81001 URINALYSIS AUTO W/SCOPE: CPT

## 2023-08-10 RX ORDER — CEFDINIR 300 MG/1
300 CAPSULE ORAL 2 TIMES DAILY
Qty: 14 CAPSULE | Refills: 0 | Status: SHIPPED | OUTPATIENT
Start: 2023-08-10 | End: 2023-08-17

## 2023-08-10 NOTE — TELEPHONE ENCOUNTER
Patients called with daughter in law Ebony on the call. Pt reports urinary frequency. Denies Dysuria, back pain, abdominal pain, hematuria or fever. She was seen at  08/02 and finished Keflex abx. Triage advised OV for evaluation of symptoms. Pt agreed to go to  today.     Reason for Disposition   Urinating more frequently than usual (i.e., frequency)    Additional Information   Negative: Followed a female genital area injury (e.g., vagina, vulva)   Negative: Followed a male genital area injury (penis, scrotum)   Negative: Vaginal discharge   Negative: Pus (white, yellow) or bloody discharge from end of penis   Negative: Pain or burning with passing urine (urination) and pregnant   Negative: Pain or burning with passing urine (urination) and female   Negative: Pain or burning with passing urine (urination) and male   Negative: Pain or itching in the vulvar area   Negative: Pain in scrotum is main symptom   Negative: Blood in the urine is main symptom   Negative: Symptoms arising from use of a urinary catheter (e.g., coude, Zarate)   Negative: Unable to urinate (or only a few drops) > 4 hours and bladder feels very full (e.g., palpable bladder or strong urge to urinate)   Negative: Decreased urination and drinking very little and dehydration suspected (e.g., dark urine, no urine > 12 hours, very dry mouth, very lightheaded)   Negative: Patient sounds very sick or weak to the triager   Negative: Fever > 100.4 F  (38.0 C)   Negative: Shock suspected (e.g., cold/pale/clammy skin, too weak to stand, low BP, rapid pulse)   Negative: Sounds like a life-threatening emergency to the triager    Protocols used: Urinary Symptoms-A-OH

## 2023-08-10 NOTE — PROGRESS NOTES
Assessment & Plan     Dysuria    UA pos  Urine culture pending  - UA Macroscopic with reflex to Microscopic and Culture - Clinic Collect  - UA Microscopic with Reflex to Culture  - Urine Culture    Urinary tract infection with hematuria, site unspecified    You have been diagnosed with a UTI.  This is one of the most common infections in women because women have a shorter urethra than men. Bacteria have a shorter distance to travel to reach the bladder.. Women who have gone through menopause also lose the protection from estrogen that lowers the chance of getting a UTI. And some women are at higher risk because of their genes. The most common cause of bladder infections is bacteria from the bowels. The bacteria get onto the skin around the opening of the urethra. From there, they can get into the urine. Then they travel up to the bladder, causing inflammation and infection.  Make sure you urinate after sex, drink plenty of fluids and do not hold in your urine.  We have started you on antibiotics for infection and we are awaiting urine culture results, if there is antibiotic resistance on the culture we will call you and switch antibiotics.     - Urine Culture  - cefdinir (OMNICEF) 300 MG capsule; Take 1 capsule (300 mg) by mouth 2 times daily for 7 days    Benign essential hypertension    Patient advised to follow up with PCP for recheck blood pressure   Information given to patient on diet modifications, including lowering salt in diet, decrease calories, weight loss and exercise.  Monitor blood pressure at home and if BP maintains over 140/90 then advise having a recheck with PCP in 2 weeks.       Personal history of immunosuppressive therapy    Patient is immunocompromised  Will use another round of antibiotics, will choose omnicef for better gram neg coverage and ceftriaxone is susceptible on urine culture    Review of external notes as documented elsewhere in note     At today's visit with Marcela Sandhu , we  discussed results, diagnosis, medications and formulated a plan.  We also discussed red flags for immediate return to clinic/ER, as well as indications for follow up with PCP if not improved in 3 days. Patient understood and agreed to plan. Marcela Sandhu was discharged with stable vitals and has no further questions.       No follow-ups on file.    Kenji Miramontes, [facility], [place]  [facility]    Quinn Medrano is a 82 year old, presenting for the following health issues:  UTI (Pt was seen on 8/2 for a bladder inf and finished cephalexin and is still having sx's )      HPI   Review of Systems   Constitutional, HEENT, cardiovascular, pulmonary, gi and gu systems are negative, except as otherwise noted.      Objective    BP (!) 150/70   Pulse 82   Temp 97.2  F (36.2  C) (Tympanic)   Resp 16   Wt 53.5 kg (118 lb)   LMP  (LMP Unknown)   SpO2 98%   BMI 21.58 kg/m    Body mass index is 21.58 kg/m .  Physical Exam   GENERAL: healthy, alert and no distress  NEURO: Normal strength and tone, mentation intact and speech normal  PSYCH: mentation appears normal, affect normal/bright        Results for orders placed or performed in visit on 08/10/23   UA Macroscopic with reflex to Microscopic and Culture - Clinic Collect     Status: Abnormal    Specimen: Urine, Midstream   Result Value Ref Range    Color Urine Yellow Colorless, Straw, Light Yellow, Yellow    Appearance Urine Clear Clear    Glucose Urine Negative Negative mg/dL    Bilirubin Urine Negative Negative    Ketones Urine Negative Negative mg/dL    Specific Gravity Urine 1.020 1.003 - 1.035    Blood Urine Negative Negative    pH Urine 7.5 (H) 5.0 - 7.0    Protein Albumin Urine Negative Negative mg/dL    Urobilinogen Urine 0.2 0.2, 1.0 E.U./dL    Nitrite Urine Negative Negative    Leukocyte Esterase Urine Trace (A) Negative   UA Microscopic with Reflex to Culture     Status: Abnormal   Result Value Ref Range    Bacteria Urine Few (A) None  Seen /HPF    RBC Urine 2-5 (A) 0-2 /HPF /HPF    WBC Urine 5-10 (A) 0-5 /HPF /HPF    Squamous Epithelials Urine Few (A) None Seen /LPF    Amorphous Crystals Urine Few (A) None Seen /HPF    Narrative    Urine Culture not indicated

## 2023-08-12 LAB — BACTERIA UR CULT: NORMAL

## 2023-08-14 DIAGNOSIS — F32.5 MAJOR DEPRESSION IN COMPLETE REMISSION (H): Chronic | ICD-10-CM

## 2023-08-14 DIAGNOSIS — I10 BENIGN ESSENTIAL HYPERTENSION: ICD-10-CM

## 2023-08-14 RX ORDER — SERTRALINE HYDROCHLORIDE 100 MG/1
200 TABLET, FILM COATED ORAL DAILY
Qty: 180 TABLET | Refills: 1 | Status: SHIPPED | OUTPATIENT
Start: 2023-08-14 | End: 2024-02-15

## 2023-08-14 RX ORDER — AMLODIPINE BESYLATE 5 MG/1
5 TABLET ORAL DAILY
Qty: 90 TABLET | Refills: 3 | Status: SHIPPED | OUTPATIENT
Start: 2023-08-14 | End: 2023-11-01

## 2023-08-15 ENCOUNTER — PATIENT OUTREACH (OUTPATIENT)
Dept: CARE COORDINATION | Facility: CLINIC | Age: 83
End: 2023-08-15
Payer: COMMERCIAL

## 2023-10-17 DIAGNOSIS — F32.5 MAJOR DEPRESSION IN COMPLETE REMISSION (H): Chronic | ICD-10-CM

## 2023-10-17 RX ORDER — MIRTAZAPINE 7.5 MG/1
7.5 TABLET, FILM COATED ORAL AT BEDTIME
Qty: 90 TABLET | Refills: 0 | Status: SHIPPED | OUTPATIENT
Start: 2023-10-17 | End: 2024-01-17

## 2023-10-17 NOTE — TELEPHONE ENCOUNTER
Prescription approved per [facility] Refill Protocol.  Alice Woodson, RN  [facility] Triage Nurse

## 2023-10-30 ENCOUNTER — NURSE TRIAGE (OUTPATIENT)
Dept: INTERNAL MEDICINE | Facility: CLINIC | Age: 83
End: 2023-10-30
Payer: COMMERCIAL

## 2023-10-30 NOTE — TELEPHONE ENCOUNTER
Do not think it is the amlodipine.    May use any virtual or virtual release spot for an in-person visit.     Patient may also be seen at noon (arrival time 11:40am) Mondays, Wednesdays, Thursdays, and Fridays OR at 1:00pm (arrival time 12:40pm) on Thursdays (during the huddle).    Please do not schedule in a same day, next day, or hospital follow-up slot.    Okay to double book lunch slot (but patient should still arrive by 11:40am).

## 2023-10-30 NOTE — TELEPHONE ENCOUNTER
Pt will callback clinic to schedule an appointment once she has determined if transportation is available. Pt's children are out of town right now, they normally get her to her appointments.

## 2023-10-30 NOTE — TELEPHONE ENCOUNTER
"Nurse Triage SBAR    Is this a 2nd Level Triage? YES, LICENSED PRACTITIONER REVIEW IS REQUIRED    Situation: Patient calling about having a chronic cough. She feels the origin is her amlodipine which she has been taking for a year. She described she is having such bad coughing fits with \"flem\" and making a \"whooping sound\". Patient having Mild SOB at rest.     Background: Patient convinced due to her heart med rather.     Assessment:     Protocol Recommended Disposition:   See in Office Today    Recommendation: needs to be seen     Routed to provider    Does the patient meet one of the following criteria for ADS visit consideration? No    Reason for Disposition   SEVERE coughing spells (e.g., whooping sound after coughing, vomiting after coughing)    Additional Information   Negative: SEVERE difficulty breathing (e.g., struggling for each breath, speaks in single words)   Negative: Lips or face are bluish now and persists when not coughing   Negative: Sounds like a life-threatening emergency to the triager   Negative: Chest pain is main symptom   Negative: Cough and < 3 weeks duration   Negative: Previous asthma attacks and this feels like an asthma attack   Negative: MODERATE difficulty breathing (e.g., speaks in phrases, SOB even at rest, pulse 100-120) and still present when not coughing   Negative: Chest pain  (Exception: MILD central chest pain, present only when coughing.)   Negative: Increasing difficulty breathing and always has some difficulty breathing   Negative: Patient sounds very sick or weak to the triager   Negative: MILD difficulty breathing (e.g., minimal/no SOB at rest, SOB with walking, pulse < 100) and still present when not coughing  (Exception: No change from usual, chronic shortness of breath.)   Negative: Coughed up blood and > 1 tablespoon (15 ml)  (Exception: Blood-tinged sputum.)   Negative: Fever > 103 F (39.4 C)   Negative: Fever > 101 F (38.3 C) and age > 60 years   Negative: Fever > " "100.0 F (37.8 C) and bedridden (e.g., CVA, chronic illness, recovering from surgery)   Negative: Fever > 100.0 F (37.8 C) and diabetes mellitus or weak immune system (e.g., HIV positive, cancer chemo, splenectomy, organ transplant, chronic steroids)    Answer Assessment - Initial Assessment Questions  1. ONSET: \"When did the cough begin?\"       Shortly after starting med- amlodipine   2. SEVERITY: \"How bad is the cough today?\"       Did not take med today feeling good   3. SPUTUM: \"Describe the color of your sputum\" (none, dry cough; clear, white, yellow, green)      Flem   4. HEMOPTYSIS: \"Are you coughing up any blood?\" If so ask: \"How much?\" (flecks, streaks, tablespoons, etc.)      nope  5. DIFFICULTY BREATHING: \"Are you having difficulty breathing?\" If Yes, ask: \"How bad is it?\" (e.g., mild, moderate, severe)     - MILD: No SOB at rest, mild SOB with walking, speaks normally in sentences, can lie down, no retractions, pulse < 100.     - MODERATE: SOB at rest, SOB with minimal exertion and prefers to sit, cannot lie down flat, speaks in phrases, mild retractions, audible wheezing, pulse 100-120.     - SEVERE: Very SOB at rest, speaks in single words, struggling to breathe, sitting hunched forward, retractions, pulse > 120       Mild SOB  6. FEVER: \"Do you have a fever?\" If Yes, ask: \"What is your temperature, how was it measured, and when did it start?\"       HUGO   7. CARDIAC HISTORY: \"Do you have any history of heart disease?\" (e.g., heart attack, congestive heart failure)       Nope   8. LUNG HISTORY: \"Do you have any history of lung disease?\"  (e.g., pulmonary embolus, asthma, emphysema)      Nope   9. PE RISK FACTORS: \"Do you have a history of blood clots?\" (or: recent major surgery, recent prolonged travel, bedridden)      nope  10. OTHER SYMPTOMS: \"Do you have any other symptoms?\" (e.g., runny nose, wheezing, chest pain)        Coughing- runny nose and eyes   11. PREGNANCY: \"Is there any chance you are " "pregnant?\" \"When was your last menstrual period?\"        No  12. TRAVEL: \"Have you traveled out of the country in the last month?\" (e.g., travel history, exposures)        never    Protocols used: Cough - Chronic-A-OH    "

## 2023-10-31 PROBLEM — M06.9 RA (RHEUMATOID ARTHRITIS) (H): Status: ACTIVE | Noted: 2023-10-31

## 2023-11-01 ENCOUNTER — OFFICE VISIT (OUTPATIENT)
Dept: INTERNAL MEDICINE | Facility: CLINIC | Age: 83
End: 2023-11-01
Payer: COMMERCIAL

## 2023-11-01 VITALS
DIASTOLIC BLOOD PRESSURE: 80 MMHG | OXYGEN SATURATION: 97 % | RESPIRATION RATE: 16 BRPM | HEIGHT: 62 IN | BODY MASS INDEX: 22.39 KG/M2 | WEIGHT: 121.7 LBS | SYSTOLIC BLOOD PRESSURE: 166 MMHG | HEART RATE: 80 BPM

## 2023-11-01 DIAGNOSIS — R05.3 CHRONIC COUGH: ICD-10-CM

## 2023-11-01 DIAGNOSIS — Z00.00 MEDICARE ANNUAL WELLNESS VISIT, SUBSEQUENT: Primary | ICD-10-CM

## 2023-11-01 DIAGNOSIS — I10 BENIGN ESSENTIAL HYPERTENSION: ICD-10-CM

## 2023-11-01 PROCEDURE — G0439 PPPS, SUBSEQ VISIT: HCPCS | Performed by: INTERNAL MEDICINE

## 2023-11-01 PROCEDURE — 99214 OFFICE O/P EST MOD 30 MIN: CPT | Mod: 25 | Performed by: INTERNAL MEDICINE

## 2023-11-01 RX ORDER — IRBESARTAN 150 MG/1
150 TABLET ORAL AT BEDTIME
Qty: 90 TABLET | Refills: 3 | Status: SHIPPED | OUTPATIENT
Start: 2023-11-01 | End: 2024-09-23

## 2023-11-01 RX ORDER — DONEPEZIL HYDROCHLORIDE 10 MG/1
10 TABLET, FILM COATED ORAL DAILY
COMMUNITY
Start: 2023-10-02

## 2023-11-01 ASSESSMENT — PATIENT HEALTH QUESTIONNAIRE - PHQ9
SUM OF ALL RESPONSES TO PHQ QUESTIONS 1-9: 1
SUM OF ALL RESPONSES TO PHQ QUESTIONS 1-9: 1
10. IF YOU CHECKED OFF ANY PROBLEMS, HOW DIFFICULT HAVE THESE PROBLEMS MADE IT FOR YOU TO DO YOUR WORK, TAKE CARE OF THINGS AT HOME, OR GET ALONG WITH OTHER PEOPLE: NOT DIFFICULT AT ALL

## 2023-11-01 NOTE — PROGRESS NOTES
ASSESSMENT/PLAN                                                       (Z00.00) Medicare annual wellness visit, subsequent  (primary encounter diagnosis)  Comment: PMH, PSH, FH, SH, medications, allergies, immunizations, and preventative health measures reviewed and updated as appropriate.  Plan: see below for plans.      (I10) Benign essential hypertension  Comment: poorly controlled without medication; patient discontinued her amlodipine because she believed it was causing her cough (see below).   Plan: START irbesartan 150 mg daily; blood pressure follow-up and BMP in 2-4 weeks.    (R05.3) Chronic cough  Comment: patient believes chronic cough is related to amlodipine in spite of ongoing cough after stopping amlodipine (though cough has improved somewhat).  Plan: if cough worsens, changes, or does not improve, patient to contact MD for further evaluation.    Appropriate preventive services were discussed with this patient, including applicable screening as appropriate for cardiovascular disease, diabetes, osteopenia/osteoporosis, and glaucoma.  As appropriate for age/gender, discussed screening for colorectal cancer, prostate cancer, breast cancer, and cervical cancer. Checklist reviewing preventive services available has been given to the patient.    Reviewed patients plan of care. The Basic Care Plan (routine screening as documented in Health Maintenance) for Marcela Sandhu meets the Care Plan requirement. This Care Plan has been established and reviewed with the Patient.    Kelley Haney MD   [address] [address]  T: [phone], F: [phone]    SUBJECTIVE                                                      Marcela Sandhu is a very pleasant 83 year old female who presents for her subsequent AWV:    Accompanied by son.    Patient stopped her amlodipine several days ago due to an ongoing cough.  She believes amlodipine is the cause of her ongoing cough and  reports some improvement in her cough since stopping amlodipine.  She continues to cough, however.     Her blood pressure is elevated today. She is asymptomatic from a blood pressure perspective: no chest pain or palpitations, no shortness of breath, no light-headedness or dizziness, no headaches or vision changes.     Current providers (other than myself): n/a     PMH, PSH, , , medications, allergies, immunizations, preventative health, and health risk assessment reviewed and updated as appropriate.    Past Medical History:   Diagnosis Date    Acid reflux disease     Benign essential hypertension     Benign essential tremor     History of hepatitis C virus infection     treated with interferon    MDD (major depressive disorder)     Mild cognitive impairment     Osteopenia     Peripheral neuropathy     Personal history of malignant neoplasm of breast 2007    RA (rheumatoid arthritis) (H)     Restless legs syndrome (RLS)      Past Surgical History:   Procedure Laterality Date    APPENDECTOMY      ARTHROPLASTY CARPOMETACARPAL (THUMB JOINT) Bilateral     ARTHROPLASTY KNEE Right     BLEPHAROPLASTY BILATERAL      BUNIONECTOMY CHEVRON AND PEBBLES BILATERAL, COMBINED  12/02/2011    Procedure:COMBINED BUNIONECTOMY CHEVRON AND PEBBLES BILATERAL; BILATERAL THIRD AND FOURTH CLAWTOE RECONSTRUCTION WITH EXOSTECTOMY, LEFT FIRST TARSOMETATARSAL JOINT and 2nd toe Right foot reconstruction; Surgeon:REMBERTO FARMER; Location: OR    CARPAL TUNNEL RELEASE RT/LT Bilateral     COLONOSCOPY  10/18/2011    Procedure:COLONOSCOPY; COLONOSCOPY; Surgeon:BRENDA RODRIGUEZ; Location: GI    ELBOW SURGERY      tendon lengthening surgery    ESOPHAGOSCOPY, GASTROSCOPY, DUODENOSCOPY (EGD), COMBINED N/A 3/31/2023    Procedure: Esophagoscopy, gastroscopy, duodenoscopy (EGD), combined;  Surgeon: Remberto Aguero MD;  Location:  GI    FOOT SURGERY Bilateral     toe straightening    MASTECTOMY, BILATERAL      SLING BLADDER SUSPENSION WITH  FASCIA ZACH      SPINE SURGERY      multiple lumbar surgeries; most hardware removed     Family History   Problem Relation Age of Onset    Hypertension Mother     Hypertension Father     Multiple myeloma Brother     Bone Cancer Brother     Cerebrovascular Disease Brother 60    Diabetes No family hx of     Myocardial Infarction No family hx of     Breast Cancer No family hx of     Ovarian Cancer No family hx of     Colon Cancer No family hx of      Social History     Occupational History    Occupation: Retired - [employer], [employer]   Tobacco Use    Smoking status: Never    Smokeless tobacco: Never   Vaping Use    Vaping Use: Never used   Substance and Sexual Activity    Alcohol use: No    Drug use: No    Sexual activity: Not Currently   Social History Narrative    .    Adult son.    Two grandchildren.    Partial assisted living - [facility].     Arthritis exercise class 4-6 days/week.      Allergies   Allergen Reactions    Acetaminophen GI Disturbance    Citalopram Unknown    Hydrocodone GI Disturbance    Levaquin [Levofloxacin Hemihydrate] Other (See Comments) and Rash     chest pain    Sulfa Antibiotics GI Disturbance     Current Outpatient Medications   Medication Sig    buPROPion (WELLBUTRIN XL) 300 MG 24 hr tablet TAKE 1 TABLET (300 MG) BY MOUTH ONCE DAILY IN THE MORNING    donepezil (ARICEPT) 10 MG tablet Take 10 mg by mouth daily    folic acid (FOLVITE) 1 MG tablet Take 1 mg by mouth daily    hydrOXYzine (ATARAX) 10 MG tablet Take 1 tablet (10 mg) by mouth 3 times daily as needed for anxiety    irbesartan (AVAPRO) 150 MG tablet Take 1 tablet (150 mg) by mouth at bedtime    methotrexate sodium 2.5 MG TABS Take 4 tablets once a week    mirtazapine (REMERON) 7.5 MG tablet TAKE 1 TABLET (7.5 MG) BY MOUTH AT BEDTIME    NABUMETONE PO Take 750 mg by mouth 2 times daily    sertraline (ZOLOFT) 100 MG tablet TAKE 2 TABLETS (200 MG) BY MOUTH DAILY    tiZANidine (ZANAFLEX) 2 MG tablet TAKE 1  TABLET BY MOUTH THREE TIMES DAILY AS NEEDED FOR MUSCLE SPASMS    ARIPiprazole (ABILIFY) 5 MG tablet Take 1 tablet (5 mg) by mouth daily     Immunization History   Administered Date(s) Administered    COVID-19 12+ (2023-24) (MODERNA) 10/20/2023    COVID-19 Bivalent 12+ (Pfizer) 11/10/2022    COVID-19 Monovalent 18+ (Moderna) 01/09/2021, 02/13/2021, 11/19/2021, 05/10/2022    HEPA 06/17/2002, 12/06/2002    HepB 06/17/2002, 12/06/2002, 07/18/2007    Influenza (H1N1) 01/15/2010    Influenza (High Dose) 3 valent vaccine 10/08/2015, 10/11/2016, 09/28/2017, 09/25/2019, 10/30/2020    Influenza (IIV3) PF 04/16/2001, 12/10/2004, 10/24/2005, 12/04/2007, 09/24/2010, 09/06/2011, 09/04/2012, 09/03/2013, 10/01/2014    Influenza Vaccine 65+ (FLUAD) 09/15/2021, 09/08/2022, 09/07/2023    Influenza Vaccine 65+ (Fluzone HD) 10/30/2020    Influenza Vaccine >6 months (Alfuria,Fluzone) 10/10/2014    Influenza Vaccine Im 4yrs+ 4 Valent CCIIV4 09/26/2018, 09/25/2019    Influenza,INJ,MDCK,PF,Quad >6mo(Flucelvax) 09/28/2017    Pneumo Conj 13-V (2010&after) 03/22/2017    Pneumococcal 23 valent 01/01/1997, 02/19/2007, 07/08/2013    TD,PF 7+ (Tenivac) 01/01/1997, 02/19/2007    TDAP (Adacel,Boostrix) 01/01/2007    TDAP Vaccine (Adacel) 03/22/2017    Td (Adult), Adsorbed 10/13/1997    Zoster recombinant adjuvanted (SHINGRIX) 10/24/2019, 12/24/2019    Zoster vaccine, live 06/08/2007     PREVENTATIVE HEALTH                                                      BMI: within normal limits   Blood pressure: elevated - not on medication  Breast CA screening: screening no longer indicated  Colon CA screening: screening no longer indicated  Lung CA screening: n/a   Dexa: up to date   Screening cholesterol: screening no longer indicated   Screening diabetes: up to date   Alcohol misuse screening: alcohol use reviewed - no intervention indicated at this time  Immunizations: reviewed; up to date     HEALTH RISK ASSESSMENT                                          "             In general, how would you rate your overall physical health? good  Outside of work, how many days during the week do you exercise? None  Outside of work, approximately how many minutes a day do you exercise? n/a     If you drink alcohol do you typically have >3 drinks per day or >7 drinks per week? No  Do you usually eat at least 4 servings of fruit and vegetables a day, include whole grains & fiber and avoid regularly eating high fat or \"junk\" foods? Yes     Do you have any problems taking medications regularly? No  Do you have any side effects from medications? No    Assistance with daily activities: No    Safety concerns: No    Fall risk assessment: completed today (see ambulatory assessments)    Hearing concerns: No    In the past 6 months, have you been bothered by leaking of urine: No    In general, how would you rate your overall mental or emotional health: good    Do you have a current opioid prescription? No  Do you use any other controlled substances or medications that are not prescribed by a provider? None    PHQ-2/PHQ-9 assessment: completed today (see ambulatory assessments)    Additional concerns today: No    OBJECTIVE                                                      BP (!) 166/80   Pulse 80   Resp 16   Ht 1.575 m (5' 2\")   Wt 55.2 kg (121 lb 11.2 oz)   LMP  (LMP Unknown)   SpO2 97%   BMI 22.26 kg/m    Constitutional: well-appearing  Head, Ears, and Eyes: normocephalic; normal external auditory canal and pinna; tympanic membranes visualized and normal; normal lids and conjunctivae  Neck: supple, symmetric, no thyromegaly or lymphadenopathy  Respiratory: normal respiratory effort; clear to auscultation bilaterally  Cardiovascular: regular rate and rhythm; no edema  Psych: normal judgment and insight; normal mood and affect; recent and remote memory intact    Cognitive impairment noted: No  ---  (Note was completed, in part, with [employer] voice-recognition software. Documentation " was reviewed, but some grammatical, spelling, and word errors may remain.)

## 2023-11-01 NOTE — PATIENT INSTRUCTIONS
STOP amlodipine.    START irbesartan 150mg once daily.    Blood pressure follow-up in 2-4 weeks (please schedule on the way out).

## 2023-11-06 DIAGNOSIS — F32.5 MAJOR DEPRESSION IN COMPLETE REMISSION (H): Chronic | ICD-10-CM

## 2023-11-06 RX ORDER — BUPROPION HYDROCHLORIDE 300 MG/1
TABLET ORAL
Qty: 90 TABLET | Refills: 1 | Status: SHIPPED | OUTPATIENT
Start: 2023-11-06 | End: 2024-05-29

## 2023-11-15 ENCOUNTER — OFFICE VISIT (OUTPATIENT)
Dept: INTERNAL MEDICINE | Facility: CLINIC | Age: 83
End: 2023-11-15
Payer: COMMERCIAL

## 2023-11-15 VITALS
HEART RATE: 84 BPM | DIASTOLIC BLOOD PRESSURE: 72 MMHG | WEIGHT: 121 LBS | HEIGHT: 62 IN | SYSTOLIC BLOOD PRESSURE: 158 MMHG | OXYGEN SATURATION: 92 % | RESPIRATION RATE: 18 BRPM | BODY MASS INDEX: 22.26 KG/M2

## 2023-11-15 DIAGNOSIS — F33.42 RECURRENT MAJOR DEPRESSIVE DISORDER, IN FULL REMISSION (H): ICD-10-CM

## 2023-11-15 DIAGNOSIS — I10 BENIGN ESSENTIAL HYPERTENSION: Primary | ICD-10-CM

## 2023-11-15 LAB
ANION GAP SERPL CALCULATED.3IONS-SCNC: 9 MMOL/L (ref 7–15)
BUN SERPL-MCNC: 27.8 MG/DL (ref 8–23)
CALCIUM SERPL-MCNC: 9.6 MG/DL (ref 8.8–10.2)
CHLORIDE SERPL-SCNC: 105 MMOL/L (ref 98–107)
CREAT SERPL-MCNC: 1.14 MG/DL (ref 0.51–0.95)
DEPRECATED HCO3 PLAS-SCNC: 29 MMOL/L (ref 22–29)
EGFRCR SERPLBLD CKD-EPI 2021: 48 ML/MIN/1.73M2
GLUCOSE SERPL-MCNC: 92 MG/DL (ref 70–99)
POTASSIUM SERPL-SCNC: 4.3 MMOL/L (ref 3.4–5.3)
SODIUM SERPL-SCNC: 143 MMOL/L (ref 135–145)

## 2023-11-15 PROCEDURE — 36415 COLL VENOUS BLD VENIPUNCTURE: CPT | Performed by: INTERNAL MEDICINE

## 2023-11-15 PROCEDURE — 99213 OFFICE O/P EST LOW 20 MIN: CPT | Performed by: INTERNAL MEDICINE

## 2023-11-15 PROCEDURE — 80048 BASIC METABOLIC PNL TOTAL CA: CPT | Performed by: INTERNAL MEDICINE

## 2023-11-15 RX ORDER — ARIPIPRAZOLE 5 MG/1
5 TABLET ORAL DAILY
Qty: 90 TABLET | Refills: 3 | Status: SHIPPED | OUTPATIENT
Start: 2023-11-15

## 2023-11-15 NOTE — PATIENT INSTRUCTIONS
Blood test today.    Home care nurse ordered - you will be contacted to schedule a visit.    If home blood pressures are elevated (>130s/70s), please let me know.

## 2023-11-15 NOTE — PROGRESS NOTES
"  ASSESSMENT/PLAN                                                      (I10) Benign essential hypertension  (primary encounter diagnosis)  Comment: reportedly within normal limits at home, though elevated here today, even on repeat; there is also some confusion re: medications.  Plan: BMP today;    (F33.42) Recurrent major depressive disorder, in full remission (H24)  Comment: well-controlled on current regimen.    Plan: continue present management; refills provided.     Kelley Haney MD   [facility]  [address] [place] [id number]  T: [phone], F: [phone]    SUBJECTIVE                                                      Marcela Sandhu is a very pleasant 83 year old female who presents for blood pressure follow-up:    Accompanied by daughter.    Patient was started on irbesartan 150 mg daily at last visit due to elevated blood pressure. Tolerating medication(s) well - no adverse side effects. Per report, blood pressures are within normal limits at home. They continue to be elevated here today. There seems to be some confusion about her medications. She is asymptomatic from a blood pressure perspective: no chest pain or palpitations, no shortness of breath, no light-headedness or dizziness, no headaches or vision changes.     Unrelated to above, patient has a history of difficult to manage depression.  Her depression was previously poorly controlled on Zoloft, Wellbutrin, and Remeron. After adding Abilify last year, patient reports that her depression is well controlled.    OBJECTIVE                                                      BP (!) 158/72   Pulse 84   Resp 18   Ht 1.575 m (5' 2\")   Wt 54.9 kg (121 lb)   LMP  (LMP Unknown)   SpO2 92%   BMI 22.13 kg/m    Constitutional: well-appearing  Psych: normal judgment and insight; normal mood and affect; recent and remote memory intact    ---    (Note documentation was completed, in part, with [employer] voice-recognition " software. Documentation was reviewed, but some grammatical, spelling, and word errors may remain.)

## 2023-12-07 ENCOUNTER — TELEPHONE (OUTPATIENT)
Dept: INTERNAL MEDICINE | Facility: CLINIC | Age: 83
End: 2023-12-07
Payer: COMMERCIAL

## 2023-12-07 ENCOUNTER — HOSPITAL ENCOUNTER (EMERGENCY)
Facility: CLINIC | Age: 83
Discharge: HOME | End: 2023-12-07
Attending: EMERGENCY MEDICINE | Admitting: EMERGENCY MEDICINE
Payer: COMMERCIAL

## 2023-12-07 ENCOUNTER — APPOINTMENT (OUTPATIENT)
Dept: CT IMAGING | Facility: CLINIC | Age: 83
End: 2023-12-07
Attending: EMERGENCY MEDICINE
Payer: COMMERCIAL

## 2023-12-07 VITALS
SYSTOLIC BLOOD PRESSURE: 173 MMHG | OXYGEN SATURATION: 98 % | TEMPERATURE: 98.3 F | DIASTOLIC BLOOD PRESSURE: 79 MMHG | RESPIRATION RATE: 16 BRPM | HEART RATE: 76 BPM

## 2023-12-07 DIAGNOSIS — S22.060A COMPRESSION FRACTURE OF T8 VERTEBRA, INITIAL ENCOUNTER (H): Primary | ICD-10-CM
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
## 2023-12-07 DIAGNOSIS — S09.90XA CLOSED HEAD INJURY, INITIAL ENCOUNTER: ICD-10-CM

## 2023-12-07 DIAGNOSIS — R07.89 RIGHT-SIDED CHEST WALL PAIN: ICD-10-CM

## 2023-12-07 PROCEDURE — 71250 CT THORAX DX C-: CPT

## 2023-12-07 PROCEDURE — 72125 CT NECK SPINE W/O DYE: CPT

## 2023-12-07 PROCEDURE — 250N000013 HC RX MED GY IP 250 OP 250 PS 637: Performed by: EMERGENCY MEDICINE

## 2023-12-07 PROCEDURE — 72128 CT CHEST SPINE W/O DYE: CPT

## 2023-12-07 PROCEDURE — 22310 CLOSED TX VERT FX W/O MANJ: CPT

## 2023-12-07 PROCEDURE — 99284 EMERGENCY DEPT VISIT MOD MDM: CPT | Mod: 25

## 2023-12-07 PROCEDURE — 70450 CT HEAD/BRAIN W/O DYE: CPT

## 2023-12-07 RX ORDER — ACETAMINOPHEN 500 MG
1000 TABLET ORAL ONCE
Status: COMPLETED | OUTPATIENT
Start: 2023-12-07 | End: 2023-12-07

## 2023-12-07 RX ADMIN — ACETAMINOPHEN 1000 MG: 500 TABLET, FILM COATED ORAL at 06:53

## 2023-12-07 ASSESSMENT — ACTIVITIES OF DAILY LIVING (ADL)
ADLS_ACUITY_SCORE: 35
ADLS_ACUITY_SCORE: 35

## 2023-12-07 NOTE — ED PROVIDER NOTES
History   Chief Complaint:  Fall     The history is provided by the patient.     Marcela Sandhu is a 83 year old female with history of hypertension and rheumatoid arthritis who presents to the ED via EMS from assisted living for evaluation after an unwitnessed mechanical fall. Marcela reports she got up from bed this morning around [id number] to use the restroom, but fell as she was grabbing her walker. She fell on her bottom and hit her head on the closet door. She was not able to get up independently so she used a pendant for assistance. She denies any symptoms prior to the fall. She currently endorses right-sided lower rib pain. No loss of consciousness, chest pain, abdominal pain, neck pain, back pain, or pain to extremities. She mentions she falls about twice yearly but has never injured herself like this. No blood thinners use. No history of frequent UTI's.     Independent Historian:   None - Patient Only    Medications:    Abilify  Wellbutrin  Aricept  Folvite  Avapro  Remeron  Zoloft  Zanaflex    Past Medical History:    Acid reflux disease  Benign essential hypertension  Benign essential tremor  Hepatitis C  MDD  Mild cognitive impairment  Osteopenia  Peripheral neuropathy  Malignant neoplasm of breast  Rheumatoid arthritis  RLS    Past Surgical History:    Appendectomy  Bilateral arthroplasty carpometacarpal  Right knee arthroplasty  Bilateral blepharoplasty  Bunionectomy chevron and jean bilateral  Bilateral carpal tunnel release  Elbow tendon lengthening surgery  Toe straightening surgery  Mastectomy, bilateral  Sling bladder suspension with fascia jonathan  Multiple lumbar surgeries    Physical Exam   Patient Vitals for the past 24 hrs:   BP Temp Temp src Pulse Resp SpO2   12/07/23 0700 -- 98.3  F (36.8  C) Oral -- 16 --   12/07/23 0650 (!) 173/79 -- -- 76 -- 98 %      Physical Exam  General: Alert, appears well-developed and well-nourished. Cooperative.     In mild  distress  HEENT:  Head:  Atraumatic  Ears:  External ears are normal  Mouth/Throat:  Oropharynx is without erythema or exudate and mucous membranes are moist.   Eyes:   Conjunctivae normal and EOM are normal. No scleral icterus.    Pupils are equal, round, and reactive to light.   Neck:   Normal range of motion. Neck supple.  CV:  Normal rate, regular rhythm, normal heart sounds and radial pulses are 2+ and symmetric.  Systolic murmur.  Resp:  Breath sounds are clear bilaterally    Non-labored, no retractions or accessory muscle use  GI:  Abdomen is soft, no distension, no tenderness. No rebound or guarding.  No CVA tenderness bilaterally  MS:  Normal range of motion. No edema.    Normal strength in all 4 extremities.     There is right sided lower, lateral chest wall tenderness without crepitus.      Back atraumatic.    No midline cervical, thoracic, or lumbar tenderness  Skin:  Warm and dry.  No rash or lesions noted.  Neuro:   Alert. Normal strength.  GCS: 15  Psych: Normal mood and affect.    Emergency Department Course     Imaging:  CT Head w/o Contrast   Final Result   IMPRESSION:    1. No CT evidence for acute intracranial process.   2. Brain atrophy and presumed sequela of chronic microvascular   ischemic disease, as above.      BORA TORRES DO            SYSTEM ID:  [id number]      CT Cervical Spine w/o Contrast   Final Result   IMPRESSION: No acute fracture or posttraumatic subluxation.      BORA TORRES DO            SYSTEM ID:  [id number]      Chest CT w/o contrast   Preliminary Result   IMPRESSION:    1.  New finding of a T8 compression fracture deformity since 8/10/2023   worrisome for an acute fracture.   2.  No other acute traumatic abnormality identified.   3.  A few small pulmonary nodules again noted. Some of these tiny   nodules are newly identified.      Recommendations for one or multiple incidental lung nodules < 6mm:     Low risk patients: No routine follow-up.     High risk patients:  Optional follow-up CT at 12 months; if   unchanged, no further follow-up.      *Low Risk: Minimal or absent history of smoking or other known risk   factors.   *Nonsolid (ground glass) or partly solid nodules may require longer   follow-up to exclude indolent adenocarcinoma.   *Recommendations based on Guidelines for the Management of Incidental   Pulmonary Nodules Detected at CT: From the Fleischner Society 2017,   Radiology 2017.      CT Thoracic Spine w/o Contrast   Final Result   IMPRESSION:   1. Acute-appearing T8 vertebral body fracture. No significant   comminution or retropulsed fragmentation.   2. No posttraumatic subluxation. Chronic-appearing mild T5 compression   deformity.      BORA TORRES DO            SYSTEM ID:  [id number]         Report per radiology    Laboratory:  Labs Ordered and Resulted from Time of ED Arrival to Time of ED Departure - No data to display     Procedures     Emergency Department Course & Assessments:    Interventions:  Medications   acetaminophen (TYLENOL) tablet 1,000 mg (1,000 mg Oral $Given 12/7/23 0653)     Assessments:  0640 I obtained history and examined the patient as noted above.     Independent Interpretation (X-rays, CTs, rhythm strip):  I independently reviewed CT imaging of the head which shows no acute intracranial hemorrhage.    Consultations/Discussion of Management or Tests:  None    Social Determinants of Health affecting care:   None    Disposition:  The patient was discharged to home.     Impression & Plan    CMS Diagnoses: None    Medical Decision Making:  Patient is a 83-year-old female who presents after a fall at home when attempted to use the bathroom earlier this morning.  She thinks she twisted and hit the back of her head as well as her upper back when she did fall.  She is complaining of right-sided chest wall pain.  A broad trauma workup ensued.  Thankfully CT imaging of the head shows no acute intracranial hemorrhage.  No skull fracture.  CT  C-spine shows no evidence of acute fracture or subluxation.  CT imaging of the chest revealed no sign of rib fractures or intrathoracic injury.  T-spine imaging does show an acute appearing T8 vertebral body fracture with no significant comminution or retropulsed fragmentation.  Plan for close outpatient follow-up with our spine clinic for this newly diagnosed T8 compression fracture.  Her pain is well-controlled with Tylenol alone.  We discussed use of opiates but elected to stick with just acetaminophen only.  She will follow-up with the spine clinic in the near future for recheck from this newly diagnosed T8 compression fracture.  She was able to ambulate well prior to consideration of disposition.  Patient will be discharged home with close outpatient follow-up.  We did offer workup in terms of blood work or urinalysis although patient states she has had falls in the past and does not want additional blood work or urine obtained today as she has low concern for other etiologies.  She has been eating and drinking normally.  Her vital signs are stable here.  After all questions answered and return precautions understood, discharged home.    Diagnosis:    ICD-10-CM    1. Compression fracture of T8 vertebra, initial encounter (H)  S22.060A       2. Fall, initial encounter  W19.XXXA       3. Right-sided chest wall pain  R07.89       4. Closed head injury, initial encounter  S09.90XA           Discharge Medications:  New Prescriptions    No medications on file        Scribe Disclosure:  Joseph FISHER Hailie, am serving as a scribe at 6:57 AM on 12/7/2023 to document services personally performed by Remberto Thomson MD based on my observations and the provider's statements to me.   12/7/2023   Remberto Thomson MD White, Scott, MD  12/07/23 7431

## 2023-12-07 NOTE — ED NOTES
Bed: ED25  Expected date: 12/7/23  Expected time: 6:25 AM  Means of arrival: Ambulance  Comments:  [address] 83F fall; rib pain

## 2023-12-07 NOTE — TELEPHONE ENCOUNTER
"Received a call from the patient's son, Jeromy, stating the patient was seen in the ER this morning following a fall and she was found to have a fracture in the T8 vertebra. Jeromy is wondering if he needs to schedule an appointment with Dr. Haney before scheduling an appointment at the [facility]. Advised Jeromy to schedule an appointment with the specialist. If any additional concerns arise, give the clinic a call back and an appointment can be scheduled with Dr. Haney. Jeromy did make note that the patient seems to be \"doing well.\"     Jeromy expressed understanding and had no additional questions at this time.     Alice Woodson RN  "

## 2023-12-07 NOTE — ED TRIAGE NOTES
Pt got up to use the bathroom. She tripped on her sock and fell. She thinks she somehow twisted and hit the back of her head when she fell. She is c/o pain in her ribs with pain worse on the right side. Pt called EMS and was unable to get up on her own. Not on blood thinners.      Triage Assessment (Adult)       Row Name 12/07/23 0640          Triage Assessment    Airway WDL WDL        Respiratory WDL    Respiratory WDL WDL        Cardiac WDL    Cardiac WDL WDL        Peripheral/Neurovascular WDL    Peripheral Neurovascular WDL WDL        Cognitive/Neuro/Behavioral WDL    Cognitive/Neuro/Behavioral WDL WDL

## 2023-12-08 ENCOUNTER — PATIENT OUTREACH (OUTPATIENT)
Dept: CARE COORDINATION | Facility: CLINIC | Age: 83
End: 2023-12-08
Payer: COMMERCIAL

## 2023-12-08 NOTE — PROGRESS NOTES
Clinic Care Coordination Contact  Community Health Worker Initial Outreach    CHW Initial Information Gathering:  Referral Source: ED Follow-Up  CHW Additional Questions  MyChart active?: Yes    Patient accepts CC: No, patient declined at this time. Patient will be sent Care Coordination introduction letter for future reference.       Kavya Rodríguez  Community Health Worker  [facility], [facility]    *Connected Care Resource Team does NOT follow patient ongoing. Referrals are identified based on internal discharge reports and the outreach is to ensure patient has an understanding of their discharge instructions.

## 2023-12-08 NOTE — LETTER
Marcela Sandhu  [address]   [place] [address]    Dear Marcela Sandhu,      I am a team member within the Connected [facility] Center with [facility]. I recently contacted you to ensure you are doing well following a recent visit within our health system. I also wanted to take this chance to introduce Clinic Care Coordination should you have any interest in this program in the future.    Below is a description of Clinic Care Coordination and how this team can further assist you:       The Clinic Care Coordination team is made up of a Registered Nurse, , Financial Resource Worker, and a Community Health Worker who understand and can help navigate the health care system. The goal of clinic care coordination is to help you manage your health, improve access to care, and achieve optimal health outcomes. They work alongside your provider to assist you in determining your health and social needs, obtain health care and community resources, and provide you with necessary information and education. Clinic Care Coordination can work with you through any barriers and develop a care plan that helps coordinate and strengthen the relationship between you and your care team.    If you wish to connect with the Clinic Care Coordination Team, please let your [facility] Primary Care Provider or Clinic Care Team know and they can place a referral. The Clinic Care Coordination team will then reach out by phone to further support you.    We are focused on providing you with the highest-quality healthcare experience possible.    Sincerely,   Your care team with [facility]

## 2023-12-13 ENCOUNTER — OFFICE VISIT (OUTPATIENT)
Dept: PHYSICAL MEDICINE AND REHAB | Facility: CLINIC | Age: 83
End: 2023-12-13
Payer: COMMERCIAL

## 2023-12-13 ENCOUNTER — HOSPITAL ENCOUNTER (OUTPATIENT)
Dept: GENERAL RADIOLOGY | Facility: HOSPITAL | Age: 83
Discharge: HOME | End: 2023-12-13
Attending: NURSE PRACTITIONER | Admitting: NURSE PRACTITIONER
Payer: COMMERCIAL

## 2023-12-13 VITALS
DIASTOLIC BLOOD PRESSURE: 72 MMHG | OXYGEN SATURATION: 97 % | WEIGHT: 118 LBS | SYSTOLIC BLOOD PRESSURE: 140 MMHG | HEART RATE: 91 BPM | BODY MASS INDEX: 21.71 KG/M2 | HEIGHT: 62 IN

## 2023-12-13 DIAGNOSIS — Z87.81 HISTORY OF VERTEBRAL COMPRESSION FRACTURE: ICD-10-CM

## 2023-12-13 DIAGNOSIS — M54.6 ACUTE MIDLINE THORACIC BACK PAIN: Primary | ICD-10-CM

## 2023-12-13 DIAGNOSIS — S22.060A CLOSED WEDGE COMPRESSION FRACTURE OF T8 VERTEBRA, INITIAL ENCOUNTER (H): ICD-10-CM

## 2023-12-13 DIAGNOSIS — Z91.81 STATUS POST FALL: ICD-10-CM

## 2023-12-13 DIAGNOSIS — M85.80 OSTEOPENIA, UNSPECIFIED LOCATION: ICD-10-CM

## 2023-12-13 DIAGNOSIS — M54.6 ACUTE MIDLINE THORACIC BACK PAIN: ICD-10-CM

## 2023-12-13 PROCEDURE — 99204 OFFICE O/P NEW MOD 45 MIN: CPT | Performed by: NURSE PRACTITIONER

## 2023-12-13 PROCEDURE — 72070 X-RAY EXAM THORAC SPINE 2VWS: CPT

## 2023-12-13 RX ORDER — CALCITONIN SALMON 200 [IU]/.09ML
1 SPRAY, METERED NASAL DAILY
Qty: 3.7 ML | Refills: 0 | Status: SHIPPED | OUTPATIENT
Start: 2023-12-13 | End: 2024-09-30

## 2023-12-13 ASSESSMENT — PAIN SCALES - GENERAL: PAINLEVEL: EXTREME PAIN (8)

## 2023-12-13 NOTE — LETTER
12/13/2023         RE: Marcela Sandhu  [address]  [facility] [address]        Dear Colleague,    Thank you for referring your patient, Marcela Sandhu, to the [facility]. Please see a copy of my visit note below.    ASSESSMENT: Marcela Sandhu is a 83 year old female who presents for consultation at the request of PCP Kelley Haney, with a past medical history significant for hypertension, history of hepatitis C, acid reflux, osteopenia, restless leg syndrome, rheumatoid arthritis, major depressive disorder, mild cognitive impairment, fibromyalgia, peripheral neuropathy, benign essential tremor, history of carpal tunnel release surgery, history of bilateral mastectomy, right knee arthroplasty, bilateral bunionectomy, who presents today for new patient evaluation of:    -Acute midline thoracic spine pain post fall 2012 7/20/2023.  CT scan with acute T8 compression fracture.    Patient is neurologically intact on exam. No myelopathic or red flag symptoms.          No data to display                     Diagnoses and all orders for this visit:  Acute midline thoracic back pain  -     XR Thoracic Spine 2 Views; Future  -     Orthotics and Prosthetics DME Other (Comments) (TLSO for acute T8 compression fracture pain management instability)  -     calcitonin, salmon, (MIACALCIN) 200 UNIT/ACT nasal spray; Spray 1 spray into one nostril alternating nostrils daily for 30 days Alternate nostril each day.  -     DX Hip/Pelvis/Spine; Future  -     XR Thoracic Spine 2 Views; Future  Closed wedge compression fracture of T8 vertebra, initial encounter (H)  -     XR Thoracic Spine 2 Views; Future  -     Orthotics and Prosthetics DME Other (Comments) (TLSO for acute T8 compression fracture pain management instability)  -     calcitonin, salmon, (MIACALCIN) 200 UNIT/ACT nasal spray; Spray 1 spray into one nostril alternating nostrils daily for 30 days Alternate nostril each day.  -      DX Hip/Pelvis/Spine; Future  -     XR Thoracic Spine 2 Views; Future  Status post fall  -     XR Thoracic Spine 2 Views; Future  -     XR Thoracic Spine 2 Views; Future  History of vertebral compression fracture  -     XR Thoracic Spine 2 Views; Future  -     Orthotics and Prosthetics DME Other (Comments) (TLSO for acute T8 compression fracture pain management instability)  -     calcitonin, salmon, (MIACALCIN) 200 UNIT/ACT nasal spray; Spray 1 spray into one nostril alternating nostrils daily for 30 days Alternate nostril each day.  -     DX Hip/Pelvis/Spine; Future  -     XR Thoracic Spine 2 Views; Future  Osteopenia, unspecified location  -     DX Hip/Pelvis/Spine; Future  -     XR Thoracic Spine 2 Views; Future    PLAN:  Reviewed spine anatomy and disease process. Discussed diagnosis and treatment options with the patient today. A shared decision making model was used.  The patient's values and choices were respected. The following represents what was discussed and decided upon by the provider and the patient.      -DIAGNOSTIC TESTS:  Images were personally reviewed and interpreted and explained to patient today using spine model.   --Ordered standing thoracic spine x-ray to evaluate T8 compression fracture now and then again in 4 weeks to evaluate compression fracture stability.  --Thoracic spine CT scan 12/7/2023 post fall with acute appearing T8 compression fracture.  Mild chronic T5 compression fracture.  -- Cervical CT scan 12/7/2023 post fall with no acute fractures.  Multilevel degenerative changes greatest at C5-6.    -PHYSICAL THERAPY: No recommendations for physical therapy at this time.  Could consider down the road for fracture related pain, spine strengthening and generalized mobility and balance problems    -Ordered TLSO back brace for acute T8 compression fracture to stabilize the fracture and improve pain.  Advised patient to wear this at all times during the daytime for the next 4 weeks and  then we will reassess at that time.  She can take it off for bathing as well as at bedtime and or if napping during the daytime.  Advised patient that she should avoid lifting over 10 pounds at this time and limiting bending and twisting as tolerated as well.      -MEDICATIONS: Prescribed calcitonin nasal spray 1 spray alternating nares daily for fracture related pain and healing.  Otherwise continue with acetaminophen as needed for pain 5 mg 2 tablets every 6 hours as needed.  Discussed multiple medication options today with patient. Discussed risks, side effects, and proper use of medications. Patient verbalized understanding.    -INTERVENTIONS: No recommendations for injections at this time.    -PATIENT EDUCATION:  Total time of 46 minutes, on the day of service, spent with the patient, reviewing the chart, placing orders, and documenting.     -FOLLOW-UP:   Follow-up [facility] message for x-ray results now and again in 4 weeks.    Advised patient to call the Spine Center if symptoms worsen or you have problems controlling bladder and bowel function.   ______________________________________________________________________    SUBJECTIVE:  HPI:  Marcela Sandhu  Is a 83 year old female who presents today for new patient evaluation of midline thoracic pain below the bra line that started after a fall at home 12/7/2023 when she was getting up to go to the bathroom from her bed.  She does report that she has radiating pain on the right side with some numbness and tingling.  Otherwise denies any radiation into the lower extremities.  Does have chronic low back pain but that is very manageable and tolerable at this time.  Patient reports that currently her pain is an 8/10 constant pain, 10 at its worst, 6 at its best.  She is utilizing acetaminophen at this time with some benefit.    Denies any lower extremity weakness but she does have chronic fatiguing of her legs that is unchanged, does use a walker  long-term.    Patient's son Jeromy was present today during entire visit and added to past medical/surgical/family/social history and history of presenting illness.     -Treatment to Date: History of 5 lumbar surgeries in 1990s-fusion with hardware failure  Physical therapy in the past, nothing recent.    She does take exercise classes at her living facility long-term as well.    -Medications:   Acetaminophen    Current Outpatient Medications   Medication     calcitonin, salmon, (MIACALCIN) 200 UNIT/ACT nasal spray     ARIPiprazole (ABILIFY) 5 MG tablet     buPROPion (WELLBUTRIN XL) 300 MG 24 hr tablet     donepezil (ARICEPT) 10 MG tablet     folic acid (FOLVITE) 1 MG tablet     hydrOXYzine (ATARAX) 10 MG tablet     irbesartan (AVAPRO) 150 MG tablet     methotrexate sodium 2.5 MG TABS     mirtazapine (REMERON) 7.5 MG tablet     sertraline (ZOLOFT) 100 MG tablet     tiZANidine (ZANAFLEX) 2 MG tablet     No current facility-administered medications for this visit.       Allergies   Allergen Reactions     Acetaminophen GI Disturbance     Citalopram Unknown     Hydrocodone GI Disturbance     Levaquin [Levofloxacin Hemihydrate] Other (See Comments) and Rash     chest pain     Sulfa Antibiotics GI Disturbance       Past Medical History:   Diagnosis Date     Acid reflux disease      Benign essential hypertension      Benign essential tremor      History of hepatitis C virus infection     treated with interferon     MDD (major depressive disorder)      Mild cognitive impairment      Osteopenia      Peripheral neuropathy      Personal history of malignant neoplasm of breast 2007     RA (rheumatoid arthritis) (H)      Restless legs syndrome (RLS)         Patient Active Problem List   Diagnosis     Fibromyalgia     MDD (major depressive disorder)     Peripheral neuropathy     Acid reflux disease     Personal history of malignant neoplasm of breast     Restless legs syndrome (RLS)     History of hepatitis C virus infection      Benign essential tremor     Osteopenia     Mild cognitive impairment     Benign essential hypertension     RA (rheumatoid arthritis) (H)       Past Surgical History:   Procedure Laterality Date     APPENDECTOMY       ARTHROPLASTY CARPOMETACARPAL (THUMB JOINT) Bilateral      ARTHROPLASTY KNEE Right      BLEPHAROPLASTY BILATERAL       BUNIONECTOMY CHEVRON AND PEBBLES BILATERAL, COMBINED  12/02/2011    Procedure:COMBINED BUNIONECTOMY CHEVRON AND PEBBLES BILATERAL; BILATERAL THIRD AND FOURTH CLAWTOE RECONSTRUCTION WITH EXOSTECTOMY, LEFT FIRST TARSOMETATARSAL JOINT and 2nd toe Right foot reconstruction; Surgeon:REMBERTO FARMER; Location: OR     CARPAL TUNNEL RELEASE RT/LT Bilateral      COLONOSCOPY  10/18/2011    Procedure:COLONOSCOPY; COLONOSCOPY; Surgeon:BRENDA RODRIGUEZ; Location: GI     ELBOW SURGERY      tendon lengthening surgery     ESOPHAGOSCOPY, GASTROSCOPY, DUODENOSCOPY (EGD), COMBINED N/A 3/31/2023    Procedure: Esophagoscopy, gastroscopy, duodenoscopy (EGD), combined;  Surgeon: Remberto Aguero MD;  Location:  GI     FOOT SURGERY Bilateral     toe straightening     MASTECTOMY, BILATERAL       SLING BLADDER SUSPENSION WITH FASCIA ZACH       SPINE SURGERY      multiple lumbar surgeries; most hardware removed       Family History   Problem Relation Age of Onset     Hypertension Mother      Hypertension Father      Multiple myeloma Brother      Bone Cancer Brother      Cerebrovascular Disease Brother 60     Diabetes No family hx of      Myocardial Infarction No family hx of      Breast Cancer No family hx of      Ovarian Cancer No family hx of      Colon Cancer No family hx of        Reviewed past medical, surgical, and family history with patient found on new patient intake packet located in EMR Media tab.     SOCIAL HX: Patient is  and retired.  Patient denies smoking/tobacco use.  Denies alcohol use, denies history being a heavy drinker.  Denies recreational drug use.    ROS: Positive for  "joint pain, muscle fatigue, imbalance, falls, dizziness, easy bruising, insomnia, excessive tiredness, anxiety/depression, diarrhea, nausea/vomiting, reflux, cough, wheezing, difficulty swallowing, headache.  Specifically negative for bowel/bladder dysfunction, foot drop, fevers, chills, appetite changes, nausea/vomiting, unexplained weight loss. Otherwise 13 systems reviewed are negative. Please see the patient's intake questionnaire from today for details.    OBJECTIVE:  BP (!) 140/72 (BP Location: Right arm, Patient Position: Sitting)   Pulse 91   Ht 5' 2\" (1.575 m)   Wt 118 lb (53.5 kg)   LMP  (LMP Unknown)   SpO2 97%   BMI 21.58 kg/m      PHYSICAL EXAMINATION:    --CONSTITUTIONAL:  Vital signs as above.  No acute distress.  The patient is well nourished and well groomed.  --PSYCHIATRIC:  Appropriate mood and affect. The patient is awake, alert, oriented to person, place, time and answering questions appropriately with clear speech.    --SKIN:  Skin over the face, bilateral lower extremities, and posterior torso is clean, dry, intact without rashes.    --RESPIRATORY: Normal rhythm and effort. No abnormal accessory muscle breathing patterns noted.   --STANDING EXAMINATION:  Normal lumbar lordosis noted, no lateral shift.  --MUSCULOSKELETAL: Tenderness to palpation midline thoracic spine below the bra line.  No tenderness to palpation lumbar spine. Straight leg raising is negative to radicular pain. Sciatic notch non-tender.  --LOWER EXTREMITY MOTOR TESTING:  Plantar flexion left 5/5, right 5/5   Dorsiflexion left 5/5, right 5/5   Great toe MTP extension left 5/5, right 5/5  Knee flexion left 5/5, right 5/5  Knee extension left 5/5, right 5/5   Hip flexion left 5/5, right 5/5  --HIPS: Full range of motion bilaterally.   --NEUROLOGICAL:  1/4 patellar, medial hamstring, and achilles reflexes bilaterally.  Sensation to light touch is intact in the bilateral L4, L5, and S1 dermatomes.   --VASCULAR:  Bilateral " lower extremities are warm.      RESULTS: [facility] medical records from [facility] and [facility] were reviewed today.    Imaging: Spine imaging was personally reviewed and interpreted today. The images were shown to the patient and the findings were explained using a spine model.      Chest CT w/o contrast    Result Date: 12/7/2023  CT CHEST WITHOUT CONTRAST  12/7/2023 8:00 AM CLINICAL HISTORY: Trauma, fall. Right chest wall pain, head injury, midline thoracic spine tenderness. TECHNIQUE: CT chest without IV contrast. Multiplanar reformats were obtained. Dose reduction techniques were used. CONTRAST: None. COMPARISON: CT chest 8/10/2023. FINDINGS: LUNGS AND PLEURA: No effusion. No pneumothorax. Mild right base atelectasis. No acute airspace disease. Several stable tiny pulmonary nodules noted bilaterally; stable example at the left lower lobe measures 4 mm, series 4 image 198. A few small new nodules also suggested with an example measuring only 2 mm right upper lobe, series 4 image 90. MEDIASTINUM/AXILLAE: No lymphadenopathy. No thoracic aortic aneurysm. CORONARY ARTERY CALCIFICATION: None. UPPER ABDOMEN: No acute upper abdominal abnormality identified. MUSCULOSKELETAL: New finding of T8 compression fracture deformity, series 8 image 52. No visible acute displaced rib fracture identified. There are subacute healed left-sided rib fractures with callus.     IMPRESSION: 1.  New finding of a T8 compression fracture deformity since 8/10/2023 worrisome for an acute fracture. 2.  No other acute traumatic abnormality identified. 3.  A few small pulmonary nodules again noted. Some of these tiny nodules are newly identified. Recommendations for one or multiple incidental lung nodules < 6mm:   Low risk patients: No routine follow-up.   High risk patients: Optional follow-up CT at 12 months; if unchanged, no further follow-up. *Low Risk: Minimal or absent history of smoking or other known risk factors. *Nonsolid  (ground glass) or partly solid nodules may require longer follow-up to exclude indolent adenocarcinoma. *Recommendations based on Guidelines for the Management of Incidental Pulmonary Nodules Detected at CT: From the Fleischner Society 2017, Radiology 2017. YOSEF BERUMEN MD   SYSTEM ID:  [id number]    CT Thoracic Spine w/o Contrast    Result Date: 12/7/2023  CT THORACIC SPINE WITHOUT CONTRAST  12/7/2023 8:00 AM HISTORY: Trauma, fall. Right chest wall pain, head injury, midline thoracic spine tenderness.   COMPARISON: None. TECHNIQUE: Using multidetector thin collimation helical acquisition technique, axial images through the thoracic spine without intravenous contrast were obtained. Coronal and sagittal reformations were created, reviewed and archived. Dose reduction techniques were used. FINDINGS:  Loss of vertebral body height and buckling/disruption of the anterior vertebral body cortex at T8. Chronic mild compression deformity involving T5. Otherwise preserved thoracic vertebral body height. Preserved thoracic vertebral alignment. No substantial degenerative change given patient age. Patent spinal canal and neural foramina. The visualized paraspinous tissues are within normal limits.     IMPRESSION: 1. Acute-appearing T8 vertebral body fracture. No significant comminution or retropulsed fragmentation. 2. No posttraumatic subluxation. Chronic-appearing mild T5 compression deformity. BORA TORRES DO   SYSTEM ID:  [id number]    CT Cervical Spine w/o Contrast    Result Date: 12/7/2023  EXAM: CT CERVICAL SPINE W/O CONTRAST  12/7/2023 8:00 AM HISTORY: Trauma, fall, right chest wall pain, head injury, midline T spine tenderness.   COMPARISON: none TECHNIQUE: Using multidetector thin collimation helical acquisition technique, axial, coronal and sagittal CT images through the cervical spine were obtained without intravenous contrast. Dose reduction techniques were used. FINDINGS: No acute fracture or traumatic subluxation.  No prevertebral edema. Nonfocal extraspinal structures. The findings on a level by level basis are as follows: C2-T1: Degenerative disc and opposing endplate change at C5-C6. No significant spinal canal or neural foraminal stenosis at any level.     IMPRESSION: No acute fracture or posttraumatic subluxation. BORA TORRES DO   SYSTEM ID:  [id number]    CT Head w/o Contrast    Result Date: 12/7/2023  EXAM: CT HEAD W/O CONTRAST  12/7/2023 8:00 AM HISTORY: Trauma, fall, right chest wall pain, head injury, midline T spine tenderness.   COMPARISON: None TECHNIQUE: Using multidetector thin collimation helical acquisition technique, axial, coronal and sagittal CT images from the skull base to the vertex were obtained without intravenous contrast.  (topogram) image(s) also obtained and reviewed. Dose reduction techniques were used. FINDINGS: No acute intracranial hemorrhage, mass effect, or midline shift. Subcortical, periventricular areas of white matter hypoattenuation; mild to moderate parenchymal volume loss and compensatory ventricular dilatation. No CT findings of acute infarct or hydrocephalus. Preserved subarachnoid spaces. Atraumatic calvarium. No substantial paranasal sinus mucosal disease. Clear mastoid air cells. Nonfocal orbits.     IMPRESSION: 1. No CT evidence for acute intracranial process. 2. Brain atrophy and presumed sequela of chronic microvascular ischemic disease, as above. BORA TORRES DO   SYSTEM ID:  [id number]               Again, thank you for allowing me to participate in the care of your patient.        Sincerely,        Fauzia Robles, CNP

## 2023-12-13 NOTE — PATIENT INSTRUCTIONS
~Spine Center Scheduling #[phone].  ~Please call our [facility] Spine Nurse Navigation #[phone] with any questions or concerns about your treatment plan, if symptoms worsen and you would like to be seen urgently, or if you have problems controlling bladder and bowel function.  ~For any future flareups or new symptoms, recommend follow-up in clinic or contact the nurse navigator line.  ~Please note that any My Chart messages may take multiple days for a response due to the high volume of patients seen in clinic.  Anything sent Thursday night or after will be answered the following week when able, as Fauzia Robles CNP does not work in clinic on Fridays.   ~Fauzia Robles CNP is at the [facility] on the first and third Tuesdays of the month only, otherwise primarily at the [facility].        Imaging has been ordered. Radiology will call you to schedule. Please call below if you do not hear from them in the next couple of days.     [facility] Radiology Scheduling    Please call [phone] to schedule your image(s) (select option #1). There are 3 different locations, see below.     [facility]  [address] [facility]  [address] [address], [address]   [place] [id number]       *You have a spine fracture and a Spine Back Brace has been ordered to help control your pain by taking pressure off the fracture, and to limit spine mobility in order to stabilize the fracture so that the fracture does not worsen and compress further while it is healing.   You will be contacted by [facility] to schedule an appointment for the brace fitting.   The brace needs to fit snuggly in order for it to be effective.   It can take 3-6 months before a fracture heals and can cause some discomfort up to a year after the event.  You should  wear the brace at all time when up and around during the day for the next 12 weeks. You can take the brace off for bathing/showering, at bedtime, and if you are taking a nap or lying down during the day.   If the brace is uncomfortable or does not fit properly, you should call the facility you received the brace from and they can help to re-fit it to be as comfortable as possible.       *You have been prescribed Calcitonin for bone pain and healing post fracture. This medication is a nasal spray and should be used in one side of your nose (nostril) daily, alternating sides for the next 2 weeks only. We do not recommend this medication longer than 2 weeks.       *Bone Density Testing was ordered today to evaluate bone health post fracture. You will be called to schedule this and it should be scheduled within the next couple of months.

## 2023-12-13 NOTE — PROGRESS NOTES
ASSESSMENT: Marcela Sandhu is a 83 year old female who presents for consultation at the request of PCP Kelley Haney, with a past medical history significant for hypertension, history of hepatitis C, acid reflux, osteopenia, restless leg syndrome, rheumatoid arthritis, major depressive disorder, mild cognitive impairment, fibromyalgia, peripheral neuropathy, benign essential tremor, history of carpal tunnel release surgery, history of bilateral mastectomy, right knee arthroplasty, bilateral bunionectomy, who presents today for new patient evaluation of:    -Acute midline thoracic spine pain post fall 2012 7/20/2023.  CT scan with acute T8 compression fracture.    Patient is neurologically intact on exam. No myelopathic or red flag symptoms.          No data to display                     Diagnoses and all orders for this visit:  Acute midline thoracic back pain  -     XR Thoracic Spine 2 Views; Future  -     Orthotics and Prosthetics DME Other (Comments) (TLSO for acute T8 compression fracture pain management instability)  -     calcitonin, salmon, (MIACALCIN) 200 UNIT/ACT nasal spray; Spray 1 spray into one nostril alternating nostrils daily for 30 days Alternate nostril each day.  -     DX Hip/Pelvis/Spine; Future  -     XR Thoracic Spine 2 Views; Future  Closed wedge compression fracture of T8 vertebra, initial encounter (H)  -     XR Thoracic Spine 2 Views; Future  -     Orthotics and Prosthetics DME Other (Comments) (TLSO for acute T8 compression fracture pain management instability)  -     calcitonin, salmon, (MIACALCIN) 200 UNIT/ACT nasal spray; Spray 1 spray into one nostril alternating nostrils daily for 30 days Alternate nostril each day.  -     DX Hip/Pelvis/Spine; Future  -     XR Thoracic Spine 2 Views; Future  Status post fall  -     XR Thoracic Spine 2 Views; Future  -     XR Thoracic Spine 2 Views; Future  History of vertebral compression fracture  -     XR Thoracic Spine 2 Views; Future  -      Orthotics and Prosthetics DME Other (Comments) (TLSO for acute T8 compression fracture pain management instability)  -     calcitonin, salmon, (MIACALCIN) 200 UNIT/ACT nasal spray; Spray 1 spray into one nostril alternating nostrils daily for 30 days Alternate nostril each day.  -     DX Hip/Pelvis/Spine; Future  -     XR Thoracic Spine 2 Views; Future  Osteopenia, unspecified location  -     DX Hip/Pelvis/Spine; Future  -     XR Thoracic Spine 2 Views; Future    PLAN:  Reviewed spine anatomy and disease process. Discussed diagnosis and treatment options with the patient today. A shared decision making model was used.  The patient's values and choices were respected. The following represents what was discussed and decided upon by the provider and the patient.      -DIAGNOSTIC TESTS:  Images were personally reviewed and interpreted and explained to patient today using spine model.   --Ordered standing thoracic spine x-ray to evaluate T8 compression fracture now and then again in 4 weeks to evaluate compression fracture stability.  --Thoracic spine CT scan 12/7/2023 post fall with acute appearing T8 compression fracture.  Mild chronic T5 compression fracture.  -- Cervical CT scan 12/7/2023 post fall with no acute fractures.  Multilevel degenerative changes greatest at C5-6.    -PHYSICAL THERAPY: No recommendations for physical therapy at this time.  Could consider down the road for fracture related pain, spine strengthening and generalized mobility and balance problems    -Ordered TLSO back brace for acute T8 compression fracture to stabilize the fracture and improve pain.  Advised patient to wear this at all times during the daytime for the next 4 weeks and then we will reassess at that time.  She can take it off for bathing as well as at bedtime and or if napping during the daytime.  Advised patient that she should avoid lifting over 10 pounds at this time and limiting bending and twisting as tolerated as well.       -MEDICATIONS: Prescribed calcitonin nasal spray 1 spray alternating nares daily for fracture related pain and healing.  Otherwise continue with acetaminophen as needed for pain 5 mg 2 tablets every 6 hours as needed.  Discussed multiple medication options today with patient. Discussed risks, side effects, and proper use of medications. Patient verbalized understanding.    -INTERVENTIONS: No recommendations for injections at this time.    -PATIENT EDUCATION:  Total time of 46 minutes, on the day of service, spent with the patient, reviewing the chart, placing orders, and documenting.     -FOLLOW-UP:   Follow-up [facility] message for x-ray results now and again in 4 weeks.    Advised patient to call the [facility] if symptoms worsen or you have problems controlling bladder and bowel function.   ______________________________________________________________________    SUBJECTIVE:  HPI:  Marcela Sandhu  Is a 83 year old female who presents today for new patient evaluation of midline thoracic pain below the bra line that started after a fall at home 12/7/2023 when she was getting up to go to the bathroom from her bed.  She does report that she has radiating pain on the right side with some numbness and tingling.  Otherwise denies any radiation into the lower extremities.  Does have chronic low back pain but that is very manageable and tolerable at this time.  Patient reports that currently her pain is an 8/10 constant pain, 10 at its worst, 6 at its best.  She is utilizing acetaminophen at this time with some benefit.    Denies any lower extremity weakness but she does have chronic fatiguing of her legs that is unchanged, does use a walker long-term.    Patient's son Jeromy was present today during entire visit and added to past medical/surgical/family/social history and history of presenting illness.     -Treatment to Date: History of 5 lumbar surgeries in 1990s-fusion with hardware failure  Physical therapy in the  past, nothing recent.    She does take exercise classes at her living facility long-term as well.    -Medications:   Acetaminophen    Current Outpatient Medications   Medication    calcitonin, salmon, (MIACALCIN) 200 UNIT/ACT nasal spray    ARIPiprazole (ABILIFY) 5 MG tablet    buPROPion (WELLBUTRIN XL) 300 MG 24 hr tablet    donepezil (ARICEPT) 10 MG tablet    folic acid (FOLVITE) 1 MG tablet    hydrOXYzine (ATARAX) 10 MG tablet    irbesartan (AVAPRO) 150 MG tablet    methotrexate sodium 2.5 MG TABS    mirtazapine (REMERON) 7.5 MG tablet    sertraline (ZOLOFT) 100 MG tablet    tiZANidine (ZANAFLEX) 2 MG tablet     No current facility-administered medications for this visit.       Allergies   Allergen Reactions    Acetaminophen GI Disturbance    Citalopram Unknown    Hydrocodone GI Disturbance    Levaquin [Levofloxacin Hemihydrate] Other (See Comments) and Rash     chest pain    Sulfa Antibiotics GI Disturbance       Past Medical History:   Diagnosis Date    Acid reflux disease     Benign essential hypertension     Benign essential tremor     History of hepatitis C virus infection     treated with interferon    MDD (major depressive disorder)     Mild cognitive impairment     Osteopenia     Peripheral neuropathy     Personal history of malignant neoplasm of breast 2007    RA (rheumatoid arthritis) (H)     Restless legs syndrome (RLS)         Patient Active Problem List   Diagnosis    Fibromyalgia    MDD (major depressive disorder)    Peripheral neuropathy    Acid reflux disease    Personal history of malignant neoplasm of breast    Restless legs syndrome (RLS)    History of hepatitis C virus infection    Benign essential tremor    Osteopenia    Mild cognitive impairment    Benign essential hypertension    RA (rheumatoid arthritis) (H)       Past Surgical History:   Procedure Laterality Date    APPENDECTOMY      ARTHROPLASTY CARPOMETACARPAL (THUMB JOINT) Bilateral     ARTHROPLASTY KNEE Right     BLEPHAROPLASTY  BILATERAL      BUNIONECTOMY CHEVRON AND PEBBLES BILATERAL, COMBINED  12/02/2011    Procedure:COMBINED BUNIONECTOMY CHEVRON AND PEBBLES BILATERAL; BILATERAL THIRD AND FOURTH CLAWTOE RECONSTRUCTION WITH EXOSTECTOMY, LEFT FIRST TARSOMETATARSAL JOINT and 2nd toe Right foot reconstruction; Surgeon:REMBERTO FARMER; Location: OR    CARPAL TUNNEL RELEASE RT/LT Bilateral     COLONOSCOPY  10/18/2011    Procedure:COLONOSCOPY; COLONOSCOPY; Surgeon:BRENDA RODRIGUEZ; Location: GI    ELBOW SURGERY      tendon lengthening surgery    ESOPHAGOSCOPY, GASTROSCOPY, DUODENOSCOPY (EGD), COMBINED N/A 3/31/2023    Procedure: Esophagoscopy, gastroscopy, duodenoscopy (EGD), combined;  Surgeon: Remberto Aguero MD;  Location:  GI    FOOT SURGERY Bilateral     toe straightening    MASTECTOMY, BILATERAL      SLING BLADDER SUSPENSION WITH FASCIA ZACH      SPINE SURGERY      multiple lumbar surgeries; most hardware removed       Family History   Problem Relation Age of Onset    Hypertension Mother     Hypertension Father     Multiple myeloma Brother     Bone Cancer Brother     Cerebrovascular Disease Brother 60    Diabetes No family hx of     Myocardial Infarction No family hx of     Breast Cancer No family hx of     Ovarian Cancer No family hx of     Colon Cancer No family hx of        Reviewed past medical, surgical, and family history with patient found on new patient intake packet located in [employer]MR [employer] tab.     SOCIAL HX: Patient is  and retired.  Patient denies smoking/tobacco use.  Denies alcohol use, denies history being a heavy drinker.  Denies recreational drug use.    ROS: Positive for joint pain, muscle fatigue, imbalance, falls, dizziness, easy bruising, insomnia, excessive tiredness, anxiety/depression, diarrhea, nausea/vomiting, reflux, cough, wheezing, difficulty swallowing, headache.  Specifically negative for bowel/bladder dysfunction, foot drop, fevers, chills, appetite changes, nausea/vomiting, unexplained weight  "loss. Otherwise 13 systems reviewed are negative. Please see the patient's intake questionnaire from today for details.    OBJECTIVE:  BP (!) 140/72 (BP Location: Right arm, Patient Position: Sitting)   Pulse 91   Ht 5' 2\" (1.575 m)   Wt 118 lb (53.5 kg)   LMP  (LMP Unknown)   SpO2 97%   BMI 21.58 kg/m      PHYSICAL EXAMINATION:    --CONSTITUTIONAL:  Vital signs as above.  No acute distress.  The patient is well nourished and well groomed.  --PSYCHIATRIC:  Appropriate mood and affect. The patient is awake, alert, oriented to person, place, time and answering questions appropriately with clear speech.    --SKIN:  Skin over the face, bilateral lower extremities, and posterior torso is clean, dry, intact without rashes.    --RESPIRATORY: Normal rhythm and effort. No abnormal accessory muscle breathing patterns noted.   --STANDING EXAMINATION:  Normal lumbar lordosis noted, no lateral shift.  --MUSCULOSKELETAL: Tenderness to palpation midline thoracic spine below the bra line.  No tenderness to palpation lumbar spine. Straight leg raising is negative to radicular pain. Sciatic notch non-tender.  --LOWER EXTREMITY MOTOR TESTING:  Plantar flexion left 5/5, right 5/5   Dorsiflexion left 5/5, right 5/5   Great toe MTP extension left 5/5, right 5/5  Knee flexion left 5/5, right 5/5  Knee extension left 5/5, right 5/5   Hip flexion left 5/5, right 5/5  --HIPS: Full range of motion bilaterally.   --NEUROLOGICAL:  1/4 patellar, medial hamstring, and achilles reflexes bilaterally.  Sensation to light touch is intact in the bilateral L4, L5, and S1 dermatomes.   --VASCULAR:  Bilateral lower extremities are warm.      RESULTS: Prior medical [facility] from [facility]where were reviewed today.    Imaging: Spine imaging was personally reviewed and interpreted today. The images were shown to the patient and the findings were explained using a spine model.      Chest CT w/o contrast    Result Date: " 12/7/2023  CT CHEST WITHOUT CONTRAST  12/7/2023 8:00 AM CLINICAL HISTORY: Trauma, fall. Right chest wall pain, head injury, midline thoracic spine tenderness. TECHNIQUE: CT chest without IV contrast. Multiplanar reformats were obtained. Dose reduction techniques were used. CONTRAST: None. COMPARISON: CT chest 8/10/2023. FINDINGS: LUNGS AND PLEURA: No effusion. No pneumothorax. Mild right base atelectasis. No acute airspace disease. Several stable tiny pulmonary nodules noted bilaterally; stable example at the left lower lobe measures 4 mm, series 4 image 198. A few small new nodules also suggested with an example measuring only 2 mm right upper lobe, series 4 image 90. MEDIASTINUM/AXILLAE: No lymphadenopathy. No thoracic aortic aneurysm. CORONARY ARTERY CALCIFICATION: None. UPPER ABDOMEN: No acute upper abdominal abnormality identified. MUSCULOSKELETAL: New finding of T8 compression fracture deformity, series 8 image 52. No visible acute displaced rib fracture identified. There are subacute healed left-sided rib fractures with callus.     IMPRESSION: 1.  New finding of a T8 compression fracture deformity since 8/10/2023 worrisome for an acute fracture. 2.  No other acute traumatic abnormality identified. 3.  A few small pulmonary nodules again noted. Some of these tiny nodules are newly identified. Recommendations for one or multiple incidental lung nodules < 6mm:   Low risk patients: No routine follow-up.   High risk patients: Optional follow-up CT at 12 months; if unchanged, no further follow-up. *Low Risk: Minimal or absent history of smoking or other known risk factors. *Nonsolid (ground glass) or partly solid nodules may require longer follow-up to exclude indolent adenocarcinoma. *Recommendations based on Guidelines for the Management of Incidental Pulmonary Nodules Detected at CT: From the Fleischner Society 2017, Radiology 2017. YOSEF BERUMEN MD   SYSTEM ID:  [id number]    CT Thoracic Spine w/o  Contrast    Result Date: 12/7/2023  CT THORACIC SPINE WITHOUT CONTRAST  12/7/2023 8:00 AM HISTORY: Trauma, fall. Right chest wall pain, head injury, midline thoracic spine tenderness.   COMPARISON: None. TECHNIQUE: Using multidetector thin collimation helical acquisition technique, axial images through the thoracic spine without intravenous contrast were obtained. Coronal and sagittal reformations were created, reviewed and archived. Dose reduction techniques were used. FINDINGS:  Loss of vertebral body height and buckling/disruption of the anterior vertebral body cortex at T8. Chronic mild compression deformity involving T5. Otherwise preserved thoracic vertebral body height. Preserved thoracic vertebral alignment. No substantial degenerative change given patient age. Patent spinal canal and neural foramina. The visualized paraspinous tissues are within normal limits.     IMPRESSION: 1. Acute-appearing T8 vertebral body fracture. No significant comminution or retropulsed fragmentation. 2. No posttraumatic subluxation. Chronic-appearing mild T5 compression deformity. BORA TORRES DO   SYSTEM ID:  [id number]    CT Cervical Spine w/o Contrast    Result Date: 12/7/2023  EXAM: CT CERVICAL SPINE W/O CONTRAST  12/7/2023 8:00 AM HISTORY: Trauma, fall, right chest wall pain, head injury, midline T spine tenderness.   COMPARISON: none TECHNIQUE: Using multidetector thin collimation helical acquisition technique, axial, coronal and sagittal CT images through the cervical spine were obtained without intravenous contrast. Dose reduction techniques were used. FINDINGS: No acute fracture or traumatic subluxation. No prevertebral edema. Nonfocal extraspinal structures. The findings on a level by level basis are as follows: C2-T1: Degenerative disc and opposing endplate change at C5-C6. No significant spinal canal or neural foraminal stenosis at any level.     IMPRESSION: No acute fracture or posttraumatic subluxation. BORA  DO BRIAN   SYSTEM ID:  [id number]    CT Head w/o Contrast    Result Date: 12/7/2023  EXAM: CT HEAD W/O CONTRAST  12/7/2023 8:00 AM HISTORY: Trauma, fall, right chest wall pain, head injury, midline T spine tenderness.   COMPARISON: None TECHNIQUE: Using multidetector thin collimation helical acquisition technique, axial, coronal and sagittal CT images from the skull base to the vertex were obtained without intravenous contrast.  (topogram) image(s) also obtained and reviewed. Dose reduction techniques were used. FINDINGS: No acute intracranial hemorrhage, mass effect, or midline shift. Subcortical, periventricular areas of white matter hypoattenuation; mild to moderate parenchymal volume loss and compensatory ventricular dilatation. No CT findings of acute infarct or hydrocephalus. Preserved subarachnoid spaces. Atraumatic calvarium. No substantial paranasal sinus mucosal disease. Clear mastoid air cells. Nonfocal orbits.     IMPRESSION: 1. No CT evidence for acute intracranial process. 2. Brain atrophy and presumed sequela of chronic microvascular ischemic disease, as above. BORA TORRES DO   SYSTEM ID:  [id number]

## 2024-01-05 ENCOUNTER — ANCILLARY PROCEDURE (OUTPATIENT)
Dept: GENERAL RADIOLOGY | Facility: CLINIC | Age: 84
End: 2024-01-05
Attending: NURSE PRACTITIONER
Payer: COMMERCIAL

## 2024-01-05 ENCOUNTER — TELEPHONE (OUTPATIENT)
Dept: PHYSICAL MEDICINE AND REHAB | Facility: CLINIC | Age: 84
End: 2024-01-05

## 2024-01-05 DIAGNOSIS — M54.6 ACUTE MIDLINE THORACIC BACK PAIN: ICD-10-CM

## 2024-01-05 DIAGNOSIS — S22.060A CLOSED WEDGE COMPRESSION FRACTURE OF T8 VERTEBRA, INITIAL ENCOUNTER (H): ICD-10-CM

## 2024-01-05 DIAGNOSIS — Z87.81 HISTORY OF VERTEBRAL COMPRESSION FRACTURE: ICD-10-CM

## 2024-01-05 DIAGNOSIS — M85.80 OSTEOPENIA, UNSPECIFIED LOCATION: ICD-10-CM

## 2024-01-05 DIAGNOSIS — Z91.81 STATUS POST FALL: ICD-10-CM

## 2024-01-05 PROCEDURE — 72070 X-RAY EXAM THORAC SPINE 2VWS: CPT | Performed by: RADIOLOGY

## 2024-01-05 NOTE — TELEPHONE ENCOUNTER
[facility] message sent with this info as this was noted as method of communication in providers last OV note.

## 2024-01-05 NOTE — TELEPHONE ENCOUNTER
----- Message from Kenji Thomas DO sent at 1/5/2024  2:10 PM CST -----  Please contact the patient and inform her that I reviewed her x-rays as Fauzia is out of the clinic.  There is a stable T5 compression fracture.  The T8 fracture has progressed sometimes fractures do progress as part of the normal healing..  There is 80% height loss now of the fracture.     continue with the plan to have another x-ray in 4 weeks.   continue with calcitonin nasal spray.  Given progression of the fracture, I would recommend that she wear the TLSO that was prescribed by Fauzia when out of bed until seen again in clinic.

## 2024-01-17 DIAGNOSIS — F32.5 MAJOR DEPRESSION IN COMPLETE REMISSION (H): Chronic | ICD-10-CM

## 2024-01-17 RX ORDER — MIRTAZAPINE 7.5 MG/1
7.5 TABLET, FILM COATED ORAL AT BEDTIME
Qty: 90 TABLET | Refills: 0 | Status: SHIPPED | OUTPATIENT
Start: 2024-01-17 | End: 2024-04-16

## 2024-01-17 NOTE — TELEPHONE ENCOUNTER
Prescription approved per [facility] Refill Protocol.  Alice Woodson, RN  [facility] Triage Nurse

## 2024-02-02 ENCOUNTER — TRANSFERRED RECORDS (OUTPATIENT)
Dept: HEALTH INFORMATION MANAGEMENT | Facility: CLINIC | Age: 84
End: 2024-02-02
Payer: COMMERCIAL

## 2024-02-02 ENCOUNTER — MYC MEDICAL ADVICE (OUTPATIENT)
Dept: PHYSICAL MEDICINE AND REHAB | Facility: CLINIC | Age: 84
End: 2024-02-02
Payer: COMMERCIAL

## 2024-02-02 ENCOUNTER — TELEPHONE (OUTPATIENT)
Dept: PHYSICAL MEDICINE AND REHAB | Facility: CLINIC | Age: 84
End: 2024-02-02
Payer: COMMERCIAL

## 2024-02-02 DIAGNOSIS — M54.6 ACUTE MIDLINE THORACIC BACK PAIN: ICD-10-CM

## 2024-02-02 DIAGNOSIS — S22.060A CLOSED WEDGE COMPRESSION FRACTURE OF T8 VERTEBRA, INITIAL ENCOUNTER (H): Primary | ICD-10-CM

## 2024-02-02 DIAGNOSIS — Z91.81 STATUS POST FALL: ICD-10-CM

## 2024-02-02 NOTE — TELEPHONE ENCOUNTER
[facility] Call Center    Phone Message    May a detailed message be left on voicemail: yes     Reason for Call: Order(s): Other:   Reason for requested: Repeat Xray  Date needed: asap  Provider name: Fauzia Robles    Action Taken: Other: [facility]    Travel Screening: Not Applicable

## 2024-02-02 NOTE — TELEPHONE ENCOUNTER
Chart review:  --New pt consult 12/13/24 with Fauzia. Per Fauzia, to obtain xray same day and then again 4 weeks after.  --Pt completed thoracic xray on 12/13/123  --Completed repeat thoracic xray on 1/5/24  --Per telephone encounter from 1/5/24, Dr. Thomas can have another xray in 4 weeks.     Patient already received 2 thoracic xray on 12/13/23 an 1/5/24, does the pt need a 3rd xray? No active thoracic xray in file. Please advise.

## 2024-02-09 ENCOUNTER — ANCILLARY PROCEDURE (OUTPATIENT)
Dept: GENERAL RADIOLOGY | Facility: CLINIC | Age: 84
End: 2024-02-09
Attending: NURSE PRACTITIONER
Payer: COMMERCIAL

## 2024-02-09 ENCOUNTER — ANCILLARY PROCEDURE (OUTPATIENT)
Dept: BONE DENSITY | Facility: CLINIC | Age: 84
End: 2024-02-09
Attending: NURSE PRACTITIONER
Payer: COMMERCIAL

## 2024-02-09 DIAGNOSIS — S22.060A CLOSED WEDGE COMPRESSION FRACTURE OF T8 VERTEBRA, INITIAL ENCOUNTER (H): ICD-10-CM

## 2024-02-09 DIAGNOSIS — M85.80 OSTEOPENIA, UNSPECIFIED LOCATION: ICD-10-CM

## 2024-02-09 DIAGNOSIS — Z87.81 HISTORY OF VERTEBRAL COMPRESSION FRACTURE: ICD-10-CM

## 2024-02-09 DIAGNOSIS — M54.6 ACUTE MIDLINE THORACIC BACK PAIN: ICD-10-CM

## 2024-02-09 DIAGNOSIS — Z91.81 STATUS POST FALL: ICD-10-CM

## 2024-02-09 PROCEDURE — 72070 X-RAY EXAM THORAC SPINE 2VWS: CPT | Mod: TC | Performed by: RADIOLOGY

## 2024-02-09 PROCEDURE — 77080 DXA BONE DENSITY AXIAL: CPT | Mod: TC | Performed by: PHYSICIAN ASSISTANT

## 2024-02-12 DIAGNOSIS — Z87.81 HISTORY OF VERTEBRAL COMPRESSION FRACTURE: ICD-10-CM

## 2024-02-12 DIAGNOSIS — S22.060A CLOSED WEDGE COMPRESSION FRACTURE OF T8 VERTEBRA, INITIAL ENCOUNTER (H): ICD-10-CM

## 2024-02-12 DIAGNOSIS — M80.00XG OSTEOPOROSIS WITH CURRENT PATHOLOGICAL FRACTURE WITH DELAYED HEALING, UNSPECIFIED OSTEOPOROSIS TYPE, SUBSEQUENT ENCOUNTER: Primary | ICD-10-CM

## 2024-02-12 NOTE — ANESTHESIA POSTPROCEDURE EVALUATION
Patient: Marcela Sandhu    Procedure: Procedure(s):  Esophagoscopy, gastroscopy, duodenoscopy (EGD), combined       Anesthesia Type:  MAC    Note:     Postop Pain Control: Uneventful            Sign Out: Well controlled pain   PONV:    Neuro/Psych: Uneventful            Sign Out: Acceptable/Baseline neuro status   Airway/Respiratory: Uneventful            Sign Out: Acceptable/Baseline resp. status   CV/Hemodynamics: Uneventful            Sign Out: Acceptable CV status; No obvious hypovolemia; No obvious fluid overload   Other NRE:    DID A NON-ROUTINE EVENT OCCUR?            Last vitals:  Vitals Value Taken Time   /78 03/31/23 0932   Temp     Pulse 95 03/31/23 0932   Resp 38 03/31/23 0932   SpO2 96 % 03/31/23 0928   Vitals shown include unvalidated device data.    Electronically Signed By: Dequan Myers MD  March 31, 2023  2:43 PM  
muscle mass, extra-renal metabolism of creatinine, excessive creatine ingestion, or following therapy that affects renal tubular secretion.      Calcium 02/12/2024 8.8  8.3 - 10.4 MG/DL Final    Total Bilirubin 02/12/2024 0.3  0.2 - 1.1 MG/DL Final    ALT 02/12/2024 25  12 - 65 U/L Final    AST 02/12/2024 19  15 - 37 U/L Final    Alk Phosphatase 02/12/2024 88  50 - 136 U/L Final    Total Protein 02/12/2024 9.2 (H)  6.3 - 8.2 g/dL Final    Albumin 02/12/2024 4.0  3.5 - 5.0 g/dL Final    Globulin 02/12/2024 5.2 (H)  2.8 - 4.5 g/dL Final    Albumin/Globulin Ratio 02/12/2024 0.8  0.4 - 1.6   Final    WBC 02/12/2024 3.6 (L)  4.3 - 11.1 K/uL Final    RBC 02/12/2024 4.47  4.05 - 5.2 M/uL Final    Hemoglobin 02/12/2024 12.3  11.7 - 15.4 g/dL Final    Hematocrit 02/12/2024 40.3  35.8 - 46.3 % Final    MCV 02/12/2024 90.2  82.0 - 102.0 FL Final    MCH 02/12/2024 27.5  26.1 - 32.9 PG Final    MCHC 02/12/2024 30.5 (L)  31.4 - 35.0 g/dL Final    RDW 02/12/2024 14.4  11.9 - 14.6 % Final    Platelets 02/12/2024 288  150 - 450 K/uL Final    MPV 02/12/2024 9.5  9.4 - 12.3 FL Final    nRBC 02/12/2024 0.00  0.0 - 0.2 K/uL Final    **Note: Absolute NRBC parameter is now reported with Hemogram**    Neutrophils % 02/12/2024 53  43 - 78 % Final    Lymphocytes % 02/12/2024 39  13 - 44 % Final    Monocytes % 02/12/2024 6  4.0 - 12.0 % Final    Eosinophils % 02/12/2024 1  0.5 - 7.8 % Final    Basophils % 02/12/2024 1  0.0 - 2.0 % Final    Immature Granulocytes 02/12/2024 0  0.0 - 5.0 % Final    Neutrophils Absolute 02/12/2024 1.9  1.7 - 8.2 K/UL Final    Lymphocytes Absolute 02/12/2024 1.4  0.5 - 4.6 K/UL Final    Monocytes Absolute 02/12/2024 0.2  0.1 - 1.3 K/UL Final    Eosinophils Absolute 02/12/2024 0.0  0.0 - 0.8 K/UL Final    Basophils Absolute 02/12/2024 0.1  0.0 - 0.2 K/UL Final    Absolute Immature Granulocyte 02/12/2024 0.0  0.0 - 0.5 K/UL Final    Differential Type 02/12/2024 AUTOMATED    Final         Treatment Summary has been

## 2024-02-13 ENCOUNTER — MYC MEDICAL ADVICE (OUTPATIENT)
Dept: PHYSICAL MEDICINE AND REHAB | Facility: CLINIC | Age: 84
End: 2024-02-13
Payer: COMMERCIAL

## 2024-02-13 DIAGNOSIS — Z91.81 STATUS POST FALL: ICD-10-CM

## 2024-02-13 DIAGNOSIS — Z87.81 HISTORY OF VERTEBRAL COMPRESSION FRACTURE: ICD-10-CM

## 2024-02-13 DIAGNOSIS — M54.6 ACUTE MIDLINE THORACIC BACK PAIN: Primary | ICD-10-CM

## 2024-02-13 DIAGNOSIS — S22.060A CLOSED WEDGE COMPRESSION FRACTURE OF T8 VERTEBRA, INITIAL ENCOUNTER (H): ICD-10-CM

## 2024-02-13 DIAGNOSIS — M80.00XG OSTEOPOROSIS WITH CURRENT PATHOLOGICAL FRACTURE WITH DELAYED HEALING, UNSPECIFIED OSTEOPOROSIS TYPE, SUBSEQUENT ENCOUNTER: ICD-10-CM

## 2024-02-15 ENCOUNTER — TRANSFERRED RECORDS (OUTPATIENT)
Dept: HEALTH INFORMATION MANAGEMENT | Facility: CLINIC | Age: 84
End: 2024-02-15
Payer: COMMERCIAL

## 2024-02-15 DIAGNOSIS — F41.9 ANXIETY: ICD-10-CM

## 2024-02-15 DIAGNOSIS — F32.5 MAJOR DEPRESSION IN COMPLETE REMISSION (H): Chronic | ICD-10-CM

## 2024-02-15 LAB
ALT SERPL-CCNC: 10 IU/L (ref 5–35)
AST SERPL-CCNC: 36 U/L (ref 5–34)
CREATININE (EXTERNAL): 1.01 MG/DL (ref 0.5–1.3)

## 2024-02-15 RX ORDER — SERTRALINE HYDROCHLORIDE 100 MG/1
200 TABLET, FILM COATED ORAL DAILY
Qty: 180 TABLET | Refills: 0 | Status: SHIPPED | OUTPATIENT
Start: 2024-02-15 | End: 2024-05-28

## 2024-02-15 RX ORDER — HYDROXYZINE HYDROCHLORIDE 10 MG/1
10 TABLET, FILM COATED ORAL 3 TIMES DAILY PRN
Qty: 30 TABLET | Refills: 3 | Status: SHIPPED | OUTPATIENT
Start: 2024-02-15 | End: 2024-07-09

## 2024-03-05 ENCOUNTER — TRANSFERRED RECORDS (OUTPATIENT)
Dept: HEALTH INFORMATION MANAGEMENT | Facility: CLINIC | Age: 84
End: 2024-03-05

## 2024-03-11 ENCOUNTER — TRANSFERRED RECORDS (OUTPATIENT)
Dept: HEALTH INFORMATION MANAGEMENT | Facility: CLINIC | Age: 84
End: 2024-03-11
Payer: COMMERCIAL

## 2024-03-11 LAB
ALT SERPL-CCNC: 10 IU/L (ref 5–35)
AST SERPL-CCNC: 26 U/L (ref 5–34)
CREATININE (EXTERNAL): 1.01 MG/DL (ref 0.5–1.3)

## 2024-03-13 ENCOUNTER — TRANSFERRED RECORDS (OUTPATIENT)
Dept: HEALTH INFORMATION MANAGEMENT | Facility: CLINIC | Age: 84
End: 2024-03-13
Payer: COMMERCIAL

## 2024-03-29 DIAGNOSIS — R25.2 CRAMP OF LIMB: ICD-10-CM

## 2024-03-29 RX ORDER — TIZANIDINE 2 MG/1
TABLET ORAL
Qty: 90 TABLET | Refills: 3 | Status: SHIPPED | OUTPATIENT
Start: 2024-03-29 | End: 2024-09-30

## 2024-04-05 ENCOUNTER — NURSE TRIAGE (OUTPATIENT)
Dept: INTERNAL MEDICINE | Facility: CLINIC | Age: 84
End: 2024-04-05
Payer: COMMERCIAL

## 2024-04-05 NOTE — TELEPHONE ENCOUNTER
"Pt transferred to priority line for shakiness she has had for 5 weeks and has stayed the same. Pt explains she has discussed with her arthritis provider regarding these concerns and they had done some med changes, but shakiness continues.    RN advise patient she can call and speak with her arthritis team/provider which prescribes the methotrexate for advise. Did also offer to transfer to scheduling to discuss further with PCP. Pt states she will call Dr. Doran's team and go from there.   RN reviewed reasons for call back.     Pt verbalized understanding and agrees with plan.     Answer Assessment - Initial Assessment Questions  1. REASON FOR CALL: \"What is your main concern right now?\"      shakiness  2. ONSET: \"When did the shakiness start?\"      5 weeks ago    3. OTHER SYMPTOMS: \"Do you have any other new symptoms?\"      weakness  4. TREATMENTS AND RESPONSE: \"What have you done so far to try to make this better? What medicines have you used?\"      Pt was instructed to get off of Methotrexate. Pt was off for 3 weeks. Then started on it again 2 weeks ago. Pt states the shakiness/weakness has stayed the same since it started 5 weeks ago.     Denies any dizziness, numbness/tingling sensation, chest pain, and SOB. No falls or injuries recently.    Pt states she is able to do her activities as usual, but states the weights she uses at the gym/activity center are getting harder to lift. Pt has no other complaints beside asking if there is something she can take for shakiness.    Protocols used: No Protocol [id number]    "

## 2024-04-15 DIAGNOSIS — F32.5 MAJOR DEPRESSION IN COMPLETE REMISSION (H): Chronic | ICD-10-CM

## 2024-04-16 RX ORDER — MIRTAZAPINE 7.5 MG/1
7.5 TABLET, FILM COATED ORAL AT BEDTIME
Qty: 90 TABLET | Refills: 0 | Status: SHIPPED | OUTPATIENT
Start: 2024-04-16 | End: 2024-07-15

## 2024-04-18 ENCOUNTER — OFFICE VISIT (OUTPATIENT)
Dept: URGENT CARE | Facility: URGENT CARE | Age: 84
End: 2024-04-18
Payer: COMMERCIAL

## 2024-04-18 VITALS
SYSTOLIC BLOOD PRESSURE: 156 MMHG | DIASTOLIC BLOOD PRESSURE: 68 MMHG | TEMPERATURE: 97.9 F | WEIGHT: 121 LBS | HEART RATE: 79 BPM | OXYGEN SATURATION: 97 % | BODY MASS INDEX: 22.13 KG/M2

## 2024-04-18 DIAGNOSIS — H61.21 IMPACTED CERUMEN OF RIGHT EAR: ICD-10-CM

## 2024-04-18 DIAGNOSIS — R53.83 OTHER FATIGUE: ICD-10-CM

## 2024-04-18 DIAGNOSIS — M62.81 GENERALIZED MUSCLE WEAKNESS: ICD-10-CM

## 2024-04-18 DIAGNOSIS — R42 DIZZINESS: Primary | ICD-10-CM

## 2024-04-18 DIAGNOSIS — D64.9 ANEMIA, UNSPECIFIED TYPE: ICD-10-CM

## 2024-04-18 DIAGNOSIS — M05.79 RHEUMATOID ARTHRITIS INVOLVING MULTIPLE SITES WITH POSITIVE RHEUMATOID FACTOR (H): ICD-10-CM

## 2024-04-18 DIAGNOSIS — H61.22 IMPACTED CERUMEN OF LEFT EAR: ICD-10-CM

## 2024-04-18 DIAGNOSIS — I10 BENIGN ESSENTIAL HYPERTENSION: ICD-10-CM

## 2024-04-18 LAB
ALBUMIN UR-MCNC: NEGATIVE MG/DL
ANION GAP SERPL CALCULATED.3IONS-SCNC: 4 MMOL/L (ref 7–15)
APPEARANCE UR: CLEAR
BACTERIA #/AREA URNS HPF: ABNORMAL /HPF
BASOPHILS # BLD AUTO: 0 10E3/UL (ref 0–0.2)
BASOPHILS NFR BLD AUTO: 1 %
BILIRUB UR QL STRIP: NEGATIVE
BUN SERPL-MCNC: 25 MG/DL
CALCIUM SERPL-MCNC: 9.8 MG/DL
CHLORIDE SERPL-SCNC: 109 MMOL/L
COLOR UR AUTO: YELLOW
CREAT SERPL-MCNC: 1 MG/DL
DEPRECATED HCO3 PLAS-SCNC: 29 MMOL/L (ref 22–29)
EGFRCR SERPLBLD CKD-EPI 2021: 56 ML/MIN/1.73M2
EOSINOPHIL # BLD AUTO: 0 10E3/UL (ref 0–0.7)
EOSINOPHIL NFR BLD AUTO: 0 %
ERYTHROCYTE [DISTWIDTH] IN BLOOD BY AUTOMATED COUNT: 15.7 % (ref 10–15)
GLUCOSE SERPL-MCNC: 96 MG/DL
GLUCOSE UR STRIP-MCNC: NEGATIVE MG/DL
HCT VFR BLD AUTO: 28 % (ref 35–47)
HGB BLD-MCNC: 8.7 G/DL (ref 11.7–15.7)
HGB UR QL STRIP: NEGATIVE
IMM GRANULOCYTES # BLD: 0 10E3/UL
IMM GRANULOCYTES NFR BLD: 1 %
KETONES UR STRIP-MCNC: NEGATIVE MG/DL
LEUKOCYTE ESTERASE UR QL STRIP: NEGATIVE
LYMPHOCYTES # BLD AUTO: 1.5 10E3/UL (ref 0.8–5.3)
LYMPHOCYTES NFR BLD AUTO: 41 %
MCH RBC QN AUTO: 33.9 PG (ref 26.5–33)
MCHC RBC AUTO-ENTMCNC: 31.1 G/DL (ref 31.5–36.5)
MCV RBC AUTO: 109 FL (ref 78–100)
MONOCYTES # BLD AUTO: 0.4 10E3/UL (ref 0–1.3)
MONOCYTES NFR BLD AUTO: 10 %
NEUTROPHILS # BLD AUTO: 1.7 10E3/UL (ref 1.6–8.3)
NEUTROPHILS NFR BLD AUTO: 47 %
NITRATE UR QL: POSITIVE
NRBC # BLD AUTO: 0 10E3/UL
NRBC BLD AUTO-RTO: 0 /100
PH UR STRIP: 5.5 [PH] (ref 5–7)
PLATELET # BLD AUTO: 191 10E3/UL (ref 150–450)
POTASSIUM SERPL-SCNC: 5.1 MMOL/L (ref 3.4–5.3)
RBC # BLD AUTO: 2.57 10E6/UL (ref 3.8–5.2)
RBC #/AREA URNS AUTO: ABNORMAL /HPF
SODIUM SERPL-SCNC: 142 MMOL/L
SP GR UR STRIP: 1.02 (ref 1–1.03)
SQUAMOUS #/AREA URNS AUTO: ABNORMAL /LPF
UROBILINOGEN UR STRIP-ACNC: 0.2 E.U./DL
WBC # BLD AUTO: 3.7 10E3/UL (ref 4–11)
WBC #/AREA URNS AUTO: ABNORMAL /HPF

## 2024-04-18 PROCEDURE — 87086 URINE CULTURE/COLONY COUNT: CPT | Performed by: NURSE PRACTITIONER

## 2024-04-18 PROCEDURE — 93000 ELECTROCARDIOGRAM COMPLETE: CPT | Mod: 59 | Performed by: NURSE PRACTITIONER

## 2024-04-18 PROCEDURE — 99215 OFFICE O/P EST HI 40 MIN: CPT | Mod: 25 | Performed by: NURSE PRACTITIONER

## 2024-04-18 PROCEDURE — 87088 URINE BACTERIA CULTURE: CPT | Performed by: NURSE PRACTITIONER

## 2024-04-18 PROCEDURE — 87186 SC STD MICRODIL/AGAR DIL: CPT | Performed by: NURSE PRACTITIONER

## 2024-04-18 PROCEDURE — 85025 COMPLETE CBC W/AUTO DIFF WBC: CPT | Performed by: NURSE PRACTITIONER

## 2024-04-18 PROCEDURE — 69209 REMOVE IMPACTED EAR WAX UNI: CPT | Mod: 50 | Performed by: NURSE PRACTITIONER

## 2024-04-18 PROCEDURE — 80048 BASIC METABOLIC PNL TOTAL CA: CPT | Performed by: NURSE PRACTITIONER

## 2024-04-18 PROCEDURE — 36415 COLL VENOUS BLD VENIPUNCTURE: CPT | Performed by: NURSE PRACTITIONER

## 2024-04-18 PROCEDURE — 81001 URINALYSIS AUTO W/SCOPE: CPT | Performed by: NURSE PRACTITIONER

## 2024-04-18 RX ORDER — HYDROXYCHLOROQUINE SULFATE 200 MG/1
TABLET, FILM COATED ORAL
COMMUNITY
Start: 2024-04-17 | End: 2024-09-23

## 2024-04-18 RX ORDER — PREDNISONE 5 MG/1
TABLET ORAL
COMMUNITY
End: 2024-09-23

## 2024-04-18 NOTE — PROGRESS NOTES
ICD-10-CM    1. Dizziness  R42 CBC with platelets and differential     EKG 12-lead complete w/read - Clinics     CBC with platelets and differential     Basic metabolic panel  (Ca, Cl, CO2, Creat, Gluc, K, Na, BUN)     Referral to Acute and Diagnostic Services (Day of diagnostic / First order acute)     CANCELED: Basic metabolic panel  (Ca, Cl, CO2, Creat, Gluc, K, Na, BUN)     CANCELED: Basic metabolic panel  (Ca, Cl, CO2, Creat, Gluc, K, Na, BUN)      2. Benign essential hypertension  I10 EKG 12-lead complete w/read - Clinics     Basic metabolic panel  (Ca, Cl, CO2, Creat, Gluc, K, Na, BUN)     Referral to Acute and Diagnostic Services (Day of diagnostic / First order acute)     CANCELED: Basic metabolic panel  (Ca, Cl, CO2, Creat, Gluc, K, Na, BUN)     CANCELED: Basic metabolic panel  (Ca, Cl, CO2, Creat, Gluc, K, Na, BUN)      3. Rheumatoid arthritis involving multiple sites with positive rheumatoid factor (H)  M05.79 CBC with platelets and differential     CBC with platelets and differential      4. Generalized muscle weakness  M62.81 CBC with platelets and differential     UA Macroscopic with reflex to Microscopic and Culture - Clinic Collect     CBC with platelets and differential     Urine Microscopic Exam     Urine Culture     CANCELED: Basic metabolic panel  (Ca, Cl, CO2, Creat, Gluc, K, Na, BUN)     CANCELED: Basic metabolic panel  (Ca, Cl, CO2, Creat, Gluc, K, Na, BUN)      5. Other fatigue  R53.83 CBC with platelets and differential     UA Macroscopic with reflex to Microscopic and Culture - Clinic Collect     CBC with platelets and differential     Urine Microscopic Exam     Urine Culture     CANCELED: Basic metabolic panel  (Ca, Cl, CO2, Creat, Gluc, K, Na, BUN)     CANCELED: Basic metabolic panel  (Ca, Cl, CO2, Creat, Gluc, K, Na, BUN)      6. Impacted cerumen of left ear  H61.22 MA REMOVAL IMPACTED CERUMEN IRRIGATION/LVG UNILAT      7. Impacted cerumen of right ear  H61.21 MA REMOVAL IMPACTED  CERUMEN IRRIGATION/LVG UNILAT      8. Anemia, unspecified type  D64.9       Primary concern is around her dizziness combined with her other comorbidities.  I think she would benefit from advanced imaging of the brain and have referred her to the acute and diagnostic service [facility] to be seen tomorrow.  We did review signs of stroke and when to go to the emergency room.    Anemia appears to be fairly stable with last hemoglobin being 9.5 one month ago and 8.5 the month before that.  Blood pressure is elevated today.  She is taking all medications as prescribed.  This does increase her risk for CVA and TIA.  Will have her follow-up with primary care regarding this issue.    Rheumatoid arthritis treatment does make her higher risk for infections.  She does not currently have any RA symptoms.  She does follow with rheumatology for this.    After bilateral ears are irrigated with water canals appear clear.    Red flag warning signs and when to go to the emergency room discussed.  Reviewed potential adverse reactions to medications.    42 minutes total time spent reviewing patient's chart, completing history and physical exam, establishing plan of care, reviewing laboratory results and completing documentation.  This does not include the time used for ear irrigation.    Labs:  Results for orders placed or performed in visit on 04/18/24 (from the past 24 hour(s))   UA Macroscopic with reflex to Microscopic and Culture - Clinic Collect    Specimen: Urine, Midstream   Result Value Ref Range    Color Urine Yellow Colorless, Straw, Light Yellow, Yellow    Appearance Urine Clear Clear    Glucose Urine Negative Negative mg/dL    Bilirubin Urine Negative Negative    Ketones Urine Negative Negative mg/dL    Specific Gravity Urine 1.025 1.003 - 1.035    Blood Urine Negative Negative    pH Urine 5.5 5.0 - 7.0    Protein Albumin Urine Negative Negative mg/dL    Urobilinogen Urine 0.2 0.2, 1.0 E.U./dL    Nitrite Urine  Positive (A) Negative    Leukocyte Esterase Urine Negative Negative   Urine Microscopic Exam   Result Value Ref Range    Bacteria Urine Many (A) None Seen /HPF    RBC Urine 0-2 0-2 /HPF /HPF    WBC Urine 0-5 0-5 /HPF /HPF    Squamous Epithelials Urine Few (A) None Seen /LPF   CBC with platelets and differential    Narrative    The following orders were created for panel order CBC with platelets and differential.  Procedure                               Abnormality         Status                     ---------                               -----------         ------                     CBC with platelets and d...[[id number]]                      In process                   Please view results for these tests on the individual orders.   Basic metabolic panel  (Ca, Cl, CO2, Creat, Gluc, K, Na, BUN)   Result Value Ref Range    Sodium 142 mmol/L    Potassium 5.1 3.4 - 5.3 mmol/L    Chloride 109 mmol/L    Carbon Dioxide (CO2) 29 22 - 29 mmol/L    Anion Gap 4 (L) 7 - 15 mmol/L    Urea Nitrogen 25.0 mg/dL    Creatinine 1.00 mg/dL    GFR Estimate 56 (L) >60 mL/min/1.73m2    Calcium 9.8 mg/dL    Glucose 96 mg/dL       SUBJECTIVE:   Marcela Sandhu is a 83 year old female presenting with a chief complaint of   Chief Complaint   Patient presents with    Urgent Care     PT came in with ear pain and feeling dizzy, onset couple days   .    Review of systems is negative except for as noted in the HPI.    OBJECTIVE  BP (!) 156/68   Pulse 79   Temp 97.9  F (36.6  C) (Tympanic)   Wt 54.9 kg (121 lb)   LMP  (LMP Unknown)   SpO2 97%   BMI 22.13 kg/m        GENERAL: Alert, mild distress, pale  SKIN: skin is clear, no rash or abnormal pigmentation  HEAD: The head is normocephalic.   EYES: The eyes are normal. The conjunctivae and cornea normal.   NECK: The neck is supple and thyroid is normal, no masses; LYMPH NODES: No adenopathy  HENT: Bilateral tympanic membranes appear normal, nasal passages are clear, pharynx appears  normal  LUNGS: The lung fields are clear to auscultation, no rales, rhonchi, wheezing or retractions  CV: Rhythm is regular. S1 and S2 are normal. No murmurs.  EXTREMITIES: Symmetric extremities no deformities  Neuro: Cranial nerves II through XII are intact, she does walk with a walker for support.    DARIO Patel, CNP  [facility] Provider    The use of Dragon/[employer] dictation services may have been used to construct the content in this note; any grammatical or spelling errors are non-intentional. Please contact the author of this note directly if you are in need of any clarification.

## 2024-04-19 ENCOUNTER — OFFICE VISIT (OUTPATIENT)
Dept: PEDIATRICS | Facility: CLINIC | Age: 84
End: 2024-04-19
Payer: COMMERCIAL

## 2024-04-19 ENCOUNTER — HOSPITAL ENCOUNTER (OUTPATIENT)
Dept: MRI IMAGING | Facility: CLINIC | Age: 84
Discharge: HOME | End: 2024-04-19
Attending: FAMILY MEDICINE | Admitting: FAMILY MEDICINE
Payer: COMMERCIAL

## 2024-04-19 VITALS
RESPIRATION RATE: 20 BRPM | HEIGHT: 62 IN | TEMPERATURE: 97.7 F | BODY MASS INDEX: 22.78 KG/M2 | OXYGEN SATURATION: 99 % | SYSTOLIC BLOOD PRESSURE: 162 MMHG | HEART RATE: 78 BPM | WEIGHT: 123.8 LBS | DIASTOLIC BLOOD PRESSURE: 72 MMHG

## 2024-04-19 DIAGNOSIS — R42 DIZZINESS: ICD-10-CM

## 2024-04-19 DIAGNOSIS — R42 DIZZINESS: Primary | ICD-10-CM

## 2024-04-19 DIAGNOSIS — R01.1 HEART MURMUR: ICD-10-CM

## 2024-04-19 DIAGNOSIS — N30.00 ACUTE CYSTITIS WITHOUT HEMATURIA: ICD-10-CM

## 2024-04-19 PROCEDURE — 255N000002 HC RX 255 OP 636: Performed by: FAMILY MEDICINE

## 2024-04-19 PROCEDURE — 70553 MRI BRAIN STEM W/O & W/DYE: CPT

## 2024-04-19 PROCEDURE — A9585 GADOBUTROL INJECTION: HCPCS | Performed by: FAMILY MEDICINE

## 2024-04-19 PROCEDURE — 99214 OFFICE O/P EST MOD 30 MIN: CPT | Performed by: FAMILY MEDICINE

## 2024-04-19 RX ORDER — CEFUROXIME AXETIL 500 MG/1
500 TABLET ORAL 2 TIMES DAILY
Qty: 10 TABLET | Refills: 0 | Status: SHIPPED | OUTPATIENT
Start: 2024-04-19 | End: 2024-04-24

## 2024-04-19 RX ORDER — GADOBUTROL 604.72 MG/ML
6 INJECTION INTRAVENOUS ONCE
Status: COMPLETED | OUTPATIENT
Start: 2024-04-19 | End: 2024-04-19

## 2024-04-19 RX ADMIN — GADOBUTROL 6 ML: 604.72 INJECTION INTRAVENOUS at 16:06

## 2024-04-19 ASSESSMENT — PAIN SCALES - GENERAL: PAINLEVEL: NO PAIN (0)

## 2024-04-19 NOTE — Clinical Note
FYI-patient seen at the [facility] today. New onset dizziness which seems to be improving.  MRI of head is normal today. Urine culture from urgent care yesterday is positive.  Patient started on Ceftin today.  Urine culture sensitivities are pending. Harsh heart murmur noted today.  Recommend echocardiogram as an outpatient.  I did place a primary care follow-up order to help the patient get into see you.

## 2024-04-19 NOTE — PATIENT INSTRUCTIONS
Recommend getting an echocardiogram which is an ultrasound of your heart to evaluate your heart murmur.  This could contribute to dizziness.  Please schedule an appointment with your primary care provider to address this.

## 2024-04-19 NOTE — PROGRESS NOTES
Acute and Diagnostic Services Clinic Visit    Assessment & Plan     Dizziness  MRI of the head is normal  Preliminary urine culture from urine done at urgent care yesterday does show Klebsiella pneumonia.  Patient started on Ceftin twice daily for 5 days.  Sensitivities from the culture are pending.  Patient does not recall being told of her heart murmur before and I do not see it documented in her chart.  Would recommend getting an echocardiogram to assess this murmur as it could be contributing to her dizziness.  Will send a note to her primary and also placed an order for primary care follow-up  - MR Brain w/o & w Contrast; Future  - sodium chloride (PF) 0.9% PF flush 3 mL    Acute cystitis without hematuria  Started on Ceftin  Urine culture sensitivities are pending  - cefuroxime (CEFTIN) 500 MG tablet; Take 1 tablet (500 mg) by mouth 2 times daily for 5 days    Heart murmur  Recommend echocardiogram          No follow-ups on file.    Quinn Medrano is a 83 year old, presenting for the following health issues:  CVA (Patient is here for possible stroke or TIA.  )    HPI     Evaluation of Neurologic Symptoms  Onset/Duration: about 2-3 days ago  Description:  Weakness/location: genaralized weakness  Numbness/tingling: denies  Headaches: YES  History of migraines: no  Other pain: YES- History of chronic pain in the joints.  Dizziness: YES- Yesterday.  Visual changes: no   Speech changes: no  Memory changes: no  Personality changes: no           Loss or change of consciousness: no  Progression of symptoms same  Accompanying signs and symptoms:  Fever: no   Stiff neck: no  Neck or upper back pain:  YES- chronic back pain  ENT or URI symptoms: no           Nausea or vomiting: no  History    Head or other trauma: no            Prior evaluation: no  Precipitating or Alleviating factors:           Things that improve symptoms:  no           Things that worsen symptoms: Hard to tell.  Therapies tried and outcome: She  "has group exercises 5 days a week    Patient is an 83-year-old female, here with her son, for new onset dizziness.  She was seen in urgent care yesterday and thought the dizziness could be from ear issues.  She had her ears flushed and her hearing feels better but she is still having dizziness.  Yesterday at the urgent care appointment when she went from a lying to sitting position the room was spinning.  She does get headaches off-and-on but that is typical for her and her headaches are no different than usual.  She has had no visual changes.  They are changing her rheumatoid arthritis medications.  Her white count and red blood cell counts have been going down and so she has been told to stop her methotrexate.  She missed her dose this past week.  Her new medications for the rheumatoid arthritis have not come from the pharmacy yet and so she has not started the new medications yet.    Her hand tremors seem a little bit worse recently.  She has had them for 20 years and it was a side effect of her interferon treatment for hepatitis C which she contracted from a blood transfusion.  She has no chest pain or shortness of breath.  No abdominal pain.  No fevers or chills.  No nausea.    Review of Systems  Negative except as listed in HPI      Objective    BP (!) 162/72 (BP Location: Right arm, Patient Position: Sitting, Cuff Size: Adult Regular)   Pulse 78   Temp 97.7  F (36.5  C) (Temporal)   Resp 20   Ht 1.575 m (5' 2\")   Wt 56.2 kg (123 lb 12.8 oz)   LMP  (LMP Unknown)   SpO2 99%   BMI 22.64 kg/m    Body mass index is 22.64 kg/m .  Physical Exam   Vitals are noted and within normal limits except for elevated systolic blood pressure today  General she is alert, oriented, and in no acute distress  Head is normocephalic and atraumatic  Eyes: Pupils equal round and reactive to light.  Conjunctival with no injection.  Extraocular motions are intact.  Patient follows my finger to the left and will not follow it to " the right but can look to all of the areas on the right.  Ears: Canals narrow but patent and TMs are not erythematous  Mouth mucous members are pink and moist.  Palate elevates bilaterally and tongue protrudes symmetrically.  Symmetric smile.  Sensation intact all 3 branches of the trigeminal nerve bilaterally  Neck is supple with no cervical lymphadenopathy  Heart regular rate and rhythm with a loud systolic harsh murmur  Lungs are clear to auscultation bilaterally with good air entry  Finger-nose-finger is normal   Rapid alternating movements normal  Patient does have a bilateral resting tremor  Strength is normal equal bilateral in the upper and lower extremities  Uses a walker for ambulation  MRI of head with and without contrast shows no acute findings  Results for orders placed or performed during the hospital encounter of 04/19/24   MR Brain w/o & w Contrast     Status: None    Narrative    MRI OF THE BRAIN WITHOUT AND WITH CONTRAST 4/19/2024 4:01 PM     COMPARISON: Head CT 12/7/2023.    HISTORY:  Dizziness.     TECHNIQUE: Axial diffusion-weighted with ADC map, axial T2-weighted  with fat saturation, axial T1-weighted, axial turboFLAIR and coronal  T1-weighted images of the brain were acquired without intravenous  contrast.  Following intravenous administration of gadolinium (6 mL  Gadavist), axial T1-weighted images of the brain were acquired.     FINDINGS: There is mild diffuse cerebral volume loss. There are  numerous scattered focal and moderate patchy periventricular areas of  abnormal T2 signal hyperintensity in the cerebral white matter  bilaterally that are consistent with sequela of chronic small vessel  ischemic disease.    The ventricles and basal cisterns are within normal limits in  configuration given the degree of cerebral volume loss.  There is no  midline shift.  There are no extra-axial fluid collections.  There is  no evidence for stroke or acute intracranial hemorrhage.  There is  no  abnormal contrast enhancement in the brain or its coverings.    There is no sinusitis or mastoiditis.      Impression    IMPRESSION: Diffuse cerebral volume loss and cerebral white matter  changes consistent with chronic small vessel ischemic disease. No  evidence for acute intracranial pathology.     LILLY CHOI MD         SYSTEM ID:  [id number]                 Signed Electronically by: Jacklyn French MD

## 2024-04-25 ENCOUNTER — HOSPITAL ENCOUNTER (OUTPATIENT)
Dept: CARDIOLOGY | Facility: CLINIC | Age: 84
Discharge: HOME | End: 2024-04-25
Attending: INTERNAL MEDICINE | Admitting: INTERNAL MEDICINE
Payer: COMMERCIAL

## 2024-04-25 DIAGNOSIS — R01.1 HEART MURMUR: ICD-10-CM

## 2024-04-25 LAB
BACTERIA UR CULT: ABNORMAL
LVEF ECHO: NORMAL

## 2024-04-25 PROCEDURE — 93306 TTE W/DOPPLER COMPLETE: CPT

## 2024-04-25 PROCEDURE — 93306 TTE W/DOPPLER COMPLETE: CPT | Mod: 26 | Performed by: INTERNAL MEDICINE

## 2024-05-08 ENCOUNTER — TRANSFERRED RECORDS (OUTPATIENT)
Dept: HEALTH INFORMATION MANAGEMENT | Facility: CLINIC | Age: 84
End: 2024-05-08
Payer: COMMERCIAL

## 2024-05-28 DIAGNOSIS — F32.5 MAJOR DEPRESSION IN COMPLETE REMISSION (H): Chronic | ICD-10-CM

## 2024-05-28 RX ORDER — SERTRALINE HYDROCHLORIDE 100 MG/1
200 TABLET, FILM COATED ORAL DAILY
Qty: 180 TABLET | Refills: 0 | Status: SHIPPED | OUTPATIENT
Start: 2024-05-28 | End: 2024-08-29

## 2024-05-29 DIAGNOSIS — F32.5 MAJOR DEPRESSION IN COMPLETE REMISSION (H): Chronic | ICD-10-CM

## 2024-05-29 RX ORDER — BUPROPION HYDROCHLORIDE 300 MG/1
TABLET ORAL
Qty: 90 TABLET | Refills: 1 | Status: ON HOLD | OUTPATIENT
Start: 2024-05-29 | End: 2024-10-05

## 2024-07-08 DIAGNOSIS — F41.9 ANXIETY: ICD-10-CM

## 2024-07-09 RX ORDER — HYDROXYZINE HYDROCHLORIDE 10 MG/1
10 TABLET, FILM COATED ORAL 3 TIMES DAILY PRN
Qty: 30 TABLET | Refills: 3 | Status: SHIPPED | OUTPATIENT
Start: 2024-07-09

## 2024-07-10 ENCOUNTER — TRANSFERRED RECORDS (OUTPATIENT)
Dept: HEALTH INFORMATION MANAGEMENT | Facility: CLINIC | Age: 84
End: 2024-07-10
Payer: COMMERCIAL

## 2024-07-15 DIAGNOSIS — F32.5 MAJOR DEPRESSION IN COMPLETE REMISSION (H): Chronic | ICD-10-CM

## 2024-07-15 RX ORDER — MIRTAZAPINE 7.5 MG/1
7.5 TABLET, FILM COATED ORAL AT BEDTIME
Qty: 90 TABLET | Refills: 0 | Status: SHIPPED | OUTPATIENT
Start: 2024-07-15

## 2024-08-29 DIAGNOSIS — F32.5 MAJOR DEPRESSION IN COMPLETE REMISSION (H): Chronic | ICD-10-CM

## 2024-08-29 RX ORDER — SERTRALINE HYDROCHLORIDE 100 MG/1
200 TABLET, FILM COATED ORAL DAILY
Qty: 180 TABLET | Refills: 0 | Status: SHIPPED | OUTPATIENT
Start: 2024-08-29

## 2024-09-04 ENCOUNTER — APPOINTMENT (OUTPATIENT)
Dept: GENERAL RADIOLOGY | Facility: CLINIC | Age: 84
End: 2024-09-04
Attending: EMERGENCY MEDICINE
Payer: COMMERCIAL

## 2024-09-04 ENCOUNTER — HOSPITAL ENCOUNTER (EMERGENCY)
Facility: CLINIC | Age: 84
Discharge: HOME | End: 2024-09-04
Attending: EMERGENCY MEDICINE | Admitting: EMERGENCY MEDICINE
Payer: COMMERCIAL

## 2024-09-04 VITALS
BODY MASS INDEX: 23.98 KG/M2 | WEIGHT: 127 LBS | OXYGEN SATURATION: 98 % | SYSTOLIC BLOOD PRESSURE: 202 MMHG | HEIGHT: 61 IN | TEMPERATURE: 98 F | RESPIRATION RATE: 18 BRPM | DIASTOLIC BLOOD PRESSURE: 74 MMHG | HEART RATE: 76 BPM

## 2024-09-04 DIAGNOSIS — S20.222A: ICD-10-CM

## 2024-09-04 PROCEDURE — 99283 EMERGENCY DEPT VISIT LOW MDM: CPT

## 2024-09-04 PROCEDURE — 73030 X-RAY EXAM OF SHOULDER: CPT | Mod: RT

## 2024-09-04 ASSESSMENT — ACTIVITIES OF DAILY LIVING (ADL)
ADLS_ACUITY_SCORE: 35
ADLS_ACUITY_SCORE: 35

## 2024-09-04 ASSESSMENT — COLUMBIA-SUICIDE SEVERITY RATING SCALE - C-SSRS
6. HAVE YOU EVER DONE ANYTHING, STARTED TO DO ANYTHING, OR PREPARED TO DO ANYTHING TO END YOUR LIFE?: NO
1. IN THE PAST MONTH, HAVE YOU WISHED YOU WERE DEAD OR WISHED YOU COULD GO TO SLEEP AND NOT WAKE UP?: NO
2. HAVE YOU ACTUALLY HAD ANY THOUGHTS OF KILLING YOURSELF IN THE PAST MONTH?: NO

## 2024-09-04 NOTE — ED TRIAGE NOTES
Pt states was trying to open window and lost balance. Pt c/o pain in rt shoulder. Pt denies hitting head or having neck pain. Pt had C-collar placed by EMS.  Pt is on blood thinners pt alert and oriented     Triage Assessment (Adult)       Row Name 09/04/24 1246          Triage Assessment    Airway WDL WDL        Respiratory WDL    Respiratory WDL WDL        Peripheral/Neurovascular WDL    Peripheral Neurovascular WDL WDL

## 2024-09-04 NOTE — ED PROVIDER NOTES
"  Emergency Department Note      History of Present Illness     Chief Complaint   Fall      HPI   Marcela Sandhu is an 84 year old female who is not anticoagulated with a history of hypertension who presents via EMS with a fall. Patient notes that she got back to her apartment and was about to open a window when she believes that she likely tripped due to her shoes and landed on her right scapula.  She has been able to ambulate since the fall.  She has pain to the right scapular region even though earlier she stated it was the right shoulder.  She denies hitting her head or having loss of consciousness.  She denies having a headache or any neck pain, although EMS did place a c-collar per protocol.  She also denies any numbness, tingling, or weakness to her right hand.  Additionally, she denies any chest pain, shortness of breath, dizziness, or any palpitations.  She denies any other injuries.      Independent Historian   None    Review of External Notes   I reviewed the patient's MIIC.    Past Medical History     Medical History and Problem List   Fibromyalgia  Depression  Peripheral neuropathy  Acid reflux disease  Restless leg syndrome  Osteopenia  Hypertension   Rheumatoid arthritis    Medications   Abilify  Wellbutrin  Atarax  Avapro  Remeron  Zoloft  Zanaflex  Aricept  Plaquenil  Deltasone  Folvite    Surgical History   Appendectomy   Bilateral carpometacarpal surgery  Bilateral blepharoplasty  Bilateral carpal tunnel release  Colonoscopy  Tendon lengthening surgery  EGD  Bilateral mastectomy  Spine surgery    Physical Exam     Patient Vitals for the past 24 hrs:   BP Temp Temp src Pulse Resp SpO2 Height Weight   09/04/24 1244 (!) 202/74 98  F (36.7  C) Temporal 76 18 98 % 1.549 m (5' 1\") 57.6 kg (127 lb)     Physical Exam  Nursing note and vitals reviewed.  Constitutional:  Oriented to person, place, and time. Cooperative.   HENT:   Nose:    Nose normal.   Mouth/Throat:   Mucous membranes are normal.   Eyes: "    Conjunctivae normal and EOM are normal.      Pupils are equal, round, and reactive to light.   Neck:    Trachea normal.   Cardiovascular:  Normal rate, regular rhythm, normal heart sounds and normal pulses. 3/6 systolic murmur heard.  Pulmonary/Chest:  Effort normal and breath sounds normal.   Abdominal:   Soft. Normal appearance and bowel sounds are normal.      There is no tenderness.      There is no rebound and no CVA tenderness.   Musculoskeletal:  Abrasion and contusion over the right scapular region which is tender to palpation.  No tenderness to palpation over the right shoulder and full range of motion of the right shoulder.  No cervical spine tenderness to palpation.  Head is atraumatic.  No tenderness to palpation over the bilateral hips or the pelvis.  Extremities otherwise atraumatic x 4.   Lymphadenopathy:  No cervical adenopathy.   Neurological:   Alert and oriented to person, place, and time. Normal strength.      No cranial nerve deficit or sensory deficit. GCS eye subscore is 4. GCS verbal subscore is 5. GCS motor subscore is 6.   Skin:    Abrasion and contusion over the right scapular region. No rash noted.   Psychiatric:   Normal mood and affect.      Diagnostics     Lab Results   Labs Ordered and Resulted from Time of ED Arrival to Time of ED Departure - No data to display    Imaging   XR Shoulder Right G/E 3 Views   Final Result   IMPRESSION:   No fracture or malalignment. Mild glenohumeral joint space narrowing.   Cortical irregularity along the greater tuberosity which is likely   suggestive of chronic rotator cuff tendinopathy. Osteopenia.       Eduardo Thomas MD            SYSTEM ID:  [id number]        Independent Interpretation   I independently interpreted the patient's right shoulder x-rays and agree with a negative reading for fracture.    ED Course      Medications Administered   Medications - No data to display    Procedures   Procedures     Discussion of Management   None    ED  Course   ED Course as of 09/04/24 1519   Wed Sep 04, 2024   1408 I obtained history and examined the patient as noted above         Additional Documentation  None    Medical Decision Making / Diagnosis     CMS Diagnoses: None    MIPS       None    MDM   Marcela Sandhu is an 84 year old female who came in by ambulance after she apparently had a mechanical fall.  She had x-rays of the right shoulder obtained, which were unremarkable for any fractures.  I reviewed those myself as well given the fact that her pain seems to be mainly over the scapula.  I do not see any fractures to the scapula, and I indicated that it would be very unlikely that she with fracture of the scapula based on the mechanism.  I do not feel that she requires any additional imaging.  I suspect that she has a contusion and abrasion to the right upper back region.  I recommended ice as well as Tylenol.  I also discussed whether or not we need to proceed with a CT scan of her head.  However she and her family deny that she actually is on blood thinners despite the triage note stating she is.  A review of her chart also indicates that she is not on any blood thinners.  Additionally, she did not hit her head or have a headache.  Therefore I think it is reasonable to forego a CT scan of her head at this time.  However I recommended close outpatient follow-up and certainly returning with any concerns or worsening symptoms.  Tetanus is up-to-date.    Disposition   The patient was discharged.     Diagnosis     ICD-10-CM    1. Contusion and abrasion of upper back excluding scapular region, left, initial encounter  S20.222A            Discharge Medications   New Prescriptions    No medications on file         Scribe Disclosure:  I, Alissa Brooks, am serving as a scribe at 3:15 PM on 9/4/2024 to document services personally performed by Ravindra Moncada MD based on my observations and the provider's statements to me.        Ravindra Moncada,  MD  09/04/24 1525

## 2024-09-05 ENCOUNTER — PATIENT OUTREACH (OUTPATIENT)
Dept: INTERNAL MEDICINE | Facility: CLINIC | Age: 84
End: 2024-09-05
Payer: COMMERCIAL

## 2024-09-05 NOTE — TELEPHONE ENCOUNTER
Transitions of Care Outreach  Chief Complaint   Patient presents with    Hospital F/U     Contusion and abrasion of upper back        Most Recent Admission Date: 9/4/2024   Most Recent Admission Diagnosis:      Most Recent Discharge Date: 9/4/2024   Most Recent Discharge Diagnosis: Contusion of upper back excluding scapular region, left, initial encounter - S20.222A     Transitions of Care Assessment    Discharge Assessment  How are your symptoms? (Red Flag symptoms escalate to triage hotline per guidelines): Improved  Do you know how to contact your clinic care team if you have future questions or changes to your health status? : Yes  Does the patient have their discharge instructions? : Yes  Does the patient have questions regarding their discharge instructions? : No  Were you started on any new medications or were there changes to any of your previous medications? : No  Does the patient have all of their medications?: Yes  Do you have questions regarding any of your medications? : No    Follow up Plan     Discharge Follow-Up  Discharge follow up appointment scheduled in alignment with recommended follow up timeframe or Transitions of Risk Category? (Low = within 30 days; Moderate= within 14 days; High= within 7 days): Yes    Future Appointments   Date Time Provider Department Center   9/19/2024  9:30 AM Kelley Haney MD [facility]   10/30/2024  3:30 PM Esther Holguin MD [facility]       Outpatient Plan as outlined on AVS reviewed with patient.    For any urgent concerns, please contact our 24 hour nurse triage line: [phone] ([phone])       Kavya Steiner RN

## 2024-09-22 PROBLEM — F33.42 RECURRENT MAJOR DEPRESSIVE DISORDER, IN FULL REMISSION (H): Status: ACTIVE | Noted: 2024-09-22

## 2024-09-23 ENCOUNTER — OFFICE VISIT (OUTPATIENT)
Dept: INTERNAL MEDICINE | Facility: CLINIC | Age: 84
End: 2024-09-23
Payer: COMMERCIAL

## 2024-09-23 ENCOUNTER — ANCILLARY PROCEDURE (OUTPATIENT)
Dept: GENERAL RADIOLOGY | Facility: CLINIC | Age: 84
End: 2024-09-23
Attending: INTERNAL MEDICINE
Payer: COMMERCIAL

## 2024-09-23 VITALS
HEIGHT: 61 IN | SYSTOLIC BLOOD PRESSURE: 168 MMHG | TEMPERATURE: 98.1 F | DIASTOLIC BLOOD PRESSURE: 70 MMHG | BODY MASS INDEX: 23.43 KG/M2 | WEIGHT: 124.1 LBS | OXYGEN SATURATION: 96 % | HEART RATE: 66 BPM

## 2024-09-23 DIAGNOSIS — M79.621 PAIN OF RIGHT UPPER ARM: ICD-10-CM

## 2024-09-23 DIAGNOSIS — F33.42 RECURRENT MAJOR DEPRESSIVE DISORDER, IN FULL REMISSION (H): ICD-10-CM

## 2024-09-23 DIAGNOSIS — S40.211D: ICD-10-CM

## 2024-09-23 DIAGNOSIS — S40.011D CONTUSION OF RIGHT SCAPULA, SUBSEQUENT ENCOUNTER: Primary | ICD-10-CM

## 2024-09-23 DIAGNOSIS — Z23 NEED FOR PROPHYLACTIC VACCINATION AND INOCULATION AGAINST INFLUENZA: ICD-10-CM

## 2024-09-23 DIAGNOSIS — Z23 HIGH PRIORITY FOR 2019-NCOV VACCINE: ICD-10-CM

## 2024-09-23 DIAGNOSIS — M25.521 RIGHT ELBOW PAIN: ICD-10-CM

## 2024-09-23 DIAGNOSIS — I10 BENIGN ESSENTIAL HYPERTENSION: ICD-10-CM

## 2024-09-23 DIAGNOSIS — S40.011D CONTUSION OF RIGHT SCAPULA, SUBSEQUENT ENCOUNTER: ICD-10-CM

## 2024-09-23 DIAGNOSIS — N18.31 STAGE 3A CHRONIC KIDNEY DISEASE (H): ICD-10-CM

## 2024-09-23 PROCEDURE — 91320 SARSCV2 VAC 30MCG TRS-SUC IM: CPT | Performed by: INTERNAL MEDICINE

## 2024-09-23 PROCEDURE — 90662 IIV NO PRSV INCREASED AG IM: CPT | Performed by: INTERNAL MEDICINE

## 2024-09-23 PROCEDURE — 90480 ADMN SARSCOV2 VAC 1/ONLY CMP: CPT | Performed by: INTERNAL MEDICINE

## 2024-09-23 PROCEDURE — 73060 X-RAY EXAM OF HUMERUS: CPT | Mod: TC | Performed by: STUDENT IN AN ORGANIZED HEALTH CARE EDUCATION/TRAINING PROGRAM

## 2024-09-23 PROCEDURE — 73010 X-RAY EXAM OF SHOULDER BLADE: CPT | Mod: TC | Performed by: STUDENT IN AN ORGANIZED HEALTH CARE EDUCATION/TRAINING PROGRAM

## 2024-09-23 PROCEDURE — G0008 ADMIN INFLUENZA VIRUS VAC: HCPCS | Performed by: INTERNAL MEDICINE

## 2024-09-23 PROCEDURE — 73080 X-RAY EXAM OF ELBOW: CPT | Mod: TC | Performed by: STUDENT IN AN ORGANIZED HEALTH CARE EDUCATION/TRAINING PROGRAM

## 2024-09-23 PROCEDURE — 99214 OFFICE O/P EST MOD 30 MIN: CPT | Mod: 25 | Performed by: INTERNAL MEDICINE

## 2024-09-23 RX ORDER — AMLODIPINE AND VALSARTAN 5; 160 MG/1; MG/1
1 TABLET ORAL DAILY
Qty: 90 TABLET | Refills: 3 | Status: ON HOLD | OUTPATIENT
Start: 2024-09-23 | End: 2024-10-05

## 2024-09-23 NOTE — PATIENT INSTRUCTIONS
Flu shot and COVID-19 booster today.    Xrays downstairs today.    REPLACE irbesartan with amlodipine-valsartan 5-160mg once daily.    ---    Blood pressure checks at home - set yourself up for success!     - use a good quality blood pressure machine ([employer] brand name recommended)  - use an appropriately sized, upper arm/brachial blood pressure cuff   - wait 2-3 hours after taking your blood pressure medication before checking blood pressure   - take your blood pressure in a quiet room  - take your blood pressure while seated, sitting up straight, with your feet flat on the ground  - avoid stimuli right before checking (coffee, exercise, heated discussions)  - avoid stimuli during your blood pressure check (TV, talking, loud music)  - once seated and blood pressure cuff is on, wait 5 minutes before pushing the button    Goal blood pressures are 110s-130s/60s-80s. Please let me know if your blood pressures are consistently out of this range.

## 2024-09-23 NOTE — PROGRESS NOTES
ASSESSMENT/PLAN                                                      (S40.011D) Contusion of right scapula, subsequent encounter  (primary encounter diagnosis)  (S40.211D) Abrasion of right scapular region, subsequent encounter  Comment: Ongoing right scapular pain.  Plan: right scapula x-ray today.    (M25.521) Right elbow pain  Plan: right elbow x-ray series today.    (M79.621) Pain of right upper arm  Plan: right humerus x-ray series today.    (Z23) Need for prophylactic vaccination and inoculation against influenza  Comment: flu shot given today.    (Z23) High priority for 2019-nCoV vaccine  Comment: COVID-19 booster given today.    (F33.42) Recurrent major depressive disorder, in full remission (H24)  Comment: well-controlled on Wellbutrin XL, Zoloft, Remeron, and Abilify.    (N18.31) Stage 3a chronic kidney disease (H)  Comment: known issue that I take into account for their medical decisions, no current exacerbations or new concerns.    (I10) Benign essential hypertension  Comment: poorly-controlled on current regimen.   Plan: REPLACE irbesartan 150 mg daily with amlodipine-valsartan 5-160 mg daily; patient to monitor blood pressures at home and contact MD if they continue to be elevated.    Kelley Haney MD   [address] [address]  T: [phone], F: [phone]    BREANN Sandhu is a very pleasant 84 year old female who presents for ER follow-up:    Accompanied by son.    Patient presented to the ER 9/4/2024 after a fall at home. She sustained a right scapular/upper back contusion and abrasion. Shoulder x-ray unremarkable. Discharged home with supportive care measures.    Since then patient has been bothered by right elbow, upper arm, and scapular pain. Modest improvement with Tylenol use.    Unrelated to above, patient's blood pressure is noted to be elevated today.  She is currently on  "irbesartan 150 mg daily. Tolerating medication(s) well - no adverse side effects. She is asymptomatic from a blood pressure perspective: no chest pain or palpitations, no shortness of breath, no light-headedness or dizziness, no headaches or vision changes.     Also unrelated to above, patient is due for her flu shot and COVID-19 booster.    OBJECTIVE                                                      BP (!) 168/70   Pulse 66   Temp 98.1  F (36.7  C) (Temporal)   Ht 1.549 m (5' 1\")   Wt 56.3 kg (124 lb 1.6 oz)   LMP  (LMP Unknown)   SpO2 96%   BMI 23.45 kg/m    Constitutional: well-appearing  Respiratory: normal respiratory effort; clear to auscultation bilaterally  Cardiovascular: regular rate and rhythm; no edema  Musculoskeletal: right scapular tenderness to palpation  Psych: normal judgment and insight; normal mood and affect; recent and remote memory intact    ---    (Note documentation was completed, in part, with [employer] voice-recognition software. Documentation was reviewed, but some grammatical, spelling, and word errors may remain.)    "

## 2024-09-30 ENCOUNTER — APPOINTMENT (OUTPATIENT)
Dept: CT IMAGING | Facility: CLINIC | Age: 84
DRG: 082 | End: 2024-09-30
Attending: EMERGENCY MEDICINE
Payer: COMMERCIAL

## 2024-09-30 ENCOUNTER — APPOINTMENT (OUTPATIENT)
Dept: CT IMAGING | Facility: CLINIC | Age: 84
DRG: 082 | End: 2024-09-30
Attending: PSYCHIATRY & NEUROLOGY
Payer: COMMERCIAL

## 2024-09-30 ENCOUNTER — APPOINTMENT (OUTPATIENT)
Dept: GENERAL RADIOLOGY | Facility: CLINIC | Age: 84
DRG: 082 | End: 2024-09-30
Attending: EMERGENCY MEDICINE
Payer: COMMERCIAL

## 2024-09-30 ENCOUNTER — HOSPITAL ENCOUNTER (INPATIENT)
Facility: CLINIC | Age: 84
LOS: 5 days | Discharge: NURSING, REHAB OR LONG-TERM CARE | End: 2024-10-05
Attending: EMERGENCY MEDICINE | Admitting: INTERNAL MEDICINE
Payer: COMMERCIAL

## 2024-09-30 DIAGNOSIS — S06.5XAA SDH (SUBDURAL HEMATOMA) (H): ICD-10-CM

## 2024-09-30 DIAGNOSIS — D69.6 THROMBOCYTOPENIA (H): ICD-10-CM

## 2024-09-30 DIAGNOSIS — D64.9 ANEMIA, UNSPECIFIED TYPE: ICD-10-CM

## 2024-09-30 DIAGNOSIS — I60.9 SAH (SUBARACHNOID HEMORRHAGE) (H): ICD-10-CM

## 2024-09-30 DIAGNOSIS — K92.0 GASTROINTESTINAL HEMORRHAGE WITH HEMATEMESIS: ICD-10-CM
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
## 2024-09-30 DIAGNOSIS — S02.19XA CLOSED FRACTURE OF TEMPORAL BONE, INITIAL ENCOUNTER (H): ICD-10-CM

## 2024-09-30 DIAGNOSIS — R56.9 SEIZURES (H): Primary | ICD-10-CM

## 2024-09-30 LAB
ABO/RH(D): NORMAL
ANION GAP SERPL CALCULATED.3IONS-SCNC: 14 MMOL/L (ref 7–15)
ANTIBODY SCREEN: NEGATIVE
APTT PPP: 28 SECONDS (ref 22–38)
BASOPHILS # BLD AUTO: 0 10E3/UL (ref 0–0.2)
BASOPHILS NFR BLD AUTO: 1 %
BLD PROD TYP BPU: NORMAL
BLD PROD TYP BPU: NORMAL
BLOOD COMPONENT TYPE: NORMAL
BLOOD COMPONENT TYPE: NORMAL
BUN SERPL-MCNC: 41.9 MG/DL (ref 8–23)
CALCIUM SERPL-MCNC: 9.5 MG/DL (ref 8.8–10.4)
CHLORIDE SERPL-SCNC: 107 MMOL/L (ref 98–107)
CODING SYSTEM: NORMAL
CODING SYSTEM: NORMAL
CREAT SERPL-MCNC: 1 MG/DL (ref 0.51–0.95)
CROSSMATCH: NORMAL
CROSSMATCH: NORMAL
EGFRCR SERPLBLD CKD-EPI 2021: 56 ML/MIN/1.73M2
EOSINOPHIL # BLD AUTO: 0 10E3/UL (ref 0–0.7)
EOSINOPHIL NFR BLD AUTO: 0 %
ERYTHROCYTE [DISTWIDTH] IN BLOOD BY AUTOMATED COUNT: 13.3 % (ref 10–15)
FIBRINOGEN PPP-MCNC: 323 MG/DL (ref 170–510)
GLUCOSE BLDC GLUCOMTR-MCNC: 122 MG/DL (ref 70–99)
GLUCOSE SERPL-MCNC: 126 MG/DL (ref 70–99)
HCO3 SERPL-SCNC: 21 MMOL/L (ref 22–29)
HCT VFR BLD AUTO: 22.5 % (ref 35–47)
HGB BLD-MCNC: 7 G/DL (ref 11.7–15.7)
HGB BLD-MCNC: 9.6 G/DL (ref 11.7–15.7)
HGB BLD-MCNC: 9.6 G/DL (ref 11.7–15.7)
IMM GRANULOCYTES # BLD: 0 10E3/UL
IMM GRANULOCYTES NFR BLD: 0 %
INR PPP: 1.12 (ref 0.85–1.15)
ISSUE DATE AND TIME: NORMAL
LYMPHOCYTES # BLD AUTO: 2.6 10E3/UL (ref 0.8–5.3)
LYMPHOCYTES NFR BLD AUTO: 59 %
MCH RBC QN AUTO: 33.7 PG (ref 26.5–33)
MCHC RBC AUTO-ENTMCNC: 31.1 G/DL (ref 31.5–36.5)
MCV RBC AUTO: 108 FL (ref 78–100)
MONOCYTES # BLD AUTO: 0.3 10E3/UL (ref 0–1.3)
MONOCYTES NFR BLD AUTO: 8 %
NEUTROPHILS # BLD AUTO: 1.4 10E3/UL (ref 1.6–8.3)
NEUTROPHILS NFR BLD AUTO: 33 %
NRBC # BLD AUTO: 0 10E3/UL
NRBC BLD AUTO-RTO: 0 /100
PLATELET # BLD AUTO: 129 10E3/UL (ref 150–450)
POTASSIUM SERPL-SCNC: 3.9 MMOL/L (ref 3.4–5.3)
RADIOLOGIST FLAGS: ABNORMAL
RBC # BLD AUTO: 2.08 10E6/UL (ref 3.8–5.2)
SODIUM SERPL-SCNC: 142 MMOL/L (ref 135–145)
SPECIMEN EXPIRATION DATE: NORMAL
TROPONIN T SERPL HS-MCNC: 22 NG/L
TROPONIN T SERPL HS-MCNC: 22 NG/L
UNIT ABO/RH: NORMAL
UNIT ABO/RH: NORMAL
UNIT NUMBER: NORMAL
UNIT NUMBER: NORMAL
UNIT STATUS: NORMAL
UNIT STATUS: NORMAL
UNIT TYPE ISBT: 9500
UNIT TYPE ISBT: 9500
WBC # BLD AUTO: 4.4 10E3/UL (ref 4–11)

## 2024-09-30 PROCEDURE — P9016 RBC LEUKOCYTES REDUCED: HCPCS | Performed by: EMERGENCY MEDICINE

## 2024-09-30 PROCEDURE — 99223 1ST HOSP IP/OBS HIGH 75: CPT | Performed by: INTERNAL MEDICINE

## 2024-09-30 PROCEDURE — 36415 COLL VENOUS BLD VENIPUNCTURE: CPT | Performed by: PSYCHIATRY & NEUROLOGY

## 2024-09-30 PROCEDURE — 85018 HEMOGLOBIN: CPT | Performed by: INTERNAL MEDICINE

## 2024-09-30 PROCEDURE — 250N000009 HC RX 250: Performed by: INTERNAL MEDICINE

## 2024-09-30 PROCEDURE — 96375 TX/PRO/DX INJ NEW DRUG ADDON: CPT

## 2024-09-30 PROCEDURE — 99291 CRITICAL CARE FIRST HOUR: CPT | Mod: GC | Performed by: STUDENT IN AN ORGANIZED HEALTH CARE EDUCATION/TRAINING PROGRAM

## 2024-09-30 PROCEDURE — 84484 ASSAY OF TROPONIN QUANT: CPT | Performed by: EMERGENCY MEDICINE

## 2024-09-30 PROCEDURE — 86901 BLOOD TYPING SEROLOGIC RH(D): CPT | Performed by: EMERGENCY MEDICINE

## 2024-09-30 PROCEDURE — 71045 X-RAY EXAM CHEST 1 VIEW: CPT

## 2024-09-30 PROCEDURE — 70496 CT ANGIOGRAPHY HEAD: CPT

## 2024-09-30 PROCEDURE — 250N000011 HC RX IP 250 OP 636: Performed by: EMERGENCY MEDICINE

## 2024-09-30 PROCEDURE — 72125 CT NECK SPINE W/O DYE: CPT

## 2024-09-30 PROCEDURE — 70450 CT HEAD/BRAIN W/O DYE: CPT | Mod: 77

## 2024-09-30 PROCEDURE — 85384 FIBRINOGEN ACTIVITY: CPT | Performed by: PSYCHIATRY & NEUROLOGY

## 2024-09-30 PROCEDURE — 70498 CT ANGIOGRAPHY NECK: CPT

## 2024-09-30 PROCEDURE — 85025 COMPLETE CBC W/AUTO DIFF WBC: CPT | Performed by: EMERGENCY MEDICINE

## 2024-09-30 PROCEDURE — 36415 COLL VENOUS BLD VENIPUNCTURE: CPT | Performed by: INTERNAL MEDICINE

## 2024-09-30 PROCEDURE — 250N000013 HC RX MED GY IP 250 OP 250 PS 637: Performed by: INTERNAL MEDICINE

## 2024-09-30 PROCEDURE — 36430 TRANSFUSION BLD/BLD COMPNT: CPT

## 2024-09-30 PROCEDURE — 85610 PROTHROMBIN TIME: CPT | Performed by: EMERGENCY MEDICINE

## 2024-09-30 PROCEDURE — 250N000009 HC RX 250: Performed by: EMERGENCY MEDICINE

## 2024-09-30 PROCEDURE — 96376 TX/PRO/DX INJ SAME DRUG ADON: CPT

## 2024-09-30 PROCEDURE — 258N000003 HC RX IP 258 OP 636: Performed by: INTERNAL MEDICINE

## 2024-09-30 PROCEDURE — 99285 EMERGENCY DEPT VISIT HI MDM: CPT | Mod: 25

## 2024-09-30 PROCEDURE — 86923 COMPATIBILITY TEST ELECTRIC: CPT | Performed by: EMERGENCY MEDICINE

## 2024-09-30 PROCEDURE — 70450 CT HEAD/BRAIN W/O DYE: CPT

## 2024-09-30 PROCEDURE — 80048 BASIC METABOLIC PNL TOTAL CA: CPT | Performed by: EMERGENCY MEDICINE

## 2024-09-30 PROCEDURE — 250N000011 HC RX IP 250 OP 636: Performed by: INTERNAL MEDICINE

## 2024-09-30 PROCEDURE — 93005 ELECTROCARDIOGRAM TRACING: CPT

## 2024-09-30 PROCEDURE — 85730 THROMBOPLASTIN TIME PARTIAL: CPT | Performed by: EMERGENCY MEDICINE

## 2024-09-30 PROCEDURE — 96374 THER/PROPH/DIAG INJ IV PUSH: CPT | Mod: 59

## 2024-09-30 PROCEDURE — 86900 BLOOD TYPING SEROLOGIC ABO: CPT | Performed by: EMERGENCY MEDICINE

## 2024-09-30 PROCEDURE — 200N000001 HC R&B ICU

## 2024-09-30 PROCEDURE — 36415 COLL VENOUS BLD VENIPUNCTURE: CPT | Performed by: EMERGENCY MEDICINE

## 2024-09-30 RX ORDER — AMOXICILLIN 250 MG
2 CAPSULE ORAL 2 TIMES DAILY PRN
Status: DISCONTINUED | OUTPATIENT
Start: 2024-09-30 | End: 2024-10-05 | Stop reason: HOSPADM

## 2024-09-30 RX ORDER — IOPAMIDOL 755 MG/ML
67 INJECTION, SOLUTION INTRAVASCULAR ONCE
Status: COMPLETED | OUTPATIENT
Start: 2024-09-30 | End: 2024-09-30

## 2024-09-30 RX ORDER — SODIUM CHLORIDE 9 MG/ML
INJECTION, SOLUTION INTRAVENOUS CONTINUOUS
Status: DISCONTINUED | OUTPATIENT
Start: 2024-09-30 | End: 2024-10-02

## 2024-09-30 RX ORDER — PROCHLORPERAZINE MALEATE 5 MG
5 TABLET ORAL EVERY 6 HOURS PRN
Status: DISCONTINUED | OUTPATIENT
Start: 2024-09-30 | End: 2024-10-05 | Stop reason: HOSPADM

## 2024-09-30 RX ORDER — LEVETIRACETAM 100 MG/ML
500 SOLUTION ORAL 2 TIMES DAILY
Status: DISCONTINUED | OUTPATIENT
Start: 2024-09-30 | End: 2024-10-05 | Stop reason: HOSPADM

## 2024-09-30 RX ORDER — HYDRALAZINE HYDROCHLORIDE 20 MG/ML
10-20 INJECTION INTRAMUSCULAR; INTRAVENOUS
Status: DISCONTINUED | OUTPATIENT
Start: 2024-09-30 | End: 2024-10-05 | Stop reason: HOSPADM

## 2024-09-30 RX ORDER — LEVETIRACETAM 500 MG/1
500 TABLET ORAL 2 TIMES DAILY
Status: DISCONTINUED | OUTPATIENT
Start: 2024-09-30 | End: 2024-10-05 | Stop reason: HOSPADM

## 2024-09-30 RX ORDER — ACETAMINOPHEN 650 MG/1
650 SUPPOSITORY RECTAL EVERY 4 HOURS PRN
Status: DISCONTINUED | OUTPATIENT
Start: 2024-09-30 | End: 2024-10-05 | Stop reason: HOSPADM

## 2024-09-30 RX ORDER — LEVETIRACETAM 5 MG/ML
500 INJECTION INTRAVASCULAR 2 TIMES DAILY
Status: DISCONTINUED | OUTPATIENT
Start: 2024-09-30 | End: 2024-10-05 | Stop reason: HOSPADM

## 2024-09-30 RX ORDER — ARIPIPRAZOLE 5 MG/1
5 TABLET ORAL DAILY
Status: DISCONTINUED | OUTPATIENT
Start: 2024-10-01 | End: 2024-10-05 | Stop reason: HOSPADM

## 2024-09-30 RX ORDER — ONDANSETRON 2 MG/ML
4 INJECTION INTRAMUSCULAR; INTRAVENOUS EVERY 6 HOURS PRN
Status: DISCONTINUED | OUTPATIENT
Start: 2024-09-30 | End: 2024-10-05 | Stop reason: HOSPADM

## 2024-09-30 RX ORDER — LEVETIRACETAM 10 MG/ML
1000 INJECTION INTRAVASCULAR ONCE
Status: COMPLETED | OUTPATIENT
Start: 2024-09-30 | End: 2024-09-30

## 2024-09-30 RX ORDER — LABETALOL HYDROCHLORIDE 5 MG/ML
10-20 INJECTION, SOLUTION INTRAVENOUS EVERY 10 MIN PRN
Status: DISCONTINUED | OUTPATIENT
Start: 2024-09-30 | End: 2024-10-05 | Stop reason: HOSPADM

## 2024-09-30 RX ORDER — BUPROPION HYDROCHLORIDE 150 MG/1
300 TABLET ORAL DAILY
Status: DISCONTINUED | OUTPATIENT
Start: 2024-10-01 | End: 2024-10-01

## 2024-09-30 RX ORDER — ACETAMINOPHEN 325 MG/1
650 TABLET ORAL EVERY 4 HOURS PRN
Status: DISCONTINUED | OUTPATIENT
Start: 2024-09-30 | End: 2024-10-05 | Stop reason: HOSPADM

## 2024-09-30 RX ORDER — HYDRALAZINE HYDROCHLORIDE 10 MG/1
10 TABLET, FILM COATED ORAL EVERY 4 HOURS PRN
Status: DISCONTINUED | OUTPATIENT
Start: 2024-09-30 | End: 2024-10-01

## 2024-09-30 RX ORDER — AMOXICILLIN 250 MG
1 CAPSULE ORAL 2 TIMES DAILY PRN
Status: DISCONTINUED | OUTPATIENT
Start: 2024-09-30 | End: 2024-10-05 | Stop reason: HOSPADM

## 2024-09-30 RX ORDER — POLYETHYLENE GLYCOL 3350 17 G/17G
17 POWDER, FOR SOLUTION ORAL 2 TIMES DAILY PRN
Status: DISCONTINUED | OUTPATIENT
Start: 2024-09-30 | End: 2024-10-05 | Stop reason: HOSPADM

## 2024-09-30 RX ORDER — LIDOCAINE 40 MG/G
CREAM TOPICAL
Status: DISCONTINUED | OUTPATIENT
Start: 2024-09-30 | End: 2024-10-05 | Stop reason: HOSPADM

## 2024-09-30 RX ORDER — DONEPEZIL HYDROCHLORIDE 10 MG/1
10 TABLET, FILM COATED ORAL DAILY
Status: DISCONTINUED | OUTPATIENT
Start: 2024-10-01 | End: 2024-10-05 | Stop reason: HOSPADM

## 2024-09-30 RX ORDER — MIRTAZAPINE 7.5 MG/1
7.5 TABLET, FILM COATED ORAL AT BEDTIME
Status: DISCONTINUED | OUTPATIENT
Start: 2024-09-30 | End: 2024-10-01

## 2024-09-30 RX ORDER — PROCHLORPERAZINE 25 MG
12.5 SUPPOSITORY, RECTAL RECTAL EVERY 12 HOURS PRN
Status: DISCONTINUED | OUTPATIENT
Start: 2024-09-30 | End: 2024-10-05 | Stop reason: HOSPADM

## 2024-09-30 RX ORDER — HYDRALAZINE HYDROCHLORIDE 20 MG/ML
10 INJECTION INTRAMUSCULAR; INTRAVENOUS EVERY 4 HOURS PRN
Status: DISCONTINUED | OUTPATIENT
Start: 2024-09-30 | End: 2024-10-01

## 2024-09-30 RX ORDER — SERTRALINE HYDROCHLORIDE 100 MG/1
200 TABLET, FILM COATED ORAL DAILY
Status: DISCONTINUED | OUTPATIENT
Start: 2024-10-01 | End: 2024-10-02

## 2024-09-30 RX ORDER — ONDANSETRON 4 MG/1
4 TABLET, ORALLY DISINTEGRATING ORAL EVERY 6 HOURS PRN
Status: DISCONTINUED | OUTPATIENT
Start: 2024-09-30 | End: 2024-10-05 | Stop reason: HOSPADM

## 2024-09-30 RX ADMIN — LEVETIRACETAM 1000 MG: 10 INJECTION INTRAVENOUS at 11:42

## 2024-09-30 RX ADMIN — LEVETIRACETAM 1000 MG: 10 INJECTION INTRAVENOUS at 11:55

## 2024-09-30 RX ADMIN — ONDANSETRON 4 MG: 2 INJECTION INTRAMUSCULAR; INTRAVENOUS at 19:48

## 2024-09-30 RX ADMIN — SODIUM CHLORIDE 100 ML: 9 INJECTION, SOLUTION INTRAVENOUS at 11:20

## 2024-09-30 RX ADMIN — PANTOPRAZOLE SODIUM 40 MG: 40 INJECTION, POWDER, FOR SOLUTION INTRAVENOUS at 21:32

## 2024-09-30 RX ADMIN — PANTOPRAZOLE SODIUM 80 MG: 40 INJECTION, POWDER, FOR SOLUTION INTRAVENOUS at 11:39

## 2024-09-30 RX ADMIN — MIRTAZAPINE 7.5 MG: 7.5 TABLET, FILM COATED ORAL at 21:54

## 2024-09-30 RX ADMIN — SODIUM CHLORIDE: 9 INJECTION, SOLUTION INTRAVENOUS at 21:31

## 2024-09-30 RX ADMIN — LEVETIRACETAM 500 MG: 5 INJECTION INTRAVENOUS at 21:32

## 2024-09-30 RX ADMIN — IOPAMIDOL 67 ML: 755 INJECTION, SOLUTION INTRAVENOUS at 11:19

## 2024-09-30 ASSESSMENT — ACTIVITIES OF DAILY LIVING (ADL)
ADLS_ACUITY_SCORE: 35
ADLS_ACUITY_SCORE: 25
ADLS_ACUITY_SCORE: 25
ADLS_ACUITY_SCORE: 35
ADLS_ACUITY_SCORE: 35
ADLS_ACUITY_SCORE: 39
ADLS_ACUITY_SCORE: 39
ADLS_ACUITY_SCORE: 25
ADLS_ACUITY_SCORE: 35

## 2024-09-30 NOTE — CONSULTS
"      [facility]     Stroke Code Note          History of Present Illness     Chief Complaint: Hematemesis, Fall, and Seizures      Marcela Sandhu is a 84 year old with past medical history significant for hypertension and dementia presents with hematemesis, seizure, right facial droop after ground-level fall.    Marcela is a poor historian given she is amnestic to the events surrounding her hospitalization, reportedly she had some nausea after which she had a ground-level fall at her facility, later she had a right facial droop and a seizure. Confused after that. Son denies any prior history of seizures. She lives in an independent senior facility.          Past Medical History     Stroke risk factors: HTN    Preadmission antithrombotic regimen: None     Modified [place] Score (Pre-morbid)  0-No deficits                   Assessment and Plan       L frontotemporal ICH likely traumatic   Seizure likely provoked      Intravenous Thrombolysis  Not given due to:   - ICH  - GI bleed within the past 14 days     Endovascular Treatment  Not initiated due to absence of proximal vessel occlusion     Plan:  Acute Hemorrhagic Stroke Recommendations  - Neurochecks and Vital Signs every 1 hr  - Systolic BP Goal: < 140  - Head of bed elevated  - Telemetry, EKG  - Imaging: Repeat CT head in 4 hrs  - Bedside Glucose Monitoring  - A1c, Troponin x 3  - PT/OT/SLP  - Stroke Education  - Euthermia, Euglycemia  - Fibrinogen level   - S/p Keppra load, continue Keppra 500 mg BID  -NeuroSx consulted appreciate recs   ___________________________________________________________________    The Stroke Staff is Dr. Gaona.    Please call/page NCC team after the  repeat CT head for further recs.      Bernard Fonseca MD  Vascular Neurology Fellow    To page me or covering stroke neurology team member, click here: [facility]  Choose \"On Call\" tab at top, then select \"NEUROLOGY/ALL SITES\" from middle drop-down box, press " "Enter, then look for \"stroke\" or \"telestroke\" for your site.  ___________________________________________________________________        Imaging/Labs   (personally reviewed )    CT head: 1. Parenchymal contusion in the inferior left temporal lobe with mild  surrounding edema. Subarachnoid hemorrhage in the left temporal lobe  and left frontal lobe. Thin 0.2 cm subdural hemorrhage along the left  lateral cerebral convexity.  2. No midline shift, herniation, or hydrocephalus.  3. Small amount of pneumocephalus in the inferior left temporal lobe  adjacent to the parenchymal contusions. This is just superior to the  left mastoid air cells which raises concern for a subtle fracture to  the left tegmen mastoideum. Left mastoid air cells are relatively  clear at this time.  4. Right posterior scalp soft tissue injury without underlying  calvarial fracture.  5. Diffuse parenchymal volume loss and white matter changes which are  most likely due to chronic microvascular ischemic disease she    CTA head/neck: 1. Patent arteries in the neck without evidence of dissection. Mild  atherosclerotic disease in the carotid arteries bilaterally without  significant stenosis by NASCET criteria.  2. Patent proximal major intracranial arteries without vascular  cutoff. No significant stenosis. No aneurysm identified.         Repeat CT head showed slight interval increase in ICH.          Physical Examination     BP: 125/59   Pulse: 109   Resp: 14   Temp: (!) 96.6  F (35.9  C)   Temp src: Temporal   SpO2: 100 %   O2 Device: Nasal cannula   Weight: 58.4 kg (128 lb 12 oz)    Wt Readings from Last 2 Encounters:   09/30/24 58.4 kg (128 lb 12 oz)   09/23/24 56.3 kg (124 lb 1.6 oz)       Neurologic  Mental Status:  alert, oriented x 3, follows commands, hypophonic speech  Cranial Nerves:  visual fields intact, PERRL, EOMI with normal smooth pursuit, R facial droop, hearing not formally tested but intact to conversation, palate elevation " symmetric and uvula midline, no dysarthria, shoulder shrug strong bilaterally, tongue protrusion midline  Motor:  normal muscle tone and bulk, tremors of b/l upper ext, able to move all limbs spontaneously, strength at least antigravity throughout upper and lower extremities, no pronator drift  Reflexes:  toes down-going  Sensory:  light touch sensation intact and symmetric throughout upper and lower extremities  Coordination:   no dysmetria on FTN and HTN  Station/Gait:  deferred        Stroke Scales       NIHSS  1a. Level of Consciousness 0-->Alert, keenly responsive   1b. LOC Questions 1-->Answers one question correctly   1c. LOC Commands 0-->Performs both tasks correctly   2.   Best Gaze 0-->Normal   3.   Visual 0-->No visual loss   4.   Facial Palsy 1-->Minor paralysis (flattened nasolabial fold, asymmetry on smiling)   5a. Motor Arm, Left 0-->No drift, limb holds 90 (or 45) degrees for full 10 secs   5b. Motor Arm, Right 1-->Drift, limb holds 90 (or 45) degrees, but drifts down before full 10 secs, does not hit bed or other support   6a. Motor Leg, Left 0-->No drift, leg holds 30 degree position for full 5 secs   6b. Motor Leg, right 0-->No drift, leg holds 30 degree position for full 5 secs   7.   Limb Ataxia 0-->Absent   8.   Sensory 0-->Normal, no sensory loss   9.   Best Language 1-->Mild-to-moderate aphasia, some obvious loss of fluency or facility of comprehension, without significant limitation on ideas expressed or form of expression. Reduction of speech and/or comprehension, however, makes conversation. . . (see row details)   10. Dysarthria 1-->Mild-to-moderate dysarthria, patient slurs at least some words and, at worst, can be understood with some difficulty   11. Extinction and Inattention  0-->No abnormality   Total 5 (09/30/24 1346)            Labs     CBC  Lab Results   Component Value Date    HGB 7.0 (L) 09/30/2024    HCT 22.5 (L) 09/30/2024    WBC 4.4 09/30/2024     (L) 09/30/2024        BMP  Lab Results   Component Value Date     09/30/2024    POTASSIUM 3.9 09/30/2024    CHLORIDE 107 09/30/2024    CO2 21 (L) 09/30/2024    BUN 41.9 (H) 09/30/2024    CR 1.00 (H) 09/30/2024     (H) 09/30/2024    DENIS 9.5 09/30/2024       INR  INR   Date Value Ref Range Status   09/30/2024 1.12 0.85 - 1.15 Final       Data   Stroke Code Data  (for stroke code without tele)  Stroke code activated 09/30/24  1111   First stroke provider response 09/30/24  1111   Last known normal 09/30/24  1010   Time of discovery (or onset of symptoms) 09/30/24  1010   Head CT read by Stroke Neuro Provider 09/30/24  1128   Was stroke code de-escalated? No              Clinically Significant Risk Factors Present on Admission                # Thrombocytopenia: Lowest platelets = 129 in last 2 days, will monitor for bleeding   # Hypertension: Noted on problem list    # Anemia: based on hgb <11  # Anemia: based on hgb <11               # CKD, Stage 3a (GFR 45-59): Will monitor and treat as appropriate  - last Cr =  1.00 mg/dL (Ref range: 0.51 - 0.95 mg/dL)  - last GFR = 56 mL/min/1.73m2 (Ref range: >60 mL/min/1.73m2)

## 2024-09-30 NOTE — CONSULTS
[facility]  Gastroenterology Consultation         Marcela Sandhu  [address]   [facility] [address]  84 year old female    Admission Date/Time: 9/30/2024  Primary Care Provider: Kelley Haney  Referring / Attending Physician:  Dr. Hilliard    We were asked to see the patient in consultation by Dr. Hilliard for evaluation of GI bleeding.      CC: GI bleeding    HPI:  Marcela Sandhu is a 84 year old female seen in ED for hematemesis; seen with son and daughter in ED; has PMH signif for dementia (per family), RA, RLS, GERD, CKD, MDD, Hx HCV.      Per notes, patient had stomach upset and vomited blood.  Patient does not recall this episode.  Patient also fell this AM at assisted living facility and hit her head.  Had a seizure with EMS, approx 30 min.  Had CT head/brain and noted to have SAH/SDH.    Patient unclear to what happened, but is willing to undergo procedures if needed.  Family agrees.    ROS: A comprehensive ten point review of systems was negative aside from those in mentioned in the HPI.      PAST MED HX:  I have reviewed this patient's medical history and updated it with pertinent information if needed.   Past Medical History:   Diagnosis Date    Acid reflux disease     Benign essential hypertension     Benign essential tremor     Chronic kidney disease, stage III (moderate) (H)     History of hepatitis C virus infection     treated with interferon    MDD (major depressive disorder)     Mild cognitive impairment     Osteopenia     Peripheral neuropathy     Personal history of malignant neoplasm of breast 2007    RA (rheumatoid arthritis) (H)     Restless legs syndrome (RLS)        MEDICATIONS:   Prior to Admission Medications   Prescriptions Last Dose Informant Patient Reported? Taking?   ARIPiprazole (ABILIFY) 5 MG tablet 9/29/2024  No Yes   Sig: Take 1 tablet (5 mg) by mouth daily   amLODIPine-valsartan (EXFORGE) 5-160 MG tablet 9/29/2024  No Yes   Sig: Take 1  tablet by mouth daily.   buPROPion (WELLBUTRIN XL) 300 MG 24 hr tablet 9/29/2024  No Yes   Sig: TAKE 1 TABLET (300 MG) BY MOUTH ONCE DAILY IN THE MORNING   donepezil (ARICEPT) 10 MG tablet 9/29/2024  Yes Yes   Sig: Take 10 mg by mouth daily   hydrOXYzine HCl (ATARAX) 10 MG tablet   No Yes   Sig: TAKE 1 TABLET (10 MG) BY MOUTH 3 TIMES DAILY AS NEEDED FOR ANXIETY   mirtazapine (REMERON) 7.5 MG tablet 9/29/2024  No Yes   Sig: TAKE 1 TABLET (7.5 MG) BY MOUTH AT BEDTIME   sertraline (ZOLOFT) 100 MG tablet 9/29/2024  No Yes   Sig: TAKE 2 TABLETS (200 MG) BY MOUTH DAILY      Facility-Administered Medications: None       ALLERGIES:   Allergies   Allergen Reactions    Acetaminophen GI Disturbance    Citalopram Unknown    Hydrocodone GI Disturbance    Levaquin [Levofloxacin Hemihydrate] Other (See Comments) and Rash     chest pain    Sulfa Antibiotics GI Disturbance       SOCIAL HISTORY:  Social History     Tobacco Use    Smoking status: Never    Smokeless tobacco: Never   Vaping Use    Vaping status: Never Used   Substance Use Topics    Alcohol use: No    Drug use: No       FAMILY HISTORY:  Family History   Problem Relation Age of Onset    Hypertension Mother     Hypertension Father     Multiple myeloma Brother     Bone Cancer Brother     Cerebrovascular Disease Brother 60    Diabetes No family hx of     Myocardial Infarction No family hx of     Breast Cancer No family hx of     Ovarian Cancer No family hx of     Colon Cancer No family hx of        PHYSICAL EXAM:   General  oriented x2 (person, year, not place - knows she is in hospital)  Vital Signs with Ranges  Temp: 97.4  F (36.3  C) Temp src: Temporal BP: 126/82 Pulse: 98   Resp: (!) 109 SpO2: 97 % O2 Device: Nasal cannula Oxygen Delivery: 2 LPM  No intake/output data recorded.  Head:  NCAT  Eyes:  aniticteric  Mouth:  dried blood around mouth; tongue with ecchymosis on left side - ? Small abrasion  Neck:  supple  Heart:  RRR, gr 2/6 ALEC  Lungs:  clear  Abd:  soft/nt/nd;  +bs  Ext:  moves all extrem  Skin:  no rash        ADDITIONAL COMMENTS:   I reviewed the patient's new clinical lab test results.   Recent Labs   Lab Test 09/30/24  1113 04/18/24  1649 07/27/23  1228   WBC 4.4 3.7* 4.3   HGB 7.0* 8.7* 10.9*   * 109* 108*   * 191 173   INR 1.12  --   --      Recent Labs   Lab Test 09/30/24  1113 04/18/24  1649 11/15/23  1155   POTASSIUM 3.9 5.1 4.3   CHLORIDE 107 109 105   CO2 21* 29 29   BUN 41.9* 25.0 27.8*   ANIONGAP 14 4* 9     Recent Labs   Lab Test 04/18/24  1613 08/10/23  1433 08/02/23  1052 07/27/23  1228 04/25/23  1345 08/31/22  0929   ALBUMIN  --   --   --  4.8 4.3 3.8   BILITOTAL  --   --   --  0.3 0.2 1.0   ALT  --   --   --  11 12 17   AST  --   --   --  27 25 26   PROTEIN Negative Negative Negative  --   --   --        I reviewed the patient's new imaging results.        CONSULTATION ASSESSMENT AND PLAN:    Active Problems:    Thrombocytopenia (H)      SAH (subarachnoid hemorrhage) (H)      SDH (subdural hematoma) (H)      Fall, initial encounter      Closed fracture of temporal bone, initial encounter (H)      Gastrointestinal hemorrhage with hematemesis      Anemia, unspecified type    Marcela Sandhu is a 84 year old female seen in ED for hematemesis; seen with son and daughter in ED; has PMH signif for dementia (per family), RA, RLS, GERD, CKD, MDD, Hx HCV.  No ongoing overt active bleeding currently in ED; now s/p 1 U PRBC.    Recs:  Clear liquid diet  NPO p MN  IV pantoprazole 40 mg BID  Serial hgb  Transfuse to keep hgb >7  Avoid anticoagulation/NSAIDs  Would opt for medical management for potential GI bleed, given SAH/SDH, currently.  If needed, will need neurosurgery input and likely anesthesia support.  Monitor stool output and document    Will continue to closely monitor.  Please call with any questions.      Arnol Castro MD, FACP  [facility].  Office: [phone]  Cell : [phone]    [facility]  Ph: [phone] Fax: [phone]

## 2024-09-30 NOTE — ED NOTES
Bed: ST02  Expected date:   Expected time:   Means of arrival:   Comments:  HEMS 512 82F fall, head injury and GI bleed  
Called and spoke with patient's son Jeremias. He was informed patient was here in ED. He reports that he will come to ED.    Patient lives in [facility] in [place].  
none

## 2024-09-30 NOTE — ED PROVIDER NOTES
Emergency Department Note      History of Present Illness     Chief Complaint   Hematemesis, Fall, and Seizures      HPI   Marcela Sandhu is a 84 year old female lives in an assisted living facility woke up with stomach upset and vomited up some blood this morning.  Apparently at some time this morning she was walking and fell and hit the right side of her head.  EMS was called it was unclear if it was witnessed.  When they got there, they were able to talk with her and she had a negative [place] stroke scale.  They gave her Zofran.  She then proceeded to have about a 30 second generalized tonic-clonic seizure.  She vomited up a small amount of blood after that.  She had a c-collar on but after she vomited they were very concerned about her aspirating and took it off.  She was not complaining of any neck pain before she had the seizure.  She is not on blood thinners.  According to an advanced directive we found in the chart she is DNR/DNI.    Independent Historian   EMS as detailed above.    Review of External Notes   I reviewed the emergency room note from 9/4/2024    Past Medical History     Medical History and Problem List   Past Medical History:   Diagnosis Date    Acid reflux disease     Benign essential hypertension     Benign essential tremor     Chronic kidney disease, stage III (moderate) (H)     History of hepatitis C virus infection     MDD (major depressive disorder)     Mild cognitive impairment     Osteopenia     Peripheral neuropathy     Personal history of malignant neoplasm of breast 2007    RA (rheumatoid arthritis) (H)     Restless legs syndrome (RLS)        Medications   amLODIPine-valsartan (EXFORGE) 5-160 MG tablet  ARIPiprazole (ABILIFY) 5 MG tablet  buPROPion (WELLBUTRIN XL) 300 MG 24 hr tablet  donepezil (ARICEPT) 10 MG tablet  hydrOXYzine HCl (ATARAX) 10 MG tablet  mirtazapine (REMERON) 7.5 MG tablet  sertraline (ZOLOFT) 100 MG tablet        Surgical History   Past Surgical History:    Procedure Laterality Date    APPENDECTOMY      ARTHROPLASTY CARPOMETACARPAL (THUMB JOINT) Bilateral     ARTHROPLASTY KNEE Right     BLEPHAROPLASTY BILATERAL      BUNIONECTOMY CHEVRON AND PEBBLES BILATERAL, COMBINED  12/02/2011    Procedure:COMBINED BUNIONECTOMY CHEVRON AND PEBBLES BILATERAL; BILATERAL THIRD AND FOURTH CLAWTOE RECONSTRUCTION WITH EXOSTECTOMY, LEFT FIRST TARSOMETATARSAL JOINT and 2nd toe Right foot reconstruction; Surgeon:REMBERTO FARMER; Location: OR    CARPAL TUNNEL RELEASE RT/LT Bilateral     COLONOSCOPY  10/18/2011    Procedure:COLONOSCOPY; COLONOSCOPY; Surgeon:BRENDA RODRIGUEZ; Location: GI    ELBOW SURGERY      tendon lengthening surgery    ESOPHAGOSCOPY, GASTROSCOPY, DUODENOSCOPY (EGD), COMBINED N/A 3/31/2023    Procedure: Esophagoscopy, gastroscopy, duodenoscopy (EGD), combined;  Surgeon: Remberto Aguero MD;  Location:  GI    FOOT SURGERY Bilateral     toe straightening    MASTECTOMY, BILATERAL      SLING BLADDER SUSPENSION WITH FASCIA ZACH      SPINE SURGERY      multiple lumbar surgeries; most hardware removed       Physical Exam     Patient Vitals for the past 24 hrs:   BP Temp Temp src Pulse Resp SpO2 Weight   09/30/24 1430 123/65 -- -- 105 18 99 % --   09/30/24 1400 117/59 -- -- 105 19 100 % --   09/30/24 1330 114/59 -- -- 108 18 100 % --   09/30/24 1324 118/53 96.8  F (36  C) -- 104 16 -- --   09/30/24 1309 125/59 (!) 96.6  F (35.9  C) -- 109 14 -- --   09/30/24 1250 127/58 -- -- 105 18 100 % --   09/30/24 1230 129/57 -- -- 108 19 100 % --   09/30/24 1200 136/66 -- -- 110 10 98 % --   09/30/24 1136 -- 96.9  F (36.1  C) Temporal -- -- -- --   09/30/24 1130 (!) 148/67 -- -- 105 -- 99 % --   09/30/24 1114 (!) 162/70 -- -- -- -- -- --   09/30/24 1113 -- -- -- 111 11 93 % --   09/30/24 1109 -- -- -- -- -- -- 58.4 kg (128 lb 12 oz)     Physical Exam  Nursing note and vitals reviewed.    Constitutional:  Appears confused and weak  HENT:                No blood from the nose.  She  has a about a 2 to 3 inch hematoma over the right posterior parietal anterior occipital scalp with swelling.  Looks like there could be a small area of tongue biting on the left side.  Eyes:    Conjunctivae are normal without injection.  Pupils are equal.  Cardiovascular:  Normal rate, regular rhythm with normal S1 and S2.      She has a grade 2 harsh systolic murmur.  Pulmonary:  Effort normal and breath sounds clear to auscultation bilaterally.    No respiratory distress.  No stridor.     No wheezes. No rales.     GI:    Soft. No distension and no mass. No tenderness.   Musculoskeletal:  Normal range of motion. No extremity deformity.     No edema and no tenderness.    Neurological:   Patient is awake but confused.  Can follow some simple commands and could say her name but speech is difficult to understand.  There appears to be a mild right facial droop.  She will not follow commands to squeeze my hands or move her toes.  Skin:    Skin is warm and dry. No rash noted.  Contusion and hematoma to the right posterior parietal scalp  Psychiatric:   Patient is confused and difficult to assess.      Diagnostics     Lab Results   Labs Ordered and Resulted from Time of ED Arrival to Time of ED Departure   BASIC METABOLIC PANEL - Abnormal       Result Value    Sodium 142      Potassium 3.9      Chloride 107      Carbon Dioxide (CO2) 21 (*)     Anion Gap 14      Urea Nitrogen 41.9 (*)     Creatinine 1.00 (*)     GFR Estimate 56 (*)     Calcium 9.5      Glucose 126 (*)    TROPONIN T, HIGH SENSITIVITY - Abnormal    Troponin T, High Sensitivity 22 (*)    CBC WITH PLATELETS AND DIFFERENTIAL - Abnormal    WBC Count 4.4      RBC Count 2.08 (*)     Hemoglobin 7.0 (*)     Hematocrit 22.5 (*)      (*)     MCH 33.7 (*)     MCHC 31.1 (*)     RDW 13.3      Platelet Count 129 (*)     % Neutrophils 33      % Lymphocytes 59      % Monocytes 8      % Eosinophils 0      % Basophils 1      % Immature Granulocytes 0      NRBCs per 100  WBC 0      Absolute Neutrophils 1.4 (*)     Absolute Lymphocytes 2.6      Absolute Monocytes 0.3      Absolute Eosinophils 0.0      Absolute Basophils 0.0      Absolute Immature Granulocytes 0.0      Absolute NRBCs 0.0     TROPONIN T, HIGH SENSITIVITY - Abnormal    Troponin T, High Sensitivity 22 (*)    INR - Normal    INR 1.12     PARTIAL THROMBOPLASTIN TIME - Normal    aPTT 28     GLUCOSE MONITOR NURSING POCT   TYPE AND SCREEN, ADULT    ABO/RH(D) O NEG      Antibody Screen Negative      SPECIMEN EXPIRATION DATE [id number]     PREPARE RED BLOOD CELLS (UNIT)    Blood Component Type Red Blood Cells      Product Code Q0040V44      Unit Status Ready for issue      Unit Number [id number]      CROSSMATCH Compatible      CODING SYSTEM [id number]     PREPARE RED BLOOD CELLS (UNIT)    Blood Component Type Red Blood Cells      Product Code R9090K49      Unit Status Transfused      Unit Number [id number]      CROSSMATCH Compatible      CODING SYSTEM [id number]      ISSUE DATE AND TIME [id number]      UNIT ABO/RH O-      UNIT TYPE ISBT 9500     PREPARE RED BLOOD CELLS (UNIT)   RED BLOOD CELLS HAVE AVAILABLE (UNIT)   TRANSFUSE RED BLOOD CELLS (UNIT)   ABO/RH TYPE AND SCREEN       Imaging   XR Chest Port 1 View   Final Result   IMPRESSION: Possible right lateral ninth rib nondisplaced fracture. No   focal consolidation, pleural effusion or pneumothorax.   Cardiomediastinal silhouette is unremarkable.      CASSI KING MD            SYSTEM ID:  [id number]      CT Cervical Spine w/o Contrast   Final Result   IMPRESSION:    1. No evidence of acute fracture or subluxation in the cervical spine.   2. Degenerative changes in the cervical spine as described above.       FRANCOIS SILVA MD            SYSTEM ID:  [id number]      CTA Head Neck with Contrast   Final Result   IMPRESSION:    1. Patent arteries in the neck without evidence of dissection. Mild   atherosclerotic disease in the carotid arteries bilaterally without    significant stenosis by NASCET criteria.   2. Patent proximal major intracranial arteries without vascular   cutoff. No significant stenosis. No aneurysm identified.       Results discussed with Liliya Jerrod at 11:48 AM on 9/30/2024.       FRANCOIS HALL MD            SYSTEM ID:  [id number]      CT Head w/o Contrast   Final Result   Abnormal   IMPRESSION:      1. Parenchymal contusion in the inferior left temporal lobe with mild   surrounding edema. Subarachnoid hemorrhage in the left temporal lobe   and left frontal lobe. Thin 0.2 cm subdural hemorrhage along the left   lateral cerebral convexity.   2. No midline shift, herniation, or hydrocephalus.   3. Small amount of pneumocephalus in the inferior left temporal lobe   adjacent to the parenchymal contusions. This is just superior to the   left mastoid air cells which raises concern for a subtle fracture to   the left tegmen mastoideum. Left mastoid air cells are relatively   clear at this time.   4. Right posterior scalp soft tissue injury without underlying   calvarial fracture.   5. Diffuse parenchymal volume loss and white matter changes which are   most likely due to chronic microvascular ischemic disease.      [Critical Result: Acute intracranial hemorrhage]      Finding was identified on 9/30/2024 11:25 AM.       LILIYA JAVIER was contacted by Dr. Hall on 9/30/2024 11:31 AM and   verbalized understanding of the critical result.        FRANCOIS HALL MD            SYSTEM ID:  [id number]      CT Head w/o Contrast    (Results Pending)     EKG   ECG results from 09/30/24   EKG 12-lead, tracing only     Value    Systolic Blood Pressure     Diastolic Blood Pressure     Ventricular Rate 109    Atrial Rate 109    CT Interval 184    QRS Duration 100        QTc 471    P Axis 78    R AXIS -7    T Axis 70    Interpretation ECG      Sinus tachycardia  Possible Left atrial enlargement  Left ventricular hypertrophy ( Sokolow-Steward , [place] product , Romhilt-Friend  )  Abnormal ECG  When compared with ECG of 22-Oct-2012 15:29,  No significant change           Independent Interpretation   None    ED Course      Medications Administered   Medications   sodium chloride 0.9% BOLUS 1-250 mL (has no administration in time range)   iopamidol (ISOVUE-370) solution 67 mL (67 mLs Intravenous $Given 9/30/24 1119)     And   sodium chloride 0.9 % bag for CT scan flush use (100 mLs As instructed $Given 9/30/24 1120)   pantoprazole (PROTONIX) IV push injection 80 mg (80 mg Intravenous $Given 9/30/24 1139)   levETIRAcetam (KEPPRA) intermittent infusion 1,000 mg (0 mg Intravenous Stopped 9/30/24 1210)     And   levETIRAcetam (KEPPRA) intermittent infusion 1,000 mg (0 mg Intravenous Stopped 9/30/24 1157)     Procedures   Procedures     Discussion of Management   Neurology, 1111 stroke neurology    ED Course        Additional Documentation  The patient has stroke symptoms:         ED Stroke specific documentation           NIHSS PDF     Patient last known well time: 1030  ED Provider first to bedside at: 1110  CT Results received at: 1129    Thrombolytics:   Not given due to:   - active bleeding  - GI bleed within the past 14 days    If treating with thrombolytics: Ensure SBP<180 and DBP<105 prior to treatment with thrombolytics.  Administering thrombolytics after treatment with IV labetalol, hydralazine, or nicardipine is reasonable once BP control is established.    Endovascular Retrieval:  Not initiated due to absence of proximal vessel occlusion    [employer] Stroke Scale (Baseline)  Time Performed: 1110     Score    Level of consciousness: (1)   Not alert; arousable w/ minor stim to obey/answer/respond    LOC questions: (0)   Answers both questions correctly    LOC commands: (0)   Performs both tasks correctly    Best gaze: (0)   Normal    Visual: (0)   No visual loss    Facial palsy: (1)   Minor paralysis (flat nasolabial fold, smile asymmetry)    Motor arm (left): (0)    No drift    Motor arm (right): (0)   No drift    Motor leg (left): (0)   No drift    Motor leg (right): (0)   No drift    Limb ataxia: (0)   Absent    Sensory: (0)   Normal- no sensory loss    Best language: (0)   Normal- no aphasia    Dysarthria: (1)   Mild to moderate dysarthria    Extinction and inattention: (0)   No abnormality        Total Score:  3        Stroke Mimics were considered (including migraine headache, seizure disorder, hypoglycemia (or hyperglycemia), head or spinal trauma, CNS infection, Toxin ingestion and shock state (e.g. sepsis) .          Medical Decision Making / Diagnosis         MIPS       None    MDM   Marcela Sandhu is a 84 year old female who comes in from assisted living after vomiting up blood possibly late last night into this morning.  She then apparently fell which has happened over the past month a few times.  She hit her head and EMS got there and she vomited blood but [place] stroke scale at that time was normal.  Then she proceeded to have a grand mal seizure lasted 30 minutes.  She is somewhat postictal here, she is arousable and answer some simple questions but it is hard to understand with her speech.  Appeared to be right facial droop and she had a hard time following commands.  I called a tier 1 code stroke even though I was suspicious that she had a brain bleed from the trauma.  Radiology called and said that she had a subarachnoid and subdural hemorrhages as noted above and a little bit of pneumocephaly suggestive of a basilar skull fracture that they could not see.  These were small and I talked with neurosurgery and they did not feel they were operable and would likely be stable but recommended a follow-up CT in 6 hours and admission.  Neurocritical care will be following her and recommended blood pressures to be kept under 160 systolic.  I confirmed her DNR/DNI status when her family arrived but they are okay with giving her blood.  With a GI bleed she will need  GI consultation and I talked to Dr. Olivares who will plan to do endoscopy when she stabilized.  80 mg of Protonix was given IV.  Her hemoglobin is 7 and the last 1 we had was about 8-1/2 so I am giving her a unit of blood and we consented her emergently as she could not give consent initially.  Will repeat her hemoglobin after that unit is given.  Dr. Hilliard will be the admitting hospitalist and she will be admitted to the ICU with neurochecks.  Patient is DNR/DNI.  She is not comfort care at this point.  Family is aware of the critical nature of her illness.  Fortunately she is not on blood thinners.  She does have renal failure with a creatinine of 1.0 and her GFR 56 but normal electrolytes.  Her troponin was high at 22 so I repeated one later and it was unchanged at 22.  She has had no chest pain.  Her platelet count is low at 129.    Critical care time minus procedures: 60 minutes    Disposition   The patient was admitted to the hospital.     Diagnosis     ICD-10-CM    1. Fall, initial encounter  W19.XXXA       2. SAH (subarachnoid hemorrhage) (H)  I60.9       3. SDH (subdural hematoma) (H)  S06.5XAA       4. Closed fracture of temporal bone, initial encounter (H)  S02.19XA       5. Gastrointestinal hemorrhage with hematemesis  K92.0       6. Anemia, unspecified type  D64.9       7. Thrombocytopenia (H)  D69.6            Discharge Medications   New Prescriptions    No medications on file         MD Jerrod Casas Tracy Alan, MD  09/30/24 0383

## 2024-09-30 NOTE — CONSULTS
[facility]    Neurosurgery Consultation     Date of Admission:  9/30/2024  Date of Consult (When I saw the patient): 09/30/24    Assessment   Marcela Sandhu is an 84 year old female who was admitted on 9/30/2024 after a fall at assisted living facility and seizure activity witnessed by EMS. Neurosurgery was consulted for SAH in left temporal lobe and left frontal lobe, and thin SDH long left lateral cerebral convexity.    Plan   -No surgical intervention planned at this time   -Admission through Hospitalist   -Repeat head CT tomorrow morning, or sooner if patient develops neuro exam changes   -Hold any blood thinners   -SBP less than 150  -Continue to monitor neuro exam   -Seizure management per Neurology   -GI bleed management per Hospitalist   -Appreciate assistance from specialties     I have discussed the following assessment and plan with Dr. Mercado.     Joy Mac, [facility] Neurosurgery  Tel [phone]  Pager [phone]    Code Status    No Order    Reason for Consult   I was asked by Dr. Hilliard to evaluate this patient for:  SAH   SDH    Primary Care Physician   Kelley Haney    Chief Complaint   Fall     History is obtained from the patient, electronic health record, emergency department physician, and patient's family    History of Present Illness   Marcela Sandhu is an 84 year old female who was admitted on 9/30/2024 after a fall and hematemesis at assisted living facility. Patient also had seizure activity that was witnessed by EMS. Neurosurgery was consulted for SAH in left temporal lobe and left frontal lobe, and thin SDH long left lateral cerebral convexity. Family at bedside during consult. Per family, patient has history of mild baseline dementia. Patient is not taking any blood thinners. She reports 6/10 headache and some mild dizziness. She also has baseline chronic BUE tremors.     CT SCAN OF THE HEAD WITHOUT CONTRAST 9/30/2024 3:57 PM    IMPRESSION:     1. Slight increase of parenchymal contusion with subarachnoid  hemorrhage in the inferior left temporal lobe and left frontal lobe  since head CT from earlier today. Thin hyperdense 0.2 cm extra-axial  hemorrhage along the left cerebral convexity appears fairly similar to  head CT from earlier today. No midline shift or herniation. No  hydrocephalus.  2. Small focus of pneumocephalus along the left lateral cerebral  convexity which appears slightly changed in position since prior. This  remains concerning for occult calvarial fracture.    Past Medical History   I have reviewed this patient's medical history and updated it with pertinent information if needed.   Past Medical History:   Diagnosis Date    Acid reflux disease     Benign essential hypertension     Benign essential tremor     Chronic kidney disease, stage III (moderate) (H)     History of hepatitis C virus infection     treated with interferon    MDD (major depressive disorder)     Mild cognitive impairment     Osteopenia     Peripheral neuropathy     Personal history of malignant neoplasm of breast 2007    RA (rheumatoid arthritis) (H)     Restless legs syndrome (RLS)        Past Surgical History   I have reviewed this patient's surgical history and updated it with pertinent information if needed.  Past Surgical History:   Procedure Laterality Date    APPENDECTOMY      ARTHROPLASTY CARPOMETACARPAL (THUMB JOINT) Bilateral     ARTHROPLASTY KNEE Right     BLEPHAROPLASTY BILATERAL      BUNIONECTOMY CHEVRON AND PEBBLES BILATERAL, COMBINED  12/02/2011    Procedure:COMBINED BUNIONECTOMY CHEVRON AND PEBBLES BILATERAL; BILATERAL THIRD AND FOURTH CLAWTOE RECONSTRUCTION WITH EXOSTECTOMY, LEFT FIRST TARSOMETATARSAL JOINT and 2nd toe Right foot reconstruction; Surgeon:ASHWIN FARMER; Location: OR    CARPAL TUNNEL RELEASE RT/LT Bilateral     COLONOSCOPY  10/18/2011    Procedure:COLONOSCOPY; COLONOSCOPY; Surgeon:BRENDA RODRIGUEZ; Location: GI     ELBOW SURGERY      tendon lengthening surgery    ESOPHAGOSCOPY, GASTROSCOPY, DUODENOSCOPY (EGD), COMBINED N/A 3/31/2023    Procedure: Esophagoscopy, gastroscopy, duodenoscopy (EGD), combined;  Surgeon: Remberto Aguero MD;  Location:  GI    FOOT SURGERY Bilateral     toe straightening    MASTECTOMY, BILATERAL      SLING BLADDER SUSPENSION WITH FASCIA ZACH      SPINE SURGERY      multiple lumbar surgeries; most hardware removed       Prior to Admission Medications   Prior to Admission Medications   Prescriptions Last Dose Informant Patient Reported? Taking?   ARIPiprazole (ABILIFY) 5 MG tablet 9/29/2024  No Yes   Sig: Take 1 tablet (5 mg) by mouth daily   amLODIPine-valsartan (EXFORGE) 5-160 MG tablet 9/29/2024  No Yes   Sig: Take 1 tablet by mouth daily.   buPROPion (WELLBUTRIN XL) 300 MG 24 hr tablet 9/29/2024  No Yes   Sig: TAKE 1 TABLET (300 MG) BY MOUTH ONCE DAILY IN THE MORNING   donepezil (ARICEPT) 10 MG tablet 9/29/2024  Yes Yes   Sig: Take 10 mg by mouth daily   hydrOXYzine HCl (ATARAX) 10 MG tablet   No Yes   Sig: TAKE 1 TABLET (10 MG) BY MOUTH 3 TIMES DAILY AS NEEDED FOR ANXIETY   mirtazapine (REMERON) 7.5 MG tablet 9/29/2024  No Yes   Sig: TAKE 1 TABLET (7.5 MG) BY MOUTH AT BEDTIME   sertraline (ZOLOFT) 100 MG tablet 9/29/2024  No Yes   Sig: TAKE 2 TABLETS (200 MG) BY MOUTH DAILY      Facility-Administered Medications: None     Allergies   Allergies   Allergen Reactions    Acetaminophen GI Disturbance    Citalopram Unknown    Hydrocodone GI Disturbance    Levaquin [Levofloxacin Hemihydrate] Other (See Comments) and Rash     chest pain    Sulfa Antibiotics GI Disturbance       Social History   I have reviewed this patient's social history and updated it with pertinent information if needed. Marcela Sandhu  reports that she has never smoked. She has never used smokeless tobacco. She reports that she does not drink alcohol and does not use drugs.    Family History   I have reviewed this patient's  family history and updated it with pertinent information if needed.   Family History   Problem Relation Age of Onset    Hypertension Mother     Hypertension Father     Multiple myeloma Brother     Bone Cancer Brother     Cerebrovascular Disease Brother 60    Diabetes No family hx of     Myocardial Infarction No family hx of     Breast Cancer No family hx of     Ovarian Cancer No family hx of     Colon Cancer No family hx of        Review of Systems   10 point ROS negative other than symptoms noted in HPI.    Physical Exam   Vital signs with ranges:   Temp:  [96.6  F (35.9  C)-97.4  F (36.3  C)] 97.4  F (36.3  C)  Pulse:  [] 98  Resp:  [9-109] 109  BP: (114-162)/(53-82) 126/82  SpO2:  [93 %-100 %] 97 %  128 lbs 11.98 oz    Heart:  No peripheral edema  Lungs:  No SOB    NEUROLOGICAL EXAMINATION:   Mental status:  Awake, alert to voice, oriented x3, following simple commands, speech is fluent.  Cranial nerves:  II-XII intact.   Motor:  Baseline chronic BUE tremors   Shoulder Abduction  Right:  5/5   Left:  5/5  Biceps                      Right:  5/5   Left:  5/5  Triceps                     Right:  5/5   Left:  5/5  Wrist Extensors        Right:  5/5   Left:  5/5  Wrist Flexors            Right:  5/5   Left:  5/5  Intrinsics                   Right:  5/5   Left:  5/5    Iliopsoas  (hip flexion)               Right: 5/5  Left:  5/5  Quadriceps  (knee extension)       Right:  5/5  Left:  5/5  Hamstrings  (knee flexion)            Right:  5/5  Left:  5/5  Gastroc Soleus  (PF)                          Right:  5/5  Left:  5/5  Tibialis Ant  (DF)                          Right:  5/5  Left:  5/5  EHL                          Right:  5/5  Left:  5/5    Sensation:  Intact to light touch   Coordination:  Smooth finger to nose testing.   Negative pronator drift.      Data   CBC RESULTS:   Recent Labs   Lab Test 09/30/24  1113   WBC 4.4   RBC 2.08*   HGB 7.0*   HCT 22.5*   *   MCH 33.7*   MCHC 31.1*   RDW 13.3   PLT  129*     Basic Metabolic Panel:  Lab Results   Component Value Date     09/30/2024     03/03/2021      Lab Results   Component Value Date    POTASSIUM 3.9 09/30/2024    POTASSIUM 3.6 12/31/2022    POTASSIUM 4.3 03/03/2021     Lab Results   Component Value Date    CHLORIDE 107 09/30/2024    CHLORIDE 107 12/31/2022    CHLORIDE 110 03/03/2021     Lab Results   Component Value Date    DENIS 9.5 09/30/2024    DENIS 9.8 03/03/2021     Lab Results   Component Value Date    CO2 21 09/30/2024    CO2 26 12/31/2022    CO2 26 03/03/2021     Lab Results   Component Value Date    BUN 41.9 09/30/2024    BUN 18 12/31/2022    BUN 31 03/03/2021     Lab Results   Component Value Date    CR 1.00 09/30/2024    CR 1.12 03/03/2021     Lab Results   Component Value Date     09/30/2024     12/31/2022    GLC 96 03/03/2021     INR:  Lab Results   Component Value Date    INR 1.12 09/30/2024

## 2024-09-30 NOTE — PHARMACY-ADMISSION MEDICATION HISTORY
Pharmacist Admission Medication History    Admission medication history is complete. The information provided in this note is only as accurate as the sources available at the time of the update.    Information Source(s): Family member and CareEverywhere/SureScripts via in-person    Pertinent Information: None    Changes made to PTA medication list:  Added: None  Deleted: tizanidine   Changed: None    Allergies reviewed with patient and updates made in EHR: unable to assess    Medication History Completed By: Kavya Andrade [facility] 9/30/2024 1:46 PM    PTA Med List   Medication Sig Last Dose    amLODIPine-valsartan (EXFORGE) 5-160 MG tablet Take 1 tablet by mouth daily. 9/29/2024    ARIPiprazole (ABILIFY) 5 MG tablet Take 1 tablet (5 mg) by mouth daily 9/29/2024    buPROPion (WELLBUTRIN XL) 300 MG 24 hr tablet TAKE 1 TABLET (300 MG) BY MOUTH ONCE DAILY IN THE MORNING 9/29/2024    donepezil (ARICEPT) 10 MG tablet Take 10 mg by mouth daily 9/29/2024    hydrOXYzine HCl (ATARAX) 10 MG tablet TAKE 1 TABLET (10 MG) BY MOUTH 3 TIMES DAILY AS NEEDED FOR ANXIETY     mirtazapine (REMERON) 7.5 MG tablet TAKE 1 TABLET (7.5 MG) BY MOUTH AT BEDTIME 9/29/2024    sertraline (ZOLOFT) 100 MG tablet TAKE 2 TABLETS (200 MG) BY MOUTH DAILY 9/29/2024

## 2024-09-30 NOTE — PLAN OF CARE
Problem: Stroke, Intracerebral Hemorrhage  Goal: Optimal Coping  Outcome: Progressing  Goal: Effective Bowel Elimination  Outcome: Progressing  Goal: Optimal Cerebral Tissue Perfusion  Outcome: Progressing  Goal: Optimal Cognitive Function  Outcome: Progressing  Goal: Effective Communication Skills  Outcome: Progressing  Goal: Optimal Functional Ability  Outcome: Progressing  Intervention: Optimize Functional Ability  Recent Flowsheet Documentation  Taken 9/30/2024 1800 by Thomas Noel RN  Activity Management: bedrest  Taken 9/30/2024 1603 by Thomas Noel RN  Activity Management: bedrest  Goal: Optimal Nutrition Intake  Outcome: Progressing  Goal: Optimal Pain Control and Function  Outcome: Progressing  Goal: Effective Oxygenation and Ventilation  Outcome: Progressing  Intervention: Optimize Oxygenation and Ventilation  Recent Flowsheet Documentation  Taken 9/30/2024 1800 by Thomas Noel RN  Head of Bed (HOB) Positioning: HOB at 30 degrees  Taken 9/30/2024 1603 by Thomas Noel RN  Head of Bed (HOB) Positioning: HOB at 30 degrees  Goal: Improved Sensorimotor Function  Outcome: Progressing  Intervention: Optimize Range of Motion, Motor Control and Function  Recent Flowsheet Documentation  Taken 9/30/2024 1800 by Thomas Noel RN  Positioning/Transfer Devices:   pillows   in use  Taken 9/30/2024 1603 by Thomas Noel RN  Positioning/Transfer Devices:   pillows   wedge   in use  Goal: Safe and Effective Swallow  Outcome: Progressing  Goal: Effective Urinary Elimination  Outcome: Progressing   Goal Outcome Evaluation:    Patient arrive from ED around 4pm following of a fall which resulted in   parenchymal contusion with subarachnoid  hemorrhage in the inferior left temporal lobe and left frontal lobe  since head CT from earlier today. Thin hyperdense 0.2 cm extra-axial  hemorrhage along the left cerebral convexity appears fairly similar to  head CT from earlier today. No midline shift  or herniation. No  hydrocephalus.  Small focus of pneumocephalus along the left lateral cerebral  convexity which appears slightly changed in position since prior. This  remains concerning for occult calvarial fracture. She will have a follow up CT at [address] [facility]. She passed her dysphagia screening and is on clear liquid diet. Her neuro exam has not changed from her ed exam, she follows commands, baseline tremors, intermittent confusion/forgetfulness, slight right facial droop which seems to go away after she talks. Family (son, daughter in law ) updated with plan of care. Pt is full code, but her advance care directive she wants to be dnr/dni. Her son and patient's sister are talking about the code status. Patient's sister (Hillary Sandhu) is the primary POA and son is secondary. Patient received one unit of blood for hgb of 7. Patient had two episodes of vomiting blood. GI is following patient along with neuro crit and neuro surg.

## 2024-09-30 NOTE — ED TRIAGE NOTES
Patient BIBA from assisted living facility. Patient had been vomiting blood this morning when she fell in the hallway at her facility hitting her head. En route to ED patient had a witnessed seizure but EMS. Seizure was brief. Patient has no known hx of seizures and does not take blood thinners per chart review.     C Collar applied to patient by ED staff.    Time frame unknown when patient began having hematemesis or fell.

## 2024-09-30 NOTE — H&P
[facility]    History and Physical - Hospitalist Service       Date of Admission:  9/30/2024    Assessment & Plan   Marcela Sandhu is a 84 year-old female with past medical history significant for mild cognitive impairment who presents with fall and reported hematemesis. Admitted on 9/30/2024.     Acute traumatic subdural hematoma parenchymal contusion, left temporal lobe with associated edema  Acute traumatic subarachnoid hemorrhage left temporal and left frontal lobe  Acute traumatic subdural hemorrhage left lateral cerebral convexity   Suspected left tegmen mastoideum fracture with pneumocephalus   Right posterior scalp hematoma   Fall   Seizure   *Presents with fall and head trauma. Subsequent witnessed tonic-clonic seizure by EMS.  *Head CT with parenchymal contusion inferior left temporal lobe with mild surrounding edema, subarachnoid hemorrhage in the left temporal lobe and left frontal lobe, 0.2 cm subdural hemorrhage along left lateral cerebral convexity, small amount of pneumocephalus in the inferior left temporal lobe adjacent to the parenchymal contusions, raising concern for subtle fracture to the left tegmen mastoideum, right posterior scalp soft tissue injury without underlying fracture   *PTA not on anticoagulation or antiplatelets.   - Neurosurgery consulted  - Neuro-critical care consulted   - Repeat head CT in 6 hours  - Head of bed elevated 30 degrees  - Continue levetiracetam PO/IV   - Hold anticoagulation/antiplatelets until cleared to resume by neurosurgery  - Goal SBP <150, PRN IV labetalol, hydralazine ordered  - PT/OT/SW consulted    Acute gastrointestinal bleed, suspect upper  Hematemesis  Acute blood loss anemia  *Reportedly had hematemesis the morning of presentation, limited details from patient and care   *Hgb 7.0 g/dl on admission, most recent 8.7 g/dl 4/18/24, baseline previously 10-11 g/dl.   *Vital signs currently stable.   *No history of liver  disease.  *Denies any NSAID, alcohol use.   *PTA on no antiplatelets, anticoagulation.  - GI (Elise) consulted  - Serial hemoglobin  - Type and screen   - IV PPI BID  - NPO     Hypertension  - Hold prior to admission amlodipine-valsartan in setting of GI bleed    Acute traumatic encephalopathy on chronic mild cognitive impairment   Major depression   *Son reports significantly more confused at present than baseline. Likely due to trauma +/- post-ictal state.   *Lives in [facility] at baseline with minimal assistance needs per son.   - Re-orient as needed  - Maintain normal day/night, sleep wake cycles  - Minimize sedating/altering medications as able  - Treat separate conditions as detailed above/below   - Resume prior to admission donepezil, aripiprazole, bupropion, mirtazapine, sertraline when able to take PO    Chronic kidney disease, stage 3  Baseline creatinine 1.00-1.10. Creatinine 1.00 on admission.   - Currently around baseline  - Avoid nephrotoxins  - Monitor BMP     Troponin elevation   Troponin 22->22. Suspect secondary to intracranial and GI bleeding.   - Telemetry     Acute thrombocytopenia, mild   Platelet count 129, baseline normal. Possibly consumptive in setting of bleeding.   - Monitor CBC     Possible lateral ninth rib non-displaced fracture, likely subacute  Initially sustained fall ~3 weeks ago with bruising since then over right lateral chest wall. Previous XRs R elbow, scapula, humerus negative for fracture, but does not appear to have had CXR.   - Encourage IS        Diet: NPO for Medical/Clinical Reasons Except for: No Exceptions    DVT Prophylaxis: Pneumatic Compression Devices  Zarate Catheter: Not present  Code Status: DNR/DNI - Discussed with family and consistent with advanced directive.     Disposition Plan   Admit to inpatient. Anticipate greater than or equal to 2 midnights prior to discharge.      Entered: Luis Hilliard MD 09/30/2024, 1:09 PM     The patient's care was discussed with  the ED provider, patient and patient's family     Clinically Significant Risk Factors Present on Admission                # Thrombocytopenia: Lowest platelets = 129 in last 2 days, will monitor for bleeding   # Hypertension: Noted on problem list    # Anemia: based on hgb <11                       Luis Hilliard MD  [facility]    ______________________________________________________________________    Chief Complaint   Fall, vomiting blood     History of Present Illness   Marcela Sandhu is a 84 year old female who presents with the above chief complaint.    History is obtained from the patient, patient's son, discussion with ED provider and review of medical record. History from patient limited due to encephalopathy. The patient reportedly developed bloody emesis the morning of presentation. After this started, while walking she sustained a fall striking her head.  EMS evaluated and placed her in a cervical collar, and route to the emergency department she had a witnessed tonic-clonic seizure that lasted about 30 seconds by report.  Her son notes that she currently is much more confused than baseline, at baseline she is in [facility] but requires fairly minimal assistance. She had an additional fall about 3 weeks ago landing on her right side with bruising and negative XRs. He believes her R face appears drooped.     In the Emergency Department, the patient was seen by Dr. Elder, with whom I discussed the patient's presenting symptoms and emergency department course.  Initial vital signs were a temperature of 96.9F, , /70, RR 11, SpO2 93% on 2L NC. Pertinent work-up as noted in A&P. The patient received IV levetiracetam, IV pantoprazole and Hospitalists were contacted for admission to the hospital.     Past Medical History    I have reviewed this patient's medical history and updated it with pertinent information if needed.   Past Medical History:   Diagnosis Date    Acid reflux  disease     Benign essential hypertension     Benign essential tremor     Chronic kidney disease, stage III (moderate) (H)     History of hepatitis C virus infection     treated with interferon    MDD (major depressive disorder)     Mild cognitive impairment     Osteopenia     Peripheral neuropathy     Personal history of malignant neoplasm of breast 2007    RA (rheumatoid arthritis) (H)     Restless legs syndrome (RLS)        Past Surgical History   I have reviewed this patient's surgical history and updated it with pertinent information if needed.  Past Surgical History:   Procedure Laterality Date    APPENDECTOMY      ARTHROPLASTY CARPOMETACARPAL (THUMB JOINT) Bilateral     ARTHROPLASTY KNEE Right     BLEPHAROPLASTY BILATERAL      BUNIONECTOMY CHEVRON AND PEBBLES BILATERAL, COMBINED  12/02/2011    Procedure:COMBINED BUNIONECTOMY CHEVRON AND PEBBLES BILATERAL; BILATERAL THIRD AND FOURTH CLAWTOE RECONSTRUCTION WITH EXOSTECTOMY, LEFT FIRST TARSOMETATARSAL JOINT and 2nd toe Right foot reconstruction; Surgeon:REMBERTO FARMER; Location: OR    CARPAL TUNNEL RELEASE RT/LT Bilateral     COLONOSCOPY  10/18/2011    Procedure:COLONOSCOPY; COLONOSCOPY; Surgeon:BRENDA RODRIGUEZ; Location: GI    ELBOW SURGERY      tendon lengthening surgery    ESOPHAGOSCOPY, GASTROSCOPY, DUODENOSCOPY (EGD), COMBINED N/A 3/31/2023    Procedure: Esophagoscopy, gastroscopy, duodenoscopy (EGD), combined;  Surgeon: Remberto Aguero MD;  Location:  GI    FOOT SURGERY Bilateral     toe straightening    MASTECTOMY, BILATERAL      SLING BLADDER SUSPENSION WITH FASCIA ZACH      SPINE SURGERY      multiple lumbar surgeries; most hardware removed         Social History   I have reviewed this patient's social history and updated it with pertinent information if needed.  Social History     Tobacco Use    Smoking status: Never    Smokeless tobacco: Never   Vaping Use    Vaping status: Never Used   Substance Use Topics    Alcohol use: No    Drug use:  No        Family History   I have reviewed this patient's family history and updated it with pertinent information if needed.   Family History   Problem Relation Age of Onset    Hypertension Mother     Hypertension Father     Multiple myeloma Brother     Bone Cancer Brother     Cerebrovascular Disease Brother 60    Diabetes No family hx of     Myocardial Infarction No family hx of     Breast Cancer No family hx of     Ovarian Cancer No family hx of     Colon Cancer No family hx of         Prior to Admission Medications     Prior to Admission Medications   Prescriptions Last Dose Informant Patient Reported? Taking?   ARIPiprazole (ABILIFY) 5 MG tablet 9/29/2024  No Yes   Sig: Take 1 tablet (5 mg) by mouth daily   amLODIPine-valsartan (EXFORGE) 5-160 MG tablet 9/29/2024  No Yes   Sig: Take 1 tablet by mouth daily.   buPROPion (WELLBUTRIN XL) 300 MG 24 hr tablet 9/29/2024  No Yes   Sig: TAKE 1 TABLET (300 MG) BY MOUTH ONCE DAILY IN THE MORNING   donepezil (ARICEPT) 10 MG tablet 9/29/2024  Yes Yes   Sig: Take 10 mg by mouth daily   hydrOXYzine HCl (ATARAX) 10 MG tablet   No Yes   Sig: TAKE 1 TABLET (10 MG) BY MOUTH 3 TIMES DAILY AS NEEDED FOR ANXIETY   mirtazapine (REMERON) 7.5 MG tablet 9/29/2024  No Yes   Sig: TAKE 1 TABLET (7.5 MG) BY MOUTH AT BEDTIME   sertraline (ZOLOFT) 100 MG tablet 9/29/2024  No Yes   Sig: TAKE 2 TABLETS (200 MG) BY MOUTH DAILY      Facility-Administered Medications: None     Allergies   Allergies   Allergen Reactions    Acetaminophen GI Disturbance    Citalopram Unknown    Hydrocodone GI Disturbance    Levaquin [Levofloxacin Hemihydrate] Other (See Comments) and Rash     chest pain    Sulfa Antibiotics GI Disturbance       Physical Exam   Vital Signs: Temp: 96.9  F (36.1  C) Temp src: Temporal BP: 127/58 Pulse: 105   Resp: 18 SpO2: 100 % O2 Device: Nasal cannula Oxygen Delivery: 2 LPM  Weight: 128 lbs 11.98 oz    Constitutional: Well-appearing, NAD  Eyes: PERRL, EOMI  HENT: Hematoma R occipital  "scalp. Lateral tongue lacerations.   Respiratory: Clear to auscultation bilaterally, good air movement, normal effort   Cardiovascular: RRR. No peripheral edema.   GI: Soft, non-tender, non-distended. No rebound tenderness or guarding.    Skin: Warm, dry.   Musculoskeletal: Ecchymosis over right lateral chest wall with tenderness to palpation.   Neurologic: Alert. Oriented to \"hospital\", not specific name. Not able to state date. Right facial droop. Strength diffusely diminished and symmetric bilaterally, appears to have poor effort.    Psychiatric: Normal affect, appropriate      Data   Data reviewed today: I reviewed all medications, new labs and imaging results over the last 24 hours. I personally reviewed the EKG tracing showing sinus tachycardia .    Recent Labs   Lab 09/30/24  1113   WBC 4.4   HGB 7.0*   *   *   INR 1.12      POTASSIUM 3.9   CHLORIDE 107   CO2 21*   BUN 41.9*   CR 1.00*   ANIONGAP 14   DENIS 9.5   *       Recent Results (from the past 24 hour(s))   CT Head w/o Contrast   Result Value    Radiologist flags Acute intracranial hemorrhage (AA)    Narrative    CT SCAN OF THE HEAD WITHOUT CONTRAST   9/30/2024 11:23 AM     HISTORY: Code Stroke. Evaluate for potential thrombolysis and  thrombectomy. Fall. Seizures.    TECHNIQUE:  Axial images of the head and coronal reformations without  IV contrast material. Radiation dose for this scan was reduced using  automated exposure control, adjustment of the mA and/or kV according  to patient size, or iterative reconstruction technique.    COMPARISON: Brain MR 4/19/2024.    FINDINGS: Small-volume hyperdense subarachnoid hemorrhage in the left  frontal lobe. Thin adjacent 0.2 cm hyperdense subdural hematoma.  Additional area of parenchymal contusions in the inferior left  temporal lobe with mild surrounding hypodense edema. There is probably  adjacent small amount of subarachnoid hemorrhage in the left temporal  lobe. No midline shift " or herniation. Ventricular size is within  normal limits without evidence of hydrocephalus. Mild diffuse  parenchymal volume loss. Patchy periventricular white matter  hypodensities are nonspecific, but most likely related to chronic  microvascular ischemic disease.     Marked mucosal thickening in the right maxillary sinus and sphenoid  sinus with air fluid layers present.    Small amount of pneumocephalus in the inferior left temporal lobe  adjacent to the parenchymal contusion and just superior to the mastoid  air cells. This is concerning for a subtle tegmen mastoideum fracture.  Left mastoid air cells remain relatively clear.    Right posterior scalp soft tissue injury. No underlying calvarial  fracture.      Impression    IMPRESSION:     1. Parenchymal contusion in the inferior left temporal lobe with mild  surrounding edema. Subarachnoid hemorrhage in the left temporal lobe  and left frontal lobe. Thin 0.2 cm subdural hemorrhage along the left  lateral cerebral convexity.  2. No midline shift, herniation, or hydrocephalus.  3. Small amount of pneumocephalus in the inferior left temporal lobe  adjacent to the parenchymal contusions. This is just superior to the  left mastoid air cells which raises concern for a subtle fracture to  the left tegmen mastoideum. Left mastoid air cells are relatively  clear at this time.  4. Right posterior scalp soft tissue injury without underlying  calvarial fracture.  5. Diffuse parenchymal volume loss and white matter changes which are  most likely due to chronic microvascular ischemic disease.    [Critical Result: Acute intracranial hemorrhage]    Finding was identified on 9/30/2024 11:25 AM.     MARTHA MAGAÑA JAVIER was contacted by Dr. Silva on 9/30/2024 11:31 AM and  verbalized understanding of the critical result.      FRANCOIS SILVA MD         SYSTEM ID:  [id number]   CTA Head Neck with Contrast    Narrative    CT ANGIOGRAM OF THE HEAD AND NECK WITH CONTRAST  9/30/2024 11:33  AM     HISTORY: Code Stroke to evaluate for potential thrombolysis and  thrombectomy. Fall. Altered mental status.    TECHNIQUE:  CT angiography with an injection of 67 mL [id number] IV  with scans through the head and neck. Images were transferred to a  separate 3-D workstation where multiplanar reformations and 3-D images  were created. Estimates of carotid stenoses are made relative to the  distal internal carotid artery diameters except as noted. Radiation  dose for this scan was reduced using automated exposure control,  adjustment of the mA and/or kV according to patient size, or iterative  reconstruction technique.      COMPARISON: None.     CT HEAD FINDINGS: Subarachnoid hemorrhage in the left frontal and  temporal lobes with thin subdural hemorrhage in the left cerebral  convexity and parenchymal contusion in the left temporal lobe. This is  better described on head CT.    CT ANGIOGRAM HEAD FINDINGS:  The major intracranial arteries including  the proximal branches of the anterior cerebral, middle cerebral, and  posterior cerebral arteries appear patent without vascular cutoff. No  aneurysm identified. No significant stenosis. Venous circulation is  unremarkable.     CT ANGIOGRAM NECK FINDINGS: Normal origin of the great vessels from  the aortic arch.     Right carotid artery: The right common and internal carotid arteries  are patent. Mild atherosclerotic disease at the carotid bifurcation  without stenosis.     Left carotid artery: The left common and internal carotid arteries are  patent. Mild atherosclerotic disease at the carotid bifurcation  without stenosis.     Vertebral arteries: Vertebral arteries are patent without evidence of  dissection. No significant stenosis.     Other findings: Multilevel degenerative changes in the spine.       Impression    IMPRESSION:   1. Patent arteries in the neck without evidence of dissection. Mild  atherosclerotic disease in the carotid arteries bilaterally  without  significant stenosis by NASCET criteria.  2. Patent proximal major intracranial arteries without vascular  cutoff. No significant stenosis. No aneurysm identified.     Results discussed with Liliya Elder at 11:48 AM on 9/30/2024.     FRANCOIS SILVA MD         SYSTEM ID:  [id number]   CT Cervical Spine w/o Contrast    Narrative    CT CERVICAL SPINE WITHOUT CONTRAST 9/30/2024 11:34 AM     HISTORY: Fall, hit right side of head.     TECHNIQUE: Axial images of the cervical spine were obtained without  intravenous contrast. Multiplanar reformations were performed.   Radiation dose for this scan was reduced using automated exposure  control, adjustment of the mA and/or kV according to patient size, or  iterative reconstruction technique.    COMPARISON: Cervical spine CT 12/7/2023.    FINDINGS: Cervical spine alignment is grossly within normal limits. No  acute lucent fracture line visualized. No significant loss of  vertebral body height. Severe sclerotic degenerative endplate changes  and loss of disc height at C5-C6, also present on prior. Moderate  degenerative endplate changes and loss of disc height at the other  levels. Facet hypertrophy throughout the cervical spine. Mild spinal  canal narrowing at C6-C7. Moderate neural foraminal narrowings at  C5-C6.    Visualized paraspinous tissues: Unremarkable.      Impression    IMPRESSION:   1. No evidence of acute fracture or subluxation in the cervical spine.  2. Degenerative changes in the cervical spine as described above.     FRANCOIS SILVA MD         SYSTEM ID:  [id number]   XR Chest Port 1 View    Narrative    CHEST ONE VIEW  9/30/2024 12:29 PM     HISTORY: Fall, bruising to the right lateral ribs    COMPARISON: CT chest 12/7/2023      Impression    IMPRESSION: Possible right lateral ninth rib nondisplaced fracture. No  focal consolidation, pleural effusion or pneumothorax.  Cardiomediastinal silhouette is unremarkable.    CASSI KING MD         SYSTEM  ID:  [id number]

## 2024-10-01 ENCOUNTER — APPOINTMENT (OUTPATIENT)
Dept: PHYSICAL THERAPY | Facility: CLINIC | Age: 84
DRG: 082 | End: 2024-10-01
Attending: INTERNAL MEDICINE
Payer: COMMERCIAL

## 2024-10-01 ENCOUNTER — APPOINTMENT (OUTPATIENT)
Dept: GENERAL RADIOLOGY | Facility: CLINIC | Age: 84
DRG: 082 | End: 2024-10-01
Attending: HOSPITALIST
Payer: COMMERCIAL

## 2024-10-01 ENCOUNTER — APPOINTMENT (OUTPATIENT)
Dept: SPEECH THERAPY | Facility: CLINIC | Age: 84
DRG: 082 | End: 2024-10-01
Attending: PSYCHIATRY & NEUROLOGY
Payer: COMMERCIAL

## 2024-10-01 ENCOUNTER — APPOINTMENT (OUTPATIENT)
Dept: OCCUPATIONAL THERAPY | Facility: CLINIC | Age: 84
DRG: 082 | End: 2024-10-01
Attending: INTERNAL MEDICINE
Payer: COMMERCIAL

## 2024-10-01 ENCOUNTER — HOSPITAL ENCOUNTER (INPATIENT)
Dept: NEUROLOGY | Facility: CLINIC | Age: 84
Discharge: HOME | End: 2024-10-01
Attending: PSYCHIATRY & NEUROLOGY
Payer: COMMERCIAL

## 2024-10-01 ENCOUNTER — APPOINTMENT (OUTPATIENT)
Dept: SPEECH THERAPY | Facility: CLINIC | Age: 84
DRG: 082 | End: 2024-10-01
Attending: INTERNAL MEDICINE
Payer: COMMERCIAL

## 2024-10-01 LAB
ANION GAP SERPL CALCULATED.3IONS-SCNC: 8 MMOL/L (ref 7–15)
ATRIAL RATE - MUSE: 109 BPM
BUN SERPL-MCNC: 27.4 MG/DL (ref 8–23)
CALCIUM SERPL-MCNC: 9.1 MG/DL (ref 8.8–10.4)
CHLORIDE SERPL-SCNC: 108 MMOL/L (ref 98–107)
CREAT SERPL-MCNC: 0.87 MG/DL (ref 0.51–0.95)
DIASTOLIC BLOOD PRESSURE - MUSE: NORMAL MMHG
EGFRCR SERPLBLD CKD-EPI 2021: 66 ML/MIN/1.73M2
ERYTHROCYTE [DISTWIDTH] IN BLOOD BY AUTOMATED COUNT: 14.7 % (ref 10–15)
GLUCOSE SERPL-MCNC: 90 MG/DL (ref 70–99)
HCO3 SERPL-SCNC: 26 MMOL/L (ref 22–29)
HCT VFR BLD AUTO: 29.5 % (ref 35–47)
HGB BLD-MCNC: 10.4 G/DL (ref 11.7–15.7)
HGB BLD-MCNC: 9.3 G/DL (ref 11.7–15.7)
INTERPRETATION ECG - MUSE: NORMAL
MCH RBC QN AUTO: 32.2 PG (ref 26.5–33)
MCHC RBC AUTO-ENTMCNC: 31.5 G/DL (ref 31.5–36.5)
MCV RBC AUTO: 102 FL (ref 78–100)
P AXIS - MUSE: 78 DEGREES
PLATELET # BLD AUTO: 135 10E3/UL (ref 150–450)
POTASSIUM SERPL-SCNC: 4.5 MMOL/L (ref 3.4–5.3)
PR INTERVAL - MUSE: 184 MS
QRS DURATION - MUSE: 100 MS
QT - MUSE: 350 MS
QTC - MUSE: 471 MS
R AXIS - MUSE: -7 DEGREES
RBC # BLD AUTO: 2.89 10E6/UL (ref 3.8–5.2)
SODIUM SERPL-SCNC: 142 MMOL/L (ref 135–145)
SYSTOLIC BLOOD PRESSURE - MUSE: NORMAL MMHG
T AXIS - MUSE: 70 DEGREES
VENTRICULAR RATE- MUSE: 109 BPM
WBC # BLD AUTO: 5.7 10E3/UL (ref 4–11)

## 2024-10-01 PROCEDURE — 85018 HEMOGLOBIN: CPT | Performed by: HOSPITALIST

## 2024-10-01 PROCEDURE — 250N000011 HC RX IP 250 OP 636: Performed by: HOSPITALIST

## 2024-10-01 PROCEDURE — 99291 CRITICAL CARE FIRST HOUR: CPT | Mod: 25 | Performed by: STUDENT IN AN ORGANIZED HEALTH CARE EDUCATION/TRAINING PROGRAM

## 2024-10-01 PROCEDURE — 250N000009 HC RX 250: Performed by: INTERNAL MEDICINE

## 2024-10-01 PROCEDURE — 36415 COLL VENOUS BLD VENIPUNCTURE: CPT | Performed by: INTERNAL MEDICINE

## 2024-10-01 PROCEDURE — 250N000009 HC RX 250: Performed by: HOSPITALIST

## 2024-10-01 PROCEDURE — 95718 EEG PHYS/QHP 2-12 HR W/VEEG: CPT | Performed by: PSYCHIATRY & NEUROLOGY

## 2024-10-01 PROCEDURE — 120N000013 HC R&B IMCU

## 2024-10-01 PROCEDURE — 92526 ORAL FUNCTION THERAPY: CPT | Mod: GN | Performed by: SPEECH-LANGUAGE PATHOLOGIST

## 2024-10-01 PROCEDURE — 97530 THERAPEUTIC ACTIVITIES: CPT | Mod: GP | Performed by: PHYSICAL THERAPIST

## 2024-10-01 PROCEDURE — 99233 SBSQ HOSP IP/OBS HIGH 50: CPT | Performed by: HOSPITALIST

## 2024-10-01 PROCEDURE — 97535 SELF CARE MNGMENT TRAINING: CPT | Mod: GO | Performed by: OCCUPATIONAL THERAPIST

## 2024-10-01 PROCEDURE — 97530 THERAPEUTIC ACTIVITIES: CPT | Mod: GO | Performed by: OCCUPATIONAL THERAPIST

## 2024-10-01 PROCEDURE — 74230 X-RAY XM SWLNG FUNCJ C+: CPT

## 2024-10-01 PROCEDURE — 97116 GAIT TRAINING THERAPY: CPT | Mod: GP | Performed by: PHYSICAL THERAPIST

## 2024-10-01 PROCEDURE — 250N000011 HC RX IP 250 OP 636

## 2024-10-01 PROCEDURE — 80048 BASIC METABOLIC PNL TOTAL CA: CPT | Performed by: INTERNAL MEDICINE

## 2024-10-01 PROCEDURE — 36415 COLL VENOUS BLD VENIPUNCTURE: CPT | Performed by: HOSPITALIST

## 2024-10-01 PROCEDURE — 95813 EEG EXTND MNTR 61-119 MIN: CPT

## 2024-10-01 PROCEDURE — 85027 COMPLETE CBC AUTOMATED: CPT | Performed by: INTERNAL MEDICINE

## 2024-10-01 PROCEDURE — 97166 OT EVAL MOD COMPLEX 45 MIN: CPT | Mod: GO | Performed by: OCCUPATIONAL THERAPIST

## 2024-10-01 PROCEDURE — 250N000011 HC RX IP 250 OP 636: Performed by: INTERNAL MEDICINE

## 2024-10-01 PROCEDURE — 92610 EVALUATE SWALLOWING FUNCTION: CPT | Mod: GN | Performed by: SPEECH-LANGUAGE PATHOLOGIST

## 2024-10-01 PROCEDURE — 97161 PT EVAL LOW COMPLEX 20 MIN: CPT | Mod: GP | Performed by: PHYSICAL THERAPIST

## 2024-10-01 PROCEDURE — 92611 MOTION FLUOROSCOPY/SWALLOW: CPT | Mod: GN

## 2024-10-01 RX ORDER — BARIUM SULFATE 400 MG/ML
SUSPENSION ORAL ONCE
Status: COMPLETED | OUTPATIENT
Start: 2024-10-01 | End: 2024-10-01

## 2024-10-01 RX ORDER — HYDRALAZINE HYDROCHLORIDE 10 MG/1
10 TABLET, FILM COATED ORAL EVERY 4 HOURS PRN
Status: DISCONTINUED | OUTPATIENT
Start: 2024-10-01 | End: 2024-10-05 | Stop reason: HOSPADM

## 2024-10-01 RX ORDER — HYDRALAZINE HYDROCHLORIDE 20 MG/ML
10 INJECTION INTRAMUSCULAR; INTRAVENOUS EVERY 4 HOURS PRN
Status: DISCONTINUED | OUTPATIENT
Start: 2024-10-01 | End: 2024-10-05 | Stop reason: HOSPADM

## 2024-10-01 RX ADMIN — LABETALOL HYDROCHLORIDE 10 MG: 5 INJECTION, SOLUTION INTRAVENOUS at 15:21

## 2024-10-01 RX ADMIN — PANTOPRAZOLE SODIUM 40 MG: 40 INJECTION, POWDER, FOR SOLUTION INTRAVENOUS at 20:02

## 2024-10-01 RX ADMIN — LABETALOL HYDROCHLORIDE 20 MG: 5 INJECTION, SOLUTION INTRAVENOUS at 15:53

## 2024-10-01 RX ADMIN — LEVETIRACETAM 500 MG: 5 INJECTION INTRAVENOUS at 20:02

## 2024-10-01 RX ADMIN — PROCHLORPERAZINE EDISYLATE 5 MG: 5 INJECTION INTRAMUSCULAR; INTRAVENOUS at 07:51

## 2024-10-01 RX ADMIN — HYDRALAZINE HYDROCHLORIDE 10 MG: 20 INJECTION INTRAMUSCULAR; INTRAVENOUS at 15:34

## 2024-10-01 RX ADMIN — ONDANSETRON 4 MG: 2 INJECTION INTRAMUSCULAR; INTRAVENOUS at 03:43

## 2024-10-01 RX ADMIN — LEVETIRACETAM 500 MG: 5 INJECTION INTRAVENOUS at 08:14

## 2024-10-01 RX ADMIN — PANTOPRAZOLE SODIUM 40 MG: 40 INJECTION, POWDER, FOR SOLUTION INTRAVENOUS at 08:15

## 2024-10-01 RX ADMIN — LABETALOL HYDROCHLORIDE 10 MG: 5 INJECTION, SOLUTION INTRAVENOUS at 14:20

## 2024-10-01 RX ADMIN — BARIUM SULFATE 20 ML: 400 SUSPENSION ORAL at 14:51

## 2024-10-01 ASSESSMENT — ACTIVITIES OF DAILY LIVING (ADL)
ADLS_ACUITY_SCORE: 35
ADLS_ACUITY_SCORE: 37
ADLS_ACUITY_SCORE: 31
ADLS_ACUITY_SCORE: 37
ADLS_ACUITY_SCORE: 31
ADLS_ACUITY_SCORE: 37
ADLS_ACUITY_SCORE: 37
ADLS_ACUITY_SCORE: 35
ADLS_ACUITY_SCORE: 31
ADLS_ACUITY_SCORE: 38
ADLS_ACUITY_SCORE: 38
PREVIOUS_RESPONSIBILITIES: MEDICATION MANAGEMENT;MEAL PREP
ADLS_ACUITY_SCORE: 35
ADLS_ACUITY_SCORE: 35
ADLS_ACUITY_SCORE: 37
ADLS_ACUITY_SCORE: 37
ADLS_ACUITY_SCORE: 31
ADLS_ACUITY_SCORE: 37
ADLS_ACUITY_SCORE: 37
ADLS_ACUITY_SCORE: 42
ADLS_ACUITY_SCORE: 41
ADLS_ACUITY_SCORE: 41
ADLS_ACUITY_SCORE: 38
ADLS_ACUITY_SCORE: 38

## 2024-10-01 NOTE — INTERIM SUMMARY
"    Repeat CT head showed stable intracranial hemorrhage       Fibrinogen level wnl     The Stroke Staff is Dr. Gaona.  .       Bernard Fonseca MD  Vascular Neurology Fellow     To page me or covering stroke neurology team member, click here: [facility]COM  Choose \"On Call\" tab at top, then select \"NEUROLOGY/ALL SITES\" from middle drop-down box, press Enter, then look for \"stroke\" or \"telestroke\" for your site.  "

## 2024-10-01 NOTE — PROGRESS NOTES
"CLINICAL SWALLOW EVALUATION     10/01/24 1113   Appointment Info   Signing Clinician's Name / Credentials (SLP) Bianca Robledo [facility]   General Information   Onset of Illness/Injury or Date of Surgery 09/30/24   Referring Physician Luis Hilliard MD   Patient/Family Therapy Goal Statement (SLP) Patient would like to eat/drink.   Pertinent History of Current Problem Per provider note: \"Marcela Sandhu is a 84 year-old female with past medical history significant for mild cognitive impairment who presents with fall and reported hematemesis. Admitted on 9/30/2024. Acute traumatic subdural hematoma parenchymal contusion, left temporal lobe with associated edema  Acute traumatic subarachnoid hemorrhage left temporal and left frontal lobe  Acute traumatic subdural hemorrhage left lateral cerebral convexity   Suspected left tegmen mastoideum fracture with pneumocephalus   Right posterior scalp hematoma   Fall   Seizure. Lives in intermediate at baseline with minimal assistance needs per son.\"   General Observations Patient alert, pleasant, sitting up in chair upon SLP arrival. Patient's son present who reported prior hx of coughing with PO intake and no hx of pneumonias. Patient/family reported patient is on a regular diet and thin liquids at baseline.   Type of Evaluation   Type of Evaluation Swallow Evaluation   Oral Motor   Oral Musculature anomalies present   Structural Abnormalities none present   Mucosal Quality adequate   Dentition (Oral Motor)   Dentition (Oral Motor) edentulous;dental appliance/dentures   Dental Appliance/Denture (Oral Motor) upper and lower;dentures, full   Facial Symmetry (Oral Motor)   Facial Symmetry (Oral Motor) right side impairment   Right Side Facial Asymmetry minimal impairment;moderate impairment   Lip Function (Oral Motor)   Lip Range of Motion (Oral Motor) protrusion impairment;retraction impairment   Lip Strength (Oral Motor) WFL   Protrusion, Lip Range of Motion right side;minimal " "impairment;moderate impairment   Retraction, Lip Range of Motion minimal impairment;moderate impairment   Tongue Function (Oral Motor)   Tongue Strength (Oral Motor) strength decreased;bilateral   Tongue Coordination/Speed (Oral Motor) reduced rate   Tongue ROM (Oral Motor) protrusion is impaired   Protrusion, Tongue ROM Impairment (Oral Motor) bilateral;minimal impairment;moderate impairment   Jaw Function (Oral Motor)   Jaw Function (Oral Motor) WNL   Cough/Swallow/Gag Reflex (Oral Motor)   Soft Palate/Velum (Oral Motor) unable/difficult to assess   Volitional Throat Clear/Cough (Oral Motor) reduced strength   Vocal Quality/Secretion Management (Oral Motor)   Vocal Quality (Oral Motor) WFL   Secretion Management (Oral Motor) WNL   General Swallowing Observations   Past History of Dysphagia No prior hx of SLP swallow evaluations. Esophagram 2/6/2023: \" Mild esophageal dysmotility with tertiary contractions in  the mid and distal esophagus and mild ringed appearance of the  esophageal mucosa, which may also be related to the dysmotility. Upper  endoscopy correlation can be considered to assess for areas of acute  esophagitis if clinically indicated. No mass or stricture.\" EGD 3/31/2023: \"Abnormal esophageal motility, tertiary contractions. There was no ringed appearance as seen on the esophagram, this may have just been the tertiary contractions. Biopsies from mid and distal esophagus obtained. If patient is having esophageal dysphagia in future, it is likely the dysmotility. Initial treatment is to take small bites, sit upright for 30min after meals and drink liquids with each meal. If ongoing dysphagia, then patient can be seen in [facility] for further evaluation.\"   Respiratory Support nasal cannula  (2L)   Current Diet/Method of Nutritional Intake (General Swallowing Observations, NIS) NPO   Swallowing Evaluation Clinical swallow evaluation   Clinical Swallow Evaluation   Feeding Assistance frequent " cues/help required   Clinical Swallow Evaluation Textures Trialed thin liquids;mildly thick liquids   Clinical Swallow Eval: Thin Liquid Texture Trial   Mode of Presentation, Thin Liquids spoon;cup;fed by clinician;self-fed  (hand over hand assist)   Volume of Liquid or Food Presented 3 ice chips, 3 sips by spoon, 2 sips by medicine cup   Oral Phase of Swallow delayed AP movement;premature pharyngeal entry  (suspected)   Pharyngeal Phase of Swallow coughing/choking   Diagnostic Statement Inconsistent coughing with sips by spoon or small cup   Clinical Swallow Eval: Mildly Thick Liquids   Mode of Presentation spoon;fed by clinician   Volume Presented 3 sips by spoon   Oral Phase delayed AP movement;premature pharyngeal entry  (suspected)   Pharyngeal Phase coughing/choking   Diagnostic Statement Inconsistent coughing.   Esophageal Phase of Swallow   Patient reports or presents with symptoms of esophageal dysphagia Yes   Esophageal comments Hx of GERD.   Swallowing Recommendations   Diet Consistency Recommendations NPO;ice chips only;consider alternative means of nutrition   Supervision Level for Intake 1:1 supervision needed   Mode of Delivery Recommendations bolus size, small   Monitoring/Assistance Required (Eating/Swallowing) monitor for cough or change in vocal quality with intake   Recommended Feeding/Eating Techniques (Swallow Eval) maintain upright sitting position for eating   Medication Administration Recommendations, Swallowing (SLP) via alternate route for now   Instrumental Assessment Recommendations VFSS (videofluoroscopic swallowing study)   General Therapy Interventions   Planned Therapy Interventions Dysphagia Treatment   Dysphagia treatment Oropharyngeal exercise training;Modified diet education;Instruction of safe swallow strategies   Clinical Impression   Criteria for Skilled Therapeutic Interventions Met (SLP Eval) Yes, treatment indicated   SLP Diagnosis Dysphagia   Risks & Benefits of therapy  have been explained evaluation/treatment results reviewed;care plan/treatment goals reviewed;risks/benefits reviewed;current/potential barriers reviewed;participants voiced agreement with care plan;participants included;patient;son   Clinical Impression Comments Mild-moderate oropharyngeal dysphagia based on clinical swallow evaluation in setting of left frontal temporal SDH. Patient has prior hx of dysphagia symptoms with coughing with PO intake and esophageal dysfunction. Patient up in chair for exam. [facility] for reduced ROM right sided and reduced lingual strength and ROM. Patient has full dentures in place. Patient assessed with  thin liquid and mildly thick liquid. Note inconsistent coughing episodes after sips of thin and mildly thick liquids. Note slow oral phase and delayed appearing swallow response. Recommend video swallow study to further assess oropahryngeal swallow function prior to intiating oral diet given current and prior dysphagia symptoms. Recommend continue NPO for now except for limited ice chips as tolerated after oral cares and with direct assist. Further recommendations pending VFSS scheduled for today at 1430.   SLP Total Evaluation Time   Eval: oral/pharyngeal swallow function, clinical swallow Minutes (09245) 10   SLP Goals   Therapy Frequency (SLP Eval) daily   SLP Predicted Duration/Target Date for Goal Attainment 10/29/24   SLP Goals Swallow           Interventions   Interventions Quick Adds Swallowing Dysfunction   Swallowing Dysfunction &/or Oral Function for Feeding               SLP Discharge Planning       SLP Discharge Recommendation Transitional Care Facility;Acute Rehab Center-Motivated patient will benefit from intensive, interdisciplinary therapy.  Anticipate will be able to tolerate 3 hours of therapy per day   SLP Rationale for DC Rec Below baseline for swallow function.   SLP Brief overview of current status  Recommend video swallow study to further assess  oropahryngeal swallow function prior to intiating oral diet given current and prior dysphagia symptoms. Recommend continue NPO for now except for limited ice chips as tolerated after oral cares and with direct assist. Further recommendations pending VFSS scheduled for today at 1430.   Total Session Time

## 2024-10-01 NOTE — PLAN OF CARE
Problem: Adult Inpatient Plan of Care  Goal: Absence of Hospital-Acquired Illness or Injury  Intervention: Identify and Manage Fall Risk  Recent Flowsheet Documentation  Taken 10/1/2024 1600 by Edith Chi RN  Safety Promotion/Fall Prevention:   clutter free environment maintained   increased rounding and observation   increase visualization of patient   lighting adjusted   room organization consistent   safety round/check completed   room door open   patient and family education   room near nurse's station  Taken 10/1/2024 1200 by Edith Chi RN  Safety Promotion/Fall Prevention:   clutter free environment maintained   increased rounding and observation   increase visualization of patient   lighting adjusted   room organization consistent   safety round/check completed   room door open   patient and family education   room near nurse's station  Taken 10/1/2024 1000 by Edith Chi RN  Safety Promotion/Fall Prevention:   clutter free environment maintained   increased rounding and observation   increase visualization of patient   lighting adjusted   room organization consistent   safety round/check completed   room door open   patient and family education   room near nurse's station  Taken 10/1/2024 0800 by Edith Chi RN  Safety Promotion/Fall Prevention:   clutter free environment maintained   increased rounding and observation   increase visualization of patient   lighting adjusted   room organization consistent   safety round/check completed   room door open   patient and family education   room near nurse's station     Problem: Adult Inpatient Plan of Care  Goal: Absence of Hospital-Acquired Illness or Injury  Intervention: Prevent Skin Injury  Recent Flowsheet Documentation  Taken 10/1/2024 1600 by Edith Chi RN  Body Position:   weight shifting   upper extremity elevated   heels elevated   position changed independently  Skin Protection:   adhesive use limited   incontinence pads utilized    pulse oximeter probe site changed   silicone foam dressing in place   skin to device areas padded   skin to skin areas padded   transparent dressing maintained  Device Skin Pressure Protection:   absorbent pad utilized/changed   adhesive use limited   positioning supports utilized   pressure points protected   skin-to-device areas padded   skin-to-skin areas padded   tubing/devices free from skin contact  Taken 10/1/2024 1200 by Edith Chi RN  Body Position:   weight shifting   upper extremity elevated   heels elevated   position changed independently  Skin Protection:   adhesive use limited   incontinence pads utilized   pulse oximeter probe site changed   silicone foam dressing in place   skin to device areas padded   skin to skin areas padded   transparent dressing maintained  Device Skin Pressure Protection:   absorbent pad utilized/changed   adhesive use limited   positioning supports utilized   pressure points protected   skin-to-device areas padded   skin-to-skin areas padded   tubing/devices free from skin contact  Taken 10/1/2024 1000 by Edith Chi RN  Skin Protection:   adhesive use limited   incontinence pads utilized   pulse oximeter probe site changed   silicone foam dressing in place   skin to device areas padded   skin to skin areas padded   transparent dressing maintained  Device Skin Pressure Protection:   absorbent pad utilized/changed   adhesive use limited   positioning supports utilized   pressure points protected   skin-to-device areas padded   skin-to-skin areas padded   tubing/devices free from skin contact  Taken 10/1/2024 0800 by Edith Chi RN  Body Position:   turned   heels elevated  Skin Protection:   adhesive use limited   incontinence pads utilized   pulse oximeter probe site changed   silicone foam dressing in place   skin to device areas padded   skin to skin areas padded   transparent dressing maintained  Device Skin Pressure Protection:   absorbent pad  utilized/changed   adhesive use limited   positioning supports utilized   pressure points protected   skin-to-device areas padded   skin-to-skin areas padded   tubing/devices free from skin contact     Problem: Adult Inpatient Plan of Care  Goal: Absence of Hospital-Acquired Illness or Injury  Intervention: Prevent and Manage VTE (Venous Thromboembolism) Risk  Recent Flowsheet Documentation  Taken 10/1/2024 1600 by Edith Chi RN  VTE Prevention/Management: SCDs on (sequential compression devices)  Taken 10/1/2024 1200 by Edith Chi RN  VTE Prevention/Management: SCDs on (sequential compression devices)  Taken 10/1/2024 1000 by Edith Chi RN  VTE Prevention/Management: SCDs on (sequential compression devices)  Taken 10/1/2024 0800 by Edith Chi RN  VTE Prevention/Management: SCDs on (sequential compression devices)     Problem: Adult Inpatient Plan of Care  Goal: Absence of Hospital-Acquired Illness or Injury  Intervention: Prevent Infection  Recent Flowsheet Documentation  Taken 10/1/2024 1600 by Edith Chi RN  Infection Prevention:   cohorting utilized   environmental surveillance performed   equipment surfaces disinfected   hand hygiene promoted   personal protective equipment utilized   rest/sleep promoted   single patient room provided  Taken 10/1/2024 1200 by Edith Chi RN  Infection Prevention:   cohorting utilized   environmental surveillance performed   equipment surfaces disinfected   hand hygiene promoted   personal protective equipment utilized   rest/sleep promoted   single patient room provided  Taken 10/1/2024 1000 by Edith Chi RN  Infection Prevention:   cohorting utilized   environmental surveillance performed   equipment surfaces disinfected   hand hygiene promoted   personal protective equipment utilized   rest/sleep promoted   single patient room provided  Taken 10/1/2024 0800 by Edith Chi RN  Infection Prevention:   cohorting utilized   environmental surveillance  performed   equipment surfaces disinfected   hand hygiene promoted   personal protective equipment utilized   rest/sleep promoted   single patient room provided     Problem: Adult Inpatient Plan of Care  Goal: Optimal Comfort and Wellbeing  Intervention: Provide Person-Centered Care  Recent Flowsheet Documentation  Taken 10/1/2024 1600 by Edith Chi RN  Trust Relationship/Rapport:   care explained   choices provided   reassurance provided   thoughts/feelings acknowledged  Taken 10/1/2024 1200 by Edith Chi RN  Trust Relationship/Rapport:   care explained   choices provided   reassurance provided   thoughts/feelings acknowledged  Taken 10/1/2024 1000 by Edith Chi RN  Trust Relationship/Rapport:   care explained   choices provided   reassurance provided   thoughts/feelings acknowledged  Taken 10/1/2024 0800 by Edith Chi RN  Trust Relationship/Rapport:   care explained   choices provided   reassurance provided   thoughts/feelings acknowledged     Problem: Risk for Delirium  Goal: Optimal Coping  Intervention: Optimize Psychosocial Adjustment to Delirium  Recent Flowsheet Documentation  Taken 10/1/2024 1600 by Edith Chi RN  Supportive Measures:   active listening utilized   relaxation techniques promoted  Family/Support System Care:   caregiver stress acknowledged   family care conference arranged   involvement promoted   presence promoted   self-care encouraged   support provided  Taken 10/1/2024 1200 by Edith Chi RN  Supportive Measures:   active listening utilized   relaxation techniques promoted  Family/Support System Care:   caregiver stress acknowledged   family care conference arranged   involvement promoted   presence promoted   self-care encouraged   support provided  Taken 10/1/2024 1000 by Edith Chi RN  Supportive Measures:   active listening utilized   relaxation techniques promoted  Family/Support System Care:   caregiver stress acknowledged   family care conference  arranged   involvement promoted   presence promoted   self-care encouraged   support provided  Taken 10/1/2024 0800 by Edith Chi RN  Supportive Measures:   active listening utilized   relaxation techniques promoted     Problem: Risk for Delirium  Goal: Improved Attention and Thought Clarity  Intervention: Maximize Cognitive Function  Recent Flowsheet Documentation  Taken 10/1/2024 1600 by Edith Chi RN  Sensory Stimulation Regulation:   care clustered   lighting decreased   quiet environment promoted   music on  Reorientation Measures:   reorientation provided   clock in view   calendar in view  Taken 10/1/2024 1200 by Edith Chi RN  Sensory Stimulation Regulation:   care clustered   lighting decreased   quiet environment promoted   music on  Reorientation Measures:   reorientation provided   clock in view   calendar in view  Taken 10/1/2024 1000 by Edith Chi RN  Sensory Stimulation Regulation:   care clustered   lighting decreased   quiet environment promoted   music on  Reorientation Measures:   reorientation provided   clock in view   calendar in view  Taken 10/1/2024 0800 by Edith Chi RN  Sensory Stimulation Regulation:   care clustered   lighting decreased   quiet environment promoted   music on  Reorientation Measures:   reorientation provided   clock in view   calendar in view   [facility]    ICU Nursing Progress Note       Date of Admission:  9/30/2024       NEURO:oriented to self, and family, participating in self cares and recognizes that he intermittent confusion with date, time and situation, calm and cooperative follows commands equally x4, baseline tremor BUE, right facial droop, PERRL, tracking appropriately, no change in vision per patient    CV: clarified BP parameters to keep <150 SBP  required labetolol and hydralazine IVP while    RESP: shallow resp TCDB with encouragement, requires 1-2 LPM NC while asleep    GI: NPO SLP video swallow results  pending   NPO after midnight trending HGB and recurring GIB    :up with assist to toilet    INTEG: periarea slightly reddened scattered bruises on RUE and right arm, right side    Lines/ tubes/ drains:  Zarate Catheter: NA  Central Lines: NA    Vasoactive gtts:NA    BG: WDL    Prophylaxis:SCD      Code Status: DNR/DNI    Marcela's care was discussed and condition updates with her son Erasmo, sister and care team including SLP/PT/OT, neurocrit, neurosurg, and hospitalist. Family support provided, questions answered.    Edith Chi RN AMBER CCRN  Staff Nurse  [facility] Intensive Care UnitGoal Outcome Evaluation:

## 2024-10-01 NOTE — PROGRESS NOTES
"[facility]   Inpatient VFSS   10/01/24 1544   Appointment Info   Signing Clinician's Name / Credentials (SLP) Alee Lloyd MS CCC SLP   General Information   Onset of Illness/Injury or Date of Surgery 09/30/24   Referring Physician Luis Hilliard MD   Patient/Family Therapy Goal Statement (SLP) To eat/drink   Pertinent History of Current Problem Per provider note: \"Marcela Sandhu is a 84 year-old female with past medical history significant for mild cognitive impairment who presents with fall and reported hematemesis. Admitted on 9/30/2024. Acute traumatic subdural hematoma parenchymal contusion, left temporal lobe with associated edema  Acute traumatic subarachnoid hemorrhage left temporal and left frontal lobe  Acute traumatic subdural hemorrhage left lateral cerebral convexity   Suspected left tegmen mastoideum fracture with pneumocephalus   Right posterior scalp hematoma   Fall   Seizure. Lives in [facility] at baseline with minimal assistance needs per son.\"   General Observations Pt is agreeable to VFSS   Type of Evaluation   Type of Evaluation Swallow Evaluation   General Swallowing Observations   Swallowing Evaluation Videofluoroscopic swallow study (VFSS)   VFSS Evaluation   Radiologist Dr. Kaur   Views Taken left lateral   Physical Location of Procedure [facility]   VFSS Textures Trialed thin liquids;mildly thick liquids;pureed;solid foods   VFSS Eval: Thin Liquid Texture Trial   Mode of Presentation, Thin Liquid cup;spoon;straw;fed by clinician   Order of Presentation 1 2 3 4 5 8 10   Preparatory Phase WFL   Oral Phase, Thin Liquid residue in oral cavity;impaired AP movement;premature pharyngeal entry   Bolus Location When Swallow Triggered posterior laryngeal surface of epiglottis   Pharyngeal Phase, Thin Liquid impaired epiglottic movement;impaired tongue base retraction   Rosenbek's Penetration Aspiration Scale: Thin Liquid Trial Results 4 - contrast contacts vocal " cords, no residue remains (penetration)   Strategies and Compensations chin tuck  (Chin tuck ineffective and potentially deepened penetration)   Diagnostic Statement Consistent penetration, no isaac aspiration. Deep penetration x 1 with use of chin tuck manuever.   VFSS Eval: Mildly Thick Liquids   Mode of Presentation cup;self-fed   Order of Presentation 6 11   Preparatory Phase WFL   Oral Phase residue in oral cavity;premature pharyngeal entry   Bolus Location When Swallow Triggered valleculae   Pharyngeal Phase impaired epiglottic movement   Rosenbek's Penetration Aspiration Scale 3 - contrast remains above the vocal cords, visible residue remains (penetration)   Diagnostic Statement Consistent penetration similar to that of thin liquids   VFSS Evaluation: Puree Solid Texture Trial   Mode of Presentation, Puree spoon;fed by clinician   Order of Presentation 7   Preparatory Phase WFL   Oral Phase, Puree WFL   Bolus Location When Swallow Triggered posterior angle of ramus   Pharyngeal Phase, Puree impaired epiglottic movement   Rosenbek's Penetration Aspiration Scale: Puree Food Trial Results 1 - no aspiration, contrast does not enter airway   Diagnostic Statement No penetration or aspiration, minimal amounts of pharyngeal residue   VFSS Evaluation: Solid Food Texture Trial   Mode of Presentation, Solid self-fed   Order of Presentation 9   Preparatory Phase WFL   Oral Phase, Solid impaired AP movement;residue in oral cavity   Bolus Location When Swallow Triggered posterior angle of ramus   Pharyngeal Phase, Solid impaired epiglottic movement   Rosenbek's Penetration Aspiration Scale: Solid Food Trial Results 1 - no aspiration, contrast does not enter airway   Diagnostic Statement No penetration, aspiration, minimal amounts of pharyngeal residue   Esophageal Phase of Swallow   Patient reports or presents with symptoms of esophageal dysphagia Yes   Esophageal comments Hx of GERD.   Swallowing Recommendations   Diet  Consistency Recommendations minced & moist (level 5);thin liquids (level 0)   Supervision Level for Intake 1:1 supervision needed   Mode of Delivery Recommendations bolus size, small;food moistened;slow rate of intake   Swallowing Maneuver Recommendations alternate food and liquid intake;extra swallow   Monitoring/Assistance Required (Eating/Swallowing) cue for finger/lingual sweep if oral pocketing present;stop eating activities when fatigue is present;monitor for cough or change in vocal quality with intake   Recommended Feeding/Eating Techniques (Swallow Eval) maintain upright sitting position for eating;maintain upright posture during/after eating for 30 minutes;minimize distractions during oral intake;provide assist with feeding   Medication Administration Recommendations, Swallowing (SLP) As tolerated   General Therapy Interventions   Planned Therapy Interventions Dysphagia Treatment   Dysphagia treatment Modified diet education;Instruction of safe swallow strategies   Clinical Impression   Criteria for Skilled Therapeutic Interventions Met (SLP Eval) Yes, treatment indicated   SLP Diagnosis Oropharyngeal dysphagia, acute on chronic   Risks & Benefits of therapy have been explained evaluation/treatment results reviewed;care plan/treatment goals reviewed;participants included;participants voiced agreement with care plan;patient   Clinical Impression Comments Pt seen for VFSS evaluation. She reports difficulty with liquids at baseline and admits to coughing episodes related to this. She was assessed with various textures consistencies of barium under fluoroscopy. This shows penetration appearing similar with both thin and mildly thick liquids. Of note, pt has notable tremors which results in difficulty self feeding and variability of bolus size. Two instances of deep penetration with thin liquids to the vocal cords, x 1 with consecutive straw sips and x 1 with a chin tuck maunever. Otherwise, penetration between  thin and mildy thick liquids was similar. Prolonged posterior propulsion and oral residue, mildly reduced tongue base retraction and pharyngeal constriction but most notably, she has minimal epiglottic inversion which I suspect is the primary cause for penetration. Puree and solids were consumed WFL.     Oropharyngeal dysphagia, suspect near baseline given pt's report. Recommend initiation of minced and moist diet (5) and thin liquids with use of swallowing strategies. Upright position, assist pt hand over hand assist to manage tremors with single sips from cup or straw, slow rate, 2 swallows per bolus and intermittent intentional throat clear/cough. SLP will follow for diet tolerance, advancement of solids at bedside, and training of strategies.   SLP Total Evaluation Time   Eval: oral/pharyngeal swallow function, clinical swallow Minutes (07614) 46

## 2024-10-01 NOTE — PROGRESS NOTES
[facility]  1 hr. Video , ordered by Bernard Fonseca MD.      Pt is awake and drowsy, mildly confused.   Photic stimulation performed.    Pt is cooperative and can follow simple commands.   Heart rate is fluctuating and goes up to 105.

## 2024-10-01 NOTE — PROGRESS NOTES
"   10/01/24 1000   Appointment Info   Signing Clinician's Name / Credentials (PT) Chelsea Olsen PT, Dt   Living Environment   People in Home alone   Current Living Arrangements assisted living   Home Accessibility no concerns   Transportation Anticipated family or friend will provide   Living Environment Comments pt reports I with ADL's ie dressing, toileting, showering, med mgmt and some lt cooking, pt has A with cleaning and laundry, pt's son drives and manages finances.   Self-Care   Usual Activity Tolerance moderate   Current Activity Tolerance fair   Regular Exercise Yes   Equipment Currently Used at Home walker, rolling  (has a 4WW with a seat, but has also used regual front wheeled walkers.)   Fall history within last six months yes   Number of times patient has fallen within last six months 3   Activity/Exercise/Self-Care Comment walks to meals and the \"\" with her 4WW, using the seat as a try to \"carry my goodies.\" Attends exercise classes most days, seated chair exercises and games. \"I like the GREE.\" Son states the pt has had falls as of late, the pt isn't sure  why they happen.   General Information   Onset of Illness/Injury or Date of Surgery 09/30/24   Referring Physician Luis Hilliard MD   Patient/Family Therapy Goals Statement (PT) Eager to return home, but pt and son in agreement if she were to go to day would need more rehab.   Pertinent History of Current Problem (include personal factors and/or comorbidities that impact the POC) 84 year old female NOT anticoagulated who was admitted on 9/30/2024 after a fall at assisted living facility and seizure activity witnessed by EMS. Neurosurgery was consulted for SAH in left temporal lobe and left frontal lobe, and thin SDH long left lateral cerebral convexity. Notes indicate pt also had hematemesis an dan old non-displaced fx of the 9th rib (R). PMH includer dementia, RA, RLS, GERD, CKD, MDD, Hx HCV.   Existing " "Precautions/Restrictions fall  (systolic <150)   General Observations Son at bedside assists with subjective info.   Cognition   Affect/Mental Status (Cognition) WFL   Orientation Status (Cognition) oriented x 3   Follows Commands (Cognition) follows one-step commands;follows two-step commands;over 90% accuracy   Cognitive Status Comments Forgetful, pleasant, participates 100% with activity.   Pain Assessment   Patient Currently in Pain No   Integumentary/Edema   Integumentary/Edema Comments Bruise over the R flank wraps to the back, per Son \"the last fall\" caused that. Pt with thin skin, R LE slightly wider vs radha R with soft pitting edema.   Posture    Posture Forward head position;Protracted shoulders;Kyphosis   Range of Motion (ROM)   ROM Comment B LEs WFL, B UEs WFL   Strength (Manual Muscle Testing)   Strength Comments Demonstrates equal strength B at LEs and UEs as assessed ins itting, and with functional mobility. Impaired functional activity tolerance from aseline.   Bed Mobility   Comment, (Bed Mobility) Sup>sit Min A, Long sit pivot to EOB.   Transfers   Comment, (Transfers) Sit>Stand Min A x 2   Gait/Stairs (Locomotion)   Comment, (Gait/Stairs) Bedside gait with B UE support from PT, slow wide KATIE, anterior lean, narrow KATIE.   Balance   Balance Comments Sitting balance fair +, staning balance both static and dynamic require UE support.   Sensory Examination   Sensory Perception Comments Denies n/t   Coordination   Coordination Comments Grossly intact, finger to nose, smooth persuit and saccades. Pt tracks wtih ease L adn R,   Clinical Impression   Criteria for Skilled Therapeutic Intervention Yes, treatment indicated   PT Diagnosis (PT) Imparied Gait   Influenced by the following impairments Decrased functional activity tolerance, balance and strength   Functional limitations due to impairments Decrased functional independence with mobility   Clinical Presentation (PT Evaluation Complexity) stable "   Clinical Presentation Rationale see MR   Clinical Decision Making (Complexity) low complexity   Planned Therapy Interventions (PT) balance training;bed mobility training;gait training;home exercise program;neuromuscular re-education;patient/family education;ROM (range of motion);stair training;strengthening;stretching;transfer training;progressive activity/exercise;risk factor education;home program guidelines   Risk & Benefits of therapy have been explained evaluation/treatment results reviewed;risks/benefits reviewed;care plan/treatment goals reviewed;current/potential barriers reviewed;participants voiced agreement with care plan;participants included;patient;son   PT Total Evaluation Time   PT Eval, Low Complexity Minutes (11396) 10   Physical Therapy Goals   PT Frequency Daily   PT Predicted Duration/Target Date for Goal Attainment 10/09/24   PT Goals Bed Mobility;Transfers;Gait;Stairs   PT: Bed Mobility Supine to/from sit;Modified independent;Rolling;Within precautions   PT: Transfers Modified independent;Sit to/from stand;Bed to/from chair;Assistive device   PT: Gait Supervision/stand-by assist;Assistive device;Greater than 200 feet   PT: Stairs Supervision/stand-by assist;4 stairs;Rail on both sides   Interventions   Interventions Quick Adds Gait Training;Therapeutic Activity;Therapeutic Procedure   Therapeutic Procedure/Exercise   Treatment Detail/Skilled Intervention Edu pt on use of AROm for circulation and reduced stiffness, cued for APs, LAQs and marcing with visual demo. Pt compelted 5 reps each up in recliner.   Therapeutic Activity   Therapeutic Activities: dynamic activities to improve functional performance Minutes (62396) 15   Treatment Detail/Skilled Intervention VS assesed pre, during and posta ct. Pt on 2L via NC< not usually on any, removed and spot checked with sessoin-stable at 95% room air, RN notified and in agreemnt to leave O2 off of the pt. Toileting with Min A for walker navigation  "and balance, cued for hand placement on grab bar for improved balance with LB dressing. Mod A for LBO dressing. Pt able to cmplete cares with set up. Did need Max A to wash up legs and change socks (pt incontinent at baseline). Provided wash cloth for hand wash. Min A for toilet transfer. Up in recliner, alarm on, all needs in thuy at PT exit.   Gait Training   Gait Training Minutes (40936) 10   Distance in Feet Gait trainign included verbal cues for walker proxiity, gaze and navigation ambulated in room toilet to opposite side of room to recliner . Fatigued, but\" felt good to walk.\" Pt ambulation improved with walker use vs therapist support. Rec walker use during LOS, walker adjusted for height and use, white board updated [facility] mobility recommendation.   PT Discharge Planning   PT Plan Gait and transfers; in gonzalez as able-wc follow.   PT Discharge Recommendation (DC Rec) Transitional Care Facility   PT Rationale for DC Rec Based on today's presentation recommend TCU at disch for continued strength, balance adn activity tolerane training prior to return to her assisted. However, pt did move relatively well today, if she continues to progress independence with mobility in the next day or two, could return to assisted with supervision for longer walks and home PT services. Son in agreement with recommendation, pt as wel.   PT Brief overview of current status A x 1 with walker and gait belt. Goals of therapy will be to address safe mobility and make recs for d/c to next level of care. Pt and RN will continue to follow all falls risk precautions as documented by RN staff while hospitalized.   Total Session Time   Timed Code Treatment Minutes 25   Total Session Time (sum of timed and untimed services) 35   Psychosocial Support   Trust Relationship/Rapport care explained;choices provided;reassurance provided;thoughts/feelings acknowledged   Family/Support System Care caregiver stress acknowledged;family care conference " arranged;involvement promoted;presence promoted;self-care encouraged;support provided

## 2024-10-01 NOTE — PLAN OF CARE
Goal Outcome Evaluation:      Plan of Care Reviewed With: patient, family    Overall Patient Progress: improvingOverall Patient Progress: improving     Neuro: Restless the entire night. Alert but confused - disoriented to place, situation. Slight right facial droop remains unchanged. Follows commands.  Cardiac: SR/ST. SBP remained <150.  Resp: 2L NC. Chronic cough. LS clear/dim.  GI/: NPO since 0000. No BM. Purwick in place with good UOP. Vomited x1. PRN Zofran given x2.  Skin: perineum reddened/moisture rash.  Lines: PIV x2  Gtts: NS 75  Labs/imaging: CT stable. Hgb improved (9.3).        Plan: EGD today.      *see Epic flowsheets for full nursing assessment findings     Problem: Risk for Delirium  Goal: Optimal Coping  Outcome: Not Progressing  Intervention: Optimize Psychosocial Adjustment to Delirium  Recent Flowsheet Documentation  Taken 10/1/2024 0400 by Kylie Sin RN  Supportive Measures:   active listening utilized   relaxation techniques promoted  Taken 10/1/2024 0000 by Kylie Sin RN  Supportive Measures:   active listening utilized   relaxation techniques promoted  Taken 9/30/2024 2004 by Kylie Sin RN  Supportive Measures:   active listening utilized   relaxation techniques promoted  Family/Support System Care:   involvement promoted   presence promoted   support provided  Goal: Improved Behavioral Control  Outcome: Not Progressing  Intervention: Prevent and Manage Agitation  Recent Flowsheet Documentation  Taken 10/1/2024 0400 by Kylie Sin RN  Environment Familiarity/Consistency: daily routine followed  Taken 10/1/2024 0000 by Kylie Sin RN  Environment Familiarity/Consistency: daily routine followed  Taken 9/30/2024 2004 by Kylie Sin RN  Environment Familiarity/Consistency: daily routine followed  Intervention: Minimize Safety Risk  Recent Flowsheet Documentation  Taken 10/1/2024 0400 by Kylie Sin RN  Communication Enhancement Strategies:   call light answered in person    extra time allowed for response   nonverbal strategies used   one-step directions provided   repetition utilized  Enhanced Safety Measures:   monitor patients coagulation values   pain management   patient/family teach back on injury risk   review medications for side effects with activity   room near unit station  Trust Relationship/Rapport:   care explained   choices provided   reassurance provided   thoughts/feelings acknowledged  Taken 10/1/2024 0000 by Kylie Sin RN  Communication Enhancement Strategies:   call light answered in person   extra time allowed for response   nonverbal strategies used   one-step directions provided   repetition utilized  Enhanced Safety Measures:   monitor patients coagulation values   pain management   patient/family teach back on injury risk   review medications for side effects with activity   room near unit station  Trust Relationship/Rapport:   care explained   choices provided   reassurance provided   thoughts/feelings acknowledged  Taken 9/30/2024 2004 by Kylie Sin RN  Communication Enhancement Strategies:   call light answered in person   extra time allowed for response   nonverbal strategies used   one-step directions provided   repetition utilized  Enhanced Safety Measures:   monitor patients coagulation values   pain management   patient/family teach back on injury risk   review medications for side effects with activity  Trust Relationship/Rapport:   care explained   choices provided   emotional support provided   empathic listening provided   questions answered   reassurance provided   questions encouraged   thoughts/feelings acknowledged  Goal: Improved Attention and Thought Clarity  Outcome: Not Progressing  Intervention: Maximize Cognitive Function  Recent Flowsheet Documentation  Taken 10/1/2024 0400 by Kylie Sin RN  Sensory Stimulation Regulation:   care clustered   lighting decreased   quiet environment promoted   music on  Reorientation Measures:    reorientation provided   clock in view   calendar in view  Taken 10/1/2024 0000 by Kylie Sin RN  Sensory Stimulation Regulation:   care clustered   lighting decreased   quiet environment promoted   music on  Reorientation Measures:   reorientation provided   clock in view   calendar in view  Taken 9/30/2024 2004 by Kylie Sin RN  Sensory Stimulation Regulation:   auditory stimulation minimized   care clustered   lighting decreased   quiet environment promoted   visual stimulation minimized  Reorientation Measures:   reorientation provided   clock in view   calendar in view  Goal: Improved Sleep  Outcome: Not Progressing  Intervention: Promote Sleep  Recent Flowsheet Documentation  Taken 10/1/2024 0400 by Kylie Sin RN  Sleep/Rest Enhancement:   awakenings minimized   comfort measures   consistent schedule promoted   noise level reduced   regular sleep/rest pattern promoted   relaxation techniques promoted   room darkened  Taken 10/1/2024 0000 by Kylie Sin RN  Sleep/Rest Enhancement:   awakenings minimized   comfort measures   consistent schedule promoted   noise level reduced   regular sleep/rest pattern promoted   relaxation techniques promoted   room darkened  Taken 9/30/2024 2004 by Kylie Sin RN  Sleep/Rest Enhancement:   awakenings minimized   comfort measures   consistent schedule promoted   noise level reduced   regular sleep/rest pattern promoted   relaxation techniques promoted   room darkened       Problem: Adult Inpatient Plan of Care  Goal: Plan of Care Review  Description: The Plan of Care Review/Shift note should be completed every shift.  The Outcome Evaluation is a brief statement about your assessment that the patient is improving, declining, or no change.  This information will be displayed automatically on your shift  note.  Outcome: Progressing  Flowsheets (Taken 10/1/2024 0507)  Plan of Care Reviewed With:   patient   family  Overall Patient Progress: improving     Problem: Fall  Injury Risk  Goal: Absence of Fall and Fall-Related Injury  Outcome: Progressing  Intervention: Identify and Manage Contributors  Recent Flowsheet Documentation  Taken 10/1/2024 0400 by Kylie Sin RN  Medication Review/Management: medications reviewed  Taken 10/1/2024 0000 by Kylie Sin RN  Medication Review/Management: medications reviewed  Taken 9/30/2024 2004 by Kylie Sin RN  Medication Review/Management: medications reviewed  Intervention: Promote Injury-Free Environment  Recent Flowsheet Documentation  Taken 10/1/2024 0400 by Kylie Sin RN  Safety Promotion/Fall Prevention:   clutter free environment maintained   increased rounding and observation   increase visualization of patient   lighting adjusted   room organization consistent   safety round/check completed   room door open   patient and family education   room near nurse's station  Taken 10/1/2024 0000 by Kylie Sin RN  Safety Promotion/Fall Prevention:   clutter free environment maintained   increased rounding and observation   increase visualization of patient   lighting adjusted   room organization consistent   safety round/check completed   room door open   patient and family education   room near nurse's station  Taken 9/30/2024 2004 by Kylie Sin RN  Safety Promotion/Fall Prevention:   clutter free environment maintained   increased rounding and observation   increase visualization of patient   lighting adjusted   room organization consistent   safety round/check completed   room door open   patient and family education

## 2024-10-01 NOTE — PROGRESS NOTES
Hospitalist cross cover note:    Paged by RN regarding-patient is very restless and pulling at lines.  -Mitts both wrists ordered    Dorothea Crowell MD  Hospitalist.

## 2024-10-01 NOTE — PROGRESS NOTES
[facility]    Medicine Progress Note - Hospitalist Service    Date of Admission:  9/30/2024    Assessment & Plan   Acute traumatic subdural hematoma parenchymal contusion, left temporal lobe with associated edema  Acute traumatic subarachnoid hemorrhage left temporal and left frontal lobe  Acute traumatic subdural hemorrhage left lateral cerebral convexity   Suspected left tegmen mastoideum fracture with pneumocephalus   Right posterior scalp hematoma   Fall   Seizure   *Presents with fall and head trauma. Subsequent witnessed tonic-clonic seizure by EMS.  *Head CT with parenchymal contusion inferior left temporal lobe with mild surrounding edema, subarachnoid hemorrhage in the left temporal lobe and left frontal lobe, 0.2 cm subdural hemorrhage along left lateral cerebral convexity, small amount of pneumocephalus in the inferior left temporal lobe adjacent to the parenchymal contusions, raising concern for subtle fracture to the left tegmen mastoideum, right posterior scalp soft tissue injury without underlying fracture   *PTA not on anticoagulation or antiplatelets.   * Follow-up head CT stable  * discussed with oksana cedeño to transfer to floor for serial neuro q4 and cont rehab, no plans for surgery  - Neurosurgery consulted  - Neuro-critical care consulted   - transfer to neuro floor with q 4 hour neuro checks  - Head of bed elevated 30 degrees  - Continue levetiracetam PO/IV   - Hold anticoagulation/antiplatelets until cleared to resume by neurosurgery  - Goal SBP <150, PRN IV labetalol, hydralazine ordered  - PT/OT/SW consulted     Acute gastrointestinal bleed, possible  Hematemesis  Acute blood loss anemia  *Reportedly had hematemesis the morning of presentation, limited details from patient and care   *Hgb 7.0 g/dl on admission, most recent 8.7 g/dl 4/18/24, baseline previously 10-11 g/dl.   *Vital signs currently stable.   *No history of liver disease.  *Denies any NSAID, alcohol use.    *PTA on no antiplatelets, anticoagulation.  - GI ([facility]) consulted, noted stable hgb, possible endoscopy in the AM, family would like to avoid, reasonable as long as hgb stable and no obvious further bleeding  - Serial hemoglobin q 12 hours  - Type and screen   - IV PPI BID  - NPO     Dysphagia, acute on chronic  Plan  - VFSS per SLP     Hypertension  - Hold prior to admission amlodipine-valsartan in setting of GI bleed     Acute traumatic encephalopathy on chronic mild cognitive impairment   Major depression   *Son reports significantly more confused at present than baseline. Likely due to trauma +/- post-ictal state.   *Lives in [facility] at baseline with minimal assistance needs per son.   - Re-orient as needed  - Maintain normal day/night, sleep wake cycles  - Minimize sedating/altering medications as able  - Treat separate conditions as detailed above/below   - Resume prior to admission donepezil, aripiprazole, bupropion, mirtazapine, sertraline when able to take PO     Chronic kidney disease, stage 3  Baseline creatinine 1.00-1.10. Creatinine 1.00 on admission.   - Currently around baseline  - Avoid nephrotoxins  - Monitor BMP      Troponin elevation   Troponin 22->22. Suspect secondary to intracranial and GI bleeding.   - Telemetry      Acute thrombocytopenia, mild   Platelet count 129, baseline normal. Possibly consumptive in setting of bleeding.   - Monitor CBC      Possible lateral ninth rib non-displaced fracture, likely subacute  Initially sustained fall ~3 weeks ago with bruising since then over right lateral chest wall. Previous XRs R elbow, scapula, humerus negative for fracture, but does not appear to have had CXR.   - Encourage IS              Diet: NPO per Anesthesia Guidelines for Procedure/Surgery Except for: No Exceptions    DVT Prophylaxis: Pneumatic Compression Devices  Zarate Catheter: Not present  Lines: None     Cardiac Monitoring: ACTIVE order. Indication: ICU  Code Status: No CPR- Do NOT  "Intubate      Clinically Significant Risk Factors Present on Admission                # Thrombocytopenia: Lowest platelets = 129 in last 2 days, will monitor for bleeding   # Hypertension: Noted on problem list    # Anemia: based on hgb <11  # Anemia: based on hgb <11                  Disposition Plan     Medically Ready for Discharge: Anticipated in 2-4 Days             Pradip Whalen DO  Hospitalist Service  [facility]  Securely message with [employer] (more info)  Text page via [employer] Paging/Directory   ______________________________________________________________________    Interval History   Cognitively improved per son at the bedside      Physical Exam   Vital Signs: Temp: 99.7  F (37.6  C) Temp src: Temporal BP: 121/64 Pulse: 96   Resp: 12 SpO2: 98 % O2 Device: Nasal cannula Oxygen Delivery: 2 LPM  Weight: 131 lbs 9.83 oz    Constitutional: Well-appearing, NAD  Eyes: PERRL, EOMI  HENT: Hematoma R occipital scalp. Lateral tongue lacerations.   Respiratory: Clear to auscultation bilaterally, good air movement, normal effort   Cardiovascular: RRR. No peripheral edema.   GI: Soft, non-tender, non-distended. No rebound tenderness or guarding.    Skin: Warm, dry.   Musculoskeletal: Ecchymosis over right lateral chest wall with tenderness to palpation.   Neurologic: Alert. Oriented to \"hospital\", not specific name. Not able to state date. Right facial droop. Strength diffusely diminished and symmetric bilaterally, appears to have poor effort so grossly nonfocal but rather generalized  Psychiatric: Normal affect, appropriate    Medical Decision Making       55 MINUTES SPENT BY ME on the date of service doing chart review, history, exam, documentation & further activities per the note.      Data     I have personally reviewed the following data over the past 24 hrs:    5.7  \   9.3 (L)   / 135 (L)     142 108 (H) 27.4 (H) /  90   4.5 26 0.87 \     INR:  N/A PTT:  N/A   D-dimer:  N/A " Fibrinogen:  323       Imaging results reviewed over the past 24 hrs:   Recent Results (from the past 24 hour(s))   CT Head w/o Contrast    Narrative    CT SCAN OF THE HEAD WITHOUT CONTRAST   9/30/2024 3:57 PM     HISTORY: SAH/SDH follow-up.    TECHNIQUE:  Axial images of the head and coronal reformations without  IV contrast material. Radiation dose for this scan was reduced using  automated exposure control, adjustment of the mA and/or kV according  to patient size, or iterative reconstruction technique.    COMPARISON: Head CT from earlier same day.    FINDINGS: Presumed parenchymal contusion in the inferior left temporal  lobe likely also with subarachnoid hemorrhage appears slightly  increased in size since 9/30/2024. Subarachnoid hemorrhage in the left  frontal lobe is slightly more conspicuous. Hyperdense extra-axial  hemorrhage along the left lateral cerebral convexity measures  approximately 0.2 cm in width and does not appear appreciably changed  since prior. No midline shift or herniation. Ventricular size is  within normal limits without evidence of hydrocephalus. Small focus of  pneumocephalus along the left lateral cerebral convexity which appears  to have slightly redistributed since head CT from earlier today.    Mucosal thickening in the visualized paranasal sinuses.      Impression    IMPRESSION:     1. Slight increase of parenchymal contusion with subarachnoid  hemorrhage in the inferior left temporal lobe and left frontal lobe  since head CT from earlier today. Thin hyperdense 0.2 cm extra-axial  hemorrhage along the left cerebral convexity appears fairly similar to  head CT from earlier today. No midline shift or herniation. No  hydrocephalus.  2. Small focus of pneumocephalus along the left lateral cerebral  convexity which appears slightly changed in position since prior. This  remains concerning for occult calvarial fracture.    Results discussed with nurse Aakash Noel at 4:10 PM on  9/30/2024.    FRANCOIS SILVA MD         SYSTEM ID:  [id number]   CT Head w/o Contrast    Narrative    EXAM: CT HEAD W/O CONTRAST  LOCATION: [facility]  DATE: 9/30/2024    INDICATION: f u ICH  COMPARISON: CT 9/30/2024.  TECHNIQUE: Routine CT Head without IV contrast. Multiplanar reformats. Dose reduction techniques were used.    FINDINGS:  INTRACRANIAL CONTENTS: Stable and similar intraparenchymal hemorrhagic contusion left temporal lobe and adjacent minor subarachnoid hemorrhage. Stable small volume subarachnoid hemorrhage and adjacent extra-axial collection left frontal convexity,   unchanged from prior. No CT evidence of acute infarct. Mild presumed chronic small vessel ischemic changes. Mild generalized volume loss. No hydrocephalus.     VISUALIZED ORBITS/SINUSES/MASTOIDS: No intraorbital abnormality. Air-fluid level right maxillary sinus similar to prior. No middle ear or mastoid effusion.    BONES/SOFT TISSUES: No acute abnormality.      Impression    IMPRESSION:  1.  Stable multifocal intracranial hemorrhage, including left temporal intraparenchymal hematoma, small volume subarachnoid hemorrhage in the floor of the left middle cranial fossa, small volume subarachnoid hemorrhage in the left frontal lobe, thin   extra-axial collection left convexity.

## 2024-10-01 NOTE — PROGRESS NOTES
[facility]  Gastroenterology Progress Note     Marcela Sandhu MRN# [id number]   YOB: 1940 Age: 84 year old          Assessment and Plan:     Marcela Sandhu is a 84 year old female with PMHx significant for dementia, RA, RLS, GERD, CKD, MDD, Hx HCV consulted for hematemesis.  Reported epistaxis per RN on 9/30 and 10/1     Epistaxis  Anemia  Hematemesis  Has had epistaxis followed by coffee groudn emesis  Hgb dropped to 7.0 yesterday and transfused and improve to 9.6>9.3  Patient seemed to have swallowed blood from epistaxis and vomited this. Less likely upper GI bleed    Recs:  - IV pantoprazole 40 mg BID  - Serial hgb, transfuse to keep hgb >7  - GI ok for speech evaluation and diet per speech  - will make NPO at MN incase ongoing concern of GI blood loss  - Avoid anticoagulation/NSAIDs  - Discussed with neurosurgery- stating patient has stable CT and ok with GI proceeding with any endoscopic procedures  - Monitor stool output and document                  Interval History:     doing well, denies chest pain, denies shortness of breath, denies abdominal pain, alert, oriented to person, place and time, and doing well; no cp, sob, n/v/d, or abd pain.              Review of Systems:     C: NEGATIVE for fever, chills, change in weight  E/M: NEGATIVE for ear, mouth and throat problems  R: NEGATIVE for significant cough or SOB  CV: NEGATIVE for chest pain, palpitations or peripheral edema             Medications:   I have reviewed this patient's current medications  Current Facility-Administered Medications   Medication Dose Route Frequency Provider Last Rate Last Admin    [Held by provider] ARIPiprazole (ABILIFY) tablet 5 mg  5 mg Oral Daily Luis Hilliard MD        [Held by provider] buPROPion (WELLBUTRIN XL) 24 hr tablet 300 mg  300 mg Oral Daily Luis Hilliard MD        [Held by provider] donepezil (ARICEPT) tablet 10 mg  10 mg Oral Daily Luis Hilliard,  MD        levETIRAcetam (KEPPRA) intermittent infusion 500 mg  500 mg Intravenous BID Luis Hilliard MD   500 mg at 10/01/24 0814    Or    levETIRAcetam (KEPPRA) tablet 500 mg  500 mg Oral BID Luis Hilliard MD        Or    levETIRAcetam (KEPPRA) oral solution 500 mg  500 mg Per NG tube BID Luis Hilliard MD        mirtazapine (REMERON) tablet TABS 7.5 mg  7.5 mg Oral At Bedtime Luis Hilliard MD   7.5 mg at 09/30/24 2154    pantoprazole (PROTONIX) IV push injection 40 mg  40 mg Intravenous Q12H Luis Hilliard MD   40 mg at 10/01/24 0815    [Held by provider] sertraline (ZOLOFT) tablet 200 mg  200 mg Oral Daily Luis Hilliard MD        sodium chloride (PF) 0.9% PF flush 3 mL  3 mL Intracatheter Q8H Luis Hilliard MD   3 mL at 10/01/24 0755                  Physical Exam:   Vitals were reviewed  Vital Signs with Ranges  Temp:  [96.6  F (35.9  C)-99.7  F (37.6  C)] 99.7  F (37.6  C)  Pulse:  [] 96  Resp:  [9-109] 12  BP: (114-162)/() 121/64  SpO2:  [91 %-100 %] 98 %  I/O last 3 completed shifts:  In: 936.25 [I.V.:636.25]  Out: 300 [Urine:300]  Constitutional: healthy, alert, and no distress   Cardiovascular: negative, PMI normal. No lifts, heaves, or thrills. RRR. No murmurs, clicks gallops or rub  Respiratory: negative, Percussion normal. Good diaphragmatic excursion. Lungs clear  Abdomen: Abdomen soft, non-tender. BS normal. No masses, organomegaly           Data:   I reviewed the patient's new clinical lab test results.   Recent Labs   Lab Test 10/01/24  0509 09/30/24  2339 09/30/24  1903 09/30/24  1113 04/18/24  1649   WBC 5.7  --   --  4.4 3.7*   HGB 9.3* 9.6* 9.6* 7.0* 8.7*   *  --   --  108* 109*   *  --   --  129* 191   INR  --   --   --  1.12  --      Recent Labs   Lab Test 10/01/24  0509 09/30/24  1113 04/18/24  1649   POTASSIUM 4.5 3.9 5.1   CHLORIDE 108* 107 109   CO2 26 21* 29   BUN 27.4* 41.9* 25.0   ANIONGAP 8 14 4*      Recent Labs   Lab Test 04/18/24  1613 08/10/23  1433 08/02/23  1052 07/27/23  1228 04/25/23  1345 08/31/22  0929   ALBUMIN  --   --   --  4.8 4.3 3.8   BILITOTAL  --   --   --  0.3 0.2 1.0   ALT  --   --   --  11 12 17   AST  --   --   --  27 25 26   PROTEIN Negative Negative Negative  --   --   --        I reviewed the patient's new imaging results.    All laboratory data reviewed  All imaging studies reviewed by me.    Theresa Horton PA-C,  10/1/2024  [facility]  Office : [phone]  Cell: [phone] (Dr. Olivares)  Cell: [phone] (Theresa Horton PA-C)

## 2024-10-01 NOTE — PROGRESS NOTES
[facility]  Neurosurgery Daily Progress Note    Assessment  Marcela Sandhu is an 84 year old female NOT anticoagulated who was admitted on 9/30/2024 after a fall at assisted living facility and seizure activity witnessed by EMS. Neurosurgery was consulted for SAH in left temporal lobe and left frontal lobe, and thin SDH long left lateral cerebral convexity.     EXAM: CT HEAD W/O CONTRAST  LOCATION: [facility]  DATE: 9/30/2024                                                          IMPRESSION:  1.  Stable multifocal intracranial hemorrhage, including left temporal intraparenchymal hematoma, small volume subarachnoid hemorrhage in the floor of the left middle cranial fossa, small volume subarachnoid hemorrhage in the left frontal lobe, thin   extra-axial collection left convexity.    Plan   -Repeat head CT resulted above  -No surgical intervention planned at this time   -OK to transfer out of ICU from NSGY standpoint   -Neuro checks q2h 7a-7p, q4h 7p-7a  -Admission through Hospitalist   -Hold any blood thinners   -SBP less than 150  -Continue to monitor neuro exam   -Seizure management per Neurology   -GI bleed management per Hospitalist   -Appreciate assistance from specialties    Discussed with Dr. Rogelio Long, CNP  [facility] Neurosurgery  [address]  [address] [place] [id number]  Tel [phone]  Fax [phone]     Interval History   Patient was seen this morning in bed. Per nurse, was just given compazine due to nausea and caused patient to become drowsy. Patient denies headache. Neuro exam limited due to drowsiness.       Hgb 9.3    Physical Exam   Temp: 99.7  F (37.6  C) Temp src: Temporal BP: (!) 162/82 Pulse: 107   Resp: (!) 35 SpO2: 96 % O2 Device: Nasal cannula Oxygen Delivery: 2 LPM  Vitals:    09/30/24 1109 09/30/24 1603 10/01/24 0400   Weight: 58.4 kg (128 lb 12 oz) 57 kg (125 lb 10.6 oz) 59.7 kg (131 lb 9.8 oz)        Mental status:  Alert, states she was in her bedroom but able to state why she is in the hospital, speech is fluent, following commands  Cranial nerves:  II-XII intact.  Motor:  Patient extremely drowsy and required verbal awakening multiple times. HAYDEN.   Coordination:  Smooth finger to nose testing.   Negative pronator drift.

## 2024-10-01 NOTE — PROGRESS NOTES
"      [facility] Progress Note      Interval Hx    Edson was seen and examined this AM, appears delirious wearing mittens, endorses HA. Oriented only to self. HgB better. Repeat CT Head stable.             History of Present Illness     Chief Complaint: Hematemesis, Fall, and Seizures      Marcela Sandhu is a 84 year old with past medical history significant for hypertension and dementia presents with hematemesis, seizure, right facial droop after ground-level fall.    Marcela is a poor historian given she is amnestic to the events surrounding her hospitalization, reportedly she had some nausea after which she had a ground-level fall at her facility, later she had a right facial droop and a seizure. Confused after that. Son denies any prior history of seizures. She lives in an independent senior facility.                Past Medical History     Stroke risk factors: HTN    Preadmission antithrombotic regimen: None     Modified Laisha Score (Pre-morbid)  0-No deficits                   Assessment and Plan       L frontotemporal ICH likely traumatic   Seizure likely provoked      Intravenous Thrombolysis  Not given due to:   - ICH  - GI bleed within the past 14 days     Endovascular Treatment  Not initiated due to absence of proximal vessel occlusion     Plan:  - rEEG ordered   - Neurochecks and Vital Signs every 2 hr  - Systolic BP Goal: < 150  - Head of bed elevated  - Telemetry, EKG  - Imaging: Repeat CT head for any acute neuro decline.   - Bedside Glucose Monitoring  - PT/OT/SLP  - Stroke Education  - Euthermia, Euglycemia  - S/p Keppra load, continue Keppra 500 mg BID  - NeuroSx consulted appreciate recs   ___________________________________________________________________    The Stroke Staff is Dr. Gaona.        Bernard Fonseca MD  Vascular Neurology Fellow    To page me or covering stroke neurology team member, click here: [employer]  Choose \"On Call\" tab at top, then select " "\"NEUROLOGY/ALL SITES\" from middle drop-down box, press Enter, then look for \"stroke\" or \"telestroke\" for your site.  ___________________________________________________________________        Imaging/Labs   (personally reviewed )    CT head: 1. Parenchymal contusion in the inferior left temporal lobe with mild  surrounding edema. Subarachnoid hemorrhage in the left temporal lobe  and left frontal lobe. Thin 0.2 cm subdural hemorrhage along the left  lateral cerebral convexity.  2. No midline shift, herniation, or hydrocephalus.  3. Small amount of pneumocephalus in the inferior left temporal lobe  adjacent to the parenchymal contusions. This is just superior to the  left mastoid air cells which raises concern for a subtle fracture to  the left tegmen mastoideum. Left mastoid air cells are relatively  clear at this time.  4. Right posterior scalp soft tissue injury without underlying  calvarial fracture.  5. Diffuse parenchymal volume loss and white matter changes which are  most likely due to chronic microvascular ischemic disease she    CTA head/neck: 1. Patent arteries in the neck without evidence of dissection. Mild  atherosclerotic disease in the carotid arteries bilaterally without  significant stenosis by NASCET criteria.  2. Patent proximal major intracranial arteries without vascular  cutoff. No significant stenosis. No aneurysm identified.         Repeat CT head showed slight interval increase in ICH.     Repeat CT head 9/30/24 showed stable ICH         Physical Examination     BP: (!) 146/79   Pulse: 91   Resp: (!) 44   Temp: 98.4  F (36.9  C)   Temp src: Oral   SpO2: 95 %   O2 Device: None (Room air)   Weight: 59.7 kg (131 lb 9.8 oz)    Wt Readings from Last 2 Encounters:   10/01/24 59.7 kg (131 lb 9.8 oz)   09/23/24 56.3 kg (124 lb 1.6 oz)       Neurologic  Mental Status:  alert, oriented x 1, follows commands, hypophonic speech  Cranial Nerves:  visual fields intact, PERRL, EOMI with normal smooth " pursuit, R facial droop, hearing not formally tested but intact to conversation, palate elevation symmetric and uvula midline, no dysarthria, shoulder shrug strong bilaterally, tongue protrusion midline  Motor:  normal muscle tone and bulk, tremors of b/l upper ext, able to move all limbs spontaneously, strength at least antigravity throughout upper and lower extremities, no pronator drift  Reflexes:  toes down-going  Sensory:  light touch sensation intact and symmetric throughout upper and lower extremities  Coordination:   no dysmetria on FTN and HTN  Station/Gait:  deferred        Stroke Scales       NIHSS  1a. Level of Consciousness 0-->Alert, keenly responsive   1b. LOC Questions 1-->Answers one question correctly   1c. LOC Commands 0-->Performs both tasks correctly   2.   Best Gaze 0-->Normal   3.   Visual 0-->No visual loss   4.   Facial Palsy 1-->Minor paralysis (flattened nasolabial fold, asymmetry on smiling)   5a. Motor Arm, Left 0-->No drift, limb holds 90 (or 45) degrees for full 10 secs   5b. Motor Arm, Right 1-->Drift, limb holds 90 (or 45) degrees, but drifts down before full 10 secs, does not hit bed or other support   6a. Motor Leg, Left 0-->No drift, leg holds 30 degree position for full 5 secs   6b. Motor Leg, right 0-->No drift, leg holds 30 degree position for full 5 secs   7.   Limb Ataxia 0-->Absent   8.   Sensory 0-->Normal, no sensory loss   9.   Best Language 1-->Mild-to-moderate aphasia, some obvious loss of fluency or facility of comprehension, without significant limitation on ideas expressed or form of expression. Reduction of speech and/or comprehension, however, makes conversation. . . (see row details)   10. Dysarthria 1-->Mild-to-moderate dysarthria, patient slurs at least some words and, at worst, can be understood with some difficulty   11. Extinction and Inattention  0-->No abnormality   Total 5 (09/30/24 1346)            Labs     CBC  Lab Results   Component Value Date    HGB  9.3 (L) 10/01/2024    HCT 29.5 (L) 10/01/2024    WBC 5.7 10/01/2024     (L) 10/01/2024       BMP  Lab Results   Component Value Date     10/01/2024    POTASSIUM 4.5 10/01/2024    CHLORIDE 108 (H) 10/01/2024    CO2 26 10/01/2024    BUN 27.4 (H) 10/01/2024    CR 0.87 10/01/2024    GLC 90 10/01/2024    DENIS 9.1 10/01/2024       INR  INR   Date Value Ref Range Status   09/30/2024 1.12 0.85 - 1.15 Final       Data   Stroke Code Data  (for stroke code without tele)  Stroke code activated 09/30/24  1111   First stroke provider response 09/30/24  1111   Last known normal 09/30/24  1010   Time of discovery (or onset of symptoms) 09/30/24  1010   Head CT read by Stroke Neuro Provider 09/30/24  1128   Was stroke code de-escalated? No              Clinically Significant Risk Factors                # Thrombocytopenia: Lowest platelets = 129 in last 2 days, will monitor for bleeding   # Hypertension: Noted on problem list                    # CKD, Stage 3a (GFR 45-59): Will monitor and treat as appropriate  - last Cr =  1.00 mg/dL (Ref range: 0.51 - 0.95 mg/dL)  - last GFR = 56 mL/min/1.73m2 (Ref range: >60 mL/min/1.73m2)

## 2024-10-01 NOTE — CONSULTS
SPIRITUAL HEALTH SERVICES Consult Note  [facility]  ICU    Reason for visit or referral: [facility] consult request for emotional and spiritual support.    Request from family member via  referral line for Sacrament of the Sick and approved by pt. Son Erasmo at bedside who indicated that his mother would be comforted by prayer. Pt receiving other cares this afternoon. Will follow-up within 24 hours.    Plan: Placed non-emergent request  for Sacrament of the Sick (anointing) by . Advised pt/family of the availability of  services by request.  to follow while pt is in hospital.     Erwin Lopez  Associate   [facility] Phone Line [phone]  Spiritual Health Pager [phone]    [facility] available 24/7 for emergent requests/referrals, either by paging the on-call  or by entering an ASAP/STAT consult in [facility], which will also page the on-call .

## 2024-10-01 NOTE — PLAN OF CARE
Pt here with fall, SDH and SAH. A&O x3, disoriented to situation. Neuros R facial droop, intermittent confusion, and generalized weakness. VSS, SBP<150. Tele SR. Minced and moist diet, thin liquids. NPO at midnight. NS infusing at 75mL/hr. Up with Ax2 GBW/pivot. Denies pain. Pt scoring green on the Aggression Stop Light Tool. Discharge pending.

## 2024-10-02 ENCOUNTER — APPOINTMENT (OUTPATIENT)
Dept: SPEECH THERAPY | Facility: CLINIC | Age: 84
DRG: 082 | End: 2024-10-02
Attending: PSYCHIATRY & NEUROLOGY
Payer: COMMERCIAL

## 2024-10-02 ENCOUNTER — APPOINTMENT (OUTPATIENT)
Dept: PHYSICAL THERAPY | Facility: CLINIC | Age: 84
DRG: 082 | End: 2024-10-02
Attending: PSYCHIATRY & NEUROLOGY
Payer: COMMERCIAL

## 2024-10-02 ENCOUNTER — PATIENT OUTREACH (OUTPATIENT)
Dept: CARE COORDINATION | Facility: CLINIC | Age: 84
End: 2024-10-02
Payer: COMMERCIAL

## 2024-10-02 ENCOUNTER — APPOINTMENT (OUTPATIENT)
Dept: OCCUPATIONAL THERAPY | Facility: CLINIC | Age: 84
DRG: 082 | End: 2024-10-02
Attending: PSYCHIATRY & NEUROLOGY
Payer: COMMERCIAL

## 2024-10-02 LAB
ANION GAP SERPL CALCULATED.3IONS-SCNC: 8 MMOL/L (ref 7–15)
BUN SERPL-MCNC: 21.3 MG/DL (ref 8–23)
CALCIUM SERPL-MCNC: 8.8 MG/DL (ref 8.8–10.4)
CHLORIDE SERPL-SCNC: 108 MMOL/L (ref 98–107)
CREAT SERPL-MCNC: 0.82 MG/DL (ref 0.51–0.95)
EGFRCR SERPLBLD CKD-EPI 2021: 71 ML/MIN/1.73M2
ERYTHROCYTE [DISTWIDTH] IN BLOOD BY AUTOMATED COUNT: 14.6 % (ref 10–15)
GLUCOSE SERPL-MCNC: 103 MG/DL (ref 70–99)
HCO3 SERPL-SCNC: 26 MMOL/L (ref 22–29)
HCT VFR BLD AUTO: 30.6 % (ref 35–47)
HGB BLD-MCNC: 10.1 G/DL (ref 11.7–15.7)
HGB BLD-MCNC: 9.9 G/DL (ref 11.7–15.7)
HGB BLD-MCNC: 9.9 G/DL (ref 11.7–15.7)
MCH RBC QN AUTO: 32.8 PG (ref 26.5–33)
MCHC RBC AUTO-ENTMCNC: 32.4 G/DL (ref 31.5–36.5)
MCV RBC AUTO: 101 FL (ref 78–100)
PLATELET # BLD AUTO: 150 10E3/UL (ref 150–450)
POTASSIUM SERPL-SCNC: 4.1 MMOL/L (ref 3.4–5.3)
RBC # BLD AUTO: 3.02 10E6/UL (ref 3.8–5.2)
SODIUM SERPL-SCNC: 142 MMOL/L (ref 135–145)
WBC # BLD AUTO: 5.4 10E3/UL (ref 4–11)

## 2024-10-02 PROCEDURE — 85018 HEMOGLOBIN: CPT | Performed by: HOSPITALIST

## 2024-10-02 PROCEDURE — 36415 COLL VENOUS BLD VENIPUNCTURE: CPT | Performed by: HOSPITALIST

## 2024-10-02 PROCEDURE — 250N000011 HC RX IP 250 OP 636: Performed by: HOSPITALIST

## 2024-10-02 PROCEDURE — 250N000009 HC RX 250: Performed by: HOSPITALIST

## 2024-10-02 PROCEDURE — 97129 THER IVNTJ 1ST 15 MIN: CPT | Mod: GO

## 2024-10-02 PROCEDURE — 250N000013 HC RX MED GY IP 250 OP 250 PS 637: Performed by: HOSPITALIST

## 2024-10-02 PROCEDURE — 97535 SELF CARE MNGMENT TRAINING: CPT | Mod: GO

## 2024-10-02 PROCEDURE — 99231 SBSQ HOSP IP/OBS SF/LOW 25: CPT | Mod: GC | Performed by: STUDENT IN AN ORGANIZED HEALTH CARE EDUCATION/TRAINING PROGRAM

## 2024-10-02 PROCEDURE — 120N000013 HC R&B IMCU

## 2024-10-02 PROCEDURE — 97530 THERAPEUTIC ACTIVITIES: CPT | Mod: GP | Performed by: PHYSICAL THERAPIST

## 2024-10-02 PROCEDURE — 85027 COMPLETE CBC AUTOMATED: CPT | Performed by: HOSPITALIST

## 2024-10-02 PROCEDURE — 80048 BASIC METABOLIC PNL TOTAL CA: CPT | Performed by: HOSPITALIST

## 2024-10-02 PROCEDURE — 97530 THERAPEUTIC ACTIVITIES: CPT | Mod: GO

## 2024-10-02 PROCEDURE — 99233 SBSQ HOSP IP/OBS HIGH 50: CPT | Performed by: HOSPITALIST

## 2024-10-02 PROCEDURE — 258N000003 HC RX IP 258 OP 636: Performed by: HOSPITALIST

## 2024-10-02 PROCEDURE — 92526 ORAL FUNCTION THERAPY: CPT | Mod: GN

## 2024-10-02 PROCEDURE — 97116 GAIT TRAINING THERAPY: CPT | Mod: GP | Performed by: PHYSICAL THERAPIST

## 2024-10-02 RX ORDER — AMLODIPINE BESYLATE 5 MG/1
5 TABLET ORAL DAILY
Status: DISCONTINUED | OUTPATIENT
Start: 2024-10-02 | End: 2024-10-04

## 2024-10-02 RX ORDER — SERTRALINE HYDROCHLORIDE 100 MG/1
200 TABLET, FILM COATED ORAL DAILY
Status: DISCONTINUED | OUTPATIENT
Start: 2024-10-03 | End: 2024-10-05 | Stop reason: HOSPADM

## 2024-10-02 RX ORDER — VALSARTAN 160 MG/1
160 TABLET ORAL DAILY
Status: DISCONTINUED | OUTPATIENT
Start: 2024-10-02 | End: 2024-10-05 | Stop reason: HOSPADM

## 2024-10-02 RX ORDER — SERTRALINE HYDROCHLORIDE 100 MG/1
100 TABLET, FILM COATED ORAL DAILY
Status: DISCONTINUED | OUTPATIENT
Start: 2024-10-03 | End: 2024-10-02

## 2024-10-02 RX ADMIN — PANTOPRAZOLE SODIUM 40 MG: 40 INJECTION, POWDER, FOR SOLUTION INTRAVENOUS at 20:45

## 2024-10-02 RX ADMIN — PANTOPRAZOLE SODIUM 40 MG: 40 INJECTION, POWDER, FOR SOLUTION INTRAVENOUS at 08:51

## 2024-10-02 RX ADMIN — LEVETIRACETAM 500 MG: 5 INJECTION INTRAVENOUS at 20:48

## 2024-10-02 RX ADMIN — VALSARTAN 160 MG: 160 TABLET, FILM COATED ORAL at 15:16

## 2024-10-02 RX ADMIN — SODIUM CHLORIDE: 9 INJECTION, SOLUTION INTRAVENOUS at 00:27

## 2024-10-02 RX ADMIN — ACETAMINOPHEN 325MG 650 MG: 325 TABLET ORAL at 18:11

## 2024-10-02 RX ADMIN — AMLODIPINE BESYLATE 5 MG: 5 TABLET ORAL at 15:16

## 2024-10-02 RX ADMIN — LEVETIRACETAM 500 MG: 5 INJECTION INTRAVENOUS at 08:50

## 2024-10-02 ASSESSMENT — ACTIVITIES OF DAILY LIVING (ADL)
ADLS_ACUITY_SCORE: 37
ADLS_ACUITY_SCORE: 38
ADLS_ACUITY_SCORE: 37
ADLS_ACUITY_SCORE: 37
ADLS_ACUITY_SCORE: 38
ADLS_ACUITY_SCORE: 37
ADLS_ACUITY_SCORE: 38
ADLS_ACUITY_SCORE: 38
ADLS_ACUITY_SCORE: 37
ADLS_ACUITY_SCORE: 38
ADLS_ACUITY_SCORE: 37
ADLS_ACUITY_SCORE: 38
ADLS_ACUITY_SCORE: 37
ADLS_ACUITY_SCORE: 37

## 2024-10-02 NOTE — PROGRESS NOTES
"      [facility] Progress Note      Interval Hx    Edson was seen and examined this AM, feels better, more lucid today. Denies seizures or new deficits. rEEG neg for seizures or epileptic discharges.             History of Present Illness     Chief Complaint: Hematemesis, Fall, and Seizures      Marcela Sandhu is a 84 year old with past medical history significant for hypertension and dementia presents with hematemesis, seizure, right facial droop after ground-level fall.    Marcela is a poor historian given she is amnestic to the events surrounding her hospitalization, reportedly she had some nausea after which she had a ground-level fall at her facility, later she had a right facial droop and a seizure. Confused after that. Son denies any prior history of seizures. She lives in an independent senior facility.                            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   - Bedside Glucose Monitoring  - PT/OT/SLP  - Stroke Education  - Euthermia, Euglycemia  - S/p Keppra load, continue Keppra 500 mg BID; can wean as outpatient if remains seizure free, defer to general neurology   - NeuroSx consulted appreciate recs   ___________________________________________________________________    The Stroke Staff is Dr. Gaona.      Follow up with General neurology as outpatient in 6-8 wks      Bernard Fonseca MD  Vascular Neurology Fellow    To page me or covering stroke neurology team member, click here: [facility]  Choose \"On Call\" tab at top, then select \"NEUROLOGY/ALL SITES\" from middle drop-down box, press Enter, then look for \"stroke\" or \"telestroke\" for your site.  ___________________________________________________________________        " Imaging/Labs   (personally reviewed )    CT head: 1. Parenchymal contusion in the inferior left temporal lobe with mild  surrounding edema. Subarachnoid hemorrhage in the left temporal lobe  and left frontal lobe. Thin 0.2 cm subdural hemorrhage along the left  lateral cerebral convexity.  2. No midline shift, herniation, or hydrocephalus.  3. Small amount of pneumocephalus in the inferior left temporal lobe  adjacent to the parenchymal contusions. This is just superior to the  left mastoid air cells which raises concern for a subtle fracture to  the left tegmen mastoideum. Left mastoid air cells are relatively  clear at this time.  4. Right posterior scalp soft tissue injury without underlying  calvarial fracture.  5. Diffuse parenchymal volume loss and white matter changes which are  most likely due to chronic microvascular ischemic disease she    CTA head/neck: 1. Patent arteries in the neck without evidence of dissection. Mild  atherosclerotic disease in the carotid arteries bilaterally without  significant stenosis by NASCET criteria.  2. Patent proximal major intracranial arteries without vascular  cutoff. No significant stenosis. No aneurysm identified.       Repeat CT head showed slight interval increase in ICH.     Repeat CT head 9/30/24 showed stable ICH    rEEG neg for seizures or epileptiform discharges.          Physical Examination     BP: (!) 148/85   Pulse: 89   Resp: 16   Temp: 98.2  F (36.8  C)   Temp src: Oral   SpO2: 93 %   O2 Device: None (Room air)   Weight: 59.7 kg (131 lb 9.8 oz)    Wt Readings from Last 2 Encounters:   10/01/24 59.7 kg (131 lb 9.8 oz)   09/23/24 56.3 kg (124 lb 1.6 oz)       Neurologic  Mental Status:  alert, oriented x 1, follows commands, hypophonic speech  Cranial Nerves:  visual fields intact, PERRL, EOMI with normal smooth pursuit, R facial droop, hearing not formally tested but intact to conversation, palate elevation symmetric and uvula midline, no dysarthria,  shoulder shrug strong bilaterally, tongue protrusion midline  Motor:  normal muscle tone and bulk, tremors of b/l upper ext, able to move all limbs spontaneously, strength at least antigravity throughout upper and lower extremities, no pronator drift  Reflexes:  toes down-going  Sensory:  light touch sensation intact and symmetric throughout upper and lower extremities  Coordination:   no dysmetria on FTN and HTN  Station/Gait:  deferred        Stroke Scales       NIHSS  1a. Level of Consciousness 0-->Alert, keenly responsive   1b. LOC Questions 1-->Answers one question correctly   1c. LOC Commands 0-->Performs both tasks correctly   2.   Best Gaze 0-->Normal   3.   Visual 0-->No visual loss   4.   Facial Palsy 1-->Minor paralysis (flattened nasolabial fold, asymmetry on smiling)   5a. Motor Arm, Left 0-->No drift, limb holds 90 (or 45) degrees for full 10 secs   5b. Motor Arm, Right 1-->Drift, limb holds 90 (or 45) degrees, but drifts down before full 10 secs, does not hit bed or other support   6a. Motor Leg, Left 0-->No drift, leg holds 30 degree position for full 5 secs   6b. Motor Leg, right 0-->No drift, leg holds 30 degree position for full 5 secs   7.   Limb Ataxia 0-->Absent   8.   Sensory 0-->Normal, no sensory loss   9.   Best Language 1-->Mild-to-moderate aphasia, some obvious loss of fluency or facility of comprehension, without significant limitation on ideas expressed or form of expression. Reduction of speech and/or comprehension, however, makes conversation. . . (see row details)   10. Dysarthria 1-->Mild-to-moderate dysarthria, patient slurs at least some words and, at worst, can be understood with some difficulty   11. Extinction and Inattention  0-->No abnormality   Total 5 (09/30/24 1346)            Labs     CBC  Lab Results   Component Value Date    HGB 9.9 (L) 10/02/2024    HGB 9.9 (L) 10/02/2024    HCT 30.6 (L) 10/02/2024    WBC 5.4 10/02/2024     10/02/2024       BMP  Lab Results    Component Value Date     10/02/2024    POTASSIUM 4.1 10/02/2024    CHLORIDE 108 (H) 10/02/2024    CO2 26 10/02/2024    BUN 21.3 10/02/2024    CR 0.82 10/02/2024     (H) 10/02/2024    DENIS 8.8 10/02/2024       INR  INR   Date Value Ref Range Status   09/30/2024 1.12 0.85 - 1.15 Final       Data   Stroke Code Data  (for stroke code without tele)  Stroke code activated 09/30/24  1111   First stroke provider response 09/30/24  1111   Last known normal 09/30/24  1010   Time of discovery (or onset of symptoms) 09/30/24  1010   Head CT read by Stroke Neuro Provider 09/30/24  1128   Was stroke code de-escalated? No              Clinically Significant Risk Factors                # Thrombocytopenia: Lowest platelets = 129 in last 2 days, will monitor for bleeding   # Hypertension: Noted on problem list                    # CKD, Stage 3a (GFR 45-59): Will monitor and treat as appropriate  - last Cr =  1.00 mg/dL (Ref range: 0.51 - 0.95 mg/dL)  - last GFR = 56 mL/min/1.73m2 (Ref range: >60 mL/min/1.73m2)

## 2024-10-02 NOTE — CONSULTS
Care Management Initial Consult    General Information  Assessment completed with: Patient, Children,    Type of CM/SW Visit: Initial Assessment    Primary Care Provider verified and updated as needed: Yes   Readmission within the last 30 days: no previous admission in last 30 days      Reason for Consult: discharge planning  Advance Care Planning: Advance Care Planning Reviewed: present on chart          Communication Assessment  Patient's communication style: spoken language (English or Bilingual)    Hearing Difficulty or Deaf: no   Wear Glasses or Blind: no    Cognitive  Cognitive/Neuro/Behavioral: .WDL except  Level of Consciousness: alert  Arousal Level: opens eyes spontaneously  Orientation: disoriented to, situation  Mood/Behavior: calm, cooperative  Best Language: 0 - No aphasia  Speech: clear, spontaneous    Living Environment:   People in home: alone     Current living Arrangements: assisted living  [facility] of Facility: [facility]   Able to return to prior arrangements:         Family/Social Support:  Care provided by: self  Provides care for: no one  Marital Status:   Support system: Children          Description of Support System: Supportive    Support Assessment: Adequate family and caregiver support    Current Resources:   Patient receiving home care services: No        Community Resources: None  Equipment currently used at home: walker, rolling, raised toilet seat, grab bar, tub/shower, grab bar, toilet  Supplies currently used at home: None    Employment/Financial:  Employment Status:          Financial Concerns: none           Does the patient's insurance plan have a 3 day qualifying hospital stay waiver?  Yes     Which insurance plan 3 day waiver is available? Alternative insurance waiver    Will the waiver be used for post-acute placement? Undetermined at this time    Lifestyle & Psychosocial Needs:  Social Determinants of Health     Food Insecurity: Low Risk  (10/1/2024)    Food Insecurity      Within the past 12 months, did you worry that your food would run out before you got money to buy more?: No     Within the past 12 months, did the food you bought just not last and you didn t have money to get more?: No   Depression: Not at risk (11/1/2023)    PHQ-2     PHQ-2 Score: 0   Housing Stability: Low Risk  (10/1/2024)    Housing Stability     Do you have housing? : Yes     Are you worried about losing your housing?: No   Tobacco Use: Low Risk  (9/23/2024)    Patient History     Smoking Tobacco Use: Never     Smokeless Tobacco Use: Never     Passive Exposure: Not on file   Financial Resource Strain: Low Risk  (10/1/2024)    Financial Resource Strain     Within the past 12 months, have you or your family members you live with been unable to get utilities (heat, electricity) when it was really needed?: No   Alcohol Use: Not on file   Transportation Needs: Low Risk  (10/1/2024)    Transportation Needs     Within the past 12 months, has lack of transportation kept you from medical appointments, getting your medicines, non-medical meetings or appointments, work, or from getting things that you need?: No   Physical Activity: Not on file   Interpersonal Safety: High Risk (10/1/2024)    Interpersonal Safety     Do you feel physically and emotionally safe where you currently live?: No     Within the past 12 months, have you been hit, slapped, kicked or otherwise physically hurt by someone?: No     Within the past 12 months, have you been humiliated or emotionally abused in other ways by your partner or ex-partner?: No   Stress: Not on file   Social Connections: Not on file   Health Literacy: Not on file       Functional Status:  Prior to admission patient needed assistance:   Dependent ADLs:: Ambulation-walker  Dependent IADLs:: Transportation, Money Management, Shopping, Laundry, Cleaning       Mental Health Status:  Mental Health Status: No Current Concerns       Chemical Dependency Status:  Chemical Dependency  "Status: No Current Concerns             Values/Beliefs:  Spiritual, Cultural Beliefs, Methodist Practices, Values that affect care: yes  Description of Beliefs that Will Affect Care: Cheondoism            Discussed  Partnership in Safe Discharge Planning  document with patient/family: No    Additional Information:  CM consult for discharge planning.  Per chart review, \"Marcela Sandhu is a 84 year-old female with past medical history significant for mild cognitive impairment who presents with fall and reported hematemesis.\"  Met with patient, her son Erasmo and and his wife Ebony.  Patient resides in nursing home at [facility].  Per Erasmo, she receives minimal services.  She manages her own meds and meals, primarily using the microwave for meals.  Family help with finances and transportation.  The facility provides apartment cleaning 2x/month and laundry.  Discussed recommendations of returning to nursing home vs TCU.  If she returned, would need to add additional [facility] services and home care.  They would like TCU where she could get daily therapy before returning to her apartment.  Facilities they would like referrals sent to are [facility], [facility] and [facility]. Will have  send referrals.    Next Steps:   to send TCU referrals.    Addendum @ 1450:  TCU referrals sent.    Jailene Harper RN, BSN, PHN  Inpatient Care Coordination  [facility]  Phone: [phone]        "

## 2024-10-02 NOTE — PROGRESS NOTES
Reason for Admission: Pt here for a fall, SDH & SAH    Cognitive/Mentation: A/Ox 3-4 was off on situation. Dementia at baseline.   Neuros/CMS: Intact ex R facial drood, bilateral hand tremors, generalized weakness.  VS: BP<150  Tele: NSR.  /GI: Continent.  Pulmonary: LS diminished.  Pain: denies.     Drains/Lines: PIV SL  Skin: mepilex over coccyx, rash on perineum, scattered bruising, edema BLE  Activity: Assist x 1 with personal walker.     Diet: Regular. Takes pills whole with water.     Therapies recs: Back to AL with increased cares.  Discharge: pending    Aggression Stoplight Tool: green

## 2024-10-02 NOTE — PLAN OF CARE
Reason for Admission: Pt here for a fall, SDH & SAH    Cognitive/Mentation: A/Ox 2-3 (time, situation)  Neuros/CMS: Intact ex R facial droop, confused, bilateral hand tremors, bilateral weak , BLE weakness, generalized weakness, intermittent confusion, dementia at baseline  VS: Stable on RA.   Tele: NSR.  /GI: Incontinent. Last BM PTA.  Pivot to commode  Pulmonary: LS diminished.  Pain: denies.     Drains/Lines: R PIV NS infusing @ 75mL/hr  Skin: mepilex over coccyx, rash on perineum, scattered bruising, trace edema BLE  Activity: Assist x 2 with GB/W. Pivot to commode  Diet: NPO at midnight, otherwise minced and moist diet.  Takes pills whole with water.     Therapies recs: TCU  Discharge: pending    Aggression Stoplight Tool: green    End of shift summary: NPO due to GI symptoms (hx of coffee ground emesis and nausea),  Low Hgb (9.9) and Hematocrit (30.6)

## 2024-10-02 NOTE — PROGRESS NOTES
Hemoglobin stable  NO recurrent coffee ground emesis  No recommendation for EGD  GI will sign off    Theresa Horton PA-C

## 2024-10-02 NOTE — PROGRESS NOTES
[facility]  Neurosurgery Daily Progress Note    Assessment  Marcela Sandhu is an 84 year old female NOT anticoagulated who was admitted on 9/30/2024 after a fall at assisted living facility and seizure activity witnessed by EMS. Neurosurgery was consulted for SAH in left temporal lobe and left frontal lobe, and thin SDH long left lateral cerebral convexity.     Plan   -No surgical intervention planned at this time   -Neuro checks Q4H  -Hold any blood thinners until follow up appointment  -SBP less than 150  -Continue to monitor neuro exam   -Seizure management per Neurology   -GI bleed management per Hospitalist   -Appreciate assistance from specialties  -Follow up in outpatient NSGY clinic in 1 month with head CT prior. Our schedulers will help coordinate.  -NSGY will sign off at this time.     Discussed with Dr. Rogelio Long, CNP  [facility] Neurosurgery  [address] [place] [id number]  Tel [phone]  Fax [phone]     Interval History   Patient was seen this morning up in chair. Neuro exam stable, less drowsy than yesterday so was able to get more accurate neuro exam.   Patient denies any new or worsening symptoms.    Hemoglobin 9.9      Physical Exam   Temp: 98.2  F (36.8  C) Temp src: Oral BP: (!) 148/85 Pulse: 89   Resp: 16 SpO2: 93 % O2 Device: None (Room air) Oxygen Delivery: 1.5 LPM  Vitals:    09/30/24 1109 09/30/24 1603 10/01/24 0400   Weight: 58.4 kg (128 lb 12 oz) 57 kg (125 lb 10.6 oz) 59.7 kg (131 lb 9.8 oz)       Mental status:  Alert and oriented x 3, speech is fluent, following commands  Cranial nerves:  II-XII intact.   Motor:  Moves all extremities with appropriate strength. Generalized weakness and baseline tremors upper extremities.  Shoulder Abduction  Right:  5/5   Left:  5/5  Biceps                      Right:  5/5   Left:  5/5  Triceps                     Right:  5/5   Left:  5/5  Wrist Extensors        Right:   5/5   Left:  5/5  Wrist Flexors            Right:  5/5   Left:  5/5  Intrinsics                   Right:  5/5   Left:  5/5    Iliopsoas  (hip flexion)               Right: 5/5  Left:  5/5  Quadriceps  (knee extension)       Right:  5/5  Left:  5/5  Hamstrings  (knee flexion)            Right:  5/5  Left:  5/5  Gastroc Soleus  (PF)                          Right:  5/5  Left:  5/5  Tibialis Ant  (DF)                          Right:  5/5  Left:  5/5  EHL                          Right:  5/5  Left:  5/5    Coordination:  Smooth finger to nose testing.   Negative pronator drift.

## 2024-10-02 NOTE — PROGRESS NOTES
10/01/24 1003   Appointment Info   Signing Clinician's Name / Credentials (OT) Yue FerraraOTR/L   Rehab Comments (OT) Initial Evaluation   Living Environment   People in Home alone   Current Living Arrangements assisted living   Home Accessibility no concerns   Transportation Anticipated family or friend will provide   Living Environment Comments pt reports I with ADL's ie dressing, toileting, showering,  med mgmt and some lt cooking, pt has A with cleaning and laundry, pt's son drives and manages finances.   Self-Care   Regular Exercise Yes   Activity/Exercise Type   (exercies class, seated in chair)   Exercise Amount/Frequency   (5x/wk)   Equipment Currently Used at Home walker, rolling;raised toilet seat;grab bar, tub/shower;grab bar, toilet   Fall history within last six months yes   Number of times patient has fallen within last six months 2   Activity/Exercise/Self-Care Comment walk in shower with built in seat   Instrumental Activities of Daily Living (IADL)   Previous Responsibilities medication management;meal prep   IADL Comments makes microwave meals, does get some meals from the facility bistro a couple times per week   General Information   Onset of Illness/Injury or Date of Surgery 09/30/24   Referring Physician Pradip Whalen, DO   Patient/Family Therapy Goal Statement (OT) home   Additional Occupational Profile Info/Pertinent History of Current Problem Marcela Sandhu is an 84 year old female NOT anticoagulated who was admitted on 9/30/2024 after a fall at assisted living facility and seizure activity witnessed by EMS. Neurosurgery was consulted for SAH in left temporal lobe and left frontal lobe, and thin SDH long left lateral cerebral convexity. hx dementia and hypertension.   Existing Precautions/Restrictions fall;other (see comments)  (SBP <150)   Cognitive Status Examination   Orientation Status orientation to person, place and time  (Oriented x3)   Affect/Mental Status  (Cognitive) WFL   Follows Commands over 90% accuracy;follows one-step commands;increased processing time needed  (difficulty following multi step commands.)   Safety Deficit at risk behavior observed;impulsivity;judgment   Executive Function Deficit judgment   Cognitive Status Comments Per chart, pt with baseline dementia. Continue to monitor cognition.   Visual Perception   Visual Impairment/Limitations corrective lenses full-time   Impact of Vision Impairment on Function (Vision) Vision appears WFL at this time, difficulty following multi step directions. cont to monitor.   Sensory   Sensory Comments denies   Pain Assessment   Patient Currently in Pain   (R knee tender)   Range of Motion Comprehensive   Comment, General Range of Motion R shoulder sore from past fall, R shoulder flexion to approx 100 degrees  L shoulder flex AAROM WFL, B distal AROM WFL,   Strength Comprehensive (MMT)   Comment, General Manual Muscle Testing (MMT) Assessment B UE strength appears symmetrical, overall weakness   Coordination   Coordination Comments B UE coordination appears symmetrical and WFL   Bed Mobility   Scooting/Bridging Divide (Bed Mobility) minimum assist (75% patient effort)   Supine-Sit Divide (Bed Mobility) minimum assist (75% patient effort)   Transfer Skill: Bed to Chair/Chair to Bed   Bed-Chair Divide (Transfers) minimum assist (75% patient effort);2 person assist   Sit-Stand Transfer   Sit-Stand Divide (Transfers) minimum assist (75% patient effort)   Toilet Transfer   Divide Level (Toilet Transfer) minimum assist (75% patient effort);2 person assist   Lower Body Dressing Assessment/Training   Divide Level (Lower Body Dressing) maximum assist (25% patient effort)   Grooming Assessment/Training   Divide Level (Grooming) contact guard assist   Toileting   Divide Level (Toileting) contact guard assist   Clinical Impression   Criteria for Skilled Therapeutic Interventions  Met (OT) Yes, treatment indicated   OT Diagnosis impaired I with ADL's and functional mobility.   OT Problem List-Impairments impacting ADL problems related to;activity tolerance impaired;balance;cognition;strength;range of motion (ROM);vision   Assessment of Occupational Performance 3-5 Performance Deficits   Identified Performance Deficits impaired I with dressing, toileting, showering, med mgmt, lt cooking, etc   Planned Therapy Interventions (OT) ADL retraining;cognition;transfer training;home program guidelines;progressive activity/exercise;visual perception;neuromuscular re-education   Clinical Decision Making Complexity (OT) detailed assessment/moderate complexity   Risk & Benefits of therapy have been explained evaluation/treatment results reviewed;care plan/treatment goals reviewed;risks/benefits reviewed;current/potential barriers reviewed;participants voiced agreement with care plan;participants included;patient;son   OT Total Evaluation Time   OT Eval, Moderate Complexity Minutes (14027) 10   OT Goals   Therapy Frequency (OT) 6 times/week   OT Predicted Duration/Target Date for Goal Attainment 10/09/24   OT Goals Hygiene/Grooming;Upper Body Dressing;Lower Body Dressing;Toilet Transfer/Toileting;Cognition;OT Goal 1   OT: Hygiene/Grooming modified independent;while standing   OT: Upper Body Dressing Independent;Modified independent;including set-up/clothing retrieval   OT: Lower Body Dressing Independent;Modified independent;using adaptive equipment;including set-up/clothing retrieval   OT: Toilet Transfer/Toileting Modified independent;toilet transfer;cleaning and garment management;using adaptive equipment   OT: Cognitive Patient/caregiver will verbalize understanding of cognitive assessment results/recommendations as needed for safe discharge planning   OT: Goal 1 Pt will set up a 4 pill bottle medbox with SBA and 1 vc for correction.   Self-Care/Home Management   Self-Care/Home Mgmt/ADL, Compensatory,  "Meal Prep Minutes (04625) 10   Symptoms Noted During/After Treatment (Meal Preparation/Planning Training) fatigue   Treatment Detail/Skilled Intervention OT: pt needing CGA/MIN A for toileting hygiene, pt unable to doffdonn socks and not able to complete figure 4 tech seatedon toilet needing max A to doff/oscar socks, requires MOD A to oscar pull up, A to insert LEs into pull up and to pull up over hips with unsteadines and cues hold onto grab bar with 1 hand and pull up pad with other arm for safety and balance. pt able to wash face with set up of wash clothe and cues for thoroughness.   Therapeutic Activities   Therapeutic Activity Minutes (37104) 15   Symptoms noted during/after treatment fatigue   Treatment Detail/Skilled Intervention OT: pt agreeable to OT, pt;s son present and supportive. Supine to sit with MIN A/CGA and cues for tech, sit <>Stand from EOB with CGA/MIN A, no ww availabe to ambulate to toilet and requires MIN A x2 with cues for safety, small steps.pt able to complete toilet transfer with MIN A with cues for grab bar use. Walker present to ambulate to chair, sit <>s tand CGA/MIN A with cues for hand placement with FWW and CGA/MIN A to ambulate with FWW to chair. Chair alarm on and call light within reach,. VS montiored and hypertensive, RN aware   OT Discharge Planning   OT Plan OT plan:montior vs, cognition, safety, FWW for toilet transfer, g/h at sink or TB dress. SLUMS if home vs TCU and med box set up   OT Discharge Recommendation (DC Rec) Transitional Care Facility   OT Rationale for DC Rec Prior to admit, pt independent with ADL's and funcitonal mobility with 4ww, pt manages own meds and does some lt cooking microwave meals. pt has A with cleaning, laundry and does go to the [facility]Bistro at [facility] for a couple meals per week. Pt currently limited due to impaired balance, overall impaired strength, actiivty tolerance, pt does have baseline dementia per chart, pt Alert and oriented x 3, some " difficulty following multi step commands with increased processing. Recommend TCU if discharge in today or tomorrow. Pending progress pt may be able to return home if has family A and.or [facility] staff A with all IADLs med mgmt, cooking, initial A with showering, etc and home RN, OT and PT. cont to monitor safety discharge rec.   OT Brief overview of current status Goals of therapy will be to address safe mobility and make recs for d/c to next level of care. Pt and RN will continue to follow all falls risk precautions as documented by RN staff while hospitalized. min A toilet transfer, mod A LE dress, Alert and oreintedx 3,  difficulty following multi step commands at times and increased processing. has baseline dementia per chart.   Total Session Time   Timed Code Treatment Minutes 25   Total Session Time (sum of timed and untimed services) 35

## 2024-10-02 NOTE — PROGRESS NOTES
[facility]    Medicine Progress Note - Hospitalist Service    Date of Admission:  9/30/2024    Assessment & Plan   Acute traumatic subdural hematoma parenchymal contusion, left temporal lobe with associated edema  Acute traumatic subarachnoid hemorrhage left temporal and left frontal lobe  Acute traumatic subdural hemorrhage left lateral cerebral convexity   Suspected left tegmen mastoideum fracture with pneumocephalus   Right posterior scalp hematoma   Fall   Seizure   *Presents with fall and head trauma. Subsequent witnessed tonic-clonic seizure by EMS.  *Head CT with parenchymal contusion inferior left temporal lobe with mild surrounding edema, subarachnoid hemorrhage in the left temporal lobe and left frontal lobe, 0.2 cm subdural hemorrhage along left lateral cerebral convexity, small amount of pneumocephalus in the inferior left temporal lobe adjacent to the parenchymal contusions, raising concern for subtle fracture to the left tegmen mastoideum, right posterior scalp soft tissue injury without underlying fracture   *PTA not on anticoagulation or antiplatelets.   * Follow-up head CT stable  * discussed with oksana cedeño to transfer to floor for serial neuro q4 and cont rehab, no plans for surgery  - Neurosurgery consulted  - Neuro-critical care consulted   - t q 4 hour neuro checks  - Head of bed elevated 30 degrees  - Continue levetiracetam PO/IV   - Hold anticoagulation/antiplatelets until cleared to resume by neurosurgery  - Goal SBP <150, PRN IV labetalol, hydralazine ordered. Resume home amldopine/arb  - PT/OT/SW consulted     Acute gastrointestinal bleed, possible  Hematemesis  Acute blood loss anemia  *Reportedly had hematemesis the morning of presentation, limited details from patient and care   *Hgb 7.0 g/dl on admission, most recent 8.7 g/dl 4/18/24, baseline previously 10-11 g/dl.   *Vital signs currently stable.   *No history of liver disease.  *Denies any NSAID, alcohol use.    *PTA on no antiplatelets, anticoagulation.  - GI (Elise) consulted, no e/o bleeding, they signed off      Dysphagia, acute on chronic  Plan  - VFSS per SLP, cleared for diet     Hypertension  - Hold prior to admission amlodipine-valsartan in setting of GI bleed     Acute traumatic encephalopathy on chronic mild cognitive impairment   Major depression   *Son reports significantly more confused at present than baseline. Likely due to trauma +/- post-ictal state.   *Lives in custodial at baseline with minimal assistance needs per son.   - Re-orient as needed  - Maintain normal day/night, sleep wake cycles  - Minimize sedating/altering medications as able  - Treat separate conditions as detailed above/below   - Resume prior to admission donepezil, aripiprazole, bupropion, mirtazapine, sertraline when able to take PO     Chronic kidney disease, stage 3  Baseline creatinine 1.00-1.10. Creatinine 1.00 on admission.   - Currently around baseline  - Avoid nephrotoxins  - Monitor BMP      Troponin elevation   Troponin 22->22. Suspect secondary to intracranial and GI bleeding.   - Telemetry      Acute thrombocytopenia, mild   Platelet count 129, baseline normal. Possibly consumptive in setting of bleeding.   - Monitor CBC      Possible lateral ninth rib non-displaced fracture, likely subacute  Initially sustained fall ~3 weeks ago with bruising since then over right lateral chest wall. Previous XRs R elbow, scapula, humerus negative for fracture, but does not appear to have had CXR.   - Encourage IS              Diet: Regular Diet Adult    DVT Prophylaxis: Pneumatic Compression Devices  Zarate Catheter: Not present  Lines: None     Cardiac Monitoring: None  Code Status: No CPR- Do NOT Intubate      Clinically Significant Risk Factors                  # Hypertension: Noted on problem list               # Financial/Environmental Concerns: none         Disposition Plan     Medically Ready for Discharge: Anticipated in 2-4 Days        "      Pradip Whalen, DO  Hospitalist Service  [facility]  Securely message with [employer] (more info)  Text page via [email or web address] Paging/Directory   ______________________________________________________________________    Interval History   Stable, working with therapy  improving      Physical Exam   Vital Signs: Temp: 98  F (36.7  C) Temp src: Oral BP: (!) 168/87 Pulse: 106   Resp: 16 SpO2: 98 % O2 Device: None (Room air) Oxygen Delivery: 1.5 LPM  Weight: 131 lbs 9.83 oz    Constitutional: Well-appearing, NAD  Eyes: PERRL, EOMI  HENT: Hematoma R occipital scalp. Lateral tongue lacerations.   Respiratory: Clear to auscultation bilaterally, good air movement, normal effort   Cardiovascular: RRR. No peripheral edema.   GI: Soft, non-tender, non-distended. No rebound tenderness or guarding.    Skin: Warm, dry.   Musculoskeletal: Ecchymosis over right lateral chest wall with tenderness to palpation.   Neurologic: Alert. Oriented to \"hospital\", not specific name. Not able to state date. Right facial droop. Strength diffusely diminished and symmetric bilaterally, appears to have poor effort so grossly nonfocal but rather generalized  Psychiatric: Normal affect, appropriate    Medical Decision Making       55 MINUTES SPENT BY ME on the date of service doing chart review, history, exam, documentation & further activities per the note.      Data     I have personally reviewed the following data over the past 24 hrs:    5.4  \   9.9 (L); 9.9 (L)   / 150     142 108 (H) 21.3 /  103 (H)   4.1 26 0.82 \       Imaging results reviewed over the past 24 hrs:   No results found for this or any previous visit (from the past 24 hour(s)).    "

## 2024-10-03 ENCOUNTER — APPOINTMENT (OUTPATIENT)
Dept: OCCUPATIONAL THERAPY | Facility: CLINIC | Age: 84
DRG: 082 | End: 2024-10-03
Attending: PSYCHIATRY & NEUROLOGY
Payer: COMMERCIAL

## 2024-10-03 ENCOUNTER — APPOINTMENT (OUTPATIENT)
Dept: SPEECH THERAPY | Facility: CLINIC | Age: 84
DRG: 082 | End: 2024-10-03
Attending: PSYCHIATRY & NEUROLOGY
Payer: COMMERCIAL

## 2024-10-03 ENCOUNTER — APPOINTMENT (OUTPATIENT)
Dept: PHYSICAL THERAPY | Facility: CLINIC | Age: 84
DRG: 082 | End: 2024-10-03
Attending: PSYCHIATRY & NEUROLOGY
Payer: COMMERCIAL

## 2024-10-03 LAB
HGB BLD-MCNC: 10.9 G/DL (ref 11.7–15.7)
HGB BLD-MCNC: 11.3 G/DL (ref 11.7–15.7)
POTASSIUM SERPL-SCNC: 3.9 MMOL/L (ref 3.4–5.3)

## 2024-10-03 PROCEDURE — 250N000011 HC RX IP 250 OP 636: Mod: JW | Performed by: HOSPITALIST

## 2024-10-03 PROCEDURE — 250N000009 HC RX 250: Performed by: HOSPITALIST

## 2024-10-03 PROCEDURE — 84132 ASSAY OF SERUM POTASSIUM: CPT | Performed by: INTERNAL MEDICINE

## 2024-10-03 PROCEDURE — 97530 THERAPEUTIC ACTIVITIES: CPT | Mod: GP

## 2024-10-03 PROCEDURE — 97535 SELF CARE MNGMENT TRAINING: CPT | Mod: GO

## 2024-10-03 PROCEDURE — 85018 HEMOGLOBIN: CPT | Performed by: HOSPITALIST

## 2024-10-03 PROCEDURE — 36415 COLL VENOUS BLD VENIPUNCTURE: CPT | Performed by: HOSPITALIST

## 2024-10-03 PROCEDURE — 250N000013 HC RX MED GY IP 250 OP 250 PS 637: Performed by: HOSPITALIST

## 2024-10-03 PROCEDURE — 120N000001 HC R&B MED SURG/OB

## 2024-10-03 PROCEDURE — 92526 ORAL FUNCTION THERAPY: CPT | Mod: GN | Performed by: SPEECH-LANGUAGE PATHOLOGIST

## 2024-10-03 PROCEDURE — 99233 SBSQ HOSP IP/OBS HIGH 50: CPT | Performed by: HOSPITALIST

## 2024-10-03 PROCEDURE — 97116 GAIT TRAINING THERAPY: CPT | Mod: GP

## 2024-10-03 RX ORDER — AMLODIPINE BESYLATE 5 MG/1
5 TABLET ORAL ONCE
Status: COMPLETED | OUTPATIENT
Start: 2024-10-03 | End: 2024-10-03

## 2024-10-03 RX ADMIN — LEVETIRACETAM 500 MG: 500 TABLET, FILM COATED ORAL at 08:40

## 2024-10-03 RX ADMIN — ACETAMINOPHEN 325MG 650 MG: 325 TABLET ORAL at 12:41

## 2024-10-03 RX ADMIN — DONEPEZIL HYDROCHLORIDE 10 MG: 10 TABLET ORAL at 08:40

## 2024-10-03 RX ADMIN — AMLODIPINE BESYLATE 5 MG: 5 TABLET ORAL at 08:40

## 2024-10-03 RX ADMIN — AMLODIPINE BESYLATE 5 MG: 5 TABLET ORAL at 09:43

## 2024-10-03 RX ADMIN — SERTRALINE HYDROCHLORIDE 200 MG: 100 TABLET ORAL at 08:40

## 2024-10-03 RX ADMIN — LEVETIRACETAM 500 MG: 500 TABLET, FILM COATED ORAL at 20:03

## 2024-10-03 RX ADMIN — VALSARTAN 160 MG: 160 TABLET, FILM COATED ORAL at 08:40

## 2024-10-03 RX ADMIN — ACETAMINOPHEN 325MG 650 MG: 325 TABLET ORAL at 08:38

## 2024-10-03 RX ADMIN — ACETAMINOPHEN 325MG 650 MG: 325 TABLET ORAL at 20:17

## 2024-10-03 RX ADMIN — PANTOPRAZOLE SODIUM 40 MG: 40 INJECTION, POWDER, FOR SOLUTION INTRAVENOUS at 08:40

## 2024-10-03 RX ADMIN — LABETALOL HYDROCHLORIDE 10 MG: 5 INJECTION, SOLUTION INTRAVENOUS at 12:20

## 2024-10-03 RX ADMIN — PANTOPRAZOLE SODIUM 40 MG: 40 INJECTION, POWDER, FOR SOLUTION INTRAVENOUS at 20:03

## 2024-10-03 RX ADMIN — ARIPIPRAZOLE 5 MG: 5 TABLET ORAL at 08:44

## 2024-10-03 RX ADMIN — SENNOSIDES AND DOCUSATE SODIUM 1 TABLET: 50; 8.6 TABLET ORAL at 14:23

## 2024-10-03 ASSESSMENT — ACTIVITIES OF DAILY LIVING (ADL)
ADLS_ACUITY_SCORE: 37

## 2024-10-03 NOTE — PLAN OF CARE
Reason for Admission: Pt here for a fall, SDH & SAH    Cognitive/Mentation: A/Ox 3-4  Neuros/CMS: Intact ex R facial droop, BLE weakness (unable to perform heel to shin), BUE 4/3, BLE 3/5, BUE tremors, generalized weakness  VS: stable on RA SBP<150.   Tele: NSR  /GI: Incontinent.  Last BM PTA.   Pulmonary: LS diminished.  Pain: denies.     Drains/Lines: R PIV SL  Skin: 1+ edema BLE, scattered bruising, mepilex on saccrym  Activity: Assist x 1 with GB/W.  Diet: Regular with thin liquids. Takes pills whole with water.     Therapies recs: back to assisted living with increased cares  Discharge: pending    Aggression Stoplight Tool: green    End of shift summary: K+ replacement protocol

## 2024-10-03 NOTE — PROGRESS NOTES
[facility]    Medicine Progress Note - Hospitalist Service    Date of Admission:  9/30/2024    Assessment & Plan   Acute traumatic subdural hematoma parenchymal contusion, left temporal lobe with associated edema  Acute traumatic subarachnoid hemorrhage left temporal and left frontal lobe  Acute traumatic subdural hemorrhage left lateral cerebral convexity   Suspected left tegmen mastoideum fracture with pneumocephalus   Right posterior scalp hematoma   Fall   Seizure   *Presents with fall and head trauma. Subsequent witnessed tonic-clonic seizure by EMS.  *Head CT with parenchymal contusion inferior left temporal lobe with mild surrounding edema, subarachnoid hemorrhage in the left temporal lobe and left frontal lobe, 0.2 cm subdural hemorrhage along left lateral cerebral convexity, small amount of pneumocephalus in the inferior left temporal lobe adjacent to the parenchymal contusions, raising concern for subtle fracture to the left tegmen mastoideum, right posterior scalp soft tissue injury without underlying fracture   *PTA not on anticoagulation or antiplatelets.   * Follow-up head CT stable  * discussed with oksana cedeño to transfer to floor for serial neuro q4 and cont rehab, no plans for surgery  - Neurosurgery consulted  - Neuro-critical care consulted   - t q 4 hour neuro checks  - Head of bed elevated 30 degrees  - Continue levetiracetam PO/IV   - Hold anticoagulation/antiplatelets until cleared to resume by neurosurgery  - Goal SBP <150, PRN IV labetalol, hydralazine ordered. Resume home amldopine/arb. Uptitrate the amlodipine on 10/3  - PT/OT/SW consulted     Acute gastrointestinal bleed, ruled out  *Reportedly had hematemesis the morning of presentation, limited details from patient and care   *Hgb 7.0 g/dl on admission, most recent 8.7 g/dl 4/18/24, baseline previously 10-11 g/dl.   *Vital signs currently stable.   *No history of liver disease.  *Denies any NSAID, alcohol use.    *PTA on no antiplatelets, anticoagulation.  - GI (Elise) consulted, no e/o bleeding, they signed off      Dysphagia, acute on chronic  Plan  - VFSS per SLP, cleared for diet     Hypertension  - continue amlodipine-valsartan in setting of GI bleed     Acute traumatic encephalopathy on chronic mild cognitive impairment   Major depression   *Son reports significantly more confused at present than baseline. Likely due to trauma +/- post-ictal state.   *Lives in [facility] at baseline with minimal assistance needs per son.   - Re-orient as needed  - Maintain normal day/night, sleep wake cycles  - Minimize sedating/altering medications as able  - Treat separate conditions as detailed above/below   - Resume prior to admission donepezil, aripiprazole, mirtazapine, sertraline when able to take PO  - stop wellbutrin given the seizure     Chronic kidney disease, stage 3  Baseline creatinine 1.00-1.10. Creatinine 1.00 on admission.   - Currently around baseline  - Avoid nephrotoxins  - Monitor BMP      Troponin elevation   Troponin 22->22. Suspect secondary to intracranial and GI bleeding.   - Telemetry      Acute thrombocytopenia, mild   Platelet count 129, baseline normal. Possibly consumptive in setting of bleeding.   - Monitor CBC      Possible lateral ninth rib non-displaced fracture, likely subacute  Initially sustained fall ~3 weeks ago with bruising since then over right lateral chest wall. Previous XRs R elbow, scapula, humerus negative for fracture, but does not appear to have had CXR.   - Encourage IS   - denies pain             Diet: Regular Diet Adult  Room Service    DVT Prophylaxis: Pneumatic Compression Devices  Zarate Catheter: Not present  Lines: None     Cardiac Monitoring: None  Code Status: No CPR- Do NOT Intubate      Clinically Significant Risk Factors                  # Hypertension: Noted on problem list               # Financial/Environmental Concerns: none         Disposition Plan     Medically Ready for  "Discharge: Anticipated in 2-4 Days             Pradip Whalen DO  Hospitalist Service  [facility]  Securely message with [address] (more info)  Text page via [employer] Paging/Directory   ______________________________________________________________________    Interval History   BP under controlled and using PRN      Physical Exam   Vital Signs: Temp: 98.1  F (36.7  C) Temp src: Oral BP: 139/71 Pulse: 111   Resp: 18 SpO2: 95 % O2 Device: None (Room air)    Weight: 131 lbs 9.83 oz    Constitutional: Well-appearing, NAD  Eyes: PERRL, EOMI  HENT: Hematoma R occipital scalp. Lateral tongue lacerations.   Respiratory: Clear to auscultation bilaterally, good air movement, normal effort   Cardiovascular: RRR. No peripheral edema.   GI: Soft, non-tender, non-distended. No rebound tenderness or guarding.    Skin: Warm, dry.   Musculoskeletal: Ecchymosis over right lateral chest wall with tenderness to palpation.   Neurologic: Alert. Oriented to \"hospital\", not specific name. Not able to state date. Right facial droop. Strength diffusely diminished and symmetric bilaterally, appears to have poor effort so grossly nonfocal but rather generalized  Psychiatric: Normal affect, appropriate    Medical Decision Making       55 MINUTES SPENT BY ME on the date of service doing chart review, history, exam, documentation & further activities per the note.      Data     I have personally reviewed the following data over the past 24 hrs:    N/A  \   11.3 (L)   / N/A     N/A N/A N/A /  N/A   3.9 N/A N/A \       Imaging results reviewed over the past 24 hrs:   No results found for this or any previous visit (from the past 24 hour(s)).    "

## 2024-10-03 NOTE — PROGRESS NOTES
Care Management Follow Up    Length of Stay (days): 3    Expected Discharge Date: 10/04/2024     Concerns to be Addressed: discharge planning     Patient plan of care discussed at interdisciplinary rounds: Yes    Anticipated Discharge Disposition: [facility]  Anticipated Discharge Services:  therapies    Patient/family educated on Medicare website which has current facility and service quality ratings:  yes  Education Provided on the Discharge Plan: Yes  Patient/Family in Agreement with the Plan: yes    Referrals Placed by CM/SW: Post Acute Facilities  Private pay costs discussed: Not applicable    Discussed  Partnership in Safe Discharge Planning  document with patient/family: No     Handoff Completed: Yes, [facility] PCP: Internal handoff referral completed    Additional Information:  Patient accepted to all TCU facilities that referrals were sent to; patient prefers [facility] TCU. KOJO called them and they can accept anytime today after 1500. KOJO met with patient and her son Erasmo that was at the bedside to let them know. Hospitalist was updated by KOJO and they will determine if patient is medically stable to leave today. Erasmo will transport patient when the time comes for discharge.     ADD: 1000  Patient not medically ready today per hospitalist. KOJO updated [facility]. Son Erasmo had a question about insurance so KOJO called him and left a voicemail letting him know medicare coverage of TCU.    PAS-RR    D: Per DHS regulation, KOJO completed and submitted PAS-RR to [place] [employer] Direct Connect via the Senior LinkAge Line.  PAS-RR confirmation # is : [id number]    P: Further questions may be directed to Senior LinkAge Line at #[phone], option #4 for PAS-RR staff.      Cristin Sousa, MSW, LGSW   Social Work   [facility]

## 2024-10-03 NOTE — PLAN OF CARE
Goal Outcome Evaluation:      Plan of Care Reviewed With: patient, child          Outcome Evaluation: Patient will discharge to [facility] TCU when medically stable    MAMI Meeks, LGSW   Social Work   [facility]

## 2024-10-03 NOTE — PLAN OF CARE
Reason for Admission: Pt here for a fall, SDH & SAH     Cognitive/Mentation: A/Ox 4  Neuros/CMS: Intact ex R facial droop, BLE weakness (unable to perform heel to shin), BUE 4/3, BLE 3/5, BUE tremors, generalized weakness  VS: stable on RA SBP<150. Labetalol PRN x1  Tele: NSR, RA  /GI: Incontinent.  Last BM PTA. Senna PRN x1, encouraged fluids and ambulation.   Pulmonary: LS diminished.  Pain: In legs- PRN Tylenol x2     Drains/Lines: R PIV SL  Skin: 1+ edema BLE, scattered bruising, mepilex on saccrym  Activity: Assist x 1 with GB/W.  Diet: Regular with thin liquids. Takes pills whole with water.      Therapies recs: back to assisted living with increased cares- [facility] TCU when medically ready   Discharge: pending BP control      Aggression Stoplight Tool: green     End of shift summary: LBM 9/30- PRN Senna x1, SBP <150 with PRN labetalol x1, Pt says she is always in pain, everywhere. She regularly takes Tylenol twice daily. PRN Tylenol properly managed pain. Ambulation encouraged.

## 2024-10-04 ENCOUNTER — APPOINTMENT (OUTPATIENT)
Dept: SPEECH THERAPY | Facility: CLINIC | Age: 84
DRG: 082 | End: 2024-10-04
Attending: PSYCHIATRY & NEUROLOGY
Payer: COMMERCIAL

## 2024-10-04 ENCOUNTER — APPOINTMENT (OUTPATIENT)
Dept: PHYSICAL THERAPY | Facility: CLINIC | Age: 84
DRG: 082 | End: 2024-10-04
Attending: PSYCHIATRY & NEUROLOGY
Payer: COMMERCIAL

## 2024-10-04 ENCOUNTER — DOCUMENTATION ONLY (OUTPATIENT)
Dept: GERIATRICS | Facility: CLINIC | Age: 84
End: 2024-10-04
Payer: COMMERCIAL

## 2024-10-04 LAB
HGB BLD-MCNC: 9.8 G/DL (ref 11.7–15.7)
POTASSIUM SERPL-SCNC: 3.6 MMOL/L (ref 3.4–5.3)

## 2024-10-04 PROCEDURE — 120N000001 HC R&B MED SURG/OB

## 2024-10-04 PROCEDURE — 99232 SBSQ HOSP IP/OBS MODERATE 35: CPT | Performed by: HOSPITALIST

## 2024-10-04 PROCEDURE — 92526 ORAL FUNCTION THERAPY: CPT | Mod: GN | Performed by: SPEECH-LANGUAGE PATHOLOGIST

## 2024-10-04 PROCEDURE — 36415 COLL VENOUS BLD VENIPUNCTURE: CPT | Performed by: HOSPITALIST

## 2024-10-04 PROCEDURE — 250N000009 HC RX 250: Performed by: HOSPITALIST

## 2024-10-04 PROCEDURE — 85018 HEMOGLOBIN: CPT | Performed by: HOSPITALIST

## 2024-10-04 PROCEDURE — 84132 ASSAY OF SERUM POTASSIUM: CPT | Performed by: HOSPITALIST

## 2024-10-04 PROCEDURE — 97530 THERAPEUTIC ACTIVITIES: CPT | Mod: GP

## 2024-10-04 PROCEDURE — 250N000013 HC RX MED GY IP 250 OP 250 PS 637: Performed by: HOSPITALIST

## 2024-10-04 RX ORDER — AMLODIPINE BESYLATE 10 MG/1
10 TABLET ORAL DAILY
Status: DISCONTINUED | OUTPATIENT
Start: 2024-10-04 | End: 2024-10-05 | Stop reason: HOSPADM

## 2024-10-04 RX ADMIN — PANTOPRAZOLE SODIUM 40 MG: 40 INJECTION, POWDER, FOR SOLUTION INTRAVENOUS at 09:39

## 2024-10-04 RX ADMIN — VALSARTAN 160 MG: 160 TABLET, FILM COATED ORAL at 09:38

## 2024-10-04 RX ADMIN — ACETAMINOPHEN 325MG 650 MG: 325 TABLET ORAL at 18:45

## 2024-10-04 RX ADMIN — PANTOPRAZOLE SODIUM 40 MG: 40 INJECTION, POWDER, FOR SOLUTION INTRAVENOUS at 20:16

## 2024-10-04 RX ADMIN — AMLODIPINE BESYLATE 10 MG: 10 TABLET ORAL at 09:38

## 2024-10-04 RX ADMIN — LEVETIRACETAM 500 MG: 500 TABLET, FILM COATED ORAL at 09:38

## 2024-10-04 RX ADMIN — LEVETIRACETAM 500 MG: 500 TABLET, FILM COATED ORAL at 20:16

## 2024-10-04 RX ADMIN — SERTRALINE HYDROCHLORIDE 200 MG: 100 TABLET ORAL at 09:38

## 2024-10-04 RX ADMIN — ARIPIPRAZOLE 5 MG: 5 TABLET ORAL at 09:38

## 2024-10-04 RX ADMIN — DONEPEZIL HYDROCHLORIDE 10 MG: 10 TABLET ORAL at 09:38

## 2024-10-04 ASSESSMENT — ACTIVITIES OF DAILY LIVING (ADL)
ADLS_ACUITY_SCORE: 33
ADLS_ACUITY_SCORE: 37
ADLS_ACUITY_SCORE: 33
ADLS_ACUITY_SCORE: 37
ADLS_ACUITY_SCORE: 37

## 2024-10-04 NOTE — PROGRESS NOTES
[facility]    Medicine Progress Note - Hospitalist Service    Date of Admission:  9/30/2024    Assessment & Plan   Acute traumatic subdural hematoma parenchymal contusion, left temporal lobe with associated edema  Acute traumatic subarachnoid hemorrhage left temporal and left frontal lobe  Acute traumatic subdural hemorrhage left lateral cerebral convexity   Suspected left tegmen mastoideum fracture with pneumocephalus   Right posterior scalp hematoma   Fall   Seizure   *Presents with fall and head trauma. Subsequent witnessed tonic-clonic seizure by EMS.  *Head CT with parenchymal contusion inferior left temporal lobe with mild surrounding edema, subarachnoid hemorrhage in the left temporal lobe and left frontal lobe, 0.2 cm subdural hemorrhage along left lateral cerebral convexity, small amount of pneumocephalus in the inferior left temporal lobe adjacent to the parenchymal contusions, raising concern for subtle fracture to the left tegmen mastoideum, right posterior scalp soft tissue injury without underlying fracture   *PTA not on anticoagulation or antiplatelets.   * Follow-up head CT stable  * discussed with oksana cedeño to transfer to floor for serial neuro q4 and cont rehab, no plans for surgery  - Neurosurgery consulted  - Neuro-critical care consulted   - t q 4 hour neuro checks  - Head of bed elevated 30 degrees  - Continue levetiracetam PO/IV   - Hold anticoagulation/antiplatelets until cleared to resume by neurosurgery  - Goal SBP <150, PRN IV labetalol, hydralazine ordered., if blood pressure remains stable and no IV Prn, discharge to TCU on 10/5  - PT/OT/SW consulted     Acute gastrointestinal bleed, ruled out  *Reportedly had hematemesis the morning of presentation, limited details from patient and care   *Hgb 7.0 g/dl on admission, most recent 8.7 g/dl 4/18/24, baseline previously 10-11 g/dl.   *Vital signs currently stable.   *No history of liver disease.  *Denies any NSAID,  alcohol use.   *PTA on no antiplatelets, anticoagulation.  - GI (Elise) consulted, no e/o bleeding, they signed off      Dysphagia, acute on chronic  Plan  - VFSS per SLP, cleared for diet     Hypertension  - continue amlodipine-valsartan in setting of GI bleed     Acute traumatic encephalopathy on chronic mild cognitive impairment   Major depression   *Son reports significantly more confused at present than baseline. Likely due to trauma +/- post-ictal state.   *Lives in group home at baseline with minimal assistance needs per son.   - Re-orient as needed  - Maintain normal day/night, sleep wake cycles  - Minimize sedating/altering medications as able  - Treat separate conditions as detailed above/below   - Resume prior to admission donepezil, aripiprazole, mirtazapine, sertraline when able to take PO  - stop wellbutrin given the seizure     Chronic kidney disease, stage 3  Baseline creatinine 1.00-1.10. Creatinine 1.00 on admission.   - Currently around baseline  - Avoid nephrotoxins  - Monitor BMP      Troponin elevation   Troponin 22->22. Suspect secondary to intracranial and GI bleeding.   - Telemetry      Acute thrombocytopenia, mild   Platelet count 129, baseline normal. Possibly consumptive in setting of bleeding.   - Monitor CBC      Possible lateral ninth rib non-displaced fracture, likely subacute  Initially sustained fall ~3 weeks ago with bruising since then over right lateral chest wall. Previous XRs R elbow, scapula, humerus negative for fracture, but does not appear to have had CXR.   - Encourage IS   - denies pain             Diet: Regular Diet Adult  Room Service    DVT Prophylaxis: Pneumatic Compression Devices  Zarate Catheter: Not present  Lines: None     Cardiac Monitoring: None  Code Status: No CPR- Do NOT Intubate      Clinically Significant Risk Factors                  # Hypertension: Noted on problem list               # Financial/Environmental Concerns: none         Disposition Plan  "    Medically Ready for Discharge: Anticipated Tomorrow             Pradip Whalen DO  Hospitalist Service  [facility]  Securely message with [employer] (more info)  Text page via [employer] Paging/Directory   ______________________________________________________________________    Interval History   Stable  Blood pressure improved, but still not consistently < 150      Physical Exam   Vital Signs: Temp: 99.2  F (37.3  C) Temp src: Oral BP: 126/67 Pulse: 106   Resp: 18 SpO2: 99 % O2 Device: None (Room air)    Weight: 131 lbs 9.83 oz    Constitutional: Well-appearing, NAD  Eyes: PERRL, EOMI  HENT: Hematoma R occipital scalp. Lateral tongue lacerations.   Respiratory: Clear to auscultation bilaterally, good air movement, normal effort   Cardiovascular: RRR. No peripheral edema.   GI: Soft, non-tender, non-distended. No rebound tenderness or guarding.    Skin: Warm, dry.   Musculoskeletal: Ecchymosis over right lateral chest wall with tenderness to palpation.   Neurologic: Alert. Oriented to \"hospital\", not specific name. Not able to state date. Right facial droop. Strength diffusely diminished and symmetric bilaterally, appears to have poor effort so grossly nonfocal but rather generalized  Psychiatric: Normal affect, appropriate    Medical Decision Making       35 MINUTES SPENT BY ME on the date of service doing chart review, history, exam, documentation & further activities per the note.      Data     I have personally reviewed the following data over the past 24 hrs:    N/A  \   9.8 (L)   / N/A     N/A N/A N/A /  N/A   3.6 N/A N/A \       Imaging results reviewed over the past 24 hrs:   No results found for this or any previous visit (from the past 24 hour(s)).    "

## 2024-10-04 NOTE — PLAN OF CARE
Reason for Admission: fall, SDH, SAH, seizure    Cognitive/Mentation: A/Ox 4  Neuros/CMS: Intact ex slight R droop, BUE tremors, BUE 4/5, BLE 3/5. Unable to perform heel to shin d/t weakness. BLE edema, trace-2+  VS: stable except HTN. SBP <150. No PRNs given   Tele: NSR.  /GI: Continent.   Pulmonary: LS clear.  Pain: generalized. PRN tylenol given x1     Drains/Lines: PIV SL  Skin: scattered bruising, scab on R knee  Activity: Assist x 1 with GBW.  Diet: regular with thin liquids. Takes pills whole.     Discharge: TCU, anticipated tomorrow 10/5 at 1200    Aggression Stoplight Tool: green    End of shift summary: pt up in chair for a lot of shift, BP within parameters. No significant events.

## 2024-10-04 NOTE — PLAN OF CARE
Goal Outcome Evaluation:      Plan of Care Reviewed With: patient    Overall Patient Progress: no changeOverall Patient Progress: no change       Pt here with fall, SDH, and SAH. A&O x 4. Neuros intact ex. R. Facial droop, BLE weakness, BUE 4/5, BLE 3/5. VSS. Tele NSR. Reg diet, thin liquids. Takes pills whole. Up with 1, GB and walker. Denies pain at this time; at start of shift patient reported 10/10 generalized pain - FLACC of 2. Pt scoring green on the Aggression Stop Light Tool. Plan back to [facility]. Discharge pending medically stable.

## 2024-10-04 NOTE — PROGRESS NOTES
Care Management Follow Up    Length of Stay (days): 4    Expected Discharge Date: 10/04/2024     Concerns to be Addressed: discharge planning     Patient plan of care discussed at interdisciplinary rounds: Yes    Anticipated Discharge Disposition: [facility]  Anticipated Discharge Services:  therapies    Referrals Placed by CM/KOJO: Post Acute Facilities  Private pay costs discussed: Not applicable    Discussed  Partnership in Safe Discharge Planning  document with patient/family: No     Handoff Completed: Yes, [facility] PCP: Internal handoff referral completed    Additional Information:  KOJO spoke to patient and hospitalist in the room and we will plan for a discharge tomorrow. KOJO confirmed this plan with Concha in admissions at [facility] and that will work. When patient's son arrived, KOJO met with them again. Son Erasmo will transport patient to [facility] tomorrow 10/5 at 1200. Facility aware of transport time. PAS already completed.    Fax for orders tomorrow: [phone]   Phone contact for tomorrow: [phone]     MAMI Meeks, LGSW   Social Work   [facility]

## 2024-10-05 VITALS
HEART RATE: 83 BPM | BODY MASS INDEX: 24.87 KG/M2 | WEIGHT: 131.61 LBS | SYSTOLIC BLOOD PRESSURE: 130 MMHG | DIASTOLIC BLOOD PRESSURE: 60 MMHG | TEMPERATURE: 97.4 F | OXYGEN SATURATION: 99 % | RESPIRATION RATE: 16 BRPM

## 2024-10-05 LAB — POTASSIUM SERPL-SCNC: 3.5 MMOL/L (ref 3.4–5.3)

## 2024-10-05 PROCEDURE — 250N000009 HC RX 250: Performed by: HOSPITALIST

## 2024-10-05 PROCEDURE — 84132 ASSAY OF SERUM POTASSIUM: CPT | Performed by: INTERNAL MEDICINE

## 2024-10-05 PROCEDURE — 250N000013 HC RX MED GY IP 250 OP 250 PS 637: Performed by: HOSPITALIST

## 2024-10-05 PROCEDURE — 36415 COLL VENOUS BLD VENIPUNCTURE: CPT | Performed by: INTERNAL MEDICINE

## 2024-10-05 PROCEDURE — 99239 HOSP IP/OBS DSCHRG MGMT >30: CPT | Performed by: HOSPITALIST

## 2024-10-05 RX ORDER — LEVETIRACETAM 500 MG/1
500 TABLET ORAL 2 TIMES DAILY
DISCHARGE
Start: 2024-10-05

## 2024-10-05 RX ORDER — VALSARTAN 160 MG/1
160 TABLET ORAL DAILY
DISCHARGE
Start: 2024-10-06

## 2024-10-05 RX ORDER — AMLODIPINE BESYLATE 10 MG/1
10 TABLET ORAL DAILY
DISCHARGE
Start: 2024-10-05

## 2024-10-05 RX ADMIN — LEVETIRACETAM 500 MG: 500 TABLET, FILM COATED ORAL at 08:09

## 2024-10-05 RX ADMIN — ACETAMINOPHEN 325MG 650 MG: 325 TABLET ORAL at 08:13

## 2024-10-05 RX ADMIN — VALSARTAN 160 MG: 160 TABLET, FILM COATED ORAL at 08:08

## 2024-10-05 RX ADMIN — AMLODIPINE BESYLATE 10 MG: 10 TABLET ORAL at 08:09

## 2024-10-05 RX ADMIN — PANTOPRAZOLE SODIUM 40 MG: 40 INJECTION, POWDER, FOR SOLUTION INTRAVENOUS at 08:09

## 2024-10-05 RX ADMIN — SERTRALINE HYDROCHLORIDE 200 MG: 100 TABLET ORAL at 08:08

## 2024-10-05 RX ADMIN — ARIPIPRAZOLE 5 MG: 5 TABLET ORAL at 08:10

## 2024-10-05 RX ADMIN — DONEPEZIL HYDROCHLORIDE 10 MG: 10 TABLET ORAL at 08:09

## 2024-10-05 ASSESSMENT — ACTIVITIES OF DAILY LIVING (ADL)
ADLS_ACUITY_SCORE: 37
ADLS_ACUITY_SCORE: 33
ADLS_ACUITY_SCORE: 37

## 2024-10-05 NOTE — DISCHARGE SUMMARY
[facility]  Hospitalist Discharge Summary      Date of Admission:  9/30/2024  Date of Discharge:  10/5/2024  Discharging Provider: Pradip Whalen DO  Discharge Service: Hospitalist Service    Discharge Diagnoses   Acute traumatic subdural hematoma parenchymal contusion, left temporal lobe with associated edema  Acute traumatic subarachnoid hemorrhage left temporal and left frontal lobe  Acute traumatic subdural hemorrhage left lateral cerebral convexity   Suspected left tegmen mastoideum fracture with pneumocephalus   Right posterior scalp hematoma   Fall   Seizure     Clinically Significant Risk Factors          Follow-ups Needed After Discharge   Follow-up Appointments     Follow Up and recommended labs and tests      Follow up with [facility] physician.  The following labs/tests are   recommended: bmp in 1 week.    Follow-up Follow up in outpatient NSGY clinic in 1 month with head CT   prior. Our schedulers will help coordinate.        Follow-up with neurosurgery in 1 month with head ct prior    Referral placed for neurology appointment to determine length of Keppra txt    Unresulted Labs Ordered in the Past 30 Days of this Admission       No orders found from 8/31/2024 to 10/1/2024.        These results will be followed up by PCP    Discharge Disposition   Discharged to rehabilitation facility  Condition at discharge: Stable    Hospital Course   Acute traumatic subdural hematoma parenchymal contusion, left temporal lobe with associated edema  Acute traumatic subarachnoid hemorrhage left temporal and left frontal lobe  Acute traumatic subdural hemorrhage left lateral cerebral convexity   Suspected left tegmen mastoideum fracture with pneumocephalus   Right posterior scalp hematoma   Fall   Seizure   *Presents with fall and head trauma. Subsequent witnessed tonic-clonic seizure by EMS.  *Head CT with parenchymal contusion inferior left temporal lobe with mild surrounding edema,  subarachnoid hemorrhage in the left temporal lobe and left frontal lobe, 0.2 cm subdural hemorrhage along left lateral cerebral convexity, small amount of pneumocephalus in the inferior left temporal lobe adjacent to the parenchymal contusions, raising concern for subtle fracture to the left tegmen mastoideum, right posterior scalp soft tissue injury without underlying fracture   *PTA not on anticoagulation or antiplatelets.   * Follow-up head CT stable  * discussed with gala, ok to transfer to floor for serial neuro q4 and cont rehab, no plans for surgery  * did well after, with exception of uncontrolled hypertension that took a few days to get to goal  - Neurosurgery consulted  - continue keppra, follow-up with neurology to determine length of therapy referral placed  - to TCU today  - discussed no NSAID with son Juan at length     Acute gastrointestinal bleed, ruled out  *Reportedly had hematemesis the morning of presentation, limited details from patient and care   *Hgb 7.0 g/dl on admission, most recent 8.7 g/dl 4/18/24, baseline previously 10-11 g/dl.   *Vital signs currently stable.   *No history of liver disease.  *Denies any NSAID, alcohol use.   *PTA on no antiplatelets, anticoagulation.  - GI (Elise) consulted, no e/o bleeding, they signed off        Dysphagia, acute on chronic  Plan  - VFSS per SLP, cleared for diet, SLP consulted for TCU     Hypertension  - continue amlodipine-valsartan in setting of GI bleed     Acute traumatic encephalopathy on chronic mild cognitive impairment   Major depression   *Son reports significantly more confused at present than baseline. Likely due to trauma +/- post-ictal state.   *Lives in [place] at baseline with minimal assistance needs per son.   - Re-orient as needed  - Maintain normal day/night, sleep wake cycles  - Minimize sedating/altering medications as able  - Treat separate conditions as detailed above/below   - Resume prior to admission donepezil, aripiprazole,  mirtazapine, sertraline   - stop wellbutrin given the seizure     Chronic kidney disease, stage 3  Baseline creatinine 1.00-1.10. Creatinine 1.00 on admission.   - Currently around baseline     Troponin elevation   Troponin 22->22. Suspect secondary to intracranial and GI bleeding.        Acute thrombocytopenia, mild   Platelet count 129, baseline normal. Possibly consumptive in setting of bleeding.   - stable at 150      Possible lateral ninth rib non-displaced fracture, likely subacute  Initially sustained fall ~3 weeks ago with bruising since then over right lateral chest wall. Previous XRs R elbow, scapula, humerus negative for fracture, but does not appear to have had CXR.   - denies pain          Consultations This Hospital Stay   GASTROENTEROLOGY IP CONSULT  OCCUPATIONAL THERAPY ADULT IP CONSULT  PHYSICAL THERAPY ADULT IP CONSULT  SPEECH LANGUAGE PATH ADULT IP CONSULT  NEUROSURGERY IP CONSULT  NEUROLOGY CRITICAL CARE ADULT IP CONSULT  SPIRITUAL HEALTH SERVICES IP CONSULT  NEUROSURGERY IP CONSULT  CARE MANAGEMENT / SOCIAL WORK IP CONSULT  PHYSICAL THERAPY ADULT IP CONSULT  OCCUPATIONAL THERAPY ADULT IP CONSULT  SPEECH LANGUAGE PATH ADULT IP CONSULT    Code Status   No CPR- Do NOT Intubate    Time Spent on this Encounter   I, Pradip Whalen DO, personally saw the patient today and spent greater than 30 minutes discharging this patient.       Pradip Whalen DO  [facility] NEUROSCIENCE UNIT  [address] [place] [address]  Phone: [phone]  ______________________________________________________________________    Physical Exam   Vital Signs: Temp: 97.8  F (36.6  C) Temp src: Oral BP: 125/74 Pulse: 100   Resp: 16 SpO2: 99 % O2 Device: None (Room air)    Weight: 131 lbs 9.83 oz  Constitutional: Well-appearing, NAD  Respiratory: Clear to auscultation bilaterally, good air movement, normal effort   Cardiovascular: RRR. No peripheral edema.   GI: Soft, non-tender, non-distended.  "No rebound tenderness or guarding.    Skin: Warm, dry.   Musculoskeletal: Ecchymosis over right lateral chest wall with tenderness to palpation.   Neurologic: Alert. Oriented to \"hospital\", not specific name. Not able to state date. Mild generalized tremor is chronic  Psychiatric: Normal affect, appropriate       Primary Care Physician   Kelley Haney    Discharge Orders      CT Head w/o Contrast     Primary Care - Care Coordination Referral      Adult Neurology  Referral      Adult Neurology  Referral      General info for SNF    Length of Stay Estimate: Short Term Care: Estimated # of Days <30  Condition at Discharge: Stable  Level of care:skilled   Rehabilitation Potential: Good  Admission H&P remains valid and up-to-date: Yes  Recent Chemotherapy: N/A  Use Nursing Home Standing Orders: Yes     Mantoux instructions    Give two-step Mantoux (PPD) Per Facility Policy Yes     Reason for your hospital stay    Traumatic intracranial hemorrhage causing seizure     Additional Discharge Instructions    1. Please avoid Over the counter pain medications except for acetaminophen (Brand Tylenol). Anything else will increase bleeding risk  2. Referral placed for neurology clinic appointment to determine length of time you need to take the Keppra (an antiseizure medication)     Activity - Up with nursing assistance     Follow Up and recommended labs and tests    Follow up with [facility] physician.  The following labs/tests are recommended: bmp in 1 week.    Follow-up Follow up in outpatient NSGY clinic in 1 month with head CT prior. Our schedulers will help coordinate.     Physical Therapy Adult Consult    Evaluate and treat as clinically indicated.    Reason:  fall     Occupational Therapy Adult Consult    Evaluate and treat as clinically indicated.    Reason:  fall     Speech Language Path Adult Consult    Evaluate and treat as clinically indicated.    Reason:  dysphagia     Diet    Follow this diet " upon discharge: Current Diet:Orders Placed This Encounter      Room Service      Regular Diet Adult       Significant Results and Procedures   Most Recent 3 CBC's:  Recent Labs   Lab Test 10/04/24  0609 10/03/24  1928 10/03/24  0712 10/02/24  2141 10/02/24  0549 10/01/24  1755 10/01/24  0509 09/30/24  1903 09/30/24  1113   WBC  --   --   --   --  5.4  --  5.7  --  4.4   HGB 9.8* 10.9* 11.3*   < > 9.9*  9.9*   < > 9.3*   < > 7.0*   MCV  --   --   --   --  101*  --  102*  --  108*   PLT  --   --   --   --  150  --  135*  --  129*    < > = values in this interval not displayed.     Most Recent 3 BMP's:  Recent Labs   Lab Test 10/04/24  0609 10/03/24  0712 10/02/24  0549 10/01/24  0509 09/30/24  1609 09/30/24  1113   NA  --   --  142 142  --  142   POTASSIUM 3.6 3.9 4.1 4.5  --  3.9   CHLORIDE  --   --  108* 108*  --  107   CO2  --   --  26 26  --  21*   BUN  --   --  21.3 27.4*  --  41.9*   CR  --   --  0.82 0.87  --  1.00*   ANIONGAP  --   --  8 8  --  14   DENIS  --   --  8.8 9.1  --  9.5   GLC  --   --  103* 90 122* 126*     Most Recent 2 LFT's:  Recent Labs   Lab Test 07/27/23  1228 04/25/23  1345   AST 27 25   ALT 11 12   ALKPHOS 38 60   BILITOTAL 0.3 0.2   ,   Results for orders placed or performed during the hospital encounter of 09/30/24   CT Head w/o Contrast     Value    Radiologist flags Acute intracranial hemorrhage (AA)    Narrative    CT SCAN OF THE HEAD WITHOUT CONTRAST   9/30/2024 11:23 AM     HISTORY: Code Stroke. Evaluate for potential thrombolysis and  thrombectomy. Fall. Seizures.    TECHNIQUE:  Axial images of the head and coronal reformations without  IV contrast material. Radiation dose for this scan was reduced using  automated exposure control, adjustment of the mA and/or kV according  to patient size, or iterative reconstruction technique.    COMPARISON: Brain MR 4/19/2024.    FINDINGS: Small-volume hyperdense subarachnoid hemorrhage in the left  frontal lobe. Thin adjacent 0.2 cm hyperdense  subdural hematoma.  Additional area of parenchymal contusions in the inferior left  temporal lobe with mild surrounding hypodense edema. There is probably  adjacent small amount of subarachnoid hemorrhage in the left temporal  lobe. No midline shift or herniation. Ventricular size is within  normal limits without evidence of hydrocephalus. Mild diffuse  parenchymal volume loss. Patchy periventricular white matter  hypodensities are nonspecific, but most likely related to chronic  microvascular ischemic disease.     Marked mucosal thickening in the right maxillary sinus and sphenoid  sinus with air fluid layers present.    Small amount of pneumocephalus in the inferior left temporal lobe  adjacent to the parenchymal contusion and just superior to the mastoid  air cells. This is concerning for a subtle tegmen mastoideum fracture.  Left mastoid air cells remain relatively clear.    Right posterior scalp soft tissue injury. No underlying calvarial  fracture.      Impression    IMPRESSION:     1. Parenchymal contusion in the inferior left temporal lobe with mild  surrounding edema. Subarachnoid hemorrhage in the left temporal lobe  and left frontal lobe. Thin 0.2 cm subdural hemorrhage along the left  lateral cerebral convexity.  2. No midline shift, herniation, or hydrocephalus.  3. Small amount of pneumocephalus in the inferior left temporal lobe  adjacent to the parenchymal contusions. This is just superior to the  left mastoid air cells which raises concern for a subtle fracture to  the left tegmen mastoideum. Left mastoid air cells are relatively  clear at this time.  4. Right posterior scalp soft tissue injury without underlying  calvarial fracture.  5. Diffuse parenchymal volume loss and white matter changes which are  most likely due to chronic microvascular ischemic disease.    [Critical Result: Acute intracranial hemorrhage]    Finding was identified on 9/30/2024 11:25 AM.     MARTHA JAVIER was contacted by  Dr. Silva on 9/30/2024 11:31 AM and  verbalized understanding of the critical result.      FRANCOIS SILVA MD         SYSTEM ID:  [id number]   CTA Head Neck with Contrast    Narrative    CT ANGIOGRAM OF THE HEAD AND NECK WITH CONTRAST  9/30/2024 11:33 AM     HISTORY: Code Stroke to evaluate for potential thrombolysis and  thrombectomy. Fall. Altered mental status.    TECHNIQUE:  CT angiography with an injection of 67 mL [id number]-370 IV  with scans through the head and neck. Images were transferred to a  separate 3-D workstation where multiplanar reformations and 3-D images  were created. Estimates of carotid stenoses are made relative to the  distal internal carotid artery diameters except as noted. Radiation  dose for this scan was reduced using automated exposure control,  adjustment of the mA and/or kV according to patient size, or iterative  reconstruction technique.      COMPARISON: None.     CT HEAD FINDINGS: Subarachnoid hemorrhage in the left frontal and  temporal lobes with thin subdural hemorrhage in the left cerebral  convexity and parenchymal contusion in the left temporal lobe. This is  better described on head CT.    CT ANGIOGRAM HEAD FINDINGS:  The major intracranial arteries including  the proximal branches of the anterior cerebral, middle cerebral, and  posterior cerebral arteries appear patent without vascular cutoff. No  aneurysm identified. No significant stenosis. Venous circulation is  unremarkable.     CT ANGIOGRAM NECK FINDINGS: Normal origin of the great vessels from  the aortic arch.     Right carotid artery: The right common and internal carotid arteries  are patent. Mild atherosclerotic disease at the carotid bifurcation  without stenosis.     Left carotid artery: The left common and internal carotid arteries are  patent. Mild atherosclerotic disease at the carotid bifurcation  without stenosis.     Vertebral arteries: Vertebral arteries are patent without evidence of  dissection. No  significant stenosis.     Other findings: Multilevel degenerative changes in the spine.       Impression    IMPRESSION:   1. Patent arteries in the neck without evidence of dissection. Mild  atherosclerotic disease in the carotid arteries bilaterally without  significant stenosis by NASCET criteria.  2. Patent proximal major intracranial arteries without vascular  cutoff. No significant stenosis. No aneurysm identified.     Results discussed with Liliya Elder at 11:48 AM on 9/30/2024.     FRANCOIS SILVA MD         SYSTEM ID:  [id number]   CT Cervical Spine w/o Contrast    Narrative    CT CERVICAL SPINE WITHOUT CONTRAST 9/30/2024 11:34 AM     HISTORY: Fall, hit right side of head.     TECHNIQUE: Axial images of the cervical spine were obtained without  intravenous contrast. Multiplanar reformations were performed.   Radiation dose for this scan was reduced using automated exposure  control, adjustment of the mA and/or kV according to patient size, or  iterative reconstruction technique.    COMPARISON: Cervical spine CT 12/7/2023.    FINDINGS: Cervical spine alignment is grossly within normal limits. No  acute lucent fracture line visualized. No significant loss of  vertebral body height. Severe sclerotic degenerative endplate changes  and loss of disc height at C5-C6, also present on prior. Moderate  degenerative endplate changes and loss of disc height at the other  levels. Facet hypertrophy throughout the cervical spine. Mild spinal  canal narrowing at C6-C7. Moderate neural foraminal narrowings at  C5-C6.    Visualized paraspinous tissues: Unremarkable.      Impression    IMPRESSION:   1. No evidence of acute fracture or subluxation in the cervical spine.  2. Degenerative changes in the cervical spine as described above.     FRANCOIS SILVA MD         SYSTEM ID:  [id number]   XR Chest Port 1 View    Narrative    CHEST ONE VIEW  9/30/2024 12:29 PM     HISTORY: Fall, bruising to the right lateral ribs    COMPARISON: CT  chest 12/7/2023      Impression    IMPRESSION: Possible right lateral ninth rib nondisplaced fracture. No  focal consolidation, pleural effusion or pneumothorax.  Cardiomediastinal silhouette is unremarkable.    CASSI KING MD         SYSTEM ID:  [id number]   CT Head w/o Contrast    Narrative    CT SCAN OF THE HEAD WITHOUT CONTRAST   9/30/2024 3:57 PM     HISTORY: SAH/SDH follow-up.    TECHNIQUE:  Axial images of the head and coronal reformations without  IV contrast material. Radiation dose for this scan was reduced using  automated exposure control, adjustment of the mA and/or kV according  to patient size, or iterative reconstruction technique.    COMPARISON: Head CT from earlier same day.    FINDINGS: Presumed parenchymal contusion in the inferior left temporal  lobe likely also with subarachnoid hemorrhage appears slightly  increased in size since 9/30/2024. Subarachnoid hemorrhage in the left  frontal lobe is slightly more conspicuous. Hyperdense extra-axial  hemorrhage along the left lateral cerebral convexity measures  approximately 0.2 cm in width and does not appear appreciably changed  since prior. No midline shift or herniation. Ventricular size is  within normal limits without evidence of hydrocephalus. Small focus of  pneumocephalus along the left lateral cerebral convexity which appears  to have slightly redistributed since head CT from earlier today.    Mucosal thickening in the visualized paranasal sinuses.      Impression    IMPRESSION:     1. Slight increase of parenchymal contusion with subarachnoid  hemorrhage in the inferior left temporal lobe and left frontal lobe  since head CT from earlier today. Thin hyperdense 0.2 cm extra-axial  hemorrhage along the left cerebral convexity appears fairly similar to  head CT from earlier today. No midline shift or herniation. No  hydrocephalus.  2. Small focus of pneumocephalus along the left lateral cerebral  convexity which appears slightly  changed in position since prior. This  remains concerning for occult calvarial fracture.    Results discussed with nurse Aakash Noel at 4:10 PM on 9/30/2024.    FRANCOIS SILVA MD         SYSTEM ID:  [id number]   CT Head w/o Contrast    Narrative    EXAM: CT HEAD W/O CONTRAST  LOCATION: [facility]  DATE: 9/30/2024    INDICATION: f u ICH  COMPARISON: CT 9/30/2024.  TECHNIQUE: Routine CT Head without IV contrast. Multiplanar reformats. Dose reduction techniques were used.    FINDINGS:  INTRACRANIAL CONTENTS: Stable and similar intraparenchymal hemorrhagic contusion left temporal lobe and adjacent minor subarachnoid hemorrhage. Stable small volume subarachnoid hemorrhage and adjacent extra-axial collection left frontal convexity,   unchanged from prior. No CT evidence of acute infarct. Mild presumed chronic small vessel ischemic changes. Mild generalized volume loss. No hydrocephalus.     VISUALIZED ORBITS/SINUSES/MASTOIDS: No intraorbital abnormality. Air-fluid level right maxillary sinus similar to prior. No middle ear or mastoid effusion.    BONES/SOFT TISSUES: No acute abnormality.      Impression    IMPRESSION:  1.  Stable multifocal intracranial hemorrhage, including left temporal intraparenchymal hematoma, small volume subarachnoid hemorrhage in the floor of the left middle cranial fossa, small volume subarachnoid hemorrhage in the left frontal lobe, thin   extra-axial collection left convexity.   XR Video Swallow with SLP or OT    Narrative    VIDEO SWALLOWING EVALUATION   10/1/2024 2:46 PM     HISTORY: further assess oropharyngeal swallow function    COMPARISON: None.    FLUOROSCOPY TIME: 1.8 minutes.  SPOT IMAGES OR CINE RUNS: 11      Impression    IMPRESSION:    Thin: Positive penetration. With chin tuck maneuver there was  persistent positive penetration.      Mildly thick: Positive penetration        Puree: No penetration or aspiration.    Semi-Solid: No penetration or  aspiration.    Please refer to dedicated speech pathology note.    This study only includes the cervical esophagus.    KIMI HASSAN MD         SYSTEM ID:  [id number]       Discharge Medications   Current Discharge Medication List        START taking these medications    Details   amLODIPine (NORVASC) 10 MG tablet Take 1 tablet (10 mg) by mouth daily.    Associated Diagnoses: Seizures (H)      levETIRAcetam (KEPPRA) 500 MG tablet Take 1 tablet (500 mg) by mouth 2 times daily.    Associated Diagnoses: Seizures (H)      valsartan (DIOVAN) 160 MG tablet Take 1 tablet (160 mg) by mouth daily.    Associated Diagnoses: Seizures (H)           CONTINUE these medications which have NOT CHANGED    Details   ARIPiprazole (ABILIFY) 5 MG tablet Take 1 tablet (5 mg) by mouth daily  Qty: 90 tablet, Refills: 3    Associated Diagnoses: Recurrent major depressive disorder, in full remission (H)      donepezil (ARICEPT) 10 MG tablet Take 10 mg by mouth daily      hydrOXYzine HCl (ATARAX) 10 MG tablet TAKE 1 TABLET (10 MG) BY MOUTH 3 TIMES DAILY AS NEEDED FOR ANXIETY  Qty: 30 tablet, Refills: 3    Associated Diagnoses: Anxiety      mirtazapine (REMERON) 7.5 MG tablet TAKE 1 TABLET (7.5 MG) BY MOUTH AT BEDTIME  Qty: 90 tablet, Refills: 0    Associated Diagnoses: Major depression in complete remission (H)      sertraline (ZOLOFT) 100 MG tablet TAKE 2 TABLETS (200 MG) BY MOUTH DAILY  Qty: 180 tablet, Refills: 0    Associated Diagnoses: Major depression in complete remission (H)           STOP taking these medications       amLODIPine-valsartan (EXFORGE) 5-160 MG tablet Comments:   Reason for Stopping:         buPROPion (WELLBUTRIN XL) 300 MG 24 hr tablet Comments:   Reason for Stopping:             Allergies   Allergies   Allergen Reactions    Acetaminophen GI Disturbance    Citalopram Unknown    Hydrocodone GI Disturbance    Levaquin [Levofloxacin Hemihydrate] Other (See Comments) and Rash     chest pain    Sulfa Antibiotics GI Disturbance

## 2024-10-05 NOTE — PLAN OF CARE
Reason for Admission: SAH, SDH, fall, seizure    Cognitive/Mentation: A/Ox 4  Neuros/CMS: Intact ex slight R face droop, BUE tremors, 4/5 strength. BLE 3/5 strength, BLE edema  VS: stable on RA. SBP<150. No PRNs required   /GI: Continent.    Pulmonary: LS clear.  Pain: generalized. PRN tylenol given x1     Skin: scattered bruising  Activity: Assist x 1 with GBW.  Diet: regular with thin liquids. Takes pills whole.     Discharge: today with patients son transporting. [facility] TCU at 1200. Discharge education and instructions provided, all questions answered.     Aggression Stoplight Tool: green

## 2024-10-05 NOTE — PROGRESS NOTES
Reason for Admission: SAH, SDH, seizures s/p fall  Cognitive/Mentation: A/Ox 4  Neuros/CMS: Stable w/ slight right droop, baseline BUE tremors BUE 4/5 BLE 3/5, UTP heel to shin due to weakness, Slight edema on LE  VS: VSS SBP<150.   Tele: nsr.  GI/: BS clear, Continent.  Pulmonary: LS clear.  Pain: denies.   Drains/Lines: PIV SL  Skin: scattered bruising, healing scab on the R knee  Activity: Assist x 1 with GBW.  Diet: regular with thin liquids. Takes pills whole.   Therapies recs: TCU,   Discharge: anticipated 10/5   Aggression Stoplight Tool: green   End of shift summary: Son Erasmo will transport patient to [facility] TCU today10/5 at 1200.  K draw at am.

## 2024-10-05 NOTE — PROGRESS NOTES
Care Management Discharge Note    Discharge Date: 10/05/2024       Discharge Disposition: Transitional Care    Discharge Services:  SNF    Discharge DME:  none    Discharge Transportation: family or friend will provide    Private pay costs discussed: Not applicable    Does the patient's insurance plan have a 3 day qualifying hospital stay waiver?  No    PAS Confirmation Code: [id number]  Patient/family educated on Medicare website which has current facility and service quality ratings:      Education Provided on the Discharge Plan: Yes  Persons Notified of Discharge Plans: HUC, Charge Nurse, MD, Facility, Family, Bedside Nurse  Patient/Family in Agreement with the Plan: yes    Handoff Referral Completed: No, handoff not indicated or clinically appropriate    Additional Information:  Writer spoke with facility and they are able to accept the patient today. Writer sent MD's orders to the facility at [phone]. Patient has transport from her son at 12. Patient does not need hard scripts sent to the facility. Patient has PAS completed by previous . Patient will discharge to [facility] today.     Eric Ahmadi MSW, TLGSW  [facility]  Care Management

## 2024-10-06 NOTE — PLAN OF CARE
"Occupational Therapy Discharge Summary    Reason for therapy discharge:    Discharged to transitional care facility.    Progress towards therapy goal(s). See goals on Care Plan in [facility] electronic health record for goal details.  Goals partially met.  Barriers to achieving goals:   discharge from facility.    Therapy recommendation(s):      Continued therapy is recommended.  Rationale/Recommendations:   .OT Rationale for DC Rec: Prior to admit, pt independent with ADL's and funcitonal mobility with 4ww, pt manages own meds and does some lt cooking microwave meals. pt has A with cleaning, laundry and does go to the \"Bistro at [facility] for a couple meals per week. Pt currently limited due to impaired balance, overall impaired strength, actiivty tolerance, pt does have baseline dementia per chart, pt Alert and oriented x 3, some difficulty following multi step commands with increased processing. Recommend TCU if discharge in today or tomorrow. Pending progress pt may be able to return home if has family A and.or [facility] staff A with all IADLs med mgmt, cooking, initial A with showering, etc and home RN, OT and PT. cont to monitor safety discharge rec.    Writing therapist did not treat pt, note written based on previous treating therapist notes and recommendations. Please see chart and flow sheet for additional details.    "

## 2024-10-07 ENCOUNTER — PATIENT OUTREACH (OUTPATIENT)
Dept: CARE COORDINATION | Facility: CLINIC | Age: 84
End: 2024-10-07

## 2024-10-07 ENCOUNTER — TRANSITIONAL CARE UNIT VISIT (OUTPATIENT)
Dept: GERIATRICS | Facility: CLINIC | Age: 84
End: 2024-10-07
Payer: COMMERCIAL

## 2024-10-07 VITALS
DIASTOLIC BLOOD PRESSURE: 72 MMHG | HEART RATE: 100 BPM | RESPIRATION RATE: 18 BRPM | BODY MASS INDEX: 23.08 KG/M2 | SYSTOLIC BLOOD PRESSURE: 141 MMHG | WEIGHT: 125.4 LBS | TEMPERATURE: 98.5 F | OXYGEN SATURATION: 91 % | HEIGHT: 62 IN

## 2024-10-07 DIAGNOSIS — S06.5XAA SDH (SUBDURAL HEMATOMA) (H): Primary | ICD-10-CM

## 2024-10-07 DIAGNOSIS — R13.10 DYSPHAGIA, UNSPECIFIED TYPE: ICD-10-CM

## 2024-10-07 DIAGNOSIS — S02.19XD CLOSED FRACTURE OF TEMPORAL BONE WITH ROUTINE HEALING, SUBSEQUENT ENCOUNTER: ICD-10-CM

## 2024-10-07 DIAGNOSIS — G31.84 MILD COGNITIVE IMPAIRMENT: ICD-10-CM
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
## 2024-10-07 DIAGNOSIS — I60.9 SAH (SUBARACHNOID HEMORRHAGE) (H): ICD-10-CM

## 2024-10-07 DIAGNOSIS — N18.31 STAGE 3A CHRONIC KIDNEY DISEASE (H): ICD-10-CM

## 2024-10-07 DIAGNOSIS — I10 BENIGN ESSENTIAL HYPERTENSION: ICD-10-CM

## 2024-10-07 DIAGNOSIS — R56.9 SEIZURES (H): ICD-10-CM

## 2024-10-07 DIAGNOSIS — G25.0 BENIGN ESSENTIAL TREMOR: ICD-10-CM

## 2024-10-07 DIAGNOSIS — D69.6 THROMBOCYTOPENIA (H): ICD-10-CM

## 2024-10-07 DIAGNOSIS — F32.9 MAJOR DEPRESSIVE DISORDER WITH CURRENT ACTIVE EPISODE, UNSPECIFIED DEPRESSION EPISODE SEVERITY, UNSPECIFIED WHETHER RECURRENT: ICD-10-CM

## 2024-10-07 DIAGNOSIS — R53.81 PHYSICAL DECONDITIONING: ICD-10-CM

## 2024-10-07 DIAGNOSIS — D64.9 ANEMIA, UNSPECIFIED TYPE: ICD-10-CM

## 2024-10-07 PROCEDURE — 99310 SBSQ NF CARE HIGH MDM 45: CPT | Performed by: NURSE PRACTITIONER

## 2024-10-07 NOTE — LETTER
[facility]   To:             Please give to facility    From:   Jailene Kumar  RN  Care Coordinator   [facility]   P: [phone]  [email or web address]   Patient Name:  Marcela Sandhu YOB: 1940   Admit date: 10/5/24      *Information Needed:  Please contact me when the patient will discharge (or if they will move to long term care)- include the discharge date, disposition, and main diagnosis   If the patient is discharged with home care services, please provide the name of the agency    Also- Please inform me if a care conference is being held.   Phone or Email with information

## 2024-10-07 NOTE — PLAN OF CARE
Physical Therapy Discharge Summary    Reason for therapy discharge:    Discharged to transitional care facility.    Progress towards therapy goal(s). See goals on Care Plan in [facility] electronic health record for goal details.  Goals not met.  Barriers to achieving goals:   discharge from facility.    Therapy recommendation(s):    Continued therapy is recommended.  Rationale/Recommendations:  To progress towards independence and safety with functional mobility.

## 2024-10-07 NOTE — PROGRESS NOTES
[facility]  PRIMARY CARE PROVIDER AND CLINIC:  Kelley Haney MD, [address] [place] [address] / [facility] [id number]  Chief Complaint   Patient presents with    Hospital F/U     [facility] Medical Record Number:  [id number]  Place of Service where encounter took place:  [facility] [place] ([facility]) [[id number]]    Marcela Sandhu  is a 84 year old  (1940), admitted to the above facility from  [facility]. Hospital stay 9/30/24 through 10/5/24..  Admitted to this facility for  rehab, medical management, and nursing care.     SDH (subdural hematoma) (H)  Closed fracture of temporal bone with routine healing, subsequent encounter  SAH (subarachnoid hemorrhage) (H)  Fall, subsequent encounter  Seizures (H)  Dysphagia, unspecified type  Physical deconditioning  Mild cognitive impairment  Major depressive disorder with current active episode, unspecified depression episode severity, unspecified whether recurrent  Benign essential tremor  Benign essential hypertension  Stage 3a chronic kidney disease (H)  Thrombocytopenia (H)  Anemia, unspecified type    HPI:    HPI information obtained from: facility chart records, facility staff, patient report, [facility] chart review, and Care Everywhere [facility] chart review.   Brief Summary of Hospital Course: pt had a fall in her AL setting.she lives at [facility] her at [facility] in [place].  She hit her head,  had a SAH, SDH, and left temporal bone fra ure: all on left side.  She also had a right posterior scalp hematoma.  Seizure was witnessed by the EMS team. She was seen by neurology and put on Keppra--no further sz. Had a Hgb drop  but a GIB was ruled out. Her dysphagia which is chronic had worsened as did her  cognition--she was florian confused. Of note, her Wellbutrin was dc'd due to sz. Plts also dropped but improved. Sent for rehab.    TODAY DURING EXAM/ROS:   states has a mild HA off and on left and right temporal  areas. Otherwise no pain.   Says wants to get up to eat now (Had declined getting up for therapy earlier today).  No CP, SOB, Cough, dizziness, fevers, chills, N/V, dysuria or Bowel Abnormalities. Appetite is fair.        CODE STATUS/ADVANCE DIRECTIVES DISCUSSION:   DNR / DNI  Patient's living condition: lives alone  ALLERGIES: Citalopram, Hydrocodone, Levaquin [levofloxacin hemihydrate], and Sulfa antibiotics  PAST MEDICAL HISTORY:  has a past medical history of Acid reflux disease, Benign essential hypertension, Benign essential tremor, Chronic kidney disease, stage III (moderate) (H), History of hepatitis C virus infection, MDD (major depressive disorder), Mild cognitive impairment, Osteopenia, Peripheral neuropathy, Personal history of malignant neoplasm of breast (2007), RA (rheumatoid arthritis) (H), and Restless legs syndrome (RLS).  PAST SURGICAL HISTORY:   has a past surgical history that includes Colonoscopy (10/18/2011); mastectomy, bilateral; Blepharoplasty bilateral; Sling bladder suspension with fascia jonathan; appendectomy; Bunionectomy chevron and jean bilateral, combined (12/02/2011); Spine surgery; Foot surgery (Bilateral); Arthroplasty knee (Right); Elbow surgery; carpal tunnel release rt/lt (Bilateral); Arthroplasty carpometacarpal (thumb joint) (Bilateral); and Esophagoscopy, gastroscopy, duodenoscopy (EGD), combined (N/A, 3/31/2023).  FAMILY HISTORY: family history includes Bone Cancer in her brother; Cerebrovascular Disease (age of onset: 60) in her brother; Hypertension in her father and mother; Multiple myeloma in her brother.  SOCIAL HISTORY:   reports that she has never smoked. She has never used smokeless tobacco. She reports that she does not drink alcohol and does not use drugs.    Post Discharge Medication Reconciliation Status: discharge medications reconciled and changed, per note/orders     Current Outpatient Medications   Medication Sig Dispense Refill    amLODIPine (NORVASC) 10 MG  "tablet Take 1 tablet (10 mg) by mouth daily.      ARIPiprazole (ABILIFY) 5 MG tablet Take 1 tablet (5 mg) by mouth daily 90 tablet 3    donepezil (ARICEPT) 10 MG tablet Take 10 mg by mouth daily      hydrOXYzine HCl (ATARAX) 10 MG tablet TAKE 1 TABLET (10 MG) BY MOUTH 3 TIMES DAILY AS NEEDED FOR ANXIETY 30 tablet 3    levETIRAcetam (KEPPRA) 500 MG tablet Take 1 tablet (500 mg) by mouth 2 times daily.      mirtazapine (REMERON) 7.5 MG tablet TAKE 1 TABLET (7.5 MG) BY MOUTH AT BEDTIME 90 tablet 0    sertraline (ZOLOFT) 100 MG tablet TAKE 2 TABLETS (200 MG) BY MOUTH DAILY 180 tablet 0    valsartan (DIOVAN) 160 MG tablet Take 1 tablet (160 mg) by mouth daily.           Vitals:  BP (!) 141/72   Pulse 100   Temp 98.5  F (36.9  C)   Resp 18   Ht 1.575 m (5' 2\")   Wt 56.9 kg (125 lb 6.4 oz)   LMP  (LMP Unknown)   SpO2 91%   BMI 22.94 kg/m    Exam:  GENERAL APPEARANCE:  Alert, in no distress, oriented, cooperative  ENT:  Mouth and posterior oropharynx normal, moist mucous membranes, normal hearing acuity  EYES:  EOM, conjunctivae, lids, pupils and irises normal  RESP:  respiratory effort and palpation of chest normal, lungs clear to auscultation , no respiratory distress  CV:  Palpation and auscultation of heart done , regular rate and rhythm, no murmur, rub, or gallop, no edema, +2 pedal pulses  ABDOMEN:  normal bowel sounds, soft, nontender, no hepatosplenomegaly or other masses  M/S:   HAYDEN equally, up with assist  SKIN:  Inspection of skin and subcutaneous tissue baseline  NEURO:   Cranial nerves 2-12 are normal tested and grossly at patient's baseline and has head tremors  PSYCH:  oriented X 3, memory impaired , affect and mood normal    Lab/Diagnostic data:  Recent Labs   Lab Test 10/05/24  0820 10/04/24  0609 10/02/24  0549 10/01/24  0509   NA  --   --  142 142   POTASSIUM 3.5 3.6 4.1 4.5   CHLORIDE  --   --  108* 108*   CO2  --   --  26 26   ANIONGAP  --   --  8 8   GLC  --   --  103* 90   BUN  --   --  21.3 " 27.4*   CR  --   --  0.82 0.87   DENIS  --   --  8.8 9.1    < > = values in this interval not displayed.   CBC RESULTS:   Recent Labs   Lab Test 10/04/24  0609 10/02/24  0549   WBC  --  5.4   RBC  --  3.02*   HGB 9.8* 9.9*  9.9*   HCT  --  30.6*   MCV  --  101*   MCH  --  32.8   MCHC  --  32.4   RDW  --  14.6   PLT  --  150    < > = values in this interval not displayed.     ASSESSMENT / PLAN:  (S06.5XAA) SDH (subdural hematoma) (H)  (primary encounter diagnosis)  (S02.19XD) Closed fracture of temporal bone with routine healing, subsequent encounter  (I60.9) SAH (subarachnoid hemorrhage) (H)  (W19.XXXD) Fall, subsequent encounter  (R56.9) Seizures (H)  Comment: Keppra until seen by neuro and neurosurgery-- F/u neuro per their recs, control SBP--monitor.    (R13.10) Dysphagia, unspecified type  Comment: diet per SLP, monitor.    (R53.81) Physical deconditioning  Comment: PT OT SLP    (G31.84) Mild cognitive impairment  (F32.9) Major depressive disorder with current active episode, unspecified depression episode severity, unspecified whether recurrent  (G25.0) Benign essential tremor  Comment: cont PTA meds except Wellbutrin dc'd.   Monitor.    (I10) Benign essential hypertension  (N18.31) Stage 3a chronic kidney disease (H)  Comment: valsartan, Norvasc, monitor, and hold dif SBP <110, bmp on 10/10    (D69.6) Thrombocytopenia (H)  (D64.9) Anemia, unspecified type  Comment: Hgb and plts on 10/10      Total time spent with patient visit at the skilled nursing facility was 50 including patient visit, review of past records, and d/w son and family in room. Greater than 50% of total time spent with counseling and coordinating care.     Electronically signed by:  DARIO De CNP

## 2024-10-08 ENCOUNTER — LAB REQUISITION (OUTPATIENT)
Dept: LAB | Facility: CLINIC | Age: 84
End: 2024-10-08
Payer: COMMERCIAL

## 2024-10-08 DIAGNOSIS — D64.9 ANEMIA, UNSPECIFIED: ICD-10-CM

## 2024-10-08 DIAGNOSIS — I10 ESSENTIAL (PRIMARY) HYPERTENSION: ICD-10-CM

## 2024-10-08 DIAGNOSIS — D69.6 THROMBOCYTOPENIA, UNSPECIFIED (H): ICD-10-CM

## 2024-10-09 ENCOUNTER — DOCUMENTATION ONLY (OUTPATIENT)
Dept: OTHER | Facility: CLINIC | Age: 84
End: 2024-10-09
Payer: COMMERCIAL

## 2024-10-10 ENCOUNTER — LAB REQUISITION (OUTPATIENT)
Dept: LAB | Facility: CLINIC | Age: 84
End: 2024-10-10
Payer: COMMERCIAL

## 2024-10-10 ENCOUNTER — TRANSITIONAL CARE UNIT VISIT (OUTPATIENT)
Dept: GERIATRICS | Facility: CLINIC | Age: 84
End: 2024-10-10
Payer: COMMERCIAL

## 2024-10-10 VITALS
HEIGHT: 62 IN | BODY MASS INDEX: 23.08 KG/M2 | TEMPERATURE: 98.2 F | OXYGEN SATURATION: 98 % | RESPIRATION RATE: 18 BRPM | DIASTOLIC BLOOD PRESSURE: 74 MMHG | SYSTOLIC BLOOD PRESSURE: 162 MMHG | WEIGHT: 125.4 LBS | HEART RATE: 106 BPM

## 2024-10-10 DIAGNOSIS — I10 BENIGN ESSENTIAL HYPERTENSION: ICD-10-CM

## 2024-10-10 DIAGNOSIS — G25.0 BENIGN ESSENTIAL TREMOR: ICD-10-CM

## 2024-10-10 DIAGNOSIS — R56.9 SEIZURES (H): ICD-10-CM

## 2024-10-10 DIAGNOSIS — F33.42 RECURRENT MAJOR DEPRESSIVE DISORDER, IN FULL REMISSION (H): ICD-10-CM
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
## 2024-10-10 DIAGNOSIS — R41.89 COGNITIVE IMPAIRMENT: ICD-10-CM

## 2024-10-10 DIAGNOSIS — I10 ESSENTIAL (PRIMARY) HYPERTENSION: ICD-10-CM

## 2024-10-10 DIAGNOSIS — R13.10 DYSPHAGIA, UNSPECIFIED TYPE: ICD-10-CM

## 2024-10-10 DIAGNOSIS — D64.9 ANEMIA, UNSPECIFIED TYPE: ICD-10-CM

## 2024-10-10 DIAGNOSIS — I60.9 SAH (SUBARACHNOID HEMORRHAGE) (H): ICD-10-CM

## 2024-10-10 DIAGNOSIS — S06.5XAA SDH (SUBDURAL HEMATOMA) (H): Primary | ICD-10-CM

## 2024-10-10 DIAGNOSIS — D64.9 ANEMIA, UNSPECIFIED: ICD-10-CM

## 2024-10-10 DIAGNOSIS — R53.81 PHYSICAL DECONDITIONING: ICD-10-CM

## 2024-10-10 DIAGNOSIS — F32.9 MAJOR DEPRESSIVE DISORDER WITH CURRENT ACTIVE EPISODE, UNSPECIFIED DEPRESSION EPISODE SEVERITY, UNSPECIFIED WHETHER RECURRENT: ICD-10-CM

## 2024-10-10 LAB
ANION GAP SERPL CALCULATED.3IONS-SCNC: 8 MMOL/L (ref 7–15)
BUN SERPL-MCNC: 38 MG/DL (ref 8–23)
CALCIUM SERPL-MCNC: 9.3 MG/DL (ref 8.8–10.4)
CHLORIDE SERPL-SCNC: 108 MMOL/L (ref 98–107)
CREAT SERPL-MCNC: 0.84 MG/DL (ref 0.51–0.95)
EGFRCR SERPLBLD CKD-EPI 2021: 69 ML/MIN/1.73M2
GLUCOSE SERPL-MCNC: 106 MG/DL (ref 70–99)
HCO3 SERPL-SCNC: 28 MMOL/L (ref 22–29)
HGB BLD-MCNC: 8.8 G/DL (ref 11.7–15.7)
PLATELET # BLD AUTO: 286 10E3/UL (ref 150–450)
POTASSIUM SERPL-SCNC: 3.7 MMOL/L (ref 3.4–5.3)
SODIUM SERPL-SCNC: 144 MMOL/L (ref 135–145)

## 2024-10-10 PROCEDURE — 36415 COLL VENOUS BLD VENIPUNCTURE: CPT | Mod: ORL | Performed by: NURSE PRACTITIONER

## 2024-10-10 PROCEDURE — P9604 ONE-WAY ALLOW PRORATED TRIP: HCPCS | Mod: ORL | Performed by: NURSE PRACTITIONER

## 2024-10-10 PROCEDURE — 85049 AUTOMATED PLATELET COUNT: CPT | Mod: ORL | Performed by: NURSE PRACTITIONER

## 2024-10-10 PROCEDURE — 99305 1ST NF CARE MODERATE MDM 35: CPT | Performed by: INTERNAL MEDICINE

## 2024-10-10 PROCEDURE — 85018 HEMOGLOBIN: CPT | Mod: ORL | Performed by: NURSE PRACTITIONER

## 2024-10-10 PROCEDURE — 80048 BASIC METABOLIC PNL TOTAL CA: CPT | Mod: ORL | Performed by: NURSE PRACTITIONER

## 2024-10-10 NOTE — PROGRESS NOTES
"Marcela Sandhu is a 84 year old female seen October 10, 2024 at [facility]  where she was admitted after [facility] hospitalization 9/30-10/5 following a fall in her AL.   On the morning of admission pt had epistaxis, then vomited up some blood.   After that she was ambulating to exercise class and took a hard fall, hitting the right side of her head.   Had a tonic-clonic seizure that was witnessed by EMS.   Imaging in the ED showed SDH, left temporal lobe parenchymal contusion with associated edema, and SAH left temporal and frontal lobes     Pt had had a previous fall on 9/4, fell onto her left side when opening the blinds in her apartment.  Seen in the ED, contusion of upper back.       Pt is seen in her room up to chair with her sister Hillary present   Doesn't give much history of her own   She does say no to any headache, N/V or CNS symptoms    Has arthritis and \"Chronic pain from 20 surgeries.\"         Cognitive impairment preceded these falls, had Neuropsychiatric testing in 2016 that showed unspecified neurocognitive disorder.   She has been on donepezil since Neuropsychiatric testing in April 2022.   Also on three medications for depression   Sister states pt has had trouble swallowing and lost 40 lbs over the past year  (although by EPIC record weight has been fairly stable)    Seen later with son Jeromy present and he provides more history   Pt eating her lunch with good appetite at this time.   Jeromy notes a gradual cognitive and physical decline over past year, but still functioning okay in her apartment    Goal would be to return there.        Past Medical History:   Diagnosis Date    Acid reflux disease     Benign essential hypertension     Benign essential tremor     Chronic kidney disease, stage III (moderate) (H)     History of hepatitis C virus infection     treated with interferon    MDD (major depressive disorder)     Mild cognitive impairment     Osteopenia     Peripheral " neuropathy     Personal history of malignant neoplasm of breast 2007    RA (rheumatoid arthritis) (H)     Restless legs syndrome (RLS)        Past Surgical History:   Procedure Laterality Date    APPENDECTOMY      ARTHROPLASTY CARPOMETACARPAL (THUMB JOINT) Bilateral     ARTHROPLASTY KNEE Right     BLEPHAROPLASTY BILATERAL      BUNIONECTOMY CHEVRON AND PEBBLES BILATERAL, COMBINED  12/02/2011    Procedure:COMBINED BUNIONECTOMY CHEVRON AND PEBBLES BILATERAL; BILATERAL THIRD AND FOURTH CLAWTOE RECONSTRUCTION WITH EXOSTECTOMY, LEFT FIRST TARSOMETATARSAL JOINT and 2nd toe Right foot reconstruction; Surgeon:REMBERTO FARMER; Location: OR    CARPAL TUNNEL RELEASE RT/LT Bilateral     COLONOSCOPY  10/18/2011    Procedure:COLONOSCOPY; COLONOSCOPY; Surgeon:BRENDA RODRIGUEZ; Location: GI    ELBOW SURGERY      tendon lengthening surgery    ESOPHAGOSCOPY, GASTROSCOPY, DUODENOSCOPY (EGD), COMBINED N/A 3/31/2023    Procedure: Esophagoscopy, gastroscopy, duodenoscopy (EGD), combined;  Surgeon: Remberto Aguero MD;  Location:  GI    FOOT SURGERY Bilateral     toe straightening    MASTECTOMY, BILATERAL      SLING BLADDER SUSPENSION WITH FASCIA ZACH      SPINE SURGERY      multiple lumbar surgeries; most hardware removed       Family History   Problem Relation Age of Onset    Hypertension Mother     Hypertension Father     Multiple myeloma Brother     Bone Cancer Brother     Cerebrovascular Disease Brother 60    Diabetes No family hx of     Myocardial Infarction No family hx of     Breast Cancer No family hx of     Ovarian Cancer No family hx of     Colon Cancer No family hx of        Social History     Tobacco Use    Smoking status: Never    Smokeless tobacco: Never   Substance Use Topics    Alcohol use: No      SH:  for many years.   Lives alone, AL apartment at [facility], with minimal services   Has been there 9 years     Son Jeromy and daughter-in-law Ebony live in [place]      She has 4 sisters    Hillary lives in  "[place]   She and Hillary  brothers   Pt worked in factory /manufacturing positions    Review Of Systems  Skin: negative   Eyes: impaired vision, glasses  Ears/Nose/Throat: hearing loss  Respiratory: No shortness of breath, dyspnea on exertion, cough, or hemoptysis  Cardiovascular: negative  Gastrointestinal: poor appetite  Genitourinary: negative  Musculoskeletal: Uses a 4WW to all destinations at baseline   Now mod-max assist with dressing and hygiene, ambulates 60' with 4WW and CGA     Neurologic: cognitive impairment preceded her fall   In TCU [facility] 6/30       Psychiatric: anxiety and depression   Hematologic/Lymphatic/Immunologic: anemia and bleeding disorder  Endocrine: negative   Weight in 2022 was 158 lbs >>>2023 was 129 lbs   Wt Readings from Last 5 Encounters:   10/10/24 56.9 kg (125 lb 6.4 oz)   10/07/24 56.9 kg (125 lb 6.4 oz)   10/01/24 59.7 kg (131 lb 9.8 oz)   09/23/24 56.3 kg (124 lb 1.6 oz)   09/04/24 57.6 kg (127 lb)      GENERAL APPEARANCE: frail, alert and no distress  BP (!) 162/74   Pulse 106   Temp 98.2  F (36.8  C)   Resp 18   Ht 1.575 m (5' 2\")   Wt 56.9 kg (125 lb 6.4 oz)   LMP  (LMP Unknown)   SpO2 98%   BMI 22.94 kg/m     HEENT: normocephalic, no lesion or abnormalities  NECK: no adenopathy, no asymmetry, masses, or scars and thyroid normal to palpation  RESP: lungs clear to auscultation - no rales, rhonchi or wheezes  CV: regular rate and rhythm, normal S1 S2  ABDOMEN:  soft, nontender, no HSM or masses and bowel sounds normal  MS: extremities with trace edema    SKIN: no suspicious lesions or rashes  NEURO: slow verbal response, +dysphonia   Bilateral hand tremors, leaning right in her chair     PSYCH: affect okay  LYMPHATICS: No cervical,  or supraclavicular nodes     Lab Results   Component Value Date     10/10/2024    POTASSIUM 3.7 10/10/2024    CHLORIDE 108 (H) 10/10/2024    CO2 28 10/10/2024    ANIONGAP 8 10/10/2024     (H) 10/10/2024    BUN 38.0 (H) " 10/10/2024    CR 0.84 10/10/2024    GFRESTIMATED 69 10/10/2024    DENIS 9.3 10/10/2024     Lab Results   Component Value Date    WBC 5.4 10/02/2024      HGB 8.8 10/10/2024       10/02/2024       10/10/2024      CT HEAD W/O CONTRAST   9/30/2024  INTRACRANIAL CONTENTS: Stable and similar intraparenchymal hemorrhagic contusion left temporal lobe and adjacent minor subarachnoid hemorrhage. Stable small volume subarachnoid hemorrhage and adjacent extra-axial collection left frontal convexity, unchanged from prior. No CT evidence of acute infarct. Mild presumed chronic small vessel ischemic changes.   Mild generalized volume loss No hydrocephalus.    IMPRESSION:  1.  Stable multifocal intracranial hemorrhage, including left temporal intraparenchymal hematoma, small volume subarachnoid hemorrhage in the floor of the left middle cranial fossa, small volume subarachnoid hemorrhage in the left frontal lobe, thin extra-axial collection left convexity.      IMP/PLAN:   Decrease donepezil to 5 mg/day   Discontinue aripiprazole    Reviewed with pt's son Jeromy who agrees with this plan     (S06.5XAA) SDH (subdural hematoma) (H)   (I60.9) SAH (subarachnoid hemorrhage) (H)  Comment: as above   Plan: continue to monitor     (W19.XXXD) Fall, subsequent encounter  (R53.81) Physical deconditioning  Comment: well below her baseline since the head injury   Plan: PHYSICAL THERAPY /OCCUPATIONAL THERAPY for strengthening, balance, gait, ADLs   Discharge goal is return to her AL apartment with services and family support     (R56.9) Seizures (H)  Comment: related to above   Plan: Keppra 500 mg bid   Follow up with Neurology     (R13.10) Dysphagia, unspecified type  Comment: persistent   Plan: modified diet   Speech therapy eval and treat      (R41.89) Cognitive impairment  Comment: has been on donepezil for some time, now with diarrhea, fecal incontinence and weight loss   Plan: decrease donepezil to 5 mg/day   If no significant  change will discontinue     Reviewed with son Jeromy, who is agreeable     (F32.9) Major depressive disorder with current active episode, unspecified depression episode severity, unspecified whether recurrent  Comment: has been fairly intractable, may be some element of dementia's apathy   Plan: reviewed with son, given falls and confusion will discontinue aripiprazole and monitor mood     Continue mirtazapine 7.5 mg/HS and sertraline 200 mg/day     (G25.0) Benign essential tremor  Comment: present many years preceding this fall   Plan: may be worsened by aripiprazole   Will discontinue now and follow closely     HTN  Comment: variable BPs   Plan: amlodipine 10 mg/day and valsartan 160 mg/day   Follow bps and BMP      (D64.9) Anemia, unspecified type  Comment: epistaxis and hematemesis resolved   Plan: follow hgb   Consider PPI if drops again      Meliza Bowens MD

## 2024-10-10 NOTE — LETTER
" 10/10/2024      Marcela Sandhu  [address]  [facility] [address]        Marcela Sandhu is a 84 year old female seen October 10, 2024 at [facility]  where she was admitted after [facility] hospitalization 9/30-10/5 following a fall in her AL.   On the morning of admission pt had epistaxis, then vomited up some blood.   After that she was ambulating to exercise class and took a hard fall, hitting the right side of her head.   Had a tonic-clonic seizure that was witnessed by EMS.   Imaging in the ED showed SDH, left temporal lobe parenchymal contusion with associated edema, and SAH left temporal and frontal lobes     Pt had had a previous fall on 9/4, fell onto her left side when opening the blinds in her apartment.  Seen in the ED, contusion of upper back.       Pt is seen in her room up to chair with her sister Hillary present   Doesn't give much history of her own   She does say no to any headache, N/V or CNS symptoms    Has arthritis and \"Chronic pain from 20 surgeries.\"         Cognitive impairment preceded these falls, had Neuropsychiatric testing in 2016 that showed unspecified neurocognitive disorder.   She has been on donepezil since Neuropsychiatric testing in April 2022.   Also on three medications for depression   Sister states pt has had trouble swallowing and lost 40 lbs over the past year  (although by EPIC record weight has been fairly stable)    Seen later with son Jeromy present and he provides more history   Pt eating her lunch with good appetite at this time.   Jeromy notes a gradual cognitive and physical decline over past year, but still functioning okay in her apartment    Goal would be to return there.        Past Medical History:   Diagnosis Date     Acid reflux disease      Benign essential hypertension      Benign essential tremor      Chronic kidney disease, stage III (moderate) (H)      History of hepatitis C virus infection     treated with interferon "     MDD (major depressive disorder)      Mild cognitive impairment      Osteopenia      Peripheral neuropathy      Personal history of malignant neoplasm of breast 2007     RA (rheumatoid arthritis) (H)      Restless legs syndrome (RLS)        Past Surgical History:   Procedure Laterality Date     APPENDECTOMY       ARTHROPLASTY CARPOMETACARPAL (THUMB JOINT) Bilateral      ARTHROPLASTY KNEE Right      BLEPHAROPLASTY BILATERAL       BUNIONECTOMY CHEVRON AND PEBBLES BILATERAL, COMBINED  12/02/2011    Procedure:COMBINED BUNIONECTOMY CHEVRON AND PEBBLES BILATERAL; BILATERAL THIRD AND FOURTH CLAWTOE RECONSTRUCTION WITH EXOSTECTOMY, LEFT FIRST TARSOMETATARSAL JOINT and 2nd toe Right foot reconstruction; Surgeon:REMBERTO FARMER; Location: OR     CARPAL TUNNEL RELEASE RT/LT Bilateral      COLONOSCOPY  10/18/2011    Procedure:COLONOSCOPY; COLONOSCOPY; Surgeon:BRENDA RODRIGUEZ; Location: GI     ELBOW SURGERY      tendon lengthening surgery     ESOPHAGOSCOPY, GASTROSCOPY, DUODENOSCOPY (EGD), COMBINED N/A 3/31/2023    Procedure: Esophagoscopy, gastroscopy, duodenoscopy (EGD), combined;  Surgeon: Remberto Aguero MD;  Location:  GI     FOOT SURGERY Bilateral     toe straightening     MASTECTOMY, BILATERAL       SLING BLADDER SUSPENSION WITH FASCIA ZACH       SPINE SURGERY      multiple lumbar surgeries; most hardware removed       Family History   Problem Relation Age of Onset     Hypertension Mother      Hypertension Father      Multiple myeloma Brother      Bone Cancer Brother      Cerebrovascular Disease Brother 60     Diabetes No family hx of      Myocardial Infarction No family hx of      Breast Cancer No family hx of      Ovarian Cancer No family hx of      Colon Cancer No family hx of        Social History     Tobacco Use     Smoking status: Never     Smokeless tobacco: Never   Substance Use Topics     Alcohol use: No      SH:  for many years.   Lives alone, AL apartment at [facility], with minimal  "services   Has been there 9 years     Son Jeromy and daughter-in-law Ebony live in [place]      She has 4 sisters    Hillary lives in [place]   She and Hillary  brothers   Pt worked in factory /manufacturing positions    Review Of Systems  Skin: negative   Eyes: impaired vision, glasses  Ears/Nose/Throat: hearing loss  Respiratory: No shortness of breath, dyspnea on exertion, cough, or hemoptysis  Cardiovascular: negative  Gastrointestinal: poor appetite  Genitourinary: negative  Musculoskeletal: Uses a 4WW to all destinations at baseline   Now mod-max assist with dressing and hygiene, ambulates 60' with 4WW and CGA     Neurologic: cognitive impairment preceded her fall   In TCU SLUMS 6/30       Psychiatric: anxiety and depression   Hematologic/Lymphatic/Immunologic: anemia and bleeding disorder  Endocrine: negative   Weight in 2022 was 158 lbs >>>2023 was 129 lbs   Wt Readings from Last 5 Encounters:   10/10/24 56.9 kg (125 lb 6.4 oz)   10/07/24 56.9 kg (125 lb 6.4 oz)   10/01/24 59.7 kg (131 lb 9.8 oz)   09/23/24 56.3 kg (124 lb 1.6 oz)   09/04/24 57.6 kg (127 lb)      GENERAL APPEARANCE: frail, alert and no distress  BP (!) 162/74   Pulse 106   Temp 98.2  F (36.8  C)   Resp 18   Ht 1.575 m (5' 2\")   Wt 56.9 kg (125 lb 6.4 oz)   LMP  (LMP Unknown)   SpO2 98%   BMI 22.94 kg/m     HEENT: normocephalic, no lesion or abnormalities  NECK: no adenopathy, no asymmetry, masses, or scars and thyroid normal to palpation  RESP: lungs clear to auscultation - no rales, rhonchi or wheezes  CV: regular rate and rhythm, normal S1 S2  ABDOMEN:  soft, nontender, no HSM or masses and bowel sounds normal  MS: extremities with trace edema    SKIN: no suspicious lesions or rashes  NEURO: slow verbal response, +dysphonia   Bilateral hand tremors, leaning right in her chair     PSYCH: affect okay  LYMPHATICS: No cervical,  or supraclavicular nodes     Lab Results   Component Value Date     10/10/2024    POTASSIUM 3.7 " 10/10/2024    CHLORIDE 108 (H) 10/10/2024    CO2 28 10/10/2024    ANIONGAP 8 10/10/2024     (H) 10/10/2024    BUN 38.0 (H) 10/10/2024    CR 0.84 10/10/2024    GFRESTIMATED 69 10/10/2024    DENIS 9.3 10/10/2024     Lab Results   Component Value Date    WBC 5.4 10/02/2024      HGB 8.8 10/10/2024       10/02/2024       10/10/2024      CT HEAD W/O CONTRAST   9/30/2024  INTRACRANIAL CONTENTS: Stable and similar intraparenchymal hemorrhagic contusion left temporal lobe and adjacent minor subarachnoid hemorrhage. Stable small volume subarachnoid hemorrhage and adjacent extra-axial collection left frontal convexity, unchanged from prior. No CT evidence of acute infarct. Mild presumed chronic small vessel ischemic changes.   Mild generalized volume loss No hydrocephalus.    IMPRESSION:  1.  Stable multifocal intracranial hemorrhage, including left temporal intraparenchymal hematoma, small volume subarachnoid hemorrhage in the floor of the left middle cranial fossa, small volume subarachnoid hemorrhage in the left frontal lobe, thin extra-axial collection left convexity.      IMP/PLAN:   Decrease donepezil to 5 mg/day   Discontinue aripiprazole    Reviewed with pt's son Jeromy who agrees with this plan     (S06.5XAA) SDH (subdural hematoma) (H)   (I60.9) SAH (subarachnoid hemorrhage) (H)  Comment: as above   Plan: continue to monitor     (W19.XXXD) Fall, subsequent encounter  (R53.81) Physical deconditioning  Comment: well below her baseline since the head injury   Plan: PHYSICAL THERAPY /OCCUPATIONAL THERAPY for strengthening, balance, gait, ADLs   Discharge goal is return to her AL apartment with services and family support     (R56.9) Seizures (H)  Comment: related to above   Plan: Keppra 500 mg bid   Follow up with Neurology     (R13.10) Dysphagia, unspecified type  Comment: persistent   Plan: modified diet   Speech therapy eval and treat      (R41.89) Cognitive impairment  Comment: has been on  donepezil for some time, now with diarrhea, fecal incontinence and weight loss   Plan: decrease donepezil to 5 mg/day   If no significant change will discontinue     Reviewed with son Jeromy, who is agreeable     (F32.9) Major depressive disorder with current active episode, unspecified depression episode severity, unspecified whether recurrent  Comment: has been fairly intractable, may be some element of dementia's apathy   Plan: reviewed with son, given falls and confusion will discontinue aripiprazole and monitor mood     Continue mirtazapine 7.5 mg/HS and sertraline 200 mg/day     (G25.0) Benign essential tremor  Comment: present many years preceding this fall   Plan: may be worsened by aripiprazole   Will discontinue now and follow closely     HTN  Comment: variable BPs   Plan: amlodipine 10 mg/day and valsartan 160 mg/day   Follow bps and BMP      (D64.9) Anemia, unspecified type  Comment: epistaxis and hematemesis resolved   Plan: follow hgb   Consider PPI if drops again      Meliza Bowens MD       Sincerely,        Meliza Bowens MD

## 2024-10-14 ENCOUNTER — TRANSITIONAL CARE UNIT VISIT (OUTPATIENT)
Dept: GERIATRICS | Facility: CLINIC | Age: 84
End: 2024-10-14
Payer: COMMERCIAL

## 2024-10-14 ENCOUNTER — TELEPHONE (OUTPATIENT)
Dept: GERIATRICS | Facility: CLINIC | Age: 84
End: 2024-10-14

## 2024-10-14 ENCOUNTER — LAB REQUISITION (OUTPATIENT)
Dept: LAB | Facility: CLINIC | Age: 84
End: 2024-10-14
Payer: COMMERCIAL

## 2024-10-14 VITALS
HEART RATE: 110 BPM | OXYGEN SATURATION: 95 % | TEMPERATURE: 98 F | WEIGHT: 120.6 LBS | SYSTOLIC BLOOD PRESSURE: 131 MMHG | DIASTOLIC BLOOD PRESSURE: 78 MMHG | BODY MASS INDEX: 22.19 KG/M2 | HEIGHT: 62 IN | RESPIRATION RATE: 18 BRPM

## 2024-10-14 DIAGNOSIS — R09.02 HYPOXIA: ICD-10-CM

## 2024-10-14 DIAGNOSIS — B37.0 THRUSH: ICD-10-CM

## 2024-10-14 DIAGNOSIS — M62.81 GENERALIZED MUSCLE WEAKNESS: Primary | ICD-10-CM

## 2024-10-14 DIAGNOSIS — I10 BENIGN ESSENTIAL HYPERTENSION: ICD-10-CM

## 2024-10-14 DIAGNOSIS — R13.10 DYSPHAGIA, UNSPECIFIED TYPE: ICD-10-CM

## 2024-10-14 DIAGNOSIS — I60.9 SAH (SUBARACHNOID HEMORRHAGE) (H): ICD-10-CM

## 2024-10-14 DIAGNOSIS — S06.5XAA SDH (SUBDURAL HEMATOMA) (H): ICD-10-CM

## 2024-10-14 DIAGNOSIS — R56.9 SEIZURES (H): ICD-10-CM

## 2024-10-14 DIAGNOSIS — R53.1 WEAKNESS: ICD-10-CM

## 2024-10-14 LAB
ALBUMIN UR-MCNC: 100 MG/DL
ANION GAP SERPL CALCULATED.3IONS-SCNC: 13 MMOL/L (ref 7–15)
APPEARANCE UR: ABNORMAL
BASOPHILS # BLD AUTO: 0.1 10E3/UL (ref 0–0.2)
BASOPHILS NFR BLD AUTO: 0 %
BILIRUB UR QL STRIP: NEGATIVE
BUN SERPL-MCNC: 36.5 MG/DL (ref 8–23)
CALCIUM SERPL-MCNC: 9.5 MG/DL (ref 8.8–10.4)
CHLORIDE SERPL-SCNC: 103 MMOL/L (ref 98–107)
COLOR UR AUTO: ABNORMAL
CREAT SERPL-MCNC: 1.06 MG/DL (ref 0.51–0.95)
EGFRCR SERPLBLD CKD-EPI 2021: 52 ML/MIN/1.73M2
EOSINOPHIL # BLD AUTO: 0 10E3/UL (ref 0–0.7)
EOSINOPHIL NFR BLD AUTO: 0 %
ERYTHROCYTE [DISTWIDTH] IN BLOOD BY AUTOMATED COUNT: 13.5 % (ref 10–15)
GLUCOSE SERPL-MCNC: 117 MG/DL (ref 70–99)
GLUCOSE UR STRIP-MCNC: NEGATIVE MG/DL
HCO3 SERPL-SCNC: 26 MMOL/L (ref 22–29)
HCT VFR BLD AUTO: 31.6 % (ref 35–47)
HGB BLD-MCNC: 9.8 G/DL (ref 11.7–15.7)
HGB UR QL STRIP: ABNORMAL
HYALINE CASTS: 10 /LPF
IMM GRANULOCYTES # BLD: 0.1 10E3/UL
IMM GRANULOCYTES NFR BLD: 0 %
KETONES UR STRIP-MCNC: NEGATIVE MG/DL
LEUKOCYTE ESTERASE UR QL STRIP: ABNORMAL
LYMPHOCYTES # BLD AUTO: 0.9 10E3/UL (ref 0.8–5.3)
LYMPHOCYTES NFR BLD AUTO: 7 %
MCH RBC QN AUTO: 32.6 PG (ref 26.5–33)
MCHC RBC AUTO-ENTMCNC: 31 G/DL (ref 31.5–36.5)
MCV RBC AUTO: 105 FL (ref 78–100)
MONOCYTES # BLD AUTO: 0.9 10E3/UL (ref 0–1.3)
MONOCYTES NFR BLD AUTO: 7 %
NEUTROPHILS # BLD AUTO: 9.9 10E3/UL (ref 1.6–8.3)
NEUTROPHILS NFR BLD AUTO: 84 %
NITRATE UR QL: NEGATIVE
NRBC # BLD AUTO: 0 10E3/UL
NRBC BLD AUTO-RTO: 0 /100
PH UR STRIP: 6.5 [PH] (ref 5–7)
PLATELET # BLD AUTO: 372 10E3/UL (ref 150–450)
POTASSIUM SERPL-SCNC: 4.3 MMOL/L (ref 3.4–5.3)
RBC # BLD AUTO: 3.01 10E6/UL (ref 3.8–5.2)
RBC URINE: 8 /HPF
SODIUM SERPL-SCNC: 142 MMOL/L (ref 135–145)
SP GR UR STRIP: 1.02 (ref 1–1.03)
TRANSITIONAL EPI: 2 /HPF
UROBILINOGEN UR STRIP-MCNC: NORMAL MG/DL
WBC # BLD AUTO: 11.7 10E3/UL (ref 4–11)
WBC CLUMPS #/AREA URNS HPF: PRESENT /HPF
WBC URINE: >182 /HPF

## 2024-10-14 PROCEDURE — 80048 BASIC METABOLIC PNL TOTAL CA: CPT | Mod: ORL | Performed by: INTERNAL MEDICINE

## 2024-10-14 PROCEDURE — 81001 URINALYSIS AUTO W/SCOPE: CPT | Mod: ORL | Performed by: NURSE PRACTITIONER

## 2024-10-14 PROCEDURE — 99309 SBSQ NF CARE MODERATE MDM 30: CPT | Performed by: NURSE PRACTITIONER

## 2024-10-14 PROCEDURE — 87186 SC STD MICRODIL/AGAR DIL: CPT | Mod: ORL | Performed by: NURSE PRACTITIONER

## 2024-10-14 PROCEDURE — P9604 ONE-WAY ALLOW PRORATED TRIP: HCPCS | Mod: ORL | Performed by: INTERNAL MEDICINE

## 2024-10-14 PROCEDURE — 36415 COLL VENOUS BLD VENIPUNCTURE: CPT | Mod: ORL | Performed by: INTERNAL MEDICINE

## 2024-10-14 PROCEDURE — 85025 COMPLETE CBC W/AUTO DIFF WBC: CPT | Mod: ORL | Performed by: INTERNAL MEDICINE

## 2024-10-14 NOTE — RESULT ENCOUNTER NOTE
Obtain UA with reflex UC,  push fluids.  Discontinue labs I ordered for tomorrow.  Check BMPand CBC on 10/17

## 2024-10-14 NOTE — LETTER
10/14/2024      Marcela Sandhu  [address]  [facility] [address]                                                                                           [facility] GERIATRICS      Code Status:  DNR/DNI  Visit Type: RECHECK     Facility:   [facility] ([facility]) [[id number]]         History of Present Illness: Marcela Sandhu is a 84 year old female with a past medical history for GERD, HTN, essential tremor, CKD III, MDD, Dementia, RA and RLS.  She was recently hospitalized after a fall in which she sustained a subdural hematoma of left temporal lob and SAH of left temporal and left frontal lobe and suspected left tegmen mastoideum fracture with penumocephalus. She was see by neurosurgery and neurology.  EEG was negative for epilepticus but slowing and so she was started on Keppra and will need to follow up with neurology in 1 month.  She had hematemesis and hgb droped from 8.7 to 7.0.  no e/o of bleeding and hematemesis resolved. Wellbutrin stopped due to seizure.      Today, nursing requests a visit due to increase weakness and hypoxia during sleep this AM.  Nursing reported that her O2 sats were in the 80s during sleep and did improve to 95% once up for the day.  Therapy reports she has increased weakness today and was not able to ambulate with a walker this AM as she was previously.  Patient denies any headaches, dizziness, chest pain or palpitations.  HR 90s and bounding.  Nursing reports that yesterday she had increased tremoring and so a call was made to oncall who increased her hydroxyzine to 25mg BID and this was given yesterday.  She has not been needing her 10mg of hydroxyzine since her admission.     Current Outpatient Medications   Medication Sig Dispense Refill     amLODIPine (NORVASC) 10 MG tablet Take 1 tablet (10 mg) by mouth daily.       ARIPiprazole (ABILIFY) 5 MG tablet Take 1 tablet (5 mg) by mouth daily 90 tablet 3     donepezil (ARICEPT) 10 MG  "tablet Take 10 mg by mouth daily       hydrOXYzine HCl (ATARAX) 10 MG tablet TAKE 1 TABLET (10 MG) BY MOUTH 3 TIMES DAILY AS NEEDED FOR ANXIETY 30 tablet 3     levETIRAcetam (KEPPRA) 500 MG tablet Take 1 tablet (500 mg) by mouth 2 times daily.       mirtazapine (REMERON) 7.5 MG tablet TAKE 1 TABLET (7.5 MG) BY MOUTH AT BEDTIME 90 tablet 0     sertraline (ZOLOFT) 100 MG tablet TAKE 2 TABLETS (200 MG) BY MOUTH DAILY 180 tablet 0     valsartan (DIOVAN) 160 MG tablet Take 1 tablet (160 mg) by mouth daily.         Review of Systems   Patient denies fever, chills, headache, lightheadedness, dizziness, rhinorrhea, cough, congestion, shortness of breath, chest pain, palpitations, abdominal pain, n/v, diarrhea, constipation, change in appetite, change in sleep pattern, dysuria, frequency, burning or pain with urination.  Other than stated in HPI all other review of systems is negative.       Physical Exam  Vital signs:/78   Pulse 110   Temp 98  F (36.7  C)   Resp 18   Ht 1.575 m (5' 2\")   Wt 54.7 kg (120 lb 9.6 oz)   LMP  (LMP Unknown)   SpO2 95%   BMI 22.06 kg/m     GENERAL APPEARANCE: frail elderly female, in no acute distress.  HEENT: normocephalic, atraumatic  sclerae anicteric, conjunctivae clear and moist, EOM intact, white thick coat on tongue  LUNGS: Lung sounds CTA, no adventitious sounds, respiratory effort normal.  CARD: RRR, S1, S2, 3/6 murmur, no gallops, rubs  ABD: Soft, nondistended and nontender with normal bowel sounds.   MSK: weakness bilateral upper and lower extremities  EXTREMITIES: No cyanosis, clubbing or edema.  NEURO: Alert with cognitive impairment, CN II-XII intact, gross tremor, Face is symmetric.  SKIN: Inspection of the skin reveals no rashes, ulcerations or petechiae.  PSYCH: euthymic        Labs:    Last Comprehensive Metabolic Panel:  Lab Results   Component Value Date     10/10/2024    POTASSIUM 3.7 10/10/2024    CHLORIDE 108 (H) 10/10/2024    CO2 28 10/10/2024    " ANIONGAP 8 10/10/2024     (H) 10/10/2024    BUN 38.0 (H) 10/10/2024    CR 0.84 10/10/2024    GFRESTIMATED 69 10/10/2024    DENIS 9.3 10/10/2024       Lab Results   Component Value Date    WBC 5.4 10/02/2024    WBC 6.1 09/09/2019     Lab Results   Component Value Date    RBC 3.02 10/02/2024    RBC 3.07 09/09/2019     Lab Results   Component Value Date    HGB 8.8 10/10/2024    HGB 10.7 09/09/2019     Lab Results   Component Value Date    HCT 30.6 10/02/2024    HCT 33.2 09/09/2019     Lab Results   Component Value Date     10/02/2024     09/09/2019     Lab Results   Component Value Date    MCH 32.8 10/02/2024    MCH 34.9 09/09/2019     Lab Results   Component Value Date    MCHC 32.4 10/02/2024    MCHC 32.2 09/09/2019     Lab Results   Component Value Date    RDW 14.6 10/02/2024    RDW 13.3 09/09/2019     Lab Results   Component Value Date     10/10/2024     09/09/2019           Assessment/Plan:  Generalized muscle weakness  Hypoxia  Seizures (H)  Question if symptoms related to an acute seizure vs overmedicated with hydroxyzine vs late effects of brain injury.  Has a heart murmur and bounding HR so will obtain BMP, CBC and mag and EKG.  If needed only use 10mg of hydroxyzine however instructed nursing to try to avoid.      SAH (subarachnoid hemorrhage) (H)  SDH (subdural hematoma) (H)  Scoring 6/30 on SLUMS, unsure if this is a new baseline or if she will have improvement.  Follow up with neurology to determine ongoing dosing or stop date for Keppra.     Dysphagia, unspecified type  LS are clear but concern for aspiration with hypoxia. monitor    Benign essential hypertension  Controlled, continue with amlodipine and valsartan.              Electronically signed by:Clarice Kazt NP              Sincerely,        Clarice Katz NP

## 2024-10-14 NOTE — PROGRESS NOTES
[facility] GERIATRICS      Code Status:  DNR/DNI  Visit Type: RECHECK     Facility:   [facility] ([facility]) [[id number]]         History of Present Illness: Marcela Sandhu is a 84 year old female with a past medical history for GERD, HTN, essential tremor, CKD III, MDD, Dementia, RA and RLS.  She was recently hospitalized after a fall in which she sustained a subdural hematoma of left temporal lob and SAH of left temporal and left frontal lobe and suspected left tegmen mastoideum fracture with penumocephalus. She was see by neurosurgery and neurology.  EEG was negative for epilepticus but slowing and so she was started on Keppra and will need to follow up with neurology in 1 month.  She had hematemesis and hgb droped from 8.7 to 7.0.  no e/o of bleeding and hematemesis resolved. Wellbutrin stopped due to seizure.      Today, nursing requests a visit due to increase weakness and hypoxia during sleep this AM.  Nursing reported that her O2 sats were in the 80s during sleep and did improve to 95% once up for the day.  Therapy reports she has increased weakness today and was not able to ambulate with a walker this AM as she was previously.  Patient denies any headaches, dizziness, chest pain or palpitations.  HR 90s and bounding.  Nursing reports that yesterday she had increased tremoring and so a call was made to [facility]roy who increased her hydroxyzine to 25mg BID and this was given yesterday.  She has not been needing her 10mg of hydroxyzine since her admission.     Current Outpatient Medications   Medication Sig Dispense Refill    amLODIPine (NORVASC) 10 MG tablet Take 1 tablet (10 mg) by mouth daily.      ARIPiprazole (ABILIFY) 5 MG tablet Take 1 tablet (5 mg) by mouth daily 90 tablet 3    donepezil (ARICEPT) 10 MG tablet Take 10 mg by mouth daily      hydrOXYzine HCl (ATARAX) 10 MG tablet TAKE 1 TABLET (10  "MG) BY MOUTH 3 TIMES DAILY AS NEEDED FOR ANXIETY 30 tablet 3    levETIRAcetam (KEPPRA) 500 MG tablet Take 1 tablet (500 mg) by mouth 2 times daily.      mirtazapine (REMERON) 7.5 MG tablet TAKE 1 TABLET (7.5 MG) BY MOUTH AT BEDTIME 90 tablet 0    sertraline (ZOLOFT) 100 MG tablet TAKE 2 TABLETS (200 MG) BY MOUTH DAILY 180 tablet 0    valsartan (DIOVAN) 160 MG tablet Take 1 tablet (160 mg) by mouth daily.         Review of Systems   Patient denies fever, chills, headache, lightheadedness, dizziness, rhinorrhea, cough, congestion, shortness of breath, chest pain, palpitations, abdominal pain, n/v, diarrhea, constipation, change in appetite, change in sleep pattern, dysuria, frequency, burning or pain with urination.  Other than stated in HPI all other review of systems is negative.       Physical Exam  Vital signs:/78   Pulse 110   Temp 98  F (36.7  C)   Resp 18   Ht 1.575 m (5' 2\")   Wt 54.7 kg (120 lb 9.6 oz)   LMP  (LMP Unknown)   SpO2 95%   BMI 22.06 kg/m     GENERAL APPEARANCE: frail elderly female, in no acute distress.  HEENT: normocephalic, atraumatic  sclerae anicteric, conjunctivae clear and moist, EOM intact, white thick coat on tongue  LUNGS: Lung sounds CTA, no adventitious sounds, respiratory effort normal.  CARD: RRR, S1, S2, 3/6 murmur, no gallops, rubs  ABD: Soft, nondistended and nontender with normal bowel sounds.   MSK: weakness bilateral upper and lower extremities  EXTREMITIES: No cyanosis, clubbing or edema.  NEURO: Alert with cognitive impairment, CN II-XII intact, gross tremor, Face is symmetric.  SKIN: Inspection of the skin reveals no rashes, ulcerations or petechiae.  PSYCH: euthymic        Labs:    Last Comprehensive Metabolic Panel:  Lab Results   Component Value Date     10/10/2024    POTASSIUM 3.7 10/10/2024    CHLORIDE 108 (H) 10/10/2024    CO2 28 10/10/2024    ANIONGAP 8 10/10/2024     (H) 10/10/2024    BUN 38.0 (H) 10/10/2024    CR 0.84 10/10/2024    " GFRESTIMATED 69 10/10/2024    DENIS 9.3 10/10/2024       Lab Results   Component Value Date    WBC 5.4 10/02/2024    WBC 6.1 09/09/2019     Lab Results   Component Value Date    RBC 3.02 10/02/2024    RBC 3.07 09/09/2019     Lab Results   Component Value Date    HGB 8.8 10/10/2024    HGB 10.7 09/09/2019     Lab Results   Component Value Date    HCT 30.6 10/02/2024    HCT 33.2 09/09/2019     Lab Results   Component Value Date     10/02/2024     09/09/2019     Lab Results   Component Value Date    MCH 32.8 10/02/2024    MCH 34.9 09/09/2019     Lab Results   Component Value Date    MCHC 32.4 10/02/2024    MCHC 32.2 09/09/2019     Lab Results   Component Value Date    RDW 14.6 10/02/2024    RDW 13.3 09/09/2019     Lab Results   Component Value Date     10/10/2024     09/09/2019           Assessment/Plan:  Generalized muscle weakness  Hypoxia  Seizures (H)  Question if symptoms related to an acute seizure vs overmedicated with hydroxyzine vs late effects of brain injury.  Has a heart murmur and bounding HR so will obtain BMP, CBC and mag and EKG.  If needed only use 10mg of hydroxyzine however instructed nursing to try to avoid.      SAH (subarachnoid hemorrhage) (H)  SDH (subdural hematoma) (H)  Scoring 6/30 on SLUMS, unsure if this is a new baseline or if she will have improvement.  Follow up with neurology to determine ongoing dosing or stop date for Keppra.     Dysphagia, unspecified type  LS are clear but concern for aspiration with hypoxia. monitor    Benign essential hypertension  Controlled, continue with amlodipine and valsartan.              Electronically signed by:Clarice Katz NP

## 2024-10-15 ENCOUNTER — TRANSFERRED RECORDS (OUTPATIENT)
Dept: HEALTH INFORMATION MANAGEMENT | Facility: CLINIC | Age: 84
End: 2024-10-15

## 2024-10-15 ENCOUNTER — APPOINTMENT (OUTPATIENT)
Dept: GENERAL RADIOLOGY | Facility: CLINIC | Age: 84
DRG: 872 | End: 2024-10-15
Attending: EMERGENCY MEDICINE
Payer: COMMERCIAL

## 2024-10-15 ENCOUNTER — TELEPHONE (OUTPATIENT)
Dept: GERIATRICS | Facility: CLINIC | Age: 84
End: 2024-10-15
Payer: COMMERCIAL

## 2024-10-15 ENCOUNTER — MEDICAL CORRESPONDENCE (OUTPATIENT)
Dept: HEALTH INFORMATION MANAGEMENT | Facility: CLINIC | Age: 84
End: 2024-10-15

## 2024-10-15 ENCOUNTER — HOSPITAL ENCOUNTER (INPATIENT)
Facility: CLINIC | Age: 84
LOS: 4 days | Discharge: NURSING, REHAB OR LONG-TERM CARE | DRG: 872 | End: 2024-10-19
Attending: EMERGENCY MEDICINE | Admitting: INTERNAL MEDICINE
Payer: COMMERCIAL

## 2024-10-15 DIAGNOSIS — R65.20 SEPSIS WITH ENCEPHALOPATHY WITHOUT SEPTIC SHOCK, DUE TO UNSPECIFIED ORGANISM (H): ICD-10-CM

## 2024-10-15 DIAGNOSIS — D64.9 ANEMIA, UNSPECIFIED TYPE: ICD-10-CM

## 2024-10-15 DIAGNOSIS — G93.41 SEPSIS WITH ENCEPHALOPATHY WITHOUT SEPTIC SHOCK, DUE TO UNSPECIFIED ORGANISM (H): ICD-10-CM

## 2024-10-15 DIAGNOSIS — G31.84 MILD COGNITIVE IMPAIRMENT: ICD-10-CM

## 2024-10-15 DIAGNOSIS — K59.00 CONSTIPATION, UNSPECIFIED CONSTIPATION TYPE: ICD-10-CM

## 2024-10-15 DIAGNOSIS — N39.0 ACUTE UTI: Primary | ICD-10-CM

## 2024-10-15 DIAGNOSIS — K29.70 GASTRITIS WITHOUT BLEEDING, UNSPECIFIED CHRONICITY, UNSPECIFIED GASTRITIS TYPE: ICD-10-CM

## 2024-10-15 DIAGNOSIS — A41.9 SEPSIS WITH ENCEPHALOPATHY WITHOUT SEPTIC SHOCK, DUE TO UNSPECIFIED ORGANISM (H): ICD-10-CM

## 2024-10-15 PROBLEM — D69.6 THROMBOCYTOPENIA (H): Status: RESOLVED | Noted: 2024-09-30 | Resolved: 2024-10-15

## 2024-10-15 LAB
ALBUMIN SERPL BCG-MCNC: 3.4 G/DL (ref 3.5–5.2)
ALP SERPL-CCNC: 83 U/L (ref 40–150)
ALT SERPL W P-5'-P-CCNC: 20 U/L (ref 0–50)
ANION GAP SERPL CALCULATED.3IONS-SCNC: 12 MMOL/L (ref 7–15)
AST SERPL W P-5'-P-CCNC: 30 U/L (ref 0–45)
ATRIAL RATE - MUSE: 104 BPM
BASE EXCESS BLDV CALC-SCNC: 3 MMOL/L (ref -3–3)
BASOPHILS # BLD AUTO: 0.1 10E3/UL (ref 0–0.2)
BASOPHILS NFR BLD AUTO: 1 %
BILIRUB SERPL-MCNC: 0.3 MG/DL
BUN SERPL-MCNC: 41.9 MG/DL (ref 8–23)
CALCIUM SERPL-MCNC: 9 MG/DL (ref 8.8–10.4)
CHLORIDE SERPL-SCNC: 106 MMOL/L (ref 98–107)
CREAT SERPL-MCNC: 1.11 MG/DL (ref 0.51–0.95)
DIASTOLIC BLOOD PRESSURE - MUSE: NORMAL MMHG
EGFRCR SERPLBLD CKD-EPI 2021: 49 ML/MIN/1.73M2
EOSINOPHIL # BLD AUTO: 0 10E3/UL (ref 0–0.7)
EOSINOPHIL NFR BLD AUTO: 0 %
ERYTHROCYTE [DISTWIDTH] IN BLOOD BY AUTOMATED COUNT: 13.4 % (ref 10–15)
EST. AVERAGE GLUCOSE BLD GHB EST-MCNC: 123 MG/DL
FLUAV RNA SPEC QL NAA+PROBE: NEGATIVE
FLUBV RNA RESP QL NAA+PROBE: NEGATIVE
GLUCOSE SERPL-MCNC: 149 MG/DL (ref 70–99)
HBA1C MFR BLD: 5.9 %
HCO3 BLDV-SCNC: 28 MMOL/L (ref 21–28)
HCO3 SERPL-SCNC: 28 MMOL/L (ref 22–29)
HCT VFR BLD AUTO: 27 % (ref 35–47)
HGB BLD-MCNC: 8.6 G/DL (ref 11.7–15.7)
HOLD SPECIMEN: NORMAL
HOLD SPECIMEN: NORMAL
IMM GRANULOCYTES # BLD: 0.1 10E3/UL
IMM GRANULOCYTES NFR BLD: 1 %
INTERPRETATION ECG - MUSE: NORMAL
LACTATE BLD-SCNC: 0.6 MMOL/L
LACTATE SERPL-SCNC: 0.7 MMOL/L (ref 0.7–2)
LYMPHOCYTES # BLD AUTO: 1.1 10E3/UL (ref 0.8–5.3)
LYMPHOCYTES NFR BLD AUTO: 11 %
MCH RBC QN AUTO: 32.3 PG (ref 26.5–33)
MCHC RBC AUTO-ENTMCNC: 31.9 G/DL (ref 31.5–36.5)
MCV RBC AUTO: 102 FL (ref 78–100)
MONOCYTES # BLD AUTO: 1 10E3/UL (ref 0–1.3)
MONOCYTES NFR BLD AUTO: 9 %
NEUTROPHILS # BLD AUTO: 8.5 10E3/UL (ref 1.6–8.3)
NEUTROPHILS NFR BLD AUTO: 79 %
NRBC # BLD AUTO: 0 10E3/UL
NRBC BLD AUTO-RTO: 0 /100
NT-PROBNP SERPL-MCNC: 497 PG/ML (ref 0–1800)
P AXIS - MUSE: 74 DEGREES
PCO2 BLDV: 43 MM HG (ref 40–50)
PH BLDV: 7.42 [PH] (ref 7.32–7.43)
PLATELET # BLD AUTO: 304 10E3/UL (ref 150–450)
PO2 BLDV: 33 MM HG (ref 25–47)
POTASSIUM SERPL-SCNC: 4 MMOL/L (ref 3.4–5.3)
PR INTERVAL - MUSE: 150 MS
PROT SERPL-MCNC: 7.1 G/DL (ref 6.4–8.3)
QRS DURATION - MUSE: 74 MS
QT - MUSE: 334 MS
QTC - MUSE: 439 MS
R AXIS - MUSE: 117 DEGREES
RBC # BLD AUTO: 2.66 10E6/UL (ref 3.8–5.2)
RSV RNA SPEC NAA+PROBE: NEGATIVE
SAO2 % BLDV: 65 % (ref 70–75)
SARS-COV-2 RNA RESP QL NAA+PROBE: NEGATIVE
SODIUM SERPL-SCNC: 146 MMOL/L (ref 135–145)
SYSTOLIC BLOOD PRESSURE - MUSE: NORMAL MMHG
T AXIS - MUSE: -3 DEGREES
TROPONIN T SERPL HS-MCNC: 41 NG/L
TROPONIN T SERPL HS-MCNC: 44 NG/L
TSH SERPL DL<=0.005 MIU/L-ACNC: 0.61 UIU/ML (ref 0.3–4.2)
VENTRICULAR RATE- MUSE: 104 BPM
WBC # BLD AUTO: 10.7 10E3/UL (ref 4–11)

## 2024-10-15 PROCEDURE — 99285 EMERGENCY DEPT VISIT HI MDM: CPT | Mod: 25

## 2024-10-15 PROCEDURE — 83605 ASSAY OF LACTIC ACID: CPT | Performed by: EMERGENCY MEDICINE

## 2024-10-15 PROCEDURE — 83880 ASSAY OF NATRIURETIC PEPTIDE: CPT | Performed by: EMERGENCY MEDICINE

## 2024-10-15 PROCEDURE — 258N000003 HC RX IP 258 OP 636: Performed by: EMERGENCY MEDICINE

## 2024-10-15 PROCEDURE — 93005 ELECTROCARDIOGRAM TRACING: CPT

## 2024-10-15 PROCEDURE — 96365 THER/PROPH/DIAG IV INF INIT: CPT

## 2024-10-15 PROCEDURE — 36415 COLL VENOUS BLD VENIPUNCTURE: CPT | Performed by: EMERGENCY MEDICINE

## 2024-10-15 PROCEDURE — 99223 1ST HOSP IP/OBS HIGH 75: CPT | Performed by: PHYSICIAN ASSISTANT

## 2024-10-15 PROCEDURE — 120N000001 HC R&B MED SURG/OB

## 2024-10-15 PROCEDURE — 99207 PR APP CREDIT; MD BILLING SHARED VISIT: CPT | Performed by: INTERNAL MEDICINE

## 2024-10-15 PROCEDURE — 82607 VITAMIN B-12: CPT | Performed by: PHYSICIAN ASSISTANT

## 2024-10-15 PROCEDURE — 84443 ASSAY THYROID STIM HORMONE: CPT | Performed by: PHYSICIAN ASSISTANT

## 2024-10-15 PROCEDURE — 83036 HEMOGLOBIN GLYCOSYLATED A1C: CPT | Performed by: PHYSICIAN ASSISTANT

## 2024-10-15 PROCEDURE — 87637 SARSCOV2&INF A&B&RSV AMP PRB: CPT | Performed by: EMERGENCY MEDICINE

## 2024-10-15 PROCEDURE — 82803 BLOOD GASES ANY COMBINATION: CPT

## 2024-10-15 PROCEDURE — 85014 HEMATOCRIT: CPT | Performed by: EMERGENCY MEDICINE

## 2024-10-15 PROCEDURE — 250N000011 HC RX IP 250 OP 636: Performed by: EMERGENCY MEDICINE

## 2024-10-15 PROCEDURE — 71046 X-RAY EXAM CHEST 2 VIEWS: CPT

## 2024-10-15 PROCEDURE — 87040 BLOOD CULTURE FOR BACTERIA: CPT | Performed by: EMERGENCY MEDICINE

## 2024-10-15 PROCEDURE — 85004 AUTOMATED DIFF WBC COUNT: CPT | Performed by: EMERGENCY MEDICINE

## 2024-10-15 PROCEDURE — 250N000013 HC RX MED GY IP 250 OP 250 PS 637: Performed by: PHYSICIAN ASSISTANT

## 2024-10-15 PROCEDURE — 250N000013 HC RX MED GY IP 250 OP 250 PS 637: Performed by: EMERGENCY MEDICINE

## 2024-10-15 PROCEDURE — 84132 ASSAY OF SERUM POTASSIUM: CPT | Performed by: EMERGENCY MEDICINE

## 2024-10-15 PROCEDURE — 84484 ASSAY OF TROPONIN QUANT: CPT | Performed by: EMERGENCY MEDICINE

## 2024-10-15 RX ORDER — CEFTRIAXONE 2 G/1
2 INJECTION, POWDER, FOR SOLUTION INTRAMUSCULAR; INTRAVENOUS ONCE
Status: COMPLETED | OUTPATIENT
Start: 2024-10-15 | End: 2024-10-15

## 2024-10-15 RX ORDER — PROCHLORPERAZINE 25 MG
12.5 SUPPOSITORY, RECTAL RECTAL EVERY 12 HOURS PRN
Status: DISCONTINUED | OUTPATIENT
Start: 2024-10-15 | End: 2024-10-19 | Stop reason: HOSPADM

## 2024-10-15 RX ORDER — NYSTATIN 100000 [USP'U]/ML
1000000 SUSPENSION ORAL 4 TIMES DAILY
Status: DISCONTINUED | OUTPATIENT
Start: 2024-10-15 | End: 2024-10-15

## 2024-10-15 RX ORDER — HYDROXYZINE HYDROCHLORIDE 10 MG/1
10 TABLET, FILM COATED ORAL 3 TIMES DAILY PRN
Status: DISCONTINUED | OUTPATIENT
Start: 2024-10-15 | End: 2024-10-15

## 2024-10-15 RX ORDER — DONEPEZIL HYDROCHLORIDE 5 MG/1
5 TABLET, FILM COATED ORAL DAILY
Status: DISCONTINUED | OUTPATIENT
Start: 2024-10-16 | End: 2024-10-19 | Stop reason: HOSPADM

## 2024-10-15 RX ORDER — CEFTRIAXONE 2 G/1
2 INJECTION, POWDER, FOR SOLUTION INTRAMUSCULAR; INTRAVENOUS EVERY 24 HOURS
Status: DISCONTINUED | OUTPATIENT
Start: 2024-10-16 | End: 2024-10-16

## 2024-10-15 RX ORDER — ACETAMINOPHEN 325 MG/1
650 TABLET ORAL EVERY 4 HOURS PRN
Status: DISCONTINUED | OUTPATIENT
Start: 2024-10-15 | End: 2024-10-19 | Stop reason: HOSPADM

## 2024-10-15 RX ORDER — ACETAMINOPHEN 500 MG
1000 TABLET ORAL EVERY 6 HOURS PRN
COMMUNITY

## 2024-10-15 RX ORDER — AMLODIPINE BESYLATE 10 MG/1
10 TABLET ORAL DAILY
Status: DISCONTINUED | OUTPATIENT
Start: 2024-10-15 | End: 2024-10-19 | Stop reason: HOSPADM

## 2024-10-15 RX ORDER — NYSTATIN 100000 [USP'U]/ML
500000 SUSPENSION ORAL ONCE
Status: COMPLETED | OUTPATIENT
Start: 2024-10-15 | End: 2024-10-15

## 2024-10-15 RX ORDER — AMOXICILLIN 250 MG
2 CAPSULE ORAL 2 TIMES DAILY
Status: DISCONTINUED | OUTPATIENT
Start: 2024-10-15 | End: 2024-10-19 | Stop reason: HOSPADM

## 2024-10-15 RX ORDER — AMOXICILLIN 250 MG
2 CAPSULE ORAL 2 TIMES DAILY PRN
Status: DISCONTINUED | OUTPATIENT
Start: 2024-10-15 | End: 2024-10-19 | Stop reason: HOSPADM

## 2024-10-15 RX ORDER — CALCIUM CARBONATE 500 MG/1
1000 TABLET, CHEWABLE ORAL 4 TIMES DAILY PRN
Status: DISCONTINUED | OUTPATIENT
Start: 2024-10-15 | End: 2024-10-19 | Stop reason: HOSPADM

## 2024-10-15 RX ORDER — LIDOCAINE 40 MG/G
CREAM TOPICAL
Status: DISCONTINUED | OUTPATIENT
Start: 2024-10-15 | End: 2024-10-19 | Stop reason: HOSPADM

## 2024-10-15 RX ORDER — NYSTATIN 100000 [USP'U]/ML
500000 SUSPENSION ORAL 3 TIMES DAILY
COMMUNITY
Start: 2024-10-14 | End: 2024-10-28

## 2024-10-15 RX ORDER — ONDANSETRON 2 MG/ML
4 INJECTION INTRAMUSCULAR; INTRAVENOUS EVERY 6 HOURS PRN
Status: DISCONTINUED | OUTPATIENT
Start: 2024-10-15 | End: 2024-10-19 | Stop reason: HOSPADM

## 2024-10-15 RX ORDER — ACETAMINOPHEN 325 MG/1
650 TABLET ORAL ONCE
Status: DISCONTINUED | OUTPATIENT
Start: 2024-10-15 | End: 2024-10-15

## 2024-10-15 RX ORDER — LEVETIRACETAM 500 MG/1
500 TABLET ORAL 2 TIMES DAILY
Status: DISCONTINUED | OUTPATIENT
Start: 2024-10-15 | End: 2024-10-19 | Stop reason: HOSPADM

## 2024-10-15 RX ORDER — SERTRALINE HYDROCHLORIDE 100 MG/1
200 TABLET, FILM COATED ORAL DAILY
Status: DISCONTINUED | OUTPATIENT
Start: 2024-10-16 | End: 2024-10-19 | Stop reason: HOSPADM

## 2024-10-15 RX ORDER — ACETAMINOPHEN 650 MG/1
650 SUPPOSITORY RECTAL EVERY 4 HOURS PRN
Status: DISCONTINUED | OUTPATIENT
Start: 2024-10-15 | End: 2024-10-19 | Stop reason: HOSPADM

## 2024-10-15 RX ORDER — AMOXICILLIN 250 MG
1 CAPSULE ORAL 2 TIMES DAILY PRN
Status: DISCONTINUED | OUTPATIENT
Start: 2024-10-15 | End: 2024-10-19 | Stop reason: HOSPADM

## 2024-10-15 RX ORDER — BISACODYL 10 MG
10 SUPPOSITORY, RECTAL RECTAL DAILY PRN
Status: DISCONTINUED | OUTPATIENT
Start: 2024-10-15 | End: 2024-10-19 | Stop reason: HOSPADM

## 2024-10-15 RX ORDER — NYSTATIN 100000 [USP'U]/ML
500000 SUSPENSION ORAL 3 TIMES DAILY
Status: DISCONTINUED | OUTPATIENT
Start: 2024-10-15 | End: 2024-10-19 | Stop reason: HOSPADM

## 2024-10-15 RX ORDER — PROCHLORPERAZINE MALEATE 5 MG/1
5 TABLET ORAL EVERY 6 HOURS PRN
Status: DISCONTINUED | OUTPATIENT
Start: 2024-10-15 | End: 2024-10-19 | Stop reason: HOSPADM

## 2024-10-15 RX ORDER — MIRTAZAPINE 7.5 MG/1
7.5 TABLET, FILM COATED ORAL AT BEDTIME
Status: DISCONTINUED | OUTPATIENT
Start: 2024-10-15 | End: 2024-10-19 | Stop reason: HOSPADM

## 2024-10-15 RX ORDER — ONDANSETRON 4 MG/1
4 TABLET, ORALLY DISINTEGRATING ORAL EVERY 6 HOURS PRN
Status: DISCONTINUED | OUTPATIENT
Start: 2024-10-15 | End: 2024-10-19 | Stop reason: HOSPADM

## 2024-10-15 RX ORDER — VALSARTAN 160 MG/1
160 TABLET ORAL DAILY
Status: DISCONTINUED | OUTPATIENT
Start: 2024-10-15 | End: 2024-10-19 | Stop reason: HOSPADM

## 2024-10-15 RX ORDER — ACETAMINOPHEN 500 MG
1000 TABLET ORAL ONCE
Status: DISCONTINUED | OUTPATIENT
Start: 2024-10-15 | End: 2024-10-15

## 2024-10-15 RX ORDER — POLYETHYLENE GLYCOL 3350 17 G/17G
17 POWDER, FOR SOLUTION ORAL 2 TIMES DAILY PRN
Status: DISCONTINUED | OUTPATIENT
Start: 2024-10-15 | End: 2024-10-19 | Stop reason: HOSPADM

## 2024-10-15 RX ORDER — AMOXICILLIN 250 MG
1 CAPSULE ORAL 2 TIMES DAILY
Status: DISCONTINUED | OUTPATIENT
Start: 2024-10-15 | End: 2024-10-19 | Stop reason: HOSPADM

## 2024-10-15 RX ADMIN — ACETAMINOPHEN 650 MG: 325 TABLET, FILM COATED ORAL at 21:37

## 2024-10-15 RX ADMIN — CEFTRIAXONE SODIUM 2 G: 2 INJECTION, POWDER, FOR SOLUTION INTRAMUSCULAR; INTRAVENOUS at 13:33

## 2024-10-15 RX ADMIN — NYSTATIN 500000 UNITS: 100000 SUSPENSION ORAL at 21:15

## 2024-10-15 RX ADMIN — SODIUM CHLORIDE 1641 ML: 9 INJECTION, SOLUTION INTRAVENOUS at 13:26

## 2024-10-15 RX ADMIN — NYSTATIN 500000 UNITS: 100000 SUSPENSION ORAL at 14:40

## 2024-10-15 RX ADMIN — ACETAMINOPHEN 1000 MG: 500 TABLET ORAL at 13:16

## 2024-10-15 RX ADMIN — MIRTAZAPINE 7.5 MG: 7.5 TABLET, FILM COATED ORAL at 21:15

## 2024-10-15 RX ADMIN — LEVETIRACETAM 500 MG: 500 TABLET, FILM COATED ORAL at 21:15

## 2024-10-15 ASSESSMENT — ACTIVITIES OF DAILY LIVING (ADL)
ADLS_ACUITY_SCORE: 39
ADLS_ACUITY_SCORE: 39
WEAR_GLASSES_OR_BLIND: YES
TOILETING: 1-->ASSISTANCE (EQUIPMENT/PERSON) NEEDED
ADLS_ACUITY_SCORE: 39
ADLS_ACUITY_SCORE: 50
ADLS_ACUITY_SCORE: 51
DRESSING/BATHING: BATHING DIFFICULTY, ASSISTANCE 1 PERSON;DRESSING DIFFICULTY, ASSISTANCE 1 PERSON
NUMBER_OF_TIMES_PATIENT_HAS_FALLEN_WITHIN_LAST_SIX_MONTHS: 3
TOILETING: 1-->ASSISTANCE (EQUIPMENT/PERSON) NEEDED (NOT DEVELOPMENTALLY APPROPRIATE)
ADLS_ACUITY_SCORE: 50
ADLS_ACUITY_SCORE: 38
DRESSING/BATHING_DIFFICULTY: YES
TOILETING_ASSISTANCE: TOILETING DIFFICULTY, ASSISTANCE 1 PERSON
CHANGE_IN_FUNCTIONAL_STATUS_SINCE_ONSET_OF_CURRENT_ILLNESS/INJURY: YES
DOING_ERRANDS_INDEPENDENTLY_DIFFICULTY: YES
EQUIPMENT_CURRENTLY_USED_AT_HOME: WALKER, ROLLING;RAISED TOILET SEAT;SHOWER CHAIR
ADLS_ACUITY_SCORE: 38
TOILETING_ISSUES: YES
CONCENTRATING,_REMEMBERING_OR_MAKING_DECISIONS_DIFFICULTY: YES
ADLS_ACUITY_SCORE: 51
HEARING_DIFFICULTY_OR_DEAF: NO
ADLS_ACUITY_SCORE: 38
FALL_HISTORY_WITHIN_LAST_SIX_MONTHS: YES
WALKING_OR_CLIMBING_STAIRS_DIFFICULTY: YES
DIFFICULTY_EATING/SWALLOWING: NO
DIFFICULTY_COMMUNICATING: NO
ADLS_ACUITY_SCORE: 38

## 2024-10-15 NOTE — ED PROVIDER NOTES
Emergency Department Note      History of Present Illness     Chief Complaint   Dysuria and Generalized Weakness      HPI   Marcela Sandhu is an 84 year old female with a history of SDH, SAH, stage 3 CKD, hypertension, and mild cognitive impairment who presents to the ED with her son and daughter-in-law for evaluation of generalized weakness. The patient's son states she started to develop a fever, tremors, and generalized weakness 2 days ago. She had a urinalysis obtained yesterday and has had worsening generalized weakness since.  The UA did come back positive for infection, but she has yet to receive any antibiotics.  He notes a new oral thrush but no new cough. He states she was doing well and at baseline 3 days ago. She is currently living in a TCU after a SDH and SAH about 2 weeks ago. She denies chest pain, shortness of breath, abdominal pain, dysuria, hematuria, nausea, vomiting, diarrhea, or shortness of breath.  She does not the best historian.    Independent Historian   Son as detailed above.    Review of External Notes   I reviewed the discharge summary from 10/5/24 for a SDH and SAH and the nurse triage note from today. Also reviewed the [facility] and TCU note from yesterday.     Past Medical History     Medical History and Problem List   Fibromyalgia  Depression  Peripheral neuropathy   GERD  Breast cancer  RLS  Hepatitis C  Essential tremor  Osteopenia  Mild cognitive impairment   HTN  RA  CKD, stage 3  SAH  SDH  Thrombocytopenia  GI hemorrhage  Seizures   Physical deconditioning   RA    Medications   Avapro  Amlodipine  Abilify  Donepezil  Hydroxyzine  Keppra  Remeron  Sertraline  Valsartan     Surgical History   Appendectomy  Carpometacarpal arthroplasty (B)  Knee arthroplasty (R)  Blepharoplasty (B)  Chevron and jean bunionectomy (B)  Carpal tunnel release (B)  Mastectomy (B)  Bladder sling  Unspecified spine surgery   Unspecified elbow surgery     Physical Exam     Patient Vitals for the past 24  hrs:   BP Temp Temp src Pulse Resp SpO2   10/15/24 1410 -- 99.8  F (37.7  C) -- -- -- 94 %   10/15/24 1345 -- -- -- -- -- 94 %   10/15/24 1330 -- -- -- -- -- 94 %   10/15/24 1316 104/85 -- -- -- -- 94 %   10/15/24 1311 104/85 -- -- 117 -- 94 %   10/15/24 1304 -- (!) 102.5  F (39.2  C) Oral -- 20 --     Physical Exam  Nursing note and vitals reviewed.  Constitutional:  Awake and alert. Cooperative.  Appears moderately ill.    HENT:   Nose:    Nose normal.   Mouth/Throat:   Mucous membranes are normal.   Eyes:    Conjunctivae normal and EOM are normal.      Pupils are equal, round, and reactive to light.   Neck:    Trachea normal.   Cardiovascular:  Tachycardic rate, regular rhythm, normal heart sounds and normal pulses. No murmur heard.  Pulmonary/Chest:  Effort normal and breath sounds normal although somewhat diminished.   Abdominal:   Soft. Normal appearance and bowel sounds are normal.      There is no tenderness.      There is no rebound and no CVA tenderness.   Musculoskeletal:  Extremities atraumatic x 4.   Lymphadenopathy:  No cervical adenopathy.   Neurological:   Awake and alert. Normal strength.      No cranial nerve deficit or sensory deficit. GCS eye subscore is 4. GCS verbal subscore is 5. GCS motor subscore is 6.   Skin:    Skin is intact. No rash noted.   Psychiatric:   Normal mood and affect.    Diagnostics     Lab Results   Labs Ordered and Resulted from Time of ED Arrival to Time of ED Departure   COMPREHENSIVE METABOLIC PANEL - Abnormal       Result Value    Sodium 146 (*)     Potassium 4.0      Carbon Dioxide (CO2) 28      Anion Gap 12      Urea Nitrogen 41.9 (*)     Creatinine 1.11 (*)     GFR Estimate 49 (*)     Calcium 9.0      Chloride 106      Glucose 149 (*)     Alkaline Phosphatase 83      AST 30      ALT 20      Protein Total 7.1      Albumin 3.4 (*)     Bilirubin Total 0.3     CBC WITH PLATELETS AND DIFFERENTIAL - Abnormal    WBC Count 10.7      RBC Count 2.66 (*)     Hemoglobin 8.6 (*)      Hematocrit 27.0 (*)      (*)     MCH 32.3      MCHC 31.9      RDW 13.4      Platelet Count 304      % Neutrophils 79      % Lymphocytes 11      % Monocytes 9      % Eosinophils 0      % Basophils 1      % Immature Granulocytes 1      NRBCs per 100 WBC 0      Absolute Neutrophils 8.5 (*)     Absolute Lymphocytes 1.1      Absolute Monocytes 1.0      Absolute Eosinophils 0.0      Absolute Basophils 0.1      Absolute Immature Granulocytes 0.1      Absolute NRBCs 0.0     TROPONIN T, HIGH SENSITIVITY - Abnormal    Troponin T, High Sensitivity 44 (*)    ISTAT GASES LACTATE VENOUS POCT - Abnormal    Lactic Acid POCT 0.6      Bicarbonate Venous POCT 28      O2 Sat, Venous POCT 65 (*)     pCO2 Venous POCT 43      pH Venous POCT 7.42      pO2 Venous POCT 33      Base Excess/Deficit (+/-) POCT 3.0     LACTIC ACID WHOLE BLOOD - Normal    Lactic Acid 0.7     INFLUENZA A/B, RSV, & SARS-COV2 PCR - Normal    Influenza A PCR Negative      Influenza B PCR Negative      RSV PCR Negative      SARS CoV2 PCR Negative     NT PROBNP INPATIENT - Normal    N terminal Pro BNP Inpatient 497     TROPONIN T, HIGH SENSITIVITY   BLOOD CULTURE       Imaging   XR Chest 2 Views   Final Result   IMPRESSION: No evidence of acute cardiopulmonary disease.      ZOFIA LIMA MD            SYSTEM ID:  [id number]        ECG  ECG taken at 1344, ECG read at 1352  Sinus tachycardia  Right axis deviation  Cannot rule out anterior infarct, age undetermined  T wave abnormality, consider inferior ischemia   No significant change as compared to prior, dated 9/30/24.  Rate 104 bpm. ME interval 150 ms. QRS duration 74 ms. QT/QTc 334/439 ms. P-R-T axes 74 117 -3.     Independent Interpretation   I independently interpreted the patient's chest x-ray and agree with the negative reading for infiltrate.    ED Course      Medications Administered   Medications   acetaminophen (TYLENOL) tablet 1,000 mg (1,000 mg Oral $Given 10/15/24 3444)   sodium chloride 0.9%  BOLUS 1,641 mL (0 mLs Intravenous Stopped 10/15/24 1432)   cefTRIAXone (ROCEPHIN) 2 g vial to attach to  ml bag for ADULTS or NS 50 ml bag for PEDS (0 g Intravenous Stopped 10/15/24 1432)   nystatin (MYCOSTATIN) suspension 500,000 Units (500,000 Units Oral $Given 10/15/24 1440)       Procedures   Procedures     Discussion of Management   Admitting Hospitalist, Bianca Bernard for Dr. Wilson    ED Course   ED Course as of 10/15/24 1513   Tue Oct 15, 2024   1311 I obtained history and performed a physical exam as noted above.    1456 I spoke with LINDA Bernard of the hospitalist service regarding the patient's presentation and plan of care. They accept the patient for admission on behalf of Dr. Wilson       Additional Documentation  None    Medical Decision Making / Diagnosis     CMS Diagnoses: The patient has signs of sepsis   Sepsis ED evaluation   The patient has signs of sepsis as evidenced by:  1. Presence of 2 SIRS criteria, suspected infection, AND  2. Organ dysfunction: Acute encephalopathy due to infection    Time zero:  1315  on 10/15/24 as this was the time when Provider determined that sepsis was present.    Note: Due to a national blood culture bottle shortage, reduced blood cultures may have been drawn on this patient.    Lactic Acid Results:  Recent Labs   Lab Test 10/15/24  1312 10/15/24  1308   LACT 0.6 0.7       3 Hour Bundle 6 Hour Bundle (Reassessment)   Blood Cultures before IV Antibiotics: Yes  Antibiotics given: see below  Prehospital fluid volume (mL):                     Total fluids given (ED +Pre-hospital):  Full 30 mL/kg bolus given based on weight: 1,640 mL   Repeat Lactic Acid Level: Ordered by reflex for 2 hours after initial lactic acid collection.  Vasopressors: MAP>65 after initial IVF bolus, will continue to monitor fluid status and vital signs.  Repeat perfusion exam: I attest to having performed a repeat sepsis exam and assessment of perfusion at  .now .   BMI Readings  from Last 1 Encounters:   10/14/24 22.06 kg/m        Anti-infectives (From admission through now)      Start     Dose/Rate Route Frequency Ordered Stop    10/15/24 1320  cefTRIAXone (ROCEPHIN) 2 g vial to attach to  ml bag for ADULTS or NS 50 ml bag for PEDS         2 g  over 30 Minutes Intravenous ONCE 10/15/24 1319 10/15/24 1432                MIPS       None    [facility]   Marcela Sandhu is an 84 year old female who was brought in for further evaluation of worsening weakness and a fever.  She had a UA obtained yesterday, which I was able to see the results of, in which show a clear infection.  I felt it was reasonable to also check the above blood work and a chest x-ray, as well as swabbing her for COVID, influenza, and RSV.  While her lactic acid is normal, she does meet SIRS criteria.  Therefore I am concerned that she has sepsis as well.  Therefore she was provided IV antibiotics as well as IV fluids.  Unfortunately, she also did receive her blood pressure medication this morning, which I am sure is contributing to her slightly low blood pressure.  The rest of her workup is essentially unremarkable.  She clearly needs to come into the hospital for further evaluation and management.  I subsequently spoke with JOAQUIN Good with the hospitalist service who will be admitting the patient.    Disposition   The patient was admitted to the hospital.     Diagnosis     ICD-10-CM    1. Acute UTI  N39.0       2. Sepsis with encephalopathy without septic shock, due to unspecified organism (H)  A41.9     R65.20     G93.41              Scribe Disclosure:  I, Alee Escoto, am serving as a scribe at 1:08 PM on 10/15/2024 to document services personally performed by Ravindra Moncada MD based on my observations and the provider's statements to me.        Ravindra Moncada MD  10/15/24 4133

## 2024-10-15 NOTE — PROGRESS NOTES
Admission    Patient arrives to room 621 via cart from ED.  Care plan note: Pt lethargic but arousable, skin hot to touch (afebrile), soiled upon arrival, cleaned up. Family at bedside.     Inpatient nursing criteria listed below were met:    Did you put disposition on whiteboard and in sticky note: No  Full skin assessment done (add LDA if skin issue present). Initials of 2nd RN :Yes CD  Isolation education started/completed NA  Patient allergies verified with patient: No  Fall Risk? (Care plan updated, Education given and documented) Yes  Primary Care Plan initiated: Yes  Home medications documented in belongings flowsheet: NA  Patient belongings documented in belongings flowsheet: Yes  Reminder note (belongings/ medications) placed in discharge instructions:No  Admission profile/ required documentation complete: No  If patient is a 72 hour hold/Commitment are belongings removed from room and locked up? NA

## 2024-10-15 NOTE — H&P
[facility]    History and Physical - Hospitalist Service       Date of Admission:  10/15/2024    Assessment & Plan   Marcela Sandhu is a 84 year old female with PMHx of mild cognitive impairment, HTN, CKD III, and recent hospitalization 9/30/24-10/5/24 for a fall with subdural hematoma of L temporal lobe, SAH of L temporal and L frontal lobe, and suspected left tegmen mastoideum fracture with penumocephalus complicated by seizure who presented from TCU for further evaluation of weakness and increased confusion in the setting of abnormal UA 10/14/24. Meeting sepsis criteria with Tmax 102.5 F on admission and tachycardia in the 110s. Admitted on 10/15/2024 for further evaluation and treatment.     Sepsis in the setting of UTI  Generalized weakness: Urine culture from 10/14/24 with large LE, >182. WBC 11.7 on 10/14>10.7 on admission, lactic WNL. Covid, influenza, RSV negative.   *Received 1g tylenol, 1.6 L IVF bolus per sepsis criteria and Rocephin 2g X1 in the ED    *Last positive urine culture from 4/18/24 with klebsiella pneumonia resistant to ampicillin, otherwise sensitive. No additional hx of antibiotic resistant organism.   - Admit to inpatient status   - Continue IV Rocephin   - No further IVF, encourage adequate PO intake   - Follow urine and blood cultures   - Bladder scan to ensure no retention   - PT/OT   - CC/SW for discharge planning. Family wants pt to return to [facility] TCU upon dismissal     Transient hypoxia: Noted per EMS report overnight per nursing staff at TCU. Not hypoxic in the ED. CXR negative for acute pathology. BNP WNL. No hx of DEBORAH  - Monitor     Elevated troponin, suspect demand in the setting of above   Hx of mild aortic stenosis  Grade 1 diastolic dysfunction: 44 on admission>41. Suspect demand in the setting of above. EKG with NSR, TWI in lead III, otherwise no evidence of acute ischemia.   *Echo 4/2024 with preserved EF, mild aortic stenosis, grade I DD, no  ANNETTE.   - Monitor    Oral candidiasis: Continue PTA oral nystatin, received a dose in the ED.     Recent fall with subdural hematoma of L temporal lobe, SAH of L temporal and L frontal lobe, and suspected left tegmen mastoideum fracture with penumocephalus  Seizure  Possible lateral ninth rib non-displaced fracture, subacute: Hospitalization from 9/30/24-10/5/24. Refer to discharge summary for complete details. Followed by Neurology and Neurosurgery   - Continue Keppra   - Outpatient follow-ups as recommended     Recent hematemesis without recurrence: Noted during most recent hospitalization without recurrence, thus EGD deferred. [facility] consulted during stay. Suspect related to epistaxis episode prior to event. No ongoing issues per family     Chronic macrocytic anemia: Baseline Hgb has ranged from 8-10 over the past year.   - Monitor   - Check B12, folate, TSH     Recent Labs   Lab 10/15/24  1308 10/14/24  0940 10/10/24  0606   HGB 8.6* 9.8* 8.8*     Mild cognitive impairment  Depression: Most recent SLUMS per TCU note 6/30. Will need ongoing reevaluation once UTI treated.   - Resume PTA regimen once verified     Dysphagia, acute on chronic   GERD: Seen by SLP during last hospitalization, discharged on regular diet.   - Mechanical soft diet on admission     HTN: Hold antihypertensives in the setting of sepsis.     Essential tremor: Noted. Slightly worse in the setting of infection.     CKD IIIa: Baseline creatinine 1-1.1. Stable at 1.11 on admission.   - Monitor     Diet:  Mechanical soft diet   DVT Prophylaxis: Pneumatic Compression Devices  Zarate Catheter: Not present  Lines: None     Cardiac Monitoring: None  Code Status:  DNR/DN     Clinically Significant Risk Factors Present on Admission         # Hypernatremia: Highest Na = 146 mmol/L in last 2 days, will monitor as appropriate       # Hypoalbuminemia: Lowest albumin = 3.4 g/dL at 10/15/2024  1:08 PM, will monitor as appropriate     # Hypertension: Noted  on problem list    # Anemia: based on hgb <11           # Financial/Environmental Concerns:           Disposition Plan     Medically Ready for Discharge: Anticipated in 2-4 Days         The patient's care was discussed with the Attending Physician, Dr. Wilson, Bedside Nurse, Patient, and Patient's Family, son Erasmo and ANGEL Beck at bedside.     Bianca Benrard PA-C  Hospitalist Service  [facility]  Securely message with [employer] (more info)  Text page via [facility] Paging/Directory     ______________________________________________________________________    Chief Complaint   Weakness, confusion, fever    History is obtained from the patient    History of Present Illness   Marcela Sandhu is a 84 year old female with PMHx of mild cognitive impairment, HTN, CKD III, and recent hospitalization 9/30/24-10/5/24 for a fall with subdural hematoma of L temporal lobe, SAH of L temporal and L frontal lobe, and suspected left tegmen mastoideum fracture with penumocephalus complicated by seizure who presented from TCU for further evaluation of weakness and increased confusion in the setting of abnormal UA 10/14/24. Meeting sepsis criteria with Tmax 102.5 F on admission and tachycardia in the 110s. Admitted on 10/15/2024 for further evaluation and treatment.     Family reports onset of sx 10/12-10/13 with increased weakness, worsened confusion, fever, and lack of desire to participate in therapies at TCU. Family voiced concern for UTI. UA checked 10/14 and abnormal, but plan per family was to wait for urine culture. Unfortunately, pt's status worsened prompting ED visit.     In the ED, pt was seen by Dr. Moncada. Urine culture from 10/14/24 with large LE, >182. WBC 11.7 on 10/14>10.7 on admission, lactic WNL. Covid, influenza, RSV negative. Received 1g tylenol, 1.6 L IVF bolus per sepsis criteria and Rocephin 2g X1 in the ED      Per chart review, last positive urine culture from  4/18/24 with klebsiella pneumonia resistant to ampicillin, otherwise sensitive. No additional hx of antibiotic resistant organism per review of prior cultures.     During interview, pt is alert and follows commands, forgetful. Family contributes to majority of the hx. Pt denies any pain, feels she is completely emptying her bladder. Reviewed notes from prior hospitalization with family. No recurrent hematemesis, seizures. Pt had been progressing with therapy until this weakness when she became acutely ill. Compliant with meds.     Of note, family is paying >$500/day for a bed hold at [facility], so would like to be kept looped in on discharge planning.     Past Medical History    Past Medical History:   Diagnosis Date    Acid reflux disease     Benign essential hypertension     Benign essential tremor     Chronic kidney disease, stage III (moderate) (H)     History of hepatitis C virus infection     treated with interferon    MDD (major depressive disorder)     Mild cognitive impairment     Osteopenia     Peripheral neuropathy     Personal history of malignant neoplasm of breast 2007    RA (rheumatoid arthritis) (H)     Restless legs syndrome (RLS)        Past Surgical History   Past Surgical History:   Procedure Laterality Date    APPENDECTOMY      ARTHROPLASTY CARPOMETACARPAL (THUMB JOINT) Bilateral     ARTHROPLASTY KNEE Right     BLEPHAROPLASTY BILATERAL      BUNIONECTOMY CHEVRON AND PEBBLES BILATERAL, COMBINED  12/02/2011    Procedure:COMBINED BUNIONECTOMY CHEVRON AND PEBBLES BILATERAL; BILATERAL THIRD AND FOURTH CLAWTOE RECONSTRUCTION WITH EXOSTECTOMY, LEFT FIRST TARSOMETATARSAL JOINT and 2nd toe Right foot reconstruction; Surgeon:ASHWIN FARMER; Location: OR    CARPAL TUNNEL RELEASE RT/LT Bilateral     COLONOSCOPY  10/18/2011    Procedure:COLONOSCOPY; COLONOSCOPY; Surgeon:BRENDA RODRIGUEZ; Location: GI    ELBOW SURGERY      tendon lengthening surgery    ESOPHAGOSCOPY, GASTROSCOPY, DUODENOSCOPY (EGD),  COMBINED N/A 3/31/2023    Procedure: Esophagoscopy, gastroscopy, duodenoscopy (EGD), combined;  Surgeon: Remberto Aguero MD;  Location:  GI    FOOT SURGERY Bilateral     toe straightening    MASTECTOMY, BILATERAL      SLING BLADDER SUSPENSION WITH FASCIA ZACH      SPINE SURGERY      multiple lumbar surgeries; most hardware removed       Prior to Admission Medications   Prior to Admission Medications   Prescriptions Last Dose Informant Patient Reported? Taking?   ARIPiprazole (ABILIFY) 5 MG tablet Not Taking  No No   Sig: Take 1 tablet (5 mg) by mouth daily   Patient not taking: Reported on 10/15/2024   acetaminophen (TYLENOL) 500 MG tablet   Yes Yes   Sig: Take 1,000 mg by mouth every 6 hours as needed for mild pain.   amLODIPine (NORVASC) 10 MG tablet 10/15/2024 at am  No Yes   Sig: Take 1 tablet (10 mg) by mouth daily.   donepezil (ARICEPT) 10 MG tablet 10/15/2024 at am  Yes Yes   Sig: Take 5 mg by mouth daily.   hydrOXYzine HCl (ATARAX) 10 MG tablet   No Yes   Sig: TAKE 1 TABLET (10 MG) BY MOUTH 3 TIMES DAILY AS NEEDED FOR ANXIETY   levETIRAcetam (KEPPRA) 500 MG tablet 10/15/2024 at am  No Yes   Sig: Take 1 tablet (500 mg) by mouth 2 times daily.   mirtazapine (REMERON) 7.5 MG tablet 10/14/2024 at hs  No Yes   Sig: TAKE 1 TABLET (7.5 MG) BY MOUTH AT BEDTIME   nystatin (MYCOSTATIN) [id number] UNIT/ML suspension 10/15/2024 at am  Yes Yes   Sig: Swish and spit 500,000 Units in mouth 3 times daily.   sertraline (ZOLOFT) 100 MG tablet 10/15/2024 at am  No Yes   Sig: TAKE 2 TABLETS (200 MG) BY MOUTH DAILY   valsartan (DIOVAN) 160 MG tablet 10/15/2024 at am  No Yes   Sig: Take 1 tablet (160 mg) by mouth daily.      Facility-Administered Medications: None        Review of Systems    The 10 point Review of Systems is negative other than noted in the HPI.     Social History   I have reviewed this patient's social history and updated it with pertinent information if needed.  Social History     Tobacco Use    Smoking  status: Never    Smokeless tobacco: Never   Vaping Use    Vaping status: Never Used   Substance Use Topics    Alcohol use: No    Drug use: No         Family History   I have reviewed this patient's family history and updated it with pertinent information if needed.  Family History   Problem Relation Age of Onset    Hypertension Mother     Hypertension Father     Multiple myeloma Brother     Bone Cancer Brother     Cerebrovascular Disease Brother 60    Diabetes No family hx of     Myocardial Infarction No family hx of     Breast Cancer No family hx of     Ovarian Cancer No family hx of     Colon Cancer No family hx of          Allergies   Allergies   Allergen Reactions    Citalopram Unknown    Hydrocodone GI Disturbance    Levaquin [Levofloxacin Hemihydrate] Other (See Comments) and Rash     chest pain    Sulfa Antibiotics GI Disturbance        Physical Exam   Vital Signs: Temp: 99.8  F (37.7  C) Temp src: Oral BP: 104/85 Pulse: 117   Resp: 20 SpO2: 94 % O2 Device: None (Room air)    Weight: 0 lbs 0 oz    CONSTITUTIONAL: Pt laying in bed, dressed in hospital garb. Appears comfortable. Cooperative with interview. Accompanied by son and DIL at bedside.   HEENT: Normocephalic, atraumatic.   CARDIOVASCULAR: RRR, 2+ systolic murmur best heart in aortic region. No rubs or extra heart sounds appreciated. Pulses +2/4 and regular in upper and lower extremities, bilaterally.   RESPIRATORY: No increased work of breathing. CTA, bilat; no wheezes, rales, or rhonchi appreciated.  GASTROINTESTINAL:  Abdomen soft, non-distended. BS auscultated in all four quadrants. Negative for tenderness to palpation.    MUSCULOSKELETAL: Strength +5/5 in upper and lower extremities, bilaterally. No gross deformities noted. Normal muscle tone.   HEMATOLOGIC/LYMPHATIC/IMMUNOLOGIC: Negative for lower extremity edema, bilaterally.  NEUROLOGIC: Alert, follows commands. No focal neuro deficits. Baseline essential tremor.   SKIN: Warm, dry, intact.      Medical Decision Making       75 MINUTES SPENT BY ME on the date of service doing chart review, history, exam, documentation & further activities per the note.      Data     I have personally reviewed the following data over the past 24 hrs:    10.7  \   8.6 (L)   / 304     146 (H) 106 41.9 (H) /  149 (H)   4.0 28 1.11 (H) \     ALT: 20 AST: 30 AP: 83 TBILI: 0.3   ALB: 3.4 (L) TOT PROTEIN: 7.1 LIPASE: N/A     Trop: 41 (H) BNP: 497     Procal: N/A CRP: N/A Lactic Acid: 0.6         Imaging results reviewed over the past 24 hrs:   Recent Results (from the past 24 hour(s))   XR Chest 2 Views    Narrative    XR CHEST 2 VIEWS   10/15/2024 2:09 PM     HISTORY: Shortness of breath    COMPARISON: Chest radiograph 9/30/2024, CT 12/7/2023      Impression    IMPRESSION: No evidence of acute cardiopulmonary disease.    ZOFIA LIMA MD         SYSTEM ID:  [id number]

## 2024-10-15 NOTE — ED NOTES
[facility]  ED Nurse Handoff Report    ED Chief complaint: Dysuria and Generalized Weakness      ED Diagnosis:   Final diagnoses:   Acute UTI   Sepsis with encephalopathy without septic shock, due to unspecified organism (H)       Code Status:  Admitting MD to assess.      Allergies:   Allergies   Allergen Reactions    Citalopram Unknown    Hydrocodone GI Disturbance    Levaquin [Levofloxacin Hemihydrate] Other (See Comments) and Rash     chest pain    Sulfa Antibiotics GI Disturbance       Patient Story:   Patient BIBA from TCU with gen weakness and positive UA. Patient is febrile, has essential tremors and was recent SDH after a fall.     Focused Assessment:  Patient is alert and oriented x 3, calm and cooperative. VS are stable, skin is warm and dry, respirations are even and non-labored on room air. Patient denied chest pain, shortness of breath, dizziness, and nausea.       Treatments and/or interventions provided:   Medications   acetaminophen (TYLENOL) tablet 1,000 mg (1,000 mg Oral $Given 10/15/24 1316)   sodium chloride 0.9% BOLUS 1,641 mL (0 mLs Intravenous Stopped 10/15/24 1432)   cefTRIAXone (ROCEPHIN) 2 g vial to attach to  ml bag for ADULTS or NS 50 ml bag for PEDS (0 g Intravenous Stopped 10/15/24 1432)   nystatin (MYCOSTATIN) suspension 500,000 Units (500,000 Units Oral $Given 10/15/24 1440)       Patient's response to treatments and/or interventions: Fever improved to 99.8, patient is more alert and talking closer to baseline.    To be done/followed up on inpatient unit:  Monitor    Does this patient have any cognitive concerns?: Forgetful    Activity level - Baseline/Home:  Unknown  Activity Level - Current:   Unknown    Patient's Preferred language: English   Needed?: No    Isolation: None  Infection: Not Applicable  Patient tested for COVID 19 prior to admission: YES  Bariatric?: No    Vital Signs:   Vitals:    10/15/24 1316 10/15/24 1330 10/15/24 1345 10/15/24  1410   BP: 104/85      Pulse:       Resp:       Temp:    99.8  F (37.7  C)   TempSrc:       SpO2: 94% 94% 94% 94%       Cardiac Rhythm:     Was the PSS-3 completed:   Yes  What interventions are required if any?               Family Comments: Family members at the bedside at this time.   OBS brochure/video discussed/provided to patient/family: Yes              Name of person given brochure if not patient: NA              Relationship to patient: NA    For the majority of the shift this patient's behavior was Green.   Behavioral interventions performed were  information and reassurance provided.  .    ED NURSE PHONE NUMBER: [phone]

## 2024-10-15 NOTE — PROVIDER NOTIFICATION
RECEIVING UNIT ED HANDOFF REVIEW    ED Nurse Handoff Report was reviewed by: Janessa Serrano RN on October 15, 2024 at 3:44 PM

## 2024-10-15 NOTE — TELEPHONE ENCOUNTER
[facility] Triage Nurse Telephone Encounter    Provider: Meliza Bowens MD  Facility: [facility]  Facility Type:  TCU    Caller: Joy  Call Back Number: [phone]    Allergies:    Allergies   Allergen Reactions    Citalopram Unknown    Hydrocodone GI Disturbance    Levaquin [Levofloxacin Hemihydrate] Other (See Comments) and Rash     chest pain    Sulfa Antibiotics GI Disturbance        Reason for call: Nurse is calling to report that patient is very weak, unable to bear weight, and tremors are worse than normal.  VS:  T=99.4, P=120, R=18, RB=270/71, O2=91%RA, however does desat with exertion.  Patient required assistance with feeding.  See EKG results below:          Verbal Order/Direction given by Provider: Send patient to the ER due to profound weakness, tachycardia, and altered mental status.      Provider giving Order:  Meliza Bowens MD    Verbal Order given to: Ileana Smith RN

## 2024-10-15 NOTE — ED TRIAGE NOTES
Patient BIBA from TCU with gen weakness and positive UA. Patient is febrile, has essential tremors and was recent SDH after a fall.

## 2024-10-15 NOTE — PHARMACY-ADMISSION MEDICATION HISTORY
Pharmacist Admission Medication History    Admission medication history is complete. The information provided in this note is only as accurate as the sources available at the time of the update.    Information Source(s): Facility (U/NH/) medication list/MAR via N/A    Changes made to PTA medication list:  Added: APAP, nystatin (10/14-10/21)  Deleted: Abilify (last dose 10/10 then DCd)  Changed: donepezil 10 mg > 5 mg     Allergies reviewed with patient and updates made in EHR: no    Medication History Completed By: Altagracia García RPH 10/15/2024 2:36 PM    PTA Med List   Medication Sig Last Dose    acetaminophen (TYLENOL) 500 MG tablet Take 1,000 mg by mouth every 6 hours as needed for mild pain.     amLODIPine (NORVASC) 10 MG tablet Take 1 tablet (10 mg) by mouth daily. 10/15/2024 at am    donepezil (ARICEPT) 10 MG tablet Take 5 mg by mouth daily. 10/15/2024 at am    hydrOXYzine HCl (ATARAX) 10 MG tablet TAKE 1 TABLET (10 MG) BY MOUTH 3 TIMES DAILY AS NEEDED FOR ANXIETY     levETIRAcetam (KEPPRA) 500 MG tablet Take 1 tablet (500 mg) by mouth 2 times daily. 10/15/2024 at am    mirtazapine (REMERON) 7.5 MG tablet TAKE 1 TABLET (7.5 MG) BY MOUTH AT BEDTIME 10/14/2024 at hs    nystatin (MYCOSTATIN) 606168 UNIT/ML suspension Take 500,000 Units by mouth 3 times daily. 10/15/2024 at am    sertraline (ZOLOFT) 100 MG tablet TAKE 2 TABLETS (200 MG) BY MOUTH DAILY 10/15/2024 at am    valsartan (DIOVAN) 160 MG tablet Take 1 tablet (160 mg) by mouth daily. 10/15/2024 at am

## 2024-10-16 ENCOUNTER — APPOINTMENT (OUTPATIENT)
Dept: PHYSICAL THERAPY | Facility: CLINIC | Age: 84
DRG: 872 | End: 2024-10-16
Attending: PHYSICIAN ASSISTANT
Payer: COMMERCIAL

## 2024-10-16 ENCOUNTER — APPOINTMENT (OUTPATIENT)
Dept: SPEECH THERAPY | Facility: CLINIC | Age: 84
DRG: 872 | End: 2024-10-16
Attending: INTERNAL MEDICINE
Payer: COMMERCIAL

## 2024-10-16 ENCOUNTER — PATIENT OUTREACH (OUTPATIENT)
Dept: CARE COORDINATION | Facility: CLINIC | Age: 84
End: 2024-10-16
Payer: COMMERCIAL

## 2024-10-16 LAB
ABO/RH(D): NORMAL
ANION GAP SERPL CALCULATED.3IONS-SCNC: 10 MMOL/L (ref 7–15)
ANTIBODY SCREEN: NEGATIVE
APTT PPP: 33 SECONDS (ref 22–38)
BASOPHILS # BLD AUTO: 0 10E3/UL (ref 0–0.2)
BASOPHILS # BLD AUTO: 0 10E3/UL (ref 0–0.2)
BASOPHILS NFR BLD AUTO: 0 %
BASOPHILS NFR BLD AUTO: 0 %
BUN SERPL-MCNC: 45.3 MG/DL (ref 8–23)
CALCIUM SERPL-MCNC: 8.3 MG/DL (ref 8.8–10.4)
CHLORIDE SERPL-SCNC: 111 MMOL/L (ref 98–107)
CREAT SERPL-MCNC: 1.16 MG/DL (ref 0.51–0.95)
EGFRCR SERPLBLD CKD-EPI 2021: 47 ML/MIN/1.73M2
EOSINOPHIL # BLD AUTO: 0 10E3/UL (ref 0–0.7)
EOSINOPHIL # BLD AUTO: 0 10E3/UL (ref 0–0.7)
EOSINOPHIL NFR BLD AUTO: 0 %
EOSINOPHIL NFR BLD AUTO: 0 %
ERYTHROCYTE [DISTWIDTH] IN BLOOD BY AUTOMATED COUNT: 13.5 % (ref 10–15)
ERYTHROCYTE [DISTWIDTH] IN BLOOD BY AUTOMATED COUNT: 13.5 % (ref 10–15)
FERRITIN SERPL-MCNC: 522 NG/ML (ref 11–328)
FOLATE SERPL-MCNC: 23.1 NG/ML (ref 4.6–34.8)
GLUCOSE SERPL-MCNC: 107 MG/DL (ref 70–99)
HAPTOGLOB SERPL-MCNC: 436 MG/DL (ref 30–200)
HCO3 SERPL-SCNC: 25 MMOL/L (ref 22–29)
HCT VFR BLD AUTO: 22.9 % (ref 35–47)
HCT VFR BLD AUTO: 26.2 % (ref 35–47)
HGB BLD-MCNC: 6.9 G/DL (ref 11.7–15.7)
HGB BLD-MCNC: 8 G/DL (ref 11.7–15.7)
IMM GRANULOCYTES # BLD: 0 10E3/UL
IMM GRANULOCYTES # BLD: 0 10E3/UL
IMM GRANULOCYTES NFR BLD: 0 %
IMM GRANULOCYTES NFR BLD: 1 %
INR PPP: 1.24 (ref 0.85–1.15)
IRON BINDING CAPACITY (ROCHE): 145 UG/DL (ref 240–430)
IRON SATN MFR SERPL: 12 % (ref 15–46)
IRON SERPL-MCNC: 18 UG/DL (ref 37–145)
LDH SERPL L TO P-CCNC: 218 U/L (ref 0–250)
LYMPHOCYTES # BLD AUTO: 0.8 10E3/UL (ref 0.8–5.3)
LYMPHOCYTES # BLD AUTO: 1 10E3/UL (ref 0.8–5.3)
LYMPHOCYTES NFR BLD AUTO: 10 %
LYMPHOCYTES NFR BLD AUTO: 13 %
MCH RBC QN AUTO: 31.8 PG (ref 26.5–33)
MCH RBC QN AUTO: 32.1 PG (ref 26.5–33)
MCHC RBC AUTO-ENTMCNC: 30.1 G/DL (ref 31.5–36.5)
MCHC RBC AUTO-ENTMCNC: 30.5 G/DL (ref 31.5–36.5)
MCV RBC AUTO: 105 FL (ref 78–100)
MCV RBC AUTO: 106 FL (ref 78–100)
MONOCYTES # BLD AUTO: 0.4 10E3/UL (ref 0–1.3)
MONOCYTES # BLD AUTO: 0.5 10E3/UL (ref 0–1.3)
MONOCYTES NFR BLD AUTO: 5 %
MONOCYTES NFR BLD AUTO: 7 %
NEUTROPHILS # BLD AUTO: 6.2 10E3/UL (ref 1.6–8.3)
NEUTROPHILS # BLD AUTO: 6.4 10E3/UL (ref 1.6–8.3)
NEUTROPHILS NFR BLD AUTO: 80 %
NEUTROPHILS NFR BLD AUTO: 84 %
NRBC # BLD AUTO: 0 10E3/UL
NRBC # BLD AUTO: 0 10E3/UL
NRBC BLD AUTO-RTO: 0 /100
NRBC BLD AUTO-RTO: 0 /100
PLATELET # BLD AUTO: 277 10E3/UL (ref 150–450)
PLATELET # BLD AUTO: 294 10E3/UL (ref 150–450)
POTASSIUM SERPL-SCNC: 4 MMOL/L (ref 3.4–5.3)
RBC # BLD AUTO: 2.17 10E6/UL (ref 3.8–5.2)
RBC # BLD AUTO: 2.49 10E6/UL (ref 3.8–5.2)
RETICS # AUTO: 0.01 10E6/UL (ref 0.03–0.1)
RETICS # AUTO: 0.02 10E6/UL (ref 0.03–0.1)
RETICS/RBC NFR AUTO: 0.6 % (ref 0.5–2)
RETICS/RBC NFR AUTO: 0.7 % (ref 0.5–2)
SODIUM SERPL-SCNC: 146 MMOL/L (ref 135–145)
SPECIMEN EXPIRATION DATE: NORMAL
VIT B12 SERPL-MCNC: 853 PG/ML (ref 232–1245)
WBC # BLD AUTO: 7.7 10E3/UL (ref 4–11)
WBC # BLD AUTO: 7.8 10E3/UL (ref 4–11)

## 2024-10-16 PROCEDURE — 83615 LACTATE (LD) (LDH) ENZYME: CPT | Performed by: INTERNAL MEDICINE

## 2024-10-16 PROCEDURE — 97161 PT EVAL LOW COMPLEX 20 MIN: CPT | Mod: GP

## 2024-10-16 PROCEDURE — 80048 BASIC METABOLIC PNL TOTAL CA: CPT | Performed by: PHYSICIAN ASSISTANT

## 2024-10-16 PROCEDURE — 250N000011 HC RX IP 250 OP 636: Performed by: INTERNAL MEDICINE

## 2024-10-16 PROCEDURE — 83550 IRON BINDING TEST: CPT | Performed by: INTERNAL MEDICINE

## 2024-10-16 PROCEDURE — 36415 COLL VENOUS BLD VENIPUNCTURE: CPT | Performed by: INTERNAL MEDICINE

## 2024-10-16 PROCEDURE — 250N000009 HC RX 250: Performed by: INTERNAL MEDICINE

## 2024-10-16 PROCEDURE — 86901 BLOOD TYPING SEROLOGIC RH(D): CPT | Performed by: INTERNAL MEDICINE

## 2024-10-16 PROCEDURE — 82746 ASSAY OF FOLIC ACID SERUM: CPT | Performed by: PHYSICIAN ASSISTANT

## 2024-10-16 PROCEDURE — 92526 ORAL FUNCTION THERAPY: CPT | Mod: GN | Performed by: SPEECH-LANGUAGE PATHOLOGIST

## 2024-10-16 PROCEDURE — 83010 ASSAY OF HAPTOGLOBIN QUANT: CPT | Performed by: INTERNAL MEDICINE

## 2024-10-16 PROCEDURE — 86900 BLOOD TYPING SEROLOGIC ABO: CPT | Performed by: INTERNAL MEDICINE

## 2024-10-16 PROCEDURE — 85025 COMPLETE CBC W/AUTO DIFF WBC: CPT | Performed by: PHYSICIAN ASSISTANT

## 2024-10-16 PROCEDURE — 82728 ASSAY OF FERRITIN: CPT | Performed by: INTERNAL MEDICINE

## 2024-10-16 PROCEDURE — 120N000001 HC R&B MED SURG/OB

## 2024-10-16 PROCEDURE — 85025 COMPLETE CBC W/AUTO DIFF WBC: CPT | Performed by: INTERNAL MEDICINE

## 2024-10-16 PROCEDURE — 250N000011 HC RX IP 250 OP 636: Performed by: PHYSICIAN ASSISTANT

## 2024-10-16 PROCEDURE — 99418 PROLNG IP/OBS E/M EA 15 MIN: CPT | Performed by: INTERNAL MEDICINE

## 2024-10-16 PROCEDURE — 97530 THERAPEUTIC ACTIVITIES: CPT | Mod: GP

## 2024-10-16 PROCEDURE — 999N000111 HC STATISTIC OT IP EVAL DEFER

## 2024-10-16 PROCEDURE — 85730 THROMBOPLASTIN TIME PARTIAL: CPT | Performed by: INTERNAL MEDICINE

## 2024-10-16 PROCEDURE — 85610 PROTHROMBIN TIME: CPT | Performed by: INTERNAL MEDICINE

## 2024-10-16 PROCEDURE — 250N000013 HC RX MED GY IP 250 OP 250 PS 637: Performed by: PHYSICIAN ASSISTANT

## 2024-10-16 PROCEDURE — 85045 AUTOMATED RETICULOCYTE COUNT: CPT | Performed by: INTERNAL MEDICINE

## 2024-10-16 PROCEDURE — 258N000003 HC RX IP 258 OP 636: Performed by: INTERNAL MEDICINE

## 2024-10-16 PROCEDURE — 92610 EVALUATE SWALLOWING FUNCTION: CPT | Mod: GN | Performed by: SPEECH-LANGUAGE PATHOLOGIST

## 2024-10-16 PROCEDURE — 99233 SBSQ HOSP IP/OBS HIGH 50: CPT | Performed by: INTERNAL MEDICINE

## 2024-10-16 PROCEDURE — 36415 COLL VENOUS BLD VENIPUNCTURE: CPT | Performed by: PHYSICIAN ASSISTANT

## 2024-10-16 PROCEDURE — 86923 COMPATIBILITY TEST ELECTRIC: CPT | Performed by: INTERNAL MEDICINE

## 2024-10-16 RX ORDER — CEPHALEXIN 500 MG/1
500 CAPSULE ORAL EVERY 12 HOURS SCHEDULED
Status: DISCONTINUED | OUTPATIENT
Start: 2024-10-17 | End: 2024-10-19 | Stop reason: HOSPADM

## 2024-10-16 RX ORDER — PANTOPRAZOLE SODIUM 40 MG/1
40 TABLET, DELAYED RELEASE ORAL
Status: DISCONTINUED | OUTPATIENT
Start: 2024-10-17 | End: 2024-10-19 | Stop reason: HOSPADM

## 2024-10-16 RX ORDER — SODIUM CHLORIDE 9 MG/ML
INJECTION, SOLUTION INTRAVENOUS CONTINUOUS
Status: ACTIVE | OUTPATIENT
Start: 2024-10-16 | End: 2024-10-17

## 2024-10-16 RX ADMIN — ACETAMINOPHEN 650 MG: 325 TABLET, FILM COATED ORAL at 22:37

## 2024-10-16 RX ADMIN — SERTRALINE HYDROCHLORIDE 200 MG: 100 TABLET ORAL at 08:17

## 2024-10-16 RX ADMIN — ACETAMINOPHEN 650 MG: 325 TABLET, FILM COATED ORAL at 14:11

## 2024-10-16 RX ADMIN — IRON SUCROSE 300 MG: 20 INJECTION, SOLUTION INTRAVENOUS at 18:32

## 2024-10-16 RX ADMIN — SODIUM CHLORIDE: 9 INJECTION, SOLUTION INTRAVENOUS at 20:04

## 2024-10-16 RX ADMIN — LEVETIRACETAM 500 MG: 500 TABLET, FILM COATED ORAL at 20:13

## 2024-10-16 RX ADMIN — DONEPEZIL HYDROCHLORIDE 5 MG: 5 TABLET, FILM COATED ORAL at 08:17

## 2024-10-16 RX ADMIN — LEVETIRACETAM 500 MG: 500 TABLET, FILM COATED ORAL at 08:17

## 2024-10-16 RX ADMIN — MIRTAZAPINE 7.5 MG: 7.5 TABLET, FILM COATED ORAL at 22:37

## 2024-10-16 RX ADMIN — PANTOPRAZOLE SODIUM 40 MG: 40 INJECTION, POWDER, FOR SOLUTION INTRAVENOUS at 09:41

## 2024-10-16 RX ADMIN — NYSTATIN 500000 UNITS: 100000 SUSPENSION ORAL at 08:09

## 2024-10-16 RX ADMIN — SENNOSIDES AND DOCUSATE SODIUM 1 TABLET: 8.6; 5 TABLET ORAL at 20:13

## 2024-10-16 RX ADMIN — CEFTRIAXONE SODIUM 2 G: 2 INJECTION, POWDER, FOR SOLUTION INTRAMUSCULAR; INTRAVENOUS at 15:00

## 2024-10-16 RX ADMIN — SENNOSIDES AND DOCUSATE SODIUM 1 TABLET: 8.6; 5 TABLET ORAL at 08:21

## 2024-10-16 RX ADMIN — NYSTATIN 500000 UNITS: 100000 SUSPENSION ORAL at 22:37

## 2024-10-16 ASSESSMENT — ACTIVITIES OF DAILY LIVING (ADL)
ADLS_ACUITY_SCORE: 51
ADLS_ACUITY_SCORE: 57
ADLS_ACUITY_SCORE: 58
ADLS_ACUITY_SCORE: 56
ADLS_ACUITY_SCORE: 57
ADLS_ACUITY_SCORE: 56
ADLS_ACUITY_SCORE: 58
ADLS_ACUITY_SCORE: 56
ADLS_ACUITY_SCORE: 51
ADLS_ACUITY_SCORE: 58
ADLS_ACUITY_SCORE: 57
ADLS_ACUITY_SCORE: 57
ADLS_ACUITY_SCORE: 51
ADLS_ACUITY_SCORE: 58
ADLS_ACUITY_SCORE: 58
ADLS_ACUITY_SCORE: 51
ADLS_ACUITY_SCORE: 58
ADLS_ACUITY_SCORE: 58

## 2024-10-16 NOTE — PROGRESS NOTES
Care Management  Patient has a bed hold at [facility] TCU per [place].    Will complete consult tomorrow.

## 2024-10-16 NOTE — CONSULTS
[facility]  Gastroenterology Consultation         Marcela Sandhu  [id number]   [facility] [address]  84 year old female    Admission Date/Time: 10/15/2024  Primary Care Provider: Kelley Haney  Referring / Attending Physician:  Dr. Kathryn Wilson    We were asked to see the patient in consultation by Dr. Kathryn Wilson for evaluation of anemia.      CC: weakness    HPI:  Marcela Sandhu is a 84 year old female who has a PMHx of mild cognitive impairment, HTN, CKD III, and recent hospitalization 9/30/24-10/5/24 for a fall with subdural hematoma of L temporal lobe, SAH of L temporal and L frontal lobe, and suspected left tegmen mastoideum fracture with penumocephalus complicated by seizure, and epistaxis with hematemesis (deferred EGD) who presented from TCU for further evaluation of weakness and increased confusion with abnormal UA 10/14/24. Meeting sepsis criteria with Tmax 102.5 F on admission and tachycardia in the 110s. Found to have a hemoglobin of 6.8.     Family reports onset of sx 10/12-10/13 with increased weakness, worsened confusion, fever, and lack of desire to participate in therapies at TCU. UA checked 10/14 and abnormal. In the ED, pt was seen by Dr. Moncada. Urine culture from 10/14/24 with large LE, >182.     Patient is alert and comfortable. Unable to provide significant history. Discussion with son provides most of the history.. No recurrent hematemesis. No nausea, vomiting or abdominal pain. Pt had been progressing with therapy until this weakness when she became acutely ill. Compliant with meds. However did not go home with pantoprazole after discharge      ROS: A comprehensive ten point review of systems was negative aside from those in mentioned in the HPI.      PAST MED HX:  I have reviewed this patient's medical history and updated it with pertinent information if needed.   Past Medical History:   Diagnosis Date    Acid reflux disease      Benign essential hypertension     Benign essential tremor     Chronic kidney disease, stage III (moderate) (H)     History of hepatitis C virus infection     treated with interferon    MDD (major depressive disorder)     Mild cognitive impairment     Osteopenia     Peripheral neuropathy     Personal history of malignant neoplasm of breast 2007    RA (rheumatoid arthritis) (H)     Restless legs syndrome (RLS)        MEDICATIONS:   Prior to Admission Medications   Prescriptions Last Dose Informant Patient Reported? Taking?   ARIPiprazole (ABILIFY) 5 MG tablet Not Taking  No No   Sig: Take 1 tablet (5 mg) by mouth daily   Patient not taking: Reported on 10/15/2024   acetaminophen (TYLENOL) 500 MG tablet   Yes Yes   Sig: Take 1,000 mg by mouth every 6 hours as needed for mild pain.   amLODIPine (NORVASC) 10 MG tablet 10/15/2024 at am  No Yes   Sig: Take 1 tablet (10 mg) by mouth daily.   donepezil (ARICEPT) 10 MG tablet 10/15/2024 at am  Yes Yes   Sig: Take 5 mg by mouth daily.   hydrOXYzine HCl (ATARAX) 10 MG tablet   No Yes   Sig: TAKE 1 TABLET (10 MG) BY MOUTH 3 TIMES DAILY AS NEEDED FOR ANXIETY   levETIRAcetam (KEPPRA) 500 MG tablet 10/15/2024 at am  No Yes   Sig: Take 1 tablet (500 mg) by mouth 2 times daily.   mirtazapine (REMERON) 7.5 MG tablet 10/14/2024 at hs  No Yes   Sig: TAKE 1 TABLET (7.5 MG) BY MOUTH AT BEDTIME   nystatin (MYCOSTATIN) 023028 UNIT/ML suspension 10/15/2024 at am  Yes Yes   Sig: Swish and spit 500,000 Units in mouth 3 times daily.   sertraline (ZOLOFT) 100 MG tablet 10/15/2024 at am  No Yes   Sig: TAKE 2 TABLETS (200 MG) BY MOUTH DAILY   valsartan (DIOVAN) 160 MG tablet 10/15/2024 at am  No Yes   Sig: Take 1 tablet (160 mg) by mouth daily.      Facility-Administered Medications: None       ALLERGIES:   Allergies   Allergen Reactions    Citalopram Unknown    Hydrocodone GI Disturbance    Levaquin [Levofloxacin Hemihydrate] Other (See Comments) and Rash     chest pain    Sulfa Antibiotics GI  Disturbance       SOCIAL HISTORY:  Social History     Tobacco Use    Smoking status: Never    Smokeless tobacco: Never   Vaping Use    Vaping status: Never Used   Substance Use Topics    Alcohol use: No    Drug use: No       FAMILY HISTORY:  Family History   Problem Relation Age of Onset    Hypertension Mother     Hypertension Father     Multiple myeloma Brother     Bone Cancer Brother     Cerebrovascular Disease Brother 60    Diabetes No family hx of     Myocardial Infarction No family hx of     Breast Cancer No family hx of     Ovarian Cancer No family hx of     Colon Cancer No family hx of        PHYSICAL EXAM:   General  alert, comfortable and oriented x2  Vital Signs with Ranges  Temp: 99.1  F (37.3  C) Temp src: Oral BP: 119/58 Pulse: 102   Resp: 18 SpO2: 98 % O2 Device: Nasal cannula Oxygen Delivery: 1 LPM  I/O last 3 completed shifts:  In: 120 [P.O.:120]  Out: -     Constitutional: healthy, alert, and no distress   Cardiovascular: negative, PMI normal. No lifts, heaves, or thrills. RRR. No murmurs, clicks gallops or rub  Respiratory: negative, Percussion normal. Good diaphragmatic excursion. Lungs clear  Abdomen: Abdomen soft, non-tender. BS normal. No masses, organomegaly          ADDITIONAL COMMENTS:   I reviewed the patient's new clinical lab test results.   Recent Labs   Lab Test 10/16/24  0916 10/16/24  0714 10/15/24  1308 09/30/24  1903 09/30/24  1113   WBC 7.7 7.8 10.7   < > 4.4   HGB 8.0* 6.9* 8.6*   < > 7.0*   * 106* 102*   < > 108*    294 304   < > 129*   INR 1.24*  --   --   --  1.12    < > = values in this interval not displayed.     Recent Labs   Lab Test 10/16/24  0714 10/15/24  1308 10/14/24  0940   POTASSIUM 4.0 4.0 4.3   CHLORIDE 111* 106 103   CO2 25 28 26   BUN 45.3* 41.9* 36.5*   ANIONGAP 10 12 13     Recent Labs   Lab Test 10/15/24  1308 10/14/24  1950 04/18/24  1613 08/10/23  1433 08/02/23  1052 07/27/23  1228 04/25/23  1345   ALBUMIN 3.4*  --   --   --   --  4.8 4.3    BILITOTAL 0.3  --   --   --   --  0.3 0.2   ALT 20  --   --   --   --  11 12   AST 30  --   --   --   --  27 25   PROTEIN  --  100* Negative Negative   < >  --   --     < > = values in this interval not displayed.       I reviewed the patient's new imaging results.        CONSULTATION ASSESSMENT AND PLAN:    Marcela Sandhu is a 84 year old female with PMHx of mild cognitive impairment, HTN, CKD III, and recent hospitalization 9/30/24-10/5/24 for a fall with subdural hematoma of L temporal lobe, SAH of L temporal and L frontal lobe, and suspected left tegmen mastoideum fracture with penumocephalus complicated by seizure who presented from TCU for further evaluation of weakness and increased confusion in the setting of abnormal UA 10/14/24. Meeting sepsis criteria with Tmax 102.5 F on admission and tachycardia in the 110s as well as hemoglobin of 6.8    Anemia  Recent hematemesis without recurrence, epistaxis with prior admission  Hgb on presentation 6.8 and improved to 8.0, Baseline of 10  Noted hematemesis during most recent hospitalization without recurrence, thus EGD deferred. Suspect related to epistaxis episode prior to event. No ongoing issues per son  DDx includes anemia due to sepsis vs occult GI blood loss ( unlikely as no stools and reportedly previously brown)  Discussed case with Dr. Suarez.  Discussed with son, Erasmo, discussed possible source of anemia and recommendations to defer EGD unless recurrent drop in hemoglobin or visible evidence of GI blood loss he is in agreement    - Daily PPI  - soft diet  - serial hemoglobin and transfuse for 8 hgb 7 or less    Acute UTI  Sepsis with encephalopathy without septic shock, due to unspecified organism (H)  - On ceftriaxone  - hospitalist managing        [facility].  Office: [phone]  Cell : [phone] (Dr. Olivares)  Cell: [phone] (Theresa Horton PA-C)

## 2024-10-16 NOTE — PLAN OF CARE
Summary:  ED admit, fall/UTI  DATE & TIME: 10/15-10/16/24 Night shift   Cognitive Concerns/ Orientation : A&Ox3 forgetful, disoriented to place.    BEHAVIOR & AGGRESSION TOOL COLOR: Green   ABNL VS/O2: VSS on RA- on 2L NC while sleeping  MOBILITY: Strong A2 GB/W- very tremulous. Stand and pivot  PAIN MANAGMENT: Denied  DIET: mechanical dental soft; Encouraging fluids  BOWEL/BLADDER: incontinent of B&B, odorous incontinence x1; no BM  ABNL LAB/BG: trop 44, 41  DRAIN/DEVICES: PIV SL  TELEMETRY RHYTHM: n/a  SKIN: WDL ex blanchable redness on buttocks; pale  TESTS/PROCEDURES: NA  D/C DAY/GOALS/PLACE: Once off IV antibiotics  OTHER IMPORTANT INFO: dentures removed at HS and at bedside; Swallows pills whole one at a time      Goal Outcome Evaluation:

## 2024-10-16 NOTE — PROVIDER NOTIFICATION
MD Notification    Notified Person: MD    Notified Person Name: aki coleman    Notification Date/Time: 0953. 10/16    Notification Interaction: vocera    Purpose of Notification: Hgb is 8 after rechecked, does pt still need blood transfusion?    Orders Received: no    Comments: notified blood bank.

## 2024-10-16 NOTE — PROGRESS NOTES
This am, Hgb 6.9    patient did get 2L in ER 10/15, but the Hgb trend overall is decreasing  Will reconsult GI this admission to re evaluate  Q8h hgb  Transfuse 1 Unit  (last transfusion had been on 9/30)  No blood thinners, no longer on prednisone, has been off methotrexate/hydroxychloroquine since late summer (RA since her 20's)  Iron studies pending. Hemolysis labs pending.

## 2024-10-16 NOTE — PROGRESS NOTES
(0700- 1100)    Pt A&O x2. VSS.RA. takes pills whole. Tolerated soft diet, assist x1 with meal. Denied pain. Denied SOB. Hgb 6.8. rechecked Hgb 8. No bleeding episode noted. Incontinent of B&B, small BM this morning. GI consult. May have endoscopy tomorrow, NPO after midnight.

## 2024-10-16 NOTE — PROGRESS NOTES
[facility]    Medicine Progress Note - Hospitalist Service    Date of Admission:  10/15/2024    Assessment & Plan      Marcela Sandhu is a 84 year old female with PMHx of mild cognitive impairment, HTN, CKD III, and recent hospitalization 9/30/24-10/5/24 for a fall with subdural hematoma of L temporal lobe, SAH of L temporal and L frontal lobe, and suspected left tegmen mastoideum fracture with penumocephalus complicated by seizure who presented from TCU for further evaluation of weakness and increased confusion in the setting of abnormal UA 10/14/24.     Meeting sepsis criteria with Tmax 102.5 F on admission and tachycardia in the 110s. Admitted on 10/15/2024 for Urosepsis therapy.        Sepsis in the setting of UTI  Generalized weakness:  10/15 Urine Culture = Proteus.   Will change Ceftriaxone to Keflex, watch for sensitivities.       - PT/OT   - CC/SW for discharge planning. Family wants pt to return to [facility] TCU upon dismissal      CATRACHO   Hypernatremia   Creatinine baseline is 0.84.     Admission creatinine 1.11  -->  1.16  Na = 142 --> 144  Will add gentle IVF (50cc/hour for 10h, then re evaluate) in setting of poor po and increasing creatinine//increasing sodium.        HTN   BP are normal//low side of normal  Diovan and amlodipine not restarted since admission.  Watchful waiting     Elevated troponin, suspect demand in the setting of above   Hx of mild aortic stenosis  Grade 1 diastolic dysfunction:   Troponins 44 >41. Suspect demand in the setting of above. EKG with NSR, TWI in lead III, otherwise no evidence of acute ischemia.   *Echo 4/2024 with preserved EF, mild aortic stenosis, grade I DD, no RWMA.   - Monitor     Oral candidiasis: Continue PTA oral nystatin, received a dose in the ED.      Recent fall with subdural hematoma of L temporal lobe, SAH of L temporal and L frontal lobe, and suspected left tegmen mastoideum fracture with penumocephalus  Seizure  Possible  lateral ninth rib non-displaced fracture, subacute: Hospitalization from 9/30/24-10/5/24. Refer to discharge summary for complete details. Followed by Neurology and Neurosurgery   - Continue Keppra   - Outpatient follow-ups as recommended   - no new headache, trauma nor lateralizing neurological symptoms.      Recent hematemesis without recurrence: Noted during most recent hospitalization without recurrence, thus EGD deferred. [facility] consulted during stay. Suspect related to epistaxis episode prior to event. No ongoing issues per family   Acute Anemia  - 10/16 am Hgb 6.9.    Upon recheck 8.0    no black / bloody stools since admit.    [facility] GI re-consulted. Discussed.   - she has TANYA and ACD.  Will order Venofer 300 X 1.       Chronic macrocytic anemia: Baseline Hgb has ranged from 8-10 over the past year.   - Monitor   -   B12, folate, TSH are all normal.            Recent Labs   Lab 10/15/24  1308 10/14/24  0940 10/10/24  0606   HGB 8.6* 9.8* 8.8*      Mild cognitive impairment  Depression: Most recent SLUMS per TCU note 6/30. Will need ongoing reevaluation once UTI treated.   - Resume PTA regimen once verified      Dysphagia, acute on chronic   GERD: Seen by SLP during last hospitalization, discharged on regular diet.   - Mechanical soft diet on admission         Essential tremor: Noted. Slightly worse in the setting of infection.         Jeremias son updated 10/16/24 per phone.         Diet: Combination Diet Clear Liquid Diet; Mildly Thick (level 2) (BY SPOON ONLY, 1:1 ASSIST, sit at 90 degrees, slow rate, extra swallows, meds crushed with mildly thick or applesauce, cue pt to cough and re-swallow if wet sound/throat clear after swall...    DVT Prophylaxis: Pneumatic Compression Devices  Zarate Catheter: Not present  Lines: None     Cardiac Monitoring: None  Code Status: No CPR- Do NOT Intubate      Clinically Significant Risk Factors         # Hypernatremia: Highest Na = 146 mmol/L in last 2 days, will monitor  as appropriate  # Hyperchloremia: Highest Cl = 111 mmol/L in last 2 days, will monitor as appropriate      # Hypocalcemia: Lowest Ca = 8.3 mg/dL in last 2 days, will monitor and replace as appropriate     # Hypoalbuminemia: Lowest albumin = 3.4 g/dL at 10/15/2024  1:08 PM, will monitor as appropriate  # Coagulation Defect: INR = 1.24 (Ref range: 0.85 - 1.15) and/or PTT = 33 Seconds (Ref range: 22 - 38 Seconds), will monitor for bleeding    # Hypertension: Noted on problem list               # Financial/Environmental Concerns:           Disposition Plan     Medically Ready for Discharge: Anticipated Tomorrow if afebrile, hgb stable.                Kathryn Wilson MD  Hospitalist Service  [facility]  Securely message with [email or web address] (more info)  Text page via [facility] Paging/Directory   ______________________________________________________________________    Interval History   No black or bloody stools.   Hgb drop to 6.9, repeat is 8.0      overall hgb may be  slowly trending down.     Physical Exam   Vital Signs: Temp: 99.5  F (37.5  C) Temp src: Oral BP: 108/53 Pulse: 102   Resp: 18 SpO2: 97 % O2 Device: None (Room air)    Weight: 123 lbs 3.79 oz     CONSTITUTIONAL: Pt laying in bed,  pale   Appears comfortable. Cooperative with interview.    HEENT: Normocephalic, atraumatic.   [place]  CARDIOVASCULAR: RRR, 2+ systolic murmur best heart in aortic region. No rubs or extra heart sounds appreciated. Pulses +2/4 and regular in upper and lower extremities, bilaterally.   RESPIRATORY: No increased work of breathing. CTA, bilat; no wheezes, rales, or rhonchi appreciated.  GASTROINTESTINAL:  Abdomen soft, non-distended. BS auscultated in all four quadrants. Negative for tenderness to palpation.    MUSCULOSKELETAL:  . No gross deformities noted. Normal muscle tone for advanced age.   HEMATOLOGIC/LYMPHATIC/IMMUNOLOGIC: Negative for lower extremity edema, bilaterally.  NEUROLOGIC: Alert, follows commands.  No focal neuro deficits. Baseline essential tremor.   SKIN: Warm, dry, intact.     Medical Decision Making       70 MINUTES SPENT BY ME on the date of service doing chart review, history, exam, documentation & further activities per the note.      Data     I have personally reviewed the following data over the past 24 hrs:    7.7  \   8.0 (L)   / 277     146 (H) 111 (H) 45.3 (H) /  107 (H)   4.0 25 1.16 (H) \     TSH: N/A T4: N/A A1C: N/A     INR:  1.24 (H) PTT:  33   D-dimer:  N/A Fibrinogen:  N/A     Ferritin:  522 (H) % Retic:  0.7 LDH:  218       Imaging results reviewed over the past 24 hrs:   No results found for this or any previous visit (from the past 24 hour(s)).

## 2024-10-16 NOTE — PLAN OF CARE
Summary:  ED admit, fall/UTI  DATE & TIME: 10/15/2024 [id number]    Cognitive Concerns/ Orientation : A&Ox3-4 forgetful, disoriented to place.    BEHAVIOR & AGGRESSION TOOL COLOR: green   ABNL VS/O2: VSS on RA- desatted to 86% on RA while sleeping, put on 2L NC while sleeping, max temp 100.4  MOBILITY: Strong A2 GB/W- very tremulous. Stand and pivot, up in chair for dinner.  PAIN MANAGMENT: denies pain  DIET: mechanical dental soft- ate well. Encouraging fluids  BOWEL/BLADDER: incontinent of B&B, large odorous incontinence upon arrival- PVR 22mL. No UO rest of shift, small formed BM.  ABNL LAB/BG: trop 44, 41. Na 146, creat 1.11, HgB 8.6  DRAIN/DEVICES: PIV SL  TELEMETRY RHYTHM: n/a  SKIN: WDL ex blanchable redness on buttocks. Skin warm to touch, max temp 100.4 at HS- given tylenol.  TESTS/PROCEDURES: nothing this shift  D/C DAY/GOALS/PLACE: pending  OTHER IMPORTANT INFO: dentures removed at HS. Swallows pills whole one at a time, unable to follow command for swish and spit nystatin- pt swished and swallowed.

## 2024-10-16 NOTE — PROGRESS NOTES
"  SLP Bedside Swallow Evaluation  10/16/24 1413   Appointment Info   Signing Clinician's Name / Credentials (SLP) Sierra Green MA [facility] SLP   General Information   Onset of Illness/Injury or Date of Surgery 10/15/24   Referring Physician Kathryn Wilson MD   Patient/Family Therapy Goal Statement (SLP) Pt agreed to diet modificaiton and ongoing assesssment of swallow function.  No specific goal was stated by pt.  POC goals to be determined.   Pertinent History of Current Problem Per notes: \"PMHx of mild cognitive impairment, HTN, CKD III, and recent hospitalization 9/30/24-10/5/24 for a fall with subdural hematoma of L temporal lobe, SAH of L temporal and L frontal lobe, and suspected left tegmen mastoideum fracture with penumocephalus complicated by seizure who presented from TCU for further evaluation of weakness and increased confusion in the setting of abnormal UA 10/14/24. Meeting sepsis criteria with Tmax 102.5 F on admission and tachycardia in the 110s. Admitted on 10/15/2024 for further evaluation and treatment.\"   General Observations RN reports pt was coughing with thin liquids eariler today. Pt was alert and cooperative, some decreased recall and word finding ability noted during the interview.  Baseline tremor observed.  Pt reports some coughing with po at times recently.    Son was contacted and stated pt had been doing ok with po intake (occasional cough as a strategy to clear voicing) until this past Sunday when infection started.  Pt had difficulty using previously recommended strategies at that time.  Son reports pt below baseline for overall function/strength and cognition currently.   Type of Evaluation   Type of Evaluation Swallow Evaluation   Oral Motor   Oral Musculature   (Lower R lip at rest, mild decreased lingual ROM R > L, tremor noted, mild overall decreased coordination)   Dentition (Oral Motor)   Dentition (Oral Motor) dental appliance/dentures;adequate dentition   Vocal " Quality/Secretion Management (Oral Motor)   Comment, Vocal Quality/Secretion Management (Oral Motor) Mild hoarse quality   General Swallowing Observations   Current Diet/Method of Nutritional Intake (General Swallowing Observations, NIS)   (Mechanical dental soft diet)   Respiratory Support supplemental oxygen   Past History of Dysphagia VFSS completed 10/1/24 penetration with thin and mildly thick liquids, penetration to the vocal folds with thin, decreased epiglottic inversion, delayed swallows; Minced and moist diet and thin recommended with strategies, advanced to a regular diet and thin liquids with strategies prior to discharge   Clinical Swallow Evaluation   Feeding Assistance frequent cues/help required   Clinical Swallow Evaluation Textures Trialed thin liquids;mildly thick liquids;pureed   Clinical Swallow Eval: Thin Liquid Texture Trial   Mode of Presentation, Thin Liquids cup   Volume of Liquid or Food Presented sips x 2   Oral Phase of Swallow premature pharyngeal entry   Pharyngeal Phase of Swallow reduction in laryngeal movement;repeated swallows  (delay)   Diagnostic Statement overt extensive cough on 2nd sip   Clinical Swallow Eval: Mildly Thick Liquids   Mode of Presentation spoon   Volume Presented tsps x 4   Oral Phase premature pharyngeal entry   Pharyngeal Phase reduction in laryngeal movement;repeated swallows  (delay)   Diagnostic Statement no overt aspiration signs, max feeding assist provided   Clinical Swallow Evaluation: Puree Solid Texture Trial   Mode of Presentation, Puree spoon   Volume of Puree Presented tsps x 3   Oral Phase, Puree premature pharyngeal entry   Pharyngeal Phase, Puree reduction in laryngeal movement;feeling of something stuck in throat;repeated swallows  (delay)   Diagnostic Statement overt cough after 2nd bite of pudding, slight throat clearing after applesauce trial, max feeding assist provided   Swallowing Recommendations   Diet Consistency Recommendations mildly  thick liquids (level 2);clear liquid diet   Instrumental Assessment Recommendations VFSS (videofluoroscopic swallowing study)  (to be determined, family requests to hold off until medical status improved)   Comment, Swallowing Recommendations BY SPOON ONLY, 1:1 ASSIST/Supervision, sit at 90 degrees, slow rate, extra swallows, meds crushed with mildly thick or applesauce, cue pt to cough and re-swallow if wet sound/throat clear after swallows, hold all po if respiratory status declines   Clinical Impression   Criteria for Skilled Therapeutic Interventions Met (SLP Eval) Yes, treatment indicated   SLP Diagnosis Moderate-severe oral-pharyngeal dysphagia   Risks & Benefits of therapy have been explained evaluation/treatment results reviewed;care plan/treatment goals reviewed;risks/benefits reviewed;current/potential barriers reviewed;participants voiced agreement with care plan;participants included;patient;son   Clinical Impression Comments Pt presents with moderate-severe oral-pharyngeal dysphagia at bedside with swallow function appearing to be below level found on 10/1 during VFSS at [facility].  Deficits/risk factors include baseline dysphagia, overt coughing on thin liquids with RN and SLP today, increased tremor and confusion with new infection, hypoxia reported at times, delayed swallows, decreased elevation, need for multiple swallows, globus sensation and intermittent coughing/throat clearing after puree trials this pm during the evaluation.  Recommend a diet downgrade to mildly thick clear liquids diet by spoon only, 1:1 assist/supervision, sit at 90 degrees, slow rate, extra swallows, meds crushed with mildly thick or applesauce, cue pt to cough and re-swallow if wet sound/throat clear after swallows, hold all po if respiratory status declines.  Discussed pt status, plan, and possible repeat VFSS during this admit with pt's son.  Son prefers to monitor swallow function at bedside with hopes that swallow function  will improve as medical status improves.  Plan to continue swallow Tx at bedside with advanced po trials as indicated.  Repeat VFSS needs to be determined.   SLP Total Evaluation Time   Eval: oral/pharyngeal swallow function, clinical swallow Minutes (83767) 15   Swallowing Dysfunction &/or Oral Function for Feeding   Treatment of Swallowing Dysfunction &/or Oral Function for Feeding Minutes (10961) 10   Symptoms Noted During/After Treatment None   Treatment Detail/Skilled Intervention Educated pt, RN, and son regarding current deficits/risk factors, recommended diet downgrade with careful use of strategies/precautions, possible repeat VFSS rationale vs continued assessment at bedside.  Son prefers to monitor at bedside until medical status improves.  Mod-max repeated cues were provided in Tx to cough and swallow to help clear wet voicing/throat clear after po eval completed.  Eventual decrease in wet voicing noted.  Pt was able to intermittently follow mod-max cues to produce 2 hard swallows with po.   SLP Discharge Planning   SLP Plan Assess po tolerance, trial upgrades, monitor repeat VFSS needs/check POC with son; call son after session   SLP Discharge Recommendation Transitional Care Facility   SLP Rationale for DC Rec Swallow function appears to be below baseline, POC to be determined   SLP Brief overview of current status  Moderate-severe oral-pharyngeal dysphagia, function is below baseine; Rec: diet downgrade to mildly thick clear liquids diet by spoon only, 1:1 assist/supervision, sit at 90 degrees, slow rate, extra swallows, meds crushed with mildly thick or applesauce, cue pt to cough and re-swallow if wet sound/throat clear after swallows, hold all po if respiratory status declines.   Total Session Time   Total Session Time (sum of timed and untimed services) 25

## 2024-10-16 NOTE — PLAN OF CARE
"Occupational Therapy: Orders received. Chart reviewed and discussed with care team.? Occupational Therapy not indicated due to per PT, pt with limited tolerance for therapy at this time. Was at TCU and planning to return to TCU. Per [facility], has a \"day by day\" bed hold. Will defer OT order at this time. If OT needed at later time, please re-order.? Defer discharge recommendations to care team.? Will complete orders.    "

## 2024-10-16 NOTE — PROGRESS NOTES
"   10/16/24 1146   Appointment Info   Signing Clinician's Name / Credentials (PT) Nehemias Woodson, HEIDY   Living Environment   Living Environment Comments Pt not accurate historian per chart; however, pt reports she lived in apartment alone, no stairs. Says 'yes' to coming from [facility]. Unclear if she had any services at baseline.   Self-Care   Usual Activity Tolerance moderate   Current Activity Tolerance poor   Equipment Currently Used at Home walker, rolling   Activity/Exercise/Self-Care Comment Per pt report, she was IND with functional mob at baseline, used 4WW for ambulation at all times. Pt not recalling falls.   General Information   Onset of Illness/Injury or Date of Surgery 10/15/24   Referring Physician Biacna Bernard PA-C   Pertinent History of Current Problem (include personal factors and/or comorbidities that impact the POC) Pt is 83 yo female who, per chart, \"PMHx of mild cognitive impairment, HTN, CKD III, and recent hospitalization 9/30/24-10/5/24 for a fall with subdural hematoma of L temporal lobe, SAH of L temporal and L frontal lobe, and suspected left tegmen mastoideum fracture with penumocephalus complicated by seizure who presented from TCU for further evaluation of weakness and increased confusion in the setting of abnormal UA 10/14/24. Meeting sepsis criteria with Tmax 102.5 F on admission and tachycardia in the 110s. Admitted on 10/15/2024 for further evaluation and treatment.\"   Existing Precautions/Restrictions fall   Cognition   Affect/Mental Status (Cognition) confused   Orientation Status (Cognition) oriented x 3;oriented to;person;place;time   Pain Assessment   Patient Currently in Pain   (reports pain \"all over\")   Integumentary/Edema   Integumentary/Edema Comments bruise noted over the L knee.   Posture    Posture Forward head position   Range of Motion (ROM)   Range of Motion ROM deficits secondary to weakness   Strength (Manual Muscle Testing)   Strength (Manual Muscle " Testing) Deficits observed during functional mobility   Strength Comments unable to perform SLR   Bed Mobility   Comment, (Bed Mobility) supine > sit with modA, use of bed rails, HOB at 20 deg.   Transfers   Comment, (Transfers) STS w/ FWW and modA, from elevated bed height, posterior lean and tremulous with standing in B LEs.   Gait/Stairs (Locomotion)   Comment, (Gait/Stairs) unable to ambulate on this date.   Balance   Balance Comments sitting SBA, unable to stand without walker and Ax1-2, unsteady on feet with OOB mob.   Sensory Examination   Sensory Perception Comments difficult to assess .   Clinical Impression   Criteria for Skilled Therapeutic Intervention Yes, treatment indicated   PT Diagnosis (PT) impaired functional mob   Influenced by the following impairments pain, decreased strength and activity tolerance, impaired balance, confusion   Functional limitations due to impairments difficulty with bed mob, transfers, and ambulation   Clinical Presentation (PT Evaluation Complexity) stable   Clinical Presentation Rationale clinical judgment   Clinical Decision Making (Complexity) low complexity   Planned Therapy Interventions (PT) balance training;bed mobility training;gait training;home exercise program;neuromuscular re-education;patient/family education;ROM (range of motion);stair training;strengthening;stretching;transfer training;progressive activity/exercise;risk factor education;home program guidelines   Risk & Benefits of therapy have been explained evaluation/treatment results reviewed;care plan/treatment goals reviewed;risks/benefits reviewed;current/potential barriers reviewed;participants voiced agreement with care plan;participants included;patient   PT Total Evaluation Time   PT Eval, Low Complexity Minutes (69383) 10   Physical Therapy Goals   PT Frequency 5x/week   PT Predicted Duration/Target Date for Goal Attainment 10/23/24   PT Goals Bed Mobility;Transfers;Gait   PT: Bed Mobility Minimal  assist;Supine to/from sit   PT: Transfers Minimal assist;Sit to/from stand;Assistive device;Bed to/from chair   PT: Gait Minimal assist;Rolling walker;50 feet   Therapeutic Activity   Therapeutic Activities: dynamic activities to improve functional performance Minutes (28612) 24   Symptoms Noted During/After Treatment Fatigue   Treatment Detail/Skilled Intervention Pt supine in bed upon PT arrival. Pt agreeable to session. On RA. SpO2 and HR spot checked, SpO2 remained in low-90s, HR tachy prior and during mobility, 100-110. Pt reported mild lightheadedness with sitting up, BP stable = 119/55. Pt cued for anterior scooting at EOB, unable to perform, maxA with chux and reposheet. Pt performed STS x3 with FWW and modA, from elevated bed height, cues on UE placement and anterior weight shift. Pt demo'd mild improvement. Recliner placed on R side next to bed. Pt performed stand-pivot transfer, bed > chair towards R, FWW and modA. Cues on sequencing and repositioning of walker to facilitate turn. Pt sitting in recliner at end of session, alarm on, all needs within reach.   PT Discharge Planning   PT Plan progress bed mob, repeat STS, trial short distance gait with w/c follow or to recliner in room.   PT Discharge Recommendation (DC Rec) Transitional Care Facility   PT Rationale for DC Rec Unclear exact baseline of pt. She came from [facility], currently appears significantly below baseline, needing modA for bed mob and transfers, recommending Ax2 for stand-pivots. Pt limited by weakness, confusion, and tremors. Recommend returning to [facility] to progress mob prior to returning to home.   PT Brief overview of current status Ax2 FWW for stand-pivots vs. SS; Goals of therapy will be to address safe mobility and make recs for d/c to next level of care. Pt and RN will continue to follow all falls risk precautions as documented by RN staff while hospitalized.   Total Session Time   Timed Code Treatment Minutes 24   Total Session Time (sum  of timed and untimed services) 34

## 2024-10-17 ENCOUNTER — APPOINTMENT (OUTPATIENT)
Dept: PHYSICAL THERAPY | Facility: CLINIC | Age: 84
DRG: 872 | End: 2024-10-17
Payer: COMMERCIAL

## 2024-10-17 ENCOUNTER — APPOINTMENT (OUTPATIENT)
Dept: SPEECH THERAPY | Facility: CLINIC | Age: 84
DRG: 872 | End: 2024-10-17
Payer: COMMERCIAL

## 2024-10-17 LAB
ANION GAP SERPL CALCULATED.3IONS-SCNC: 8 MMOL/L (ref 7–15)
BACTERIA UR CULT: ABNORMAL
BACTERIA UR CULT: ABNORMAL
BUN SERPL-MCNC: 32.2 MG/DL (ref 8–23)
CALCIUM SERPL-MCNC: 8.6 MG/DL (ref 8.8–10.4)
CHLORIDE SERPL-SCNC: 114 MMOL/L (ref 98–107)
CREAT SERPL-MCNC: 0.88 MG/DL (ref 0.51–0.95)
EGFRCR SERPLBLD CKD-EPI 2021: 65 ML/MIN/1.73M2
ERYTHROCYTE [DISTWIDTH] IN BLOOD BY AUTOMATED COUNT: 13.5 % (ref 10–15)
GLUCOSE SERPL-MCNC: 101 MG/DL (ref 70–99)
HCO3 SERPL-SCNC: 26 MMOL/L (ref 22–29)
HCT VFR BLD AUTO: 24.7 % (ref 35–47)
HGB BLD-MCNC: 7.7 G/DL (ref 11.7–15.7)
MCH RBC QN AUTO: 32.9 PG (ref 26.5–33)
MCHC RBC AUTO-ENTMCNC: 31.2 G/DL (ref 31.5–36.5)
MCV RBC AUTO: 106 FL (ref 78–100)
PATH REPORT.COMMENTS IMP SPEC: NORMAL
PATH REPORT.COMMENTS IMP SPEC: NORMAL
PATH REPORT.FINAL DX SPEC: NORMAL
PATH REPORT.MICROSCOPIC SPEC OTHER STN: NORMAL
PATH REPORT.MICROSCOPIC SPEC OTHER STN: NORMAL
PLATELET # BLD AUTO: 275 10E3/UL (ref 150–450)
POTASSIUM SERPL-SCNC: 4 MMOL/L (ref 3.4–5.3)
RBC # BLD AUTO: 2.34 10E6/UL (ref 3.8–5.2)
SODIUM SERPL-SCNC: 144 MMOL/L (ref 135–145)
SODIUM SERPL-SCNC: 148 MMOL/L (ref 135–145)
WBC # BLD AUTO: 5.6 10E3/UL (ref 4–11)

## 2024-10-17 PROCEDURE — 36415 COLL VENOUS BLD VENIPUNCTURE: CPT | Performed by: INTERNAL MEDICINE

## 2024-10-17 PROCEDURE — 99233 SBSQ HOSP IP/OBS HIGH 50: CPT | Performed by: INTERNAL MEDICINE

## 2024-10-17 PROCEDURE — 84295 ASSAY OF SERUM SODIUM: CPT | Performed by: INTERNAL MEDICINE

## 2024-10-17 PROCEDURE — 80048 BASIC METABOLIC PNL TOTAL CA: CPT | Performed by: INTERNAL MEDICINE

## 2024-10-17 PROCEDURE — 258N000003 HC RX IP 258 OP 636: Performed by: INTERNAL MEDICINE

## 2024-10-17 PROCEDURE — 85060 BLOOD SMEAR INTERPRETATION: CPT | Performed by: PATHOLOGY

## 2024-10-17 PROCEDURE — 250N000013 HC RX MED GY IP 250 OP 250 PS 637: Performed by: INTERNAL MEDICINE

## 2024-10-17 PROCEDURE — 92526 ORAL FUNCTION THERAPY: CPT | Mod: GN | Performed by: SPEECH-LANGUAGE PATHOLOGIST

## 2024-10-17 PROCEDURE — 97530 THERAPEUTIC ACTIVITIES: CPT | Mod: GP

## 2024-10-17 PROCEDURE — S5010 5% DEXTROSE AND 0.45% SALINE: HCPCS | Performed by: INTERNAL MEDICINE

## 2024-10-17 PROCEDURE — 85018 HEMOGLOBIN: CPT | Performed by: INTERNAL MEDICINE

## 2024-10-17 PROCEDURE — 250N000013 HC RX MED GY IP 250 OP 250 PS 637: Performed by: PHYSICIAN ASSISTANT

## 2024-10-17 PROCEDURE — 120N000001 HC R&B MED SURG/OB

## 2024-10-17 RX ORDER — PANTOPRAZOLE SODIUM 40 MG/1
40 TABLET, DELAYED RELEASE ORAL
DISCHARGE
Start: 2024-10-18

## 2024-10-17 RX ORDER — POLYETHYLENE GLYCOL 3350 17 G/17G
17 POWDER, FOR SOLUTION ORAL DAILY
DISCHARGE
Start: 2024-10-17 | End: 2024-10-19

## 2024-10-17 RX ORDER — DEXTROSE MONOHYDRATE AND SODIUM CHLORIDE 5; .45 G/100ML; G/100ML
INJECTION, SOLUTION INTRAVENOUS CONTINUOUS
Status: ACTIVE | OUTPATIENT
Start: 2024-10-17 | End: 2024-10-18

## 2024-10-17 RX ORDER — FERROUS GLUCONATE 324(38)MG
324 TABLET ORAL EVERY OTHER DAY
Status: DISCONTINUED | OUTPATIENT
Start: 2024-10-17 | End: 2024-10-19 | Stop reason: HOSPADM

## 2024-10-17 RX ADMIN — NYSTATIN 500000 UNITS: 100000 SUSPENSION ORAL at 21:26

## 2024-10-17 RX ADMIN — SERTRALINE HYDROCHLORIDE 200 MG: 100 TABLET ORAL at 08:42

## 2024-10-17 RX ADMIN — NYSTATIN 500000 UNITS: 100000 SUSPENSION ORAL at 08:39

## 2024-10-17 RX ADMIN — LEVETIRACETAM 500 MG: 500 TABLET, FILM COATED ORAL at 08:42

## 2024-10-17 RX ADMIN — MIRTAZAPINE 7.5 MG: 7.5 TABLET, FILM COATED ORAL at 21:26

## 2024-10-17 RX ADMIN — DEXTROSE AND SODIUM CHLORIDE: 5; 450 INJECTION, SOLUTION INTRAVENOUS at 21:25

## 2024-10-17 RX ADMIN — SENNOSIDES AND DOCUSATE SODIUM 1 TABLET: 8.6; 5 TABLET ORAL at 21:26

## 2024-10-17 RX ADMIN — CEPHALEXIN 500 MG: 500 CAPSULE ORAL at 21:26

## 2024-10-17 RX ADMIN — FERROUS GLUCONATE 324 MG: 324 TABLET ORAL at 14:54

## 2024-10-17 RX ADMIN — LEVETIRACETAM 500 MG: 500 TABLET, FILM COATED ORAL at 21:26

## 2024-10-17 RX ADMIN — SENNOSIDES AND DOCUSATE SODIUM 1 TABLET: 8.6; 5 TABLET ORAL at 08:56

## 2024-10-17 RX ADMIN — DONEPEZIL HYDROCHLORIDE 5 MG: 5 TABLET, FILM COATED ORAL at 08:42

## 2024-10-17 RX ADMIN — NYSTATIN 500000 UNITS: 100000 SUSPENSION ORAL at 17:09

## 2024-10-17 RX ADMIN — CEPHALEXIN 500 MG: 500 CAPSULE ORAL at 08:42

## 2024-10-17 RX ADMIN — PANTOPRAZOLE SODIUM 40 MG: 40 TABLET, DELAYED RELEASE ORAL at 08:42

## 2024-10-17 RX ADMIN — DEXTROSE AND SODIUM CHLORIDE: 5; 450 INJECTION, SOLUTION INTRAVENOUS at 08:51

## 2024-10-17 ASSESSMENT — ACTIVITIES OF DAILY LIVING (ADL)
ADLS_ACUITY_SCORE: 58
ADLS_ACUITY_SCORE: 58
ADLS_ACUITY_SCORE: 54
ADLS_ACUITY_SCORE: 60
ADLS_ACUITY_SCORE: 58
ADLS_ACUITY_SCORE: 56
ADLS_ACUITY_SCORE: 58
ADLS_ACUITY_SCORE: 54
ADLS_ACUITY_SCORE: 56
ADLS_ACUITY_SCORE: 58
ADLS_ACUITY_SCORE: 60
ADLS_ACUITY_SCORE: 58
ADLS_ACUITY_SCORE: 54
ADLS_ACUITY_SCORE: 58
ADLS_ACUITY_SCORE: 56
ADLS_ACUITY_SCORE: 58
ADLS_ACUITY_SCORE: 56

## 2024-10-17 NOTE — PROGRESS NOTES
GI brief note:    Macrela Sandhu is a 84 year old female with PMHx of mild cognitive impairment, HTN, CKD III, and recent hospitalization 9/30/24-10/5/24 for a fall with subdural hematoma of L temporal lobe, SAH of L temporal and L frontal lobe, and suspected left tegmen mastoideum fracture with penumocephalus complicated by seizure who presented from TCU for further evaluation of weakness and increased confusion in the setting of abnormal UA 10/14/24. Meeting sepsis criteria with Tmax 102.5 F on admission and tachycardia in the 110s as well as hemoglobin of 6.8     Anemia  Recent hematemesis without recurrence, epistaxis with prior admission  Hgb on presentation 6.8 and improved to 8.0, Baseline of 10  Noted hematemesis during most recent hospitalization without recurrence, thus EGD deferred. Suspect related to epistaxis episode prior to event. No ongoing issues per son  DDx includes anemia due to sepsis vs occult GI blood loss ( unlikely as no stools and reportedly previously brown)  Discussed case with Dr. Suarez.  Discussed with son, Erasmo, discussed possible source of anemia and recommendations to defer EGD unless recurrent drop in hemoglobin or visible evidence of GI blood loss he is in agreement.  Daily PPI  Soft diet  Follow up with [facility] as needed.  Office phone: [phone]      Recent Labs   Lab 10/17/24  0648 10/16/24  0916 10/16/24  0714 10/15/24  1308 10/14/24  0940   HGB 7.7* 8.0* 6.9* 8.6* 9.8*       [facility]  Office: [phone]  Cell: [phone] (Dr. Olivares)

## 2024-10-17 NOTE — PLAN OF CARE
Goal Outcome Evaluation:    Summary:  ED admit, fall/UTI  DATE & TIME: 10/16/2024 [id number]   Cognitive Concerns/ Orientation : A&Ox3-4; forgetful at times, tremors at baseline  BEHAVIOR & AGGRESSION TOOL COLOR: Green   ABNL VS/O2: VSS on Rm Air- on 2L NC while sleeping  MOBILITY: Asstx2 GB/walker, up to chair, turn & repo in bed  PAIN MANAGMENT: Denied  DIET: clear liquids, mildly thickened,   1:1 supervision  BOWEL/BLADDER: incontinent of B&B, BMx2  ABNL LAB/BG: trop 44, 41  DRAIN/DEVICES: PIV SL  TELEMETRY RHYTHM: n/a  SKIN: WDL ex blanchable redness on buttocks; pale  TESTS/PROCEDURES: NA  D/C DAY/GOALS/PLACE: Once off IV antibiotics  OTHER IMPORTANT INFO: meds crushed w/ applesauce, started on IV iron, NS @ 50 ml/hr after IV iron completed per orders. Switched to PO keflex. No blood in stool. Still need stool occult collected.

## 2024-10-17 NOTE — PLAN OF CARE
Goal Outcome Evaluation:  DATE & TIME:10/16/24, [id number]  Cognitive Concerns/ Orientation:A/O x3, Disoriented to time, forgetful @times  BEHAVIOR & AGGRESSION TOOL COLOR:Green, calm and cooperative  ABNL VS/O2:VSS on RA- 2L overnight  MOBILITY:Ax2 w/ gb&w, T/R  PAIN MANAGMENT:Denies  DIET:Clear Liquid Diet; Mildly Thick (level 2)   BOWEL/BLADDER:Incontinent of B/B  DRAIN/DEVICES:PIV infusing NS @50 mL/hr  TELEMETRY RHYTHM:n/a  SKIN:  Blanchable redness on buttocks  TESTS/PROCEDURES:Hg 8, stool occult needs to be collected  D/C DATE: Pending  OTHER IMPORTANT INFO: Pills crushed w/apple sauce.

## 2024-10-17 NOTE — PROGRESS NOTES
[facility]    Medicine Progress Note - Hospitalist Service    Date of Admission:  10/15/2024    Assessment & Plan   Marcela Sandhu is a 84 year old female with PMHx of mild cognitive impairment, HTN, CKD III, and recent hospitalization 9/30/24-10/5/24 for a fall with subdural hematoma of L temporal lobe, SAH of L temporal and L frontal lobe, and suspected left tegmen mastoideum fracture with penumocephalus complicated by seizure who presented from TCU for further evaluation of weakness and increased confusion in the setting of abnormal UA 10/14/24.      Admitted on 10/15/2024 for Urosepsis therapy.        Sepsis in the setting of UTI  Generalized weakness:  10/15 Urine Culture = Proteus. Sensitive to cephalosporins.   Treated with Ceftriaxone X 3 days, change to Keflex 10/178/24.        - PT/OT   - CC/SW for discharge planning. Family wants pt to return to [facility] TCU upon dismissal      CATRACHO -- resolved  Creatinine baseline is 0.84.     Admission creatinine 1.11  -->  1.16 --> 0.88  Hypernatremia   Na = 142 --> 144 -- > 148  Will add gentle IVF (1/2 NS @ 100/hour for one liter)  in setting of poor po and increasing creatinine//increasing sodium.   As of 10/17/24, her oral intake is starting to , and SLP has liberalized diet (see SLP note)    -- will recheck sodium at 18:00      HTN   BP  normal/high normal   10/17 mlodipine   restarted   Diovan still held since admission.  Watchful waiting - low threshhold to resume in next 24 hours    Elevated troponin, suspect demand in the setting of above   Hx of mild aortic stenosis  Grade 1 diastolic dysfunction:   Troponins 44 >41. Suspect demand in the setting of above. EKG with NSR, TWI in lead III, otherwise no evidence of acute ischemia.   *Echo 4/2024 with preserved EF, mild aortic stenosis, grade I DD, no RWMA.   - Monitor     Oral candidiasis: Continue PTA oral nystatin, received a dose in the ED.      Recent fall with subdural  hematoma of L temporal lobe, SAH of L temporal and L frontal lobe, and suspected left tegmen mastoideum fracture with penumocephalus  Seizure  Possible lateral ninth rib non-displaced fracture, subacute: Hospitalization from 9/30/24-10/5/24. Refer to discharge summary for complete details. Followed by Neurology and Neurosurgery   - Continue Keppra   - Outpatient follow-ups as recommended   - no new headache, trauma nor lateralizing neurological symptoms.      Recent hematemesis without recurrence: Noted during most recent hospitalization without recurrence, thus EGD deferred. [facility] consulted during stay. Suspect related to epistaxis episode prior to event. No ongoing issues per family   Acute Anemia  - 10/16 am Hgb 6.9.    Upon recheck 8.0  ...   On 10/17,  Hgb 7.7  no black / bloody stools since admit.    [facility] re-consulted. Discussed:  will continue medical treatment with PPI.   Intervene if signs/symptoms of GI bleed    - she has TANYA and ACD.    Venofer 300 X 1 given on 10/16/24   unable to give another venofer due to IVF shortage   - will start oral iron every other day.   Watch for side effects.       Chronic macrocytic anemia: Baseline Hgb has ranged from 8-10 over the past year.   - Monitor   -   B12, folate, TSH are all normal.          Mild cognitive impairment  Depression:    - Resume PTA regimen  aricept.    - more alert and interactive as of 10/17     Dysphagia, acute on chronic   GERD: Seen by SLP during last hospitalization, discharged on regular diet.   - Mechanical soft diet on admission         Essential tremor: Noted. Slightly worse in the setting of infection.         Jeremias, son updated 10/16/24 per phone.           Diet: Room Service  Combination Diet Mechanical/Dental Soft Diet; Thin Liquids (level 0) (BY SPOON, assist as needed, sit at 90 degrees, slow rate, extra swallows, meds crushed with mildly thick or applesauce, cue pt to cough and re-swallow if wet sound/throat clear ...    DVT  Prophylaxis: Pneumatic Compression Devices  Zarate Catheter: Not present  Lines: None     Cardiac Monitoring: None  Code Status: No CPR- Do NOT Intubate      Clinically Significant Risk Factors         # Hypernatremia: Highest Na = 148 mmol/L in last 2 days, will monitor as appropriate  # Hyperchloremia: Highest Cl = 114 mmol/L in last 2 days, will monitor as appropriate          # Hypoalbuminemia: Lowest albumin = 3.4 g/dL at 10/15/2024  1:08 PM, will monitor as appropriate  # Coagulation Defect: INR = 1.24 (Ref range: 0.85 - 1.15) and/or PTT = 33 Seconds (Ref range: 22 - 38 Seconds), will monitor for bleeding    # Hypertension: Noted on problem list               # Financial/Environmental Concerns: none         Disposition Plan     Medically Ready for Discharge: Anticipated Tomorrow if sodium is correcting and hgb remains stable.              Kathryn Wilson MD  Hospitalist Service  [facility]  Securely message with Crunchbutton (more info)  Text page via leaselock Paging/Directory   ______________________________________________________________________    Interval History   More alert, feeling better.  Better appetite.      Physical Exam   Vital Signs: Temp: 98.1  F (36.7  C) Temp src: Oral BP: 131/53 Pulse: 91   Resp: 16 SpO2: 99 % O2 Device: None (Room air)    Weight: 123 lbs 3.79 oz  CONSTITUTIONAL: Pt laying in bed,  pale    Alert and interactive  Appears comfortable. Cooperative with interview.    HEENT: Normocephalic, atraumatic.   [place]  CARDIOVASCULAR: RRR, 2+ systolic murmur best heart in aortic region. No rubs or extra heart sounds appreciated. Pulses +2/4 and regular in upper and lower extremities, bilaterally.   RESPIRATORY: No increased work of breathing. CTA, bilat; no wheezes, rales, or rhonchi appreciated.  GASTROINTESTINAL:  Abdomen soft, non-distended. BS auscultated in all four quadrants. Negative for tenderness to palpation.    MUSCULOSKELETAL:  . No gross deformities noted.  Normal muscle tone for advanced age.   HEMATOLOGIC/LYMPHATIC/IMMUNOLOGIC: Negative for lower extremity edema, bilaterally.  NEUROLOGIC: Alert, follows commands. No focal neuro deficits. Baseline essential tremor.   SKIN: Warm, dry, intact.        Medical Decision Making       55 MINUTES SPENT BY ME on the date of service doing chart review, history, exam, documentation & further activities per the note.      Data     I have personally reviewed the following data over the past 24 hrs:    5.6  \   7.7 (L)   / 275     148 (H) 114 (H) 32.2 (H) /  101 (H)   4.0 26 0.88 \     Ferritin:  N/A % Retic:  N/A LDH:  N/A       Imaging results reviewed over the past 24 hrs:   No results found for this or any previous visit (from the past 24 hour(s)).

## 2024-10-17 NOTE — PLAN OF CARE
Goal Outcome Evaluation:      Plan of Care Reviewed With: child    Outcome Evaluation: [facility]    Eric BOLAÑOS, SUNNY  [facility]  Care Management

## 2024-10-17 NOTE — PLAN OF CARE
Goal Outcome Evaluation:  Summary:  Presented from TCU w/ weakness and increased confusion, abnormal UA found to have UTI. Hx of cognitive impairment, recently hospitalized on 9/30 for fall w/ subdural hematoma of L temporal lobe.    DATE & TIME: 10/17/24 7033-7532  Cognitive Concerns/ Orientation: A&O x3, forgetful at times, calm and cooperative   BEHAVIOR & AGGRESSION TOOL COLOR: GREEN  ABNL VS/O2: VSS, on RA, wears O2 at night  MOBILITY: Assist of 2 w/ sera steady  PAIN MANAGMENT: Denies  DIET: Mechanical dental soft, thin liquids, 1:1 assist, meds crushed w/ applesauce  BOWEL/BLADDER: Incontinent  ABNL LAB/BG:  Na 148, Hgb 7.7  DRAIN/DEVICES: PIV infusing 1/2 NS and 5% dextrose @ 75 mL/hr  TELEMETRY RHYTHM: NA  SKIN: Blanchable redness to buttocks, otherwise Intact.  TESTS/PROCEDURES: Occult stool test still needs to be collected.  D/C DATE/GOALS/PLACE: TBD  OTHER IMPORTANT INFO: GI following

## 2024-10-17 NOTE — CONSULTS
Care Management Initial Consult    General Information  Assessment completed with: VM-chart review,         Primary Care Provider verified and updated as needed: Yes   Readmission within the last 30 days: no previous admission in last 30 days      Reason for Consult: discharge planning  Advance Care Planning: Advance Care Planning Reviewed: present on chart          Communication Assessment  Patient's communication style: spoken language (English or Bilingual)    Hearing Difficulty or Deaf: no   Wear Glasses or Blind: yes    Cognitive  Cognitive/Neuro/Behavioral: .WDL except  Level of Consciousness: alert, intermittent confusion  Arousal Level: opens eyes spontaneously  Orientation: disoriented to, time  Mood/Behavior: calm, cooperative  Best Language: 0 - No aphasia  Speech: clear    Living Environment:   People in home: alone     Current living Arrangements: assisted living      Able to return to prior arrangements:         Family/Social Support:  Care provided by: self  Provides care for: no one  Marital Status:   Support system: Children          Description of Support System: Supportive    Support Assessment: Adequate family and caregiver support    Current Resources:   Patient receiving home care services: No        Community Resources: None  Equipment currently used at home: walker, rolling  Supplies currently used at home: None    Employment/Financial:  Employment Status: retired        Financial Concerns: none   Referral to Financial Worker: No       Does the patient's insurance plan have a 3 day qualifying hospital stay waiver?  No    Lifestyle & Psychosocial Needs:  Social Determinants of Health     Food Insecurity: Low Risk  (10/1/2024)    Food Insecurity     Within the past 12 months, did you worry that your food would run out before you got money to buy more?: No     Within the past 12 months, did the food you bought just not last and you didn t have money to get more?: No   Depression: Not at  risk (11/1/2023)    PHQ-2     PHQ-2 Score: 0   Housing Stability: Low Risk  (10/1/2024)    Housing Stability     Do you have housing? : Yes     Are you worried about losing your housing?: No   Tobacco Use: Low Risk  (10/14/2024)    Patient History     Smoking Tobacco Use: Never     Smokeless Tobacco Use: Never     Passive Exposure: Not on file   Financial Resource Strain: Low Risk  (10/1/2024)    Financial Resource Strain     Within the past 12 months, have you or your family members you live with been unable to get utilities (heat, electricity) when it was really needed?: No   Alcohol Use: Not on file   Transportation Needs: Low Risk  (10/1/2024)    Transportation Needs     Within the past 12 months, has lack of transportation kept you from medical appointments, getting your medicines, non-medical meetings or appointments, work, or from getting things that you need?: No   Physical Activity: Not on file   Interpersonal Safety: Low Risk  (10/15/2024)    Interpersonal Safety     Do you feel physically and emotionally safe where you currently live?: Yes     Within the past 12 months, have you been hit, slapped, kicked or otherwise physically hurt by someone?: No     Within the past 12 months, have you been humiliated or emotionally abused in other ways by your partner or ex-partner?: No   Recent Concern: Interpersonal Safety - High Risk (10/1/2024)    Interpersonal Safety     Do you feel physically and emotionally safe where you currently live?: No     Within the past 12 months, have you been hit, slapped, kicked or otherwise physically hurt by someone?: No     Within the past 12 months, have you been humiliated or emotionally abused in other ways by your partner or ex-partner?: No   Stress: Not on file   Social Connections: Not on file   Health Literacy: Not on file       Functional Status:  Prior to admission patient needed assistance:   Dependent ADLs:: Ambulation-walker  Dependent IADLs:: Transportation, Money  "Management, Shopping, Laundry, Cleaning       Mental Health Status:  Mental Health Status: No Current Concerns       Chemical Dependency Status:  Chemical Dependency Status: No Current Concerns             Values/Beliefs:  Spiritual, Cultural Beliefs, Taoist Practices, Values that affect care: yes               Discussed  Partnership in Safe Discharge Planning  document with patient/family: No    Additional Information:  Writer completed consult via chart review as patient was recently at [facility] and spoke with son to confirm. Nothing had changed from previous consult.     Per patient's H&P \"Marcela Sandhu is a 84 year old female with PMHx of mild cognitive impairment, HTN, CKD III, and recent hospitalization 9/30/24-10/5/24 for a fall with subdural hematoma of L temporal lobe, SAH of L temporal and L frontal lobe, and suspected left tegmen mastoideum fracture with penumocephalus complicated by seizure who presented from TCU for further evaluation of weakness and increased confusion in the setting of abnormal UA 10/14/24.\"    Previous  confirmed that patient has a bedhold at [facility] Patient resides in [facility].  She manages her own meds and meals, primarily using the microwave for meals. Family help with finances and transportation. The facility provides apartment cleaning 2x/month and laundry.     Discharge plan at this time would be for patient to return to TCU    Next Steps: Discharge patient to [facility] when medically ready.     Eric Ahmadi MSW, TLGSW  [facility]  Care Management       "

## 2024-10-18 LAB
ANION GAP SERPL CALCULATED.3IONS-SCNC: 7 MMOL/L (ref 7–15)
BLD PROD TYP BPU: NORMAL
BLOOD COMPONENT TYPE: NORMAL
BUN SERPL-MCNC: 21.9 MG/DL (ref 8–23)
CALCIUM SERPL-MCNC: 8.6 MG/DL (ref 8.8–10.4)
CHLORIDE SERPL-SCNC: 109 MMOL/L (ref 98–107)
CODING SYSTEM: NORMAL
CREAT SERPL-MCNC: 0.75 MG/DL (ref 0.51–0.95)
CROSSMATCH: NORMAL
EGFRCR SERPLBLD CKD-EPI 2021: 79 ML/MIN/1.73M2
ERYTHROCYTE [DISTWIDTH] IN BLOOD BY AUTOMATED COUNT: 13.2 % (ref 10–15)
GLUCOSE SERPL-MCNC: 115 MG/DL (ref 70–99)
HCO3 SERPL-SCNC: 26 MMOL/L (ref 22–29)
HCT VFR BLD AUTO: 23.8 % (ref 35–47)
HGB BLD-MCNC: 7.4 G/DL (ref 11.7–15.7)
ISSUE DATE AND TIME: NORMAL
MCH RBC QN AUTO: 31.8 PG (ref 26.5–33)
MCHC RBC AUTO-ENTMCNC: 31.1 G/DL (ref 31.5–36.5)
MCV RBC AUTO: 102 FL (ref 78–100)
PLATELET # BLD AUTO: 269 10E3/UL (ref 150–450)
POTASSIUM SERPL-SCNC: 3.9 MMOL/L (ref 3.4–5.3)
RBC # BLD AUTO: 2.33 10E6/UL (ref 3.8–5.2)
SODIUM SERPL-SCNC: 142 MMOL/L (ref 135–145)
UNIT ABO/RH: NORMAL
UNIT NUMBER: NORMAL
UNIT STATUS: NORMAL
UNIT TYPE ISBT: 9500
UPPER GI ENDOSCOPY: NORMAL
WBC # BLD AUTO: 5 10E3/UL (ref 4–11)

## 2024-10-18 PROCEDURE — 99233 SBSQ HOSP IP/OBS HIGH 50: CPT | Performed by: INTERNAL MEDICINE

## 2024-10-18 PROCEDURE — 120N000001 HC R&B MED SURG/OB

## 2024-10-18 PROCEDURE — 250N000013 HC RX MED GY IP 250 OP 250 PS 637: Performed by: PHYSICIAN ASSISTANT

## 2024-10-18 PROCEDURE — 80048 BASIC METABOLIC PNL TOTAL CA: CPT | Performed by: INTERNAL MEDICINE

## 2024-10-18 PROCEDURE — 36415 COLL VENOUS BLD VENIPUNCTURE: CPT | Performed by: INTERNAL MEDICINE

## 2024-10-18 PROCEDURE — 43235 EGD DIAGNOSTIC BRUSH WASH: CPT | Performed by: INTERNAL MEDICINE

## 2024-10-18 PROCEDURE — 250N000013 HC RX MED GY IP 250 OP 250 PS 637: Performed by: INTERNAL MEDICINE

## 2024-10-18 PROCEDURE — 0DJ08ZZ INSPECTION OF UPPER INTESTINAL TRACT, VIA NATURAL OR ARTIFICIAL OPENING ENDOSCOPIC: ICD-10-PCS | Performed by: INTERNAL MEDICINE

## 2024-10-18 PROCEDURE — P9016 RBC LEUKOCYTES REDUCED: HCPCS | Performed by: INTERNAL MEDICINE

## 2024-10-18 PROCEDURE — 250N000011 HC RX IP 250 OP 636: Performed by: INTERNAL MEDICINE

## 2024-10-18 PROCEDURE — 999N000099 HC STATISTIC MODERATE SEDATION < 10 MIN: Performed by: INTERNAL MEDICINE

## 2024-10-18 PROCEDURE — 85027 COMPLETE CBC AUTOMATED: CPT | Performed by: INTERNAL MEDICINE

## 2024-10-18 PROCEDURE — 250N000009 HC RX 250: Performed by: INTERNAL MEDICINE

## 2024-10-18 RX ORDER — CEPHALEXIN 500 MG/1
500 CAPSULE ORAL EVERY 12 HOURS
DISCHARGE
Start: 2024-10-18 | End: 2024-10-22

## 2024-10-18 RX ORDER — FERROUS GLUCONATE 324(38)MG
324 TABLET ORAL EVERY OTHER DAY
DISCHARGE
Start: 2024-10-19

## 2024-10-18 RX ADMIN — DONEPEZIL HYDROCHLORIDE 5 MG: 5 TABLET, FILM COATED ORAL at 08:25

## 2024-10-18 RX ADMIN — PANTOPRAZOLE SODIUM 40 MG: 40 TABLET, DELAYED RELEASE ORAL at 08:26

## 2024-10-18 RX ADMIN — SERTRALINE HYDROCHLORIDE 200 MG: 100 TABLET ORAL at 08:26

## 2024-10-18 RX ADMIN — NYSTATIN 500000 UNITS: 100000 SUSPENSION ORAL at 16:05

## 2024-10-18 RX ADMIN — MIRTAZAPINE 7.5 MG: 7.5 TABLET, FILM COATED ORAL at 21:58

## 2024-10-18 RX ADMIN — LEVETIRACETAM 500 MG: 500 TABLET, FILM COATED ORAL at 21:59

## 2024-10-18 RX ADMIN — NYSTATIN 500000 UNITS: 100000 SUSPENSION ORAL at 21:58

## 2024-10-18 RX ADMIN — AMLODIPINE BESYLATE 10 MG: 10 TABLET ORAL at 08:25

## 2024-10-18 RX ADMIN — SENNOSIDES AND DOCUSATE SODIUM 1 TABLET: 8.6; 5 TABLET ORAL at 08:25

## 2024-10-18 RX ADMIN — SENNOSIDES AND DOCUSATE SODIUM 1 TABLET: 8.6; 5 TABLET ORAL at 21:59

## 2024-10-18 RX ADMIN — LEVETIRACETAM 500 MG: 500 TABLET, FILM COATED ORAL at 08:25

## 2024-10-18 RX ADMIN — CEPHALEXIN 500 MG: 500 CAPSULE ORAL at 08:25

## 2024-10-18 RX ADMIN — CEPHALEXIN 500 MG: 500 CAPSULE ORAL at 19:32

## 2024-10-18 RX ADMIN — NYSTATIN 500000 UNITS: 100000 SUSPENSION ORAL at 08:26

## 2024-10-18 ASSESSMENT — ACTIVITIES OF DAILY LIVING (ADL)
ADLS_ACUITY_SCORE: 55
ADLS_ACUITY_SCORE: 56
ADLS_ACUITY_SCORE: 55
ADLS_ACUITY_SCORE: 55
ADLS_ACUITY_SCORE: 56
ADLS_ACUITY_SCORE: 55
ADLS_ACUITY_SCORE: 56
ADLS_ACUITY_SCORE: 57
ADLS_ACUITY_SCORE: 56
ADLS_ACUITY_SCORE: 55
ADLS_ACUITY_SCORE: 56
ADLS_ACUITY_SCORE: 56
ADLS_ACUITY_SCORE: 55

## 2024-10-18 NOTE — PROGRESS NOTES
[facility]    Medicine Progress Note - Hospitalist Service    Date of Admission:  10/15/2024    Assessment & Plan   Marcela Sandhu is a 84 year old female with PMHx of mild cognitive impairment, HTN, CKD III, and recent hospitalization 9/30/24-10/5/24 for a fall with subdural hematoma of L temporal lobe, SAH of L temporal and L frontal lobe, and suspected left tegmen mastoideum fracture with penumocephalus complicated by seizure who presented from TCU for further evaluation of weakness and increased confusion in the setting of abnormal UA 10/14/24.      Admitted on 10/15/2024 for Urosepsis therapy.        Sepsis in the setting of UTI  Generalized weakness:  10/15 Urine Culture = Proteus. Sensitive to cephalosporins.   Treated with Ceftriaxone X 3 days, change to Keflex 10/17 24.          - PT/OT   - CC/SW for discharge planning. Family wants pt to return to [facility]CU upon dismissal      CATRACHO -- resolved  Creatinine baseline is 0.84.     Admission creatinine 1.11  -->  1.16 --> 0.88  Hypernatremia   Na = 142 --> 144 -- > 148  Will add gentle IVF (1/2 NS @ 100/hour for one liter)  in setting of poor po and increasing creatinine//increasing sodium.   As of 10/17/24, her oral intake is starting to , and SLP has liberalized diet (see SLP note)    -- will recheck sodium at 18:00      HTN   BP  normal/high normal   10/17 mlodipine   restarted   Diovan still held since admission.  Watchful waiting - BP are at goal without the diovan on board.     Elevated troponin, suspect demand in the setting of above   Hx of mild aortic stenosis  Grade 1 diastolic dysfunction:   Troponins 44 >41. Suspect demand in the setting of above. EKG with NSR, TWI in lead III, otherwise no evidence of acute ischemia.   *Echo 4/2024 with preserved EF, mild aortic stenosis, grade I DD, no RWMA.   - Monitor     Oral candidiasis: Continue PTA oral nystatin, received a dose in the ED.      Recent fall with subdural  hematoma of L temporal lobe, SAH of L temporal and L frontal lobe, and suspected left tegmen mastoideum fracture with penumocephalus  Seizure  Possible lateral ninth rib non-displaced fracture, subacute: Hospitalization from 9/30/24-10/5/24. Refer to discharge summary for complete details. Followed by Neurology and Neurosurgery   - Continue Keppra   - Outpatient follow-ups as recommended   - no new headache, trauma nor lateralizing neurological symptoms.      Recent hematemesis without recurrence: Noted during most recent hospitalization without recurrence, thus EGD deferred. [facility] consulted during stay. Suspect related to epistaxis episode prior to event. No ongoing issues per family   Acute Anemia  - 10/16 am Hgb 6.9.    Upon recheck 8.0  ...   On 10/17,  Hgb 8.0 -->7.7 --> 7.4.     no black / bloody stools since admit.   She has h/o chronic anemia, but she has been slowly trending downwards  - 20/18/24:  EGD =    - Normal esophagus.                             - Small hiatal hernia.                             - Erythematous mucosa in the stomach.                             - Normal first portion of the duodenum and second                             portion of the duodenum.                             - No specimens collected.                             - Will recommend to trend Hgb before futher work up                             given advance age and co-morbidities. B  -Follow up with [facility] in 1-2 weeks. Our clinical staff will call the patient to schedule. Office phone: [phone]      - she has TANYA and ACD.    Venofer 300 X 1 given on 10/16/24  One unit PRBC given 10/18/24      - will start oral iron every other day.   Watch for side effects.      -- continue ppi.      - TCU can trend hgb, transfuse prn hgb< 7.5.   decide if colonoscopy warranted at GI follow up based on hgb trending      Chronic macrocytic anemia: Baseline Hgb has ranged from 8-10 over the past year.   -H/o methotrexate, but  stopped this 6 months ago.     - Monitor   -   B12, folate, TSH are all normal.   -  Consider Myelodysplastic syndrome, if anemia persists.          10/18/24 discussed the anemia and the work up with son and dil (Erasmo and Payal) at bedside.    It MDS suspected (depending on how her hgb trends and if GI source suspected), patient may just need to treat with prn transfusions over time.         Mild cognitive impairment  Depression:    - Resume PTA regimen  aricept.    - more alert and interactive as of 10/17     Dysphagia, acute on chronic   GERD: Seen by SLP during last hospitalization, discharged on regular diet.   - Mechanical soft diet on admission         Essential tremor: Noted. Slightly worse in the setting of infection.         Jeremias, son updated 10/16/24 per phone.           Diet: Room Service  Combination Diet Mechanical/Dental Soft Diet; Thin Liquids (level 0) (BY SPOON, assist as needed, sit at 90 degrees, slow rate, extra swallows, meds crushed with mildly thick or applesauce, cue pt to cough and re-swallow if wet sound/throat clear ...    DVT Prophylaxis: Pneumatic Compression Devices  Zarate Catheter: Not present  Lines: None     Cardiac Monitoring: None  Code Status: No CPR- Do NOT Intubate      Clinically Significant Risk Factors         # Hypernatremia: Highest Na = 148 mmol/L in last 2 days, will monitor as appropriate  # Hyperchloremia: Highest Cl = 114 mmol/L in last 2 days, will monitor as appropriate          # Hypoalbuminemia: Lowest albumin = 3.4 g/dL at 10/15/2024  1:08 PM, will monitor as appropriate  # Coagulation Defect: INR = 1.24 (Ref range: 0.85 - 1.15) and/or PTT = 33 Seconds (Ref range: 22 - 38 Seconds), will monitor for bleeding    # Hypertension: Noted on problem list               # Financial/Environmental Concerns: none         Disposition Plan     Medically Ready for Discharge: Anticipated Tomorrow  patient will get PRBC and plan to go TCU tomorrow with GI folllow up               Kathryn Wilson MD  Hospitalist Service  [facility]  Securely message with [employer] (more info)  Text page via [email or web address] Paging/Directory   ______________________________________________________________________    Interval History   Improved appetite.  Still weak    Physical Exam   Vital Signs: Temp: 97.8  F (36.6  C) Temp src: Oral BP: 132/64 Pulse: 90   Resp: 18 SpO2: 97 % O2 Device: None (Room air)    Weight: 124 lbs 12.49 oz    Supportive son and dil at bedside.     CONSTITUTIONAL: Pt laying in bed,  pale   eating macaroni.  Alert and interactive  Appears comfortable. Cooperative with interview.    HEENT: Normocephalic, atraumatic.   [place]  CARDIOVASCULAR: RRR, 2+ systolic murmur best heart in aortic region. No rubs or extra heart sounds appreciated. Pulses +2/4 and regular in upper and lower extremities, bilaterally.   RESPIRATORY: No increased work of breathing. CTA, bilat; no wheezes, rales, or rhonchi appreciated.  GASTROINTESTINAL:  Abdomen soft, non-distended. BS auscultated in all four quadrants. Negative for tenderness to palpation.    MUSCULOSKELETAL:  . No gross deformities noted. Normal muscle tone for advanced age.   HEMATOLOGIC/LYMPHATIC/IMMUNOLOGIC: Negative for lower extremity edema, bilaterally.  NEUROLOGIC: Alert, follows commands. No focal neuro deficits. Baseline essential tremor.   SKIN: thin Warm, dry, intact.        Medical Decision Making       55 MINUTES SPENT BY ME on the date of service doing chart review, history, exam, documentation & further activities per the note.      Data     I have personally reviewed the following data over the past 24 hrs:    5.0  \   7.4 (L)   / 269     142 109 (H) 21.9 /  115 (H)   3.9 26 0.75 \       Imaging results reviewed over the past 24 hrs:   No results found for this or any previous visit (from the past 24 hour(s)).

## 2024-10-18 NOTE — PROGRESS NOTES
[facility]  Gastroenterology Progress Note     Marcela Sandhu MRN# [id number]   YOB: 1940 Age: 84 year old          Assessment and Plan:   Marcela Sandhu is a 84 year old female with PMHx of mild cognitive impairment, HTN, CKD III, and recent hospitalization 9/30/24-10/5/24 for a fall with subdural hematoma of L temporal lobe, SAH of L temporal and L frontal lobe, and suspected left tegmen mastoideum fracture with penumocephalus complicated by seizure who presented from TCU for further evaluation of weakness and increased confusion in the setting of abnormal UA 10/14/24. Meeting sepsis criteria with Tmax 102.5 F on admission and tachycardia in the 110s as well as hemoglobin of 6.8     Persistent AoC Anemia  Recent hematemesis without recurrence, epistaxis with prior admission  Hgb on presentation 6.8 and improved to 8.0, Baseline of 10  Noted hematemesis during most recent hospitalization without recurrence, thus EGD deferred. Suspect related to epistaxis episode prior to event. No ongoing issues per son  DDx includes anemia due to sepsis vs occult GI blood loss ( unlikely as no stools and reportedly previously brown)  Discussed case with Dr. Suarez.  Discussed with son, Erasmo, discussed possible source of anemia and recommendations to defer EGD unless recurrent drop in hemoglobin or visible evidence of GI blood loss.    As of 10/18 -We decided to proceed now that Hgb continues to decrease, 7.4 today.  Discussed with patient and hospitalist.  Hospitalist discussed with son and will proceed with EGD today to find source of bleed.  Daily PPI  --Follow up with [facility] in 1-2 weeks. Our clinical staff will call the patient to schedule. Office phone: [phone]       Recent Labs   Lab 10/18/24  0658 10/17/24  0648 10/16/24  0916 10/16/24  0714 10/15/24  1308   HGB 7.4* 7.7* 8.0* 6.9* 8.6*           Interval History:     doing well; no cp, sob, n/v/d, or abd pain.               Review of Systems:     C: NEGATIVE for fever, chills, change in weight  E/M: NEGATIVE for ear, mouth and throat problems  R: NEGATIVE for significant cough or SOB  CV: NEGATIVE for chest pain, palpitations or peripheral edema             Medications:   I have reviewed this patient's current medications  Current Facility-Administered Medications   Medication Dose Route Frequency Provider Last Rate Last Admin    amLODIPine (NORVASC) tablet 10 mg  10 mg Oral Daily Kathryn Wilson MD   10 mg at 10/18/24 0825    cephALEXin (KEFLEX) capsule 500 mg  500 mg Oral Q12H On license of [facility] (08/20) Kathryn Wilson MD   500 mg at 10/18/24 0825    donepezil (ARICEPT) tablet 5 mg  5 mg Oral Daily Bianca Bernard PA-C   5 mg at 10/18/24 0825    ferrous gluconate (FERGON) tablet 324 mg  324 mg Oral Every Other Day Kathryn Wilson MD   324 mg at 10/17/24 1454    levETIRAcetam (KEPPRA) tablet 500 mg  500 mg Oral BID Bianca Bernard PA-C   500 mg at 10/18/24 0825    mirtazapine (REMERON) tablet TABS 7.5 mg  7.5 mg Oral At Bedtime Bianca Bernard PA-C   7.5 mg at 10/17/24 2126    nystatin (MYCOSTATIN) suspension 500,000 Units  500,000 Units Swish & Spit TID Bianca Bernard PA-C   500,000 Units at 10/18/24 0826    pantoprazole (PROTONIX) EC tablet 40 mg  40 mg Oral QAM AC Kathryn Wilson MD   40 mg at 10/18/24 0826    senna-docusate (SENOKOT-S/PERICOLACE) 8.6-50 MG per tablet 1 tablet  1 tablet Oral BID Bianca Bernard PA-C   1 tablet at 10/18/24 0825    Or    senna-docusate (SENOKOT-S/PERICOLACE) 8.6-50 MG per tablet 2 tablet  2 tablet Oral BID Bianca Bernard PA-C        sertraline (ZOLOFT) tablet 200 mg  200 mg Oral Daily Bianca Bernard PA-C   200 mg at 10/18/24 0826    sodium chloride (PF) 0.9% PF flush 3 mL  3 mL Intracatheter Q8H Bianca Bernard PA-C   3 mL at 10/18/24 0826    [Held by provider] valsartan (DIOVAN) tablet 160 mg  160 mg Oral Daily  Bianca Bernard PA-C                      Physical Exam:   Vitals were reviewed  Vital Signs with Ranges  Temp:  [97.8  F (36.6  C)-98.8  F (37.1  C)] 97.8  F (36.6  C)  Pulse:  [85-99] 97  Resp:  [16] 16  BP: (130-142)/(64-72) 142/72  SpO2:  [95 %-96 %] 95 %  I/O last 3 completed shifts:  In: 360 [P.O.:360]  Out: 300 [Urine:300]  Constitutional: Alert, Cooperative, lying in bed in NAD.   Respiratory:  Lungs CTAB, no labored breathing.  Cardiovascular:  Heart RRR, no edema.  GI:  Abdomen soft, NT/ND and with normoactive BS  Skin/Integumen:  Warm, dry, non-diaphoretic.  MSK: CMS x4 intact.             Data:   I reviewed the patient's new clinical lab test results.   Recent Labs   Lab Test 10/18/24  0658 10/17/24  0648 10/16/24  0916 09/30/24  1903 09/30/24  1113   WBC 5.0 5.6 7.7   < > 4.4   HGB 7.4* 7.7* 8.0*   < > 7.0*   * 106* 105*   < > 108*    275 277   < > 129*   INR  --   --  1.24*  --  1.12    < > = values in this interval not displayed.     Recent Labs   Lab Test 10/18/24  0658 10/17/24  0648 10/16/24  0714   POTASSIUM 3.9 4.0 4.0   CHLORIDE 109* 114* 111*   CO2 26 26 25   BUN 21.9 32.2* 45.3*   ANIONGAP 7 8 10     Recent Labs   Lab Test 10/15/24  1308 10/14/24  1950 04/18/24  1613 08/10/23  1433 08/02/23  1052 07/27/23  1228 04/25/23  1345   ALBUMIN 3.4*  --   --   --   --  4.8 4.3   BILITOTAL 0.3  --   --   --   --  0.3 0.2   ALT 20  --   --   --   --  11 12   AST 30  --   --   --   --  27 25   PROTEIN  --  100* Negative Negative   < >  --   --     < > = values in this interval not displayed.       I reviewed the patient's new imaging results.    All laboratory data reviewed  All imaging studies reviewed by me.    JOAQUIN Calvin,  10/18/2024  [facility]  Office : [phone]  Cell: [phone] (Dr. Olivares)

## 2024-10-18 NOTE — PLAN OF CARE
Goal Outcome Evaluation:  Denies CP, Sob. All pt need met at this time. Pt had EGD today. PM shift will transfuse blood per order.

## 2024-10-18 NOTE — PROGRESS NOTES
Care Management Follow Up    Length of Stay (days): 3    Expected Discharge Date: 10/18/2024     Concerns to be Addressed: discharge planning     Patient plan of care discussed at interdisciplinary rounds: Yes    Anticipated Discharge Disposition: Transitional Care              Anticipated Discharge Services: None  Anticipated Discharge DME: None    Patient/family educated on Medicare website which has current facility and service quality ratings: no  Education Provided on the Discharge Plan:    Patient/Family in Agreement with the Plan: yes    Referrals Placed by CM/SW: Post Acute Facilities  Private pay costs discussed: transportation costs    Discussed  Partnership in Safe Discharge Planning  document with patient/family: No     Handoff Completed: No, handoff not indicated or clinically appropriate    Additional Information:  Writer was updated today by Steve that patient will be medically stable for discharge tomorrow and orders will be in by mid morning.  Writer sent a referral to admissions at [facility].    Patient has a bed hold in their TCU.  Concha in admissions okayed patient returning on Saturday.  Per RN notes patient requires two staff for transfers so writer will discuss medivan arrangement with family.      Next Steps:       SHANA Casas

## 2024-10-18 NOTE — PLAN OF CARE
Goal Outcome Evaluation:       Summary:  Presented from TCU w/ weakness and increased confusion, abnormal UA found to have UTI. Hx of cognitive impairment, recently hospitalized on 9/30 for fall w/ subdural hematoma of L temporal lobe.  DATE & TIME: 10/17/24-10/18/24 9346-5599  Cognitive Concerns/ Orientation: A&O x4, forgetful at times, calm and cooperative   BEHAVIOR & AGGRESSION TOOL COLOR: GREEN  ABNL VS/O2: VSS, on RA, sats in mid 90's throughout night without O2  MOBILITY: Assist of 2 w/ sera steady  PAIN MANAGMENT: Denies  DIET: Mechanical dental soft, thin liquids, 1:1 assist, meds crushed w/ applesauce  BOWEL/BLADDER: Incontinent, purewick placed, good output  ABNL LAB/BG:  Na 148, Hgb 7.7  DRAIN/DEVICES: PIV infusing 1/2 NS and 5% dextrose @ 75 mL/hr, end at 0900  TELEMETRY RHYTHM: NA  SKIN: Blanchable redness to buttocks, otherwise Intact.  TESTS/PROCEDURES: Occult stool test still needs to be collected.  D/C DATE/GOALS/PLACE: TBD  OTHER IMPORTANT INFO: GI following

## 2024-10-18 NOTE — PROGRESS NOTES
Goal Outcome Evaluation:  Summary:  Presented from TCU w/ weakness and increased confusion, abnormal UA found to have UTI. Hx of cognitive impairment, recently hospitalized on 9/30 for fall w/ subdural hematoma of L temporal lobe.     DATE & TIME: 10/17/24 3219-7227  Cognitive Concerns/ Orientation: A&O x3, forgetful at times, calm and cooperative   BEHAVIOR & AGGRESSION TOOL COLOR: GREEN  ABNL VS/O2: VSS, on RA, wears O2 at night  MOBILITY: Assist of 2 w/ sera steady  PAIN MANAGMENT: Denies  DIET: Mechanical dental soft, thin liquids, 1:1 assist, meds crushed w/ applesauce  BOWEL/BLADDER: Incontinent  ABNL LAB/BG:  Na 148, Hgb 7.7  DRAIN/DEVICES: PIV infusing 1/2 NS and 5% dextrose @ 75 mL/hr  TELEMETRY RHYTHM: NA  SKIN: Blanchable redness to buttocks, otherwise Intact.  TESTS/PROCEDURES: Occult stool test still needs to be collected.  D/C DATE/GOALS/PLACE: TBD  OTHER IMPORTANT INFO: GI following

## 2024-10-19 ENCOUNTER — DOCUMENTATION ONLY (OUTPATIENT)
Dept: GERIATRICS | Facility: CLINIC | Age: 84
End: 2024-10-19

## 2024-10-19 ENCOUNTER — APPOINTMENT (OUTPATIENT)
Dept: SPEECH THERAPY | Facility: CLINIC | Age: 84
DRG: 872 | End: 2024-10-19
Payer: COMMERCIAL

## 2024-10-19 VITALS
OXYGEN SATURATION: 94 % | DIASTOLIC BLOOD PRESSURE: 75 MMHG | RESPIRATION RATE: 18 BRPM | TEMPERATURE: 98.9 F | BODY MASS INDEX: 22.64 KG/M2 | HEIGHT: 62 IN | WEIGHT: 123.02 LBS | HEART RATE: 90 BPM | SYSTOLIC BLOOD PRESSURE: 131 MMHG

## 2024-10-19 PROBLEM — Z86.79 HISTORY OF SUBARACHNOID HEMORRHAGE: Status: ACTIVE | Noted: 2024-10-19

## 2024-10-19 PROBLEM — M06.9 RA (RHEUMATOID ARTHRITIS) (H): Status: ACTIVE | Noted: 2023-10-31

## 2024-10-19 LAB
ERYTHROCYTE [DISTWIDTH] IN BLOOD BY AUTOMATED COUNT: 14 % (ref 10–15)
HCT VFR BLD AUTO: 26.7 % (ref 35–47)
HEMOCCULT STL QL: NEGATIVE
HGB BLD-MCNC: 8.7 G/DL (ref 11.7–15.7)
MCH RBC QN AUTO: 32.2 PG (ref 26.5–33)
MCHC RBC AUTO-ENTMCNC: 32.6 G/DL (ref 31.5–36.5)
MCV RBC AUTO: 99 FL (ref 78–100)
PLATELET # BLD AUTO: 260 10E3/UL (ref 150–450)
RBC # BLD AUTO: 2.7 10E6/UL (ref 3.8–5.2)
WBC # BLD AUTO: 4.8 10E3/UL (ref 4–11)

## 2024-10-19 PROCEDURE — 85027 COMPLETE CBC AUTOMATED: CPT | Performed by: INTERNAL MEDICINE

## 2024-10-19 PROCEDURE — 82272 OCCULT BLD FECES 1-3 TESTS: CPT | Performed by: INTERNAL MEDICINE

## 2024-10-19 PROCEDURE — 250N000013 HC RX MED GY IP 250 OP 250 PS 637: Performed by: PHYSICIAN ASSISTANT

## 2024-10-19 PROCEDURE — 250N000013 HC RX MED GY IP 250 OP 250 PS 637: Performed by: INTERNAL MEDICINE

## 2024-10-19 PROCEDURE — 36415 COLL VENOUS BLD VENIPUNCTURE: CPT | Performed by: INTERNAL MEDICINE

## 2024-10-19 PROCEDURE — 99239 HOSP IP/OBS DSCHRG MGMT >30: CPT | Performed by: INTERNAL MEDICINE

## 2024-10-19 PROCEDURE — 92526 ORAL FUNCTION THERAPY: CPT | Mod: GN | Performed by: SPEECH-LANGUAGE PATHOLOGIST

## 2024-10-19 RX ORDER — POLYETHYLENE GLYCOL 3350 17 G/17G
17 POWDER, FOR SOLUTION ORAL DAILY PRN
Qty: 510 G | Refills: 0 | Status: SHIPPED | OUTPATIENT
Start: 2024-10-19 | End: 2024-10-19

## 2024-10-19 RX ORDER — DONEPEZIL HYDROCHLORIDE 5 MG/1
5 TABLET, FILM COATED ORAL AT BEDTIME
DISCHARGE
Start: 2024-10-19 | End: 2024-11-07

## 2024-10-19 RX ORDER — POLYETHYLENE GLYCOL 3350 17 G/17G
17 POWDER, FOR SOLUTION ORAL DAILY PRN
DISCHARGE
Start: 2024-10-19

## 2024-10-19 RX ADMIN — CEPHALEXIN 500 MG: 500 CAPSULE ORAL at 10:16

## 2024-10-19 RX ADMIN — DONEPEZIL HYDROCHLORIDE 5 MG: 5 TABLET, FILM COATED ORAL at 10:13

## 2024-10-19 RX ADMIN — AMLODIPINE BESYLATE 10 MG: 10 TABLET ORAL at 10:13

## 2024-10-19 RX ADMIN — NYSTATIN 500000 UNITS: 100000 SUSPENSION ORAL at 16:24

## 2024-10-19 RX ADMIN — NYSTATIN 500000 UNITS: 100000 SUSPENSION ORAL at 10:13

## 2024-10-19 RX ADMIN — FERROUS GLUCONATE 324 MG: 324 TABLET ORAL at 10:16

## 2024-10-19 RX ADMIN — LEVETIRACETAM 500 MG: 500 TABLET, FILM COATED ORAL at 10:13

## 2024-10-19 RX ADMIN — PANTOPRAZOLE SODIUM 40 MG: 40 TABLET, DELAYED RELEASE ORAL at 10:17

## 2024-10-19 RX ADMIN — SERTRALINE HYDROCHLORIDE 200 MG: 100 TABLET ORAL at 10:13

## 2024-10-19 ASSESSMENT — ACTIVITIES OF DAILY LIVING (ADL)
ADLS_ACUITY_SCORE: 59
ADLS_ACUITY_SCORE: 60
ADLS_ACUITY_SCORE: 59
ADLS_ACUITY_SCORE: 60
ADLS_ACUITY_SCORE: 59
ADLS_ACUITY_SCORE: 59
ADLS_ACUITY_SCORE: 60
ADLS_ACUITY_SCORE: 59
ADLS_ACUITY_SCORE: 60
ADLS_ACUITY_SCORE: 59
ADLS_ACUITY_SCORE: 59
ADLS_ACUITY_SCORE: 60
ADLS_ACUITY_SCORE: 59
ADLS_ACUITY_SCORE: 60

## 2024-10-19 NOTE — PROGRESS NOTES
Care Management Discharge Note    Discharge Date: 10/19/2024       Discharge Disposition: Transitional Care    Discharge Services: None    Discharge DME: None    Discharge Transportation: family or friend will provide    Private pay costs discussed: Not applicable    Does the patient's insurance plan have a 3 day qualifying hospital stay waiver?  No    PAS Confirmation Code:    Patient/family educated on Medicare website which has current facility and service quality ratings: no    Education Provided on the Discharge Plan:    Persons Notified of Discharge Plans: Huc, charge, facility, family   Patient/Family in Agreement with the Plan: yes    Handoff Referral Completed: No, handoff not indicated or clinically appropriate    Additional Information:  Writer faxed discharge orders to [facility] in [place] and a safe discharge plan was followed.     FARIDA Arambula

## 2024-10-19 NOTE — PLAN OF CARE
Goal Outcome Evaluation:           Overall Patient Progress: improvingOverall Patient Progress: improving         Patient discharging back to her TCU. Discharge instructions not done due to patient confuse. Patient took all her belongings with her. [facility] transporting patient.

## 2024-10-19 NOTE — PROGRESS NOTES
Discharge    Patient discharged to TCU via wheelchair with [facility] transport.  Care plan note completed.    Listed belongings gathered and given to patient (including from security/pharmacy). Yes  Care Plan and Patient education resolved: Yes  Prescriptions if needed, hard copies sent with patient  NA  Medication Bin checked and emptied on discharge Yes  SW/care coordinator/charge RN aware of discharge: Yes

## 2024-10-19 NOTE — PLAN OF CARE
Summary:  Presented from TCU w/ weakness and increased confusion, abnormal UA found to have UTI. Hx of cognitive impairment, recently hospitalized on 9/30 for fall w/ subdural hematoma of L temporal lobe.  DATE & TIME: 10/19/24 Day shift  Cognitive Concerns/ Orientation: A&O to self and place- baseline; Calm and cooperative   BEHAVIOR & AGGRESSION TOOL COLOR: GREEN  ABNL VS/O2: VSS, on RA  MOBILITY: Assist of 2 w/ sera steady- up to chair most of shift  PAIN MANAGMENT: Denies  DIET: Mechanical dental soft, thin liquids- great appetite; meds crushed w/ applesauce  BOWEL/BLADDER: Incontinent, purewick in place; no BM  ABNL LAB/BG: Hgb 7.4--> 8.7,   DRAIN/DEVICES: 1 PIVs  infusing SL.  TELEMETRY RHYTHM: NA  SKIN: Pale. Blanchable redness to buttocks.  TESTS/PROCEDURES: Occult stool test negative  D/C DATE/GOALS/PLACE: Discharge to TCU today between 420-1710  OTHER IMPORTANT INFO: Son updated  Goal Outcome Evaluation:

## 2024-10-19 NOTE — PROGRESS NOTES
Care Management Follow Up    Length of Stay (days): 4    Expected Discharge Date: 10/19/2024     Concerns to be Addressed: discharge planning     Patient plan of care discussed at interdisciplinary rounds: Yes    Anticipated Discharge Disposition: Transitional Care        Anticipated Discharge Services: None  Anticipated Discharge DME: None    Patient/family educated on Medicare website which has current facility and service quality ratings: no  Education Provided on the Discharge Plan:    Patient/Family in Agreement with the Plan: yes    Referrals Placed by CM/SW: Post Acute Facilities  Private pay costs discussed: Not applicable    Discussed  Partnership in Safe Discharge Planning  document with patient/family: Yes:     Handoff Completed: No, handoff not indicated or clinically appropriate    Additional Information:  Writer arranged tentative transport using [employer] patients son Erasmo is aware of $100 or more for costs, ride time is for between 1620 - 1700    Waiting for orders - Called and spoke at [facility] and she was fine with the arrival time today    Next Steps: fax orders and scripts     FARIDA Arambula

## 2024-10-19 NOTE — PLAN OF CARE
Goal Outcome Evaluation:                      Summary:  Presented from TCU w/ weakness and increased confusion, abnormal UA found to have UTI. Hx of cognitive impairment, recently hospitalized on 9/30 for fall w/ subdural hematoma of L temporal lobe.  DATE & TIME: 10/18/24 pm shift.  Cognitive Concerns/ Orientation: A&O to self. Intermittent Confusion.  Calm and cooperative   BEHAVIOR & AGGRESSION TOOL COLOR: GREEN  ABNL VS/O2: VSS, on RA. On continuous pulse oximeter.  MOBILITY: Assist of 2 w/ sera steady. Not out of bed due to generalized weakness and low hgb. T&R.  PAIN MANAGMENT: Denies  DIET: Mechanical dental soft, thin liquids, 1:1 assist. Refused dinner. meds crushed w/ applesauce  BOWEL/BLADDER: Incontinent, purewick in place. Had small bm, not enough for sample for occult stool test.  ABNL LAB/BG: Hgb 7.4, gave 1 unit of blood transfusion, no reaction noted. Hgb to be recheck tomorrow morning.  DRAIN/DEVICES: 2 PIVs  infusing SL.  TELEMETRY RHYTHM: NA  SKIN: Pale. Blanchable redness to buttocks.  TESTS/PROCEDURES: Occult stool test still needs to be collected.  D/C DATE/GOALS/PLACE: Plan for discharge to TCU tomorrow. SW following.  OTHER IMPORTANT INFO: GI following

## 2024-10-19 NOTE — PROGRESS NOTES
Updated staff at [facility] that pts ride is running a little late. Should be here in next 20-30 min per [employer].     Charge, RN

## 2024-10-19 NOTE — DISCHARGE SUMMARY
[facility]  Hospitalist Discharge Summary      Date of Admission:  10/15/2024  Date of Discharge:  10/19/2024  Discharging Provider: Kathryn Wilson MD  Discharge Service: Hospitalist Service    Discharge Diagnoses     Principal Problem:    Acute UTI    Active Problems:    Benign essential tremor    Mild cognitive impairment    Benign essential hypertension    RA (rheumatoid arthritis) (H)    Anemia, unspecified type    Physical deconditioning    Sepsis with encephalopathy without septic shock, due to unspecified organism (H)    History of subarachnoid hemorrhage        Clinically Significant Risk Factors          Follow-ups Needed After Discharge   Follow-up Appointments     Follow Up and recommended labs and tests      Follow up with group home physician.  The following labs/tests are   recommended:  weekly hgb to trend.   Get BMP again in a week to ensure   stability   Watch for any side effects on the oral iron (started every   other day dosing)    Follow up with [facility] in 1-2 weeks. Their clinical staff will call the   patient to schedule. Office phone: [phone]        {A     Unresulted Labs Ordered in the Past 30 Days of this Admission       Date and Time Order Name Status Description    10/15/2024  1:19 PM Blood Culture Peripheral Blood Preliminary         These results will be followed up by  PCP/TCU    Discharge Disposition   Discharged to short-term care facility  Condition at discharge: Stable    Hospital Course   Marcela Sandhu is a 84 year old female with PMHx of mild cognitive impairment, HTN, CKD III, and recent hospitalization 9/30/24-10/5/24 for a fall with subdural hematoma of L temporal lobe, SAH of L temporal and L frontal lobe, and suspected left tegmen mastoideum fracture with penumocephalus complicated by seizure who presented from TCU for further evaluation of weakness and increased confusion in the setting of abnormal UA 10/14/24.      Admitted on  10/15/2024 for Urosepsis therapy.        Sepsis in the setting of UTI  Generalized weakness:  10/15 Urine Culture = Proteus. Sensitive to cephalosporins.   Treated with Ceftriaxone X 3 days, change to Keflex 10/17/24.          - she improved back to her prior to admission baseline with therapy          CATRACHO -- resolved  Creatinine baseline is 0.84.     Admission creatinine 1.11  -->  1.16 --> 0.88  Hypernatremia   - resolved with improved oral intake        HTN   BP  normal/high normal   10/17 mlodipine   restarted   Diovan still held since admission.  Watchful waiting - BP are at goal without the diovan on board.     Elevated troponin, suspect demand in the setting of above   Hx of mild aortic stenosis  Grade 1 diastolic dysfunction:   Troponins 44 >41. Suspect demand in the setting of above. EKG with NSR, TWI in lead III, otherwise no evidence of acute ischemia.   *Echo 4/2024 with preserved EF, mild aortic stenosis, grade I DD, no RWMA.   - Monitor     Oral candidiasis: Continue PTA oral nystatin, received a dose in the ED.      Recent fall with subdural hematoma of L temporal lobe, SAH of L temporal and L frontal lobe, and suspected left tegmen mastoideum fracture with penumocephalus  H/o Seizure  Possible lateral ninth rib non-displaced fracture, subacute: Hospitalization from 9/30/24-10/5/24. Refer to discharge summary for complete details. Followed by Neurology and Neurosurgery   - Continue Keppra   - Outpatient follow-ups as recommended   - no new headache, trauma nor lateralizing neurological symptoms.          Anemia, NOS  Chronic macrocytic anemia:   -    Hgb trended down daily this admission:  8.0 -->7.7 --> 7.4.     no black / bloody stools since admit.   She has h/o chronic anemia,   - 10/18/24:  EGD =    - Normal esophagus.                             - Small hiatal hernia.                             - Erythematous mucosa in the stomach.                             - Normal first portion of the  duodenum and second                             portion of the duodenum.                             - No specimens collected.                             - Will recommend to trend Hgb before futher work up                             given advance age and co-morbidities. B  -Follow up with [facility] in 1-2 weeks. Our clinical staff will call the patient to schedule. Office phone: [phone]      - she has TANYA and ACD.    Venofer 300 X 1 given on 10/16/24  One unit PRBC given 10/18/24  (7.4 --> 8.7 at discharge)    - will start oral iron every other day.   Watch for side effects.      -- continue ppi indefinately.      - Hemmocult X 1 is negative     --Baseline Hgb has ranged from 8-10 over the past year.     -H/o methotrexate for RA,  but stopped this 6 months ago.     -   B12, folate, TSH are all normal.     -  Consider Myelodysplastic syndrome, if anemia persists.     -- discussed all of the above with family (Erasmo, son and  katya Beck) each day this admission.    Decided on oupatient hematology referral, that will get scheduled by jayden (they will call Erasmo).   Can review/discuss her anemia and speculate whether this could be a production problem such as MDS, or consider whether colonoscopy or BMBx or monitoring only is warranted.      - meantime, patient may need prn PRBC to keep Hgb > 7 if continues to trend down.           - TCU can trend hgb, transfuse prn hgb< 7.5.   decide if colonoscopy warranted at GI follow up based on hgb trending      Mild cognitive impairment  Depression:    - Resume PTA regimen  aricept.    - more alert and interactive as of 10/17       Dysphagia, acute on chronic   GERD: Seen by SLP during last hospitalization, discharged on regular diet.   - Mechanical soft diet           Essential tremor: Noted. Slightly worse in the setting of infection.   Does make eating difficult.          juanjose Mcdowell updated 10/16/24 per phone.     Consultations This Hospital Stay   PHYSICAL THERAPY ADULT  IP CONSULT  OCCUPATIONAL THERAPY ADULT IP CONSULT  CARE MANAGEMENT / SOCIAL WORK IP CONSULT  PHARMACY IP CONSULT  GASTROENTEROLOGY IP CONSULT  SPEECH LANGUAGE PATH ADULT IP CONSULT  PHYSICAL THERAPY ADULT IP CONSULT  OCCUPATIONAL THERAPY ADULT IP CONSULT  SPEECH LANGUAGE PATH ADULT IP CONSULT    Code Status   No CPR- Do NOT Intubate    Time Spent on this Encounter   I, Kathryn Wilson MD, personally saw the patient today and spent greater than 30 minutes discharging this patient.       Kathryn Wilson MD  [address] [address] [address]  [facility] [place] [address]  Phone: [phone]  ______________________________________________________________________    Physical Exam   Vital Signs: Temp: 98.9  F (37.2  C) Temp src: Oral BP: 131/75 Pulse: 90   Resp: 18 SpO2: 94 % O2 Device: None (Room air)    Weight: 123 lbs .27 oz     CONSTITUTIONAL: Pt laying in bed,     Alert and interactive  Appears comfortable. Cooperative with interview.    HEENT: Normocephalic, atraumatic.   [place] IRA  CARDIOVASCULAR: RRR, 2+ systolic murmur best heart in aortic region. No rubs or extra heart sounds appreciated. Pulses +2/4 and regular in upper and lower extremities, bilaterally.   RESPIRATORY: No increased work of breathing. CTA, bilat; no wheezes, rales, or rhonchi appreciated.  GASTROINTESTINAL:  Abdomen soft, non-distended. BS auscultated in all four quadrants. Negative for tenderness to palpation.    MUSCULOSKELETAL:  . No gross deformities noted. Normal muscle tone for advanced age.  DJD changes  HEMATOLOGIC/LYMPHATIC/IMMUNOLOGIC: Negative for lower extremity edema, bilaterally.  NEUROLOGIC: Alert, follows commands. No focal neuro deficits. Baseline essential tremor.   SKIN: thin Warm, dry, intact.          Primary Care Physician   Kelley Haney    Discharge Orders      Primary Care - Care Coordination Referral      Adult Oncology/Hematology  Referral      General info for SNF     Length of Stay Estimate: Short Term Care: Estimated # of Days <30  Condition at Discharge: Improving  Level of care:skilled   Rehabilitation Potential: Good  Admission H&P remains valid and up-to-date: Yes  Recent Chemotherapy: N/A  Use Nursing Home Standing Orders: Yes     Mantoux instructions    Give two-step Mantoux (PPD) Per Facility Policy Yes     Follow Up and recommended labs and tests    Follow up with snf physician.  The following labs/tests are recommended:  weekly hgb to trend.   Get BMP again in a week to ensure stability   Watch for any side effects on the oral iron (started every other day dosing)    Follow up with [facility] in 1-2 weeks. Their clinical staff will call the patient to schedule. Office phone: [phone]     Reason for your hospital stay    Urinary tract infection  Generalized weakness  Anemia, recurrent  S/p blood transfusion, one unit on 10/18/24     Activity - Up with assistive device     Activity - Up with nursing assistance     Physical Therapy Adult Consult    Evaluate and treat as clinically indicated.    Reason:  generalized weakness     Occupational Therapy Adult Consult    Evaluate and treat as clinically indicated.    Reason:  adl     Speech Language Path Adult Consult    Evaluate and treat as clinically indicated.    Reason:  re evaluate dysphagia//swallow difficulties.     Diet    Follow this diet upon discharge: Current Diet:Orders Placed This Encounter      Room Service      Combination Diet Mechanical/Dental Soft Diet; Thin Liquids (level 0) (BY SPOON, assist as needed, sit at 90 degrees, slow rate, extra swallows, meds crushed with mildly thick or applesauce, cue pt to cough and re-swallow if wet sound/throat clear ...       Significant Results and Procedures   Most Recent 3 CBC's:  Recent Labs   Lab Test 10/19/24  0928 10/18/24  0658 10/17/24  0648   WBC 4.8 5.0 5.6   HGB 8.7* 7.4* 7.7*   MCV 99 102* 106*    269 275     Most Recent 3 BMP's:  Recent Labs    Lab Test 10/18/24  0658 10/17/24  1506 10/17/24  0648 10/16/24  0714    144 148* 146*   POTASSIUM 3.9  --  4.0 4.0   CHLORIDE 109*  --  114* 111*   CO2 26  --  26 25   BUN 21.9  --  32.2* 45.3*   CR 0.75  --  0.88 1.16*   ANIONGAP 7  --  8 10   DENIS 8.6*  --  8.6* 8.3*   *  --  101* 107*     Most Recent 2 LFT's:  Recent Labs   Lab Test 10/15/24  1308 07/27/23  1228   AST 30 27   ALT 20 11   ALKPHOS 83 38   BILITOTAL 0.3 0.3     Most Recent 3 Hemoglobins:  Recent Labs   Lab Test 10/19/24  0928 10/18/24  0658 10/17/24  0648   HGB 8.7* 7.4* 7.7*     7-Day Micro Results       Collected Updated Procedure Result Status      10/15/2024 1327 10/18/2024 1846 Blood Culture Peripheral Blood [[id number]]   Peripheral Blood    Preliminary result Component Value   Culture No growth after 3 days  [P]                10/15/2024 1315 10/15/2024 1420 Symptomatic Influenza A/B, RSV, & SARS-CoV2 PCR (COVID-19) Nasopharyngeal [[id number]]    Swab from Nasopharyngeal    Final result Component Value   Influenza A PCR Negative   Influenza B PCR Negative   RSV PCR Negative   SARS CoV2 PCR Negative   NEGATIVE: SARS-CoV-2 (COVID-19) RNA not detected, presumed negative.            10/14/2024 1950 10/17/2024 0748 Urine Culture [[id number]]    (Abnormal)   Urine, Straight Catheter    Final result Component Value   Culture >100,000 CFU/mL Proteus mirabilis    >100,000 CFU/mL Proteus mirabilis        Susceptibility        Proteus mirabilis (1)      GUILLE      Ampicillin <=2 ug/mL Susceptible      Ampicillin/ Sulbactam <=2 ug/mL Susceptible      Cefazolin <=4 ug/mL Susceptible  [1]       Cefepime <=1 ug/mL Susceptible      Cefoxitin 8 ug/mL Susceptible      Ceftazidime <=1 ug/mL Susceptible      Ceftriaxone <=1 ug/mL Susceptible      Ciprofloxacin <=0.25 ug/mL Susceptible      Gentamicin <=1 ug/mL Susceptible      Levofloxacin <=0.12 ug/mL Susceptible      Nitrofurantoin 128 ug/mL Resistant  [2]       Piperacillin/Tazobactam <=4  ug/mL Susceptible      Tobramycin <=1 ug/mL Susceptible      Trimethoprim/Sulfamethoxazole <=1/19 ug/mL Susceptible                   [1]  Cefazolin GUILLE breakpoints are for the treatment of uncomplicated urinary tract infections. For the treatment of systemic infections, please contact the laboratory for additional testing.     [2]  Intrinsically Resistant               Susceptibility        Proteus mirabilis (2)      GUILLE      Ampicillin <=2 ug/mL Susceptible      Ampicillin/ Sulbactam <=2 ug/mL Susceptible      Cefazolin <=4 ug/mL Susceptible  [1]       Cefepime <=1 ug/mL Susceptible      Cefoxitin <=4 ug/mL Susceptible      Ceftazidime <=1 ug/mL Susceptible      Ceftriaxone <=1 ug/mL Susceptible      Ciprofloxacin <=0.25 ug/mL Susceptible      Gentamicin 2 ug/mL Susceptible      Levofloxacin <=0.12 ug/mL Susceptible      Nitrofurantoin 128 ug/mL Resistant  [2]       Piperacillin/Tazobactam <=4 ug/mL Susceptible      Tobramycin <=1 ug/mL Susceptible      Trimethoprim/Sulfamethoxazole <=1/19 ug/mL Susceptible                   [1]  Cefazolin GUILLE breakpoints are for the treatment of uncomplicated urinary tract infections. For the treatment of systemic infections, please contact the laboratory for additional testing.     [2]  Intrinsically Resistant               Susceptibility Comments       Proteus mirabilis (1)                           Most Recent TSH and T4:  Recent Labs   Lab Test 10/15/24  1526   TSH 0.61     Most Recent Urinalysis:  Recent Labs   Lab Test 10/14/24  1950 04/18/24  1613   COLOR Orange* Yellow   APPEARANCE Cloudy* Clear   URINEGLC Negative Negative   URINEBILI Negative Negative   URINEKETONE Negative Negative   SG 1.018 1.025   UBLD Small* Negative   URINEPH 6.5 5.5   PROTEIN 100* Negative   UROBILINOGEN  --  0.2   NITRITE Negative Positive*   LEUKEST Large* Negative   RBCU 8* 0-2   WBCU >182* 0-5       Discharge Medications   Current Discharge Medication List        START taking these  medications    Details   cephALEXin (KEFLEX) 500 MG capsule Take 1 capsule (500 mg) by mouth every 12 hours for 3 days.    Associated Diagnoses: Acute UTI      ferrous gluconate (FERGON) 324 (38 Fe) MG tablet Take 1 tablet (324 mg) by mouth every other day.    Associated Diagnoses: Anemia, unspecified type      pantoprazole (PROTONIX) 40 MG EC tablet Take 1 tablet (40 mg) by mouth every morning (before breakfast).    Associated Diagnoses: Gastritis without bleeding, unspecified chronicity, unspecified gastritis type      polyethylene glycol (MIRALAX) 17 GM/Dose powder Take 17 g by mouth daily as needed for constipation.    Associated Diagnoses: Constipation, unspecified constipation type           CONTINUE these medications which have CHANGED    Details   donepezil (ARICEPT) 5 MG tablet Take 1 tablet (5 mg) by mouth at bedtime.    Associated Diagnoses: Mild cognitive impairment           CONTINUE these medications which have NOT CHANGED    Details   acetaminophen (TYLENOL) 500 MG tablet Take 1,000 mg by mouth every 6 hours as needed for mild pain.      amLODIPine (NORVASC) 10 MG tablet Take 1 tablet (10 mg) by mouth daily.    Associated Diagnoses: Seizures (H)      hydrOXYzine HCl (ATARAX) 10 MG tablet TAKE 1 TABLET (10 MG) BY MOUTH 3 TIMES DAILY AS NEEDED FOR ANXIETY  Qty: 30 tablet, Refills: 3    Associated Diagnoses: Anxiety      levETIRAcetam (KEPPRA) 500 MG tablet Take 1 tablet (500 mg) by mouth 2 times daily.    Associated Diagnoses: Seizures (H)      mirtazapine (REMERON) 7.5 MG tablet TAKE 1 TABLET (7.5 MG) BY MOUTH AT BEDTIME  Qty: 90 tablet, Refills: 0    Associated Diagnoses: Major depression in complete remission (H)      nystatin (MYCOSTATIN) 169393 UNIT/ML suspension Swish and spit 500,000 Units in mouth 3 times daily.      sertraline (ZOLOFT) 100 MG tablet TAKE 2 TABLETS (200 MG) BY MOUTH DAILY  Qty: 180 tablet, Refills: 0    Associated Diagnoses: Major depression in complete remission (H)       valsartan (DIOVAN) 160 MG tablet Take 1 tablet (160 mg) by mouth daily.    Associated Diagnoses: Seizures (H)           STOP taking these medications       ARIPiprazole (ABILIFY) 5 MG tablet Comments:   Reason for Stopping:             Allergies   Allergies   Allergen Reactions    Citalopram Unknown    Hydrocodone GI Disturbance    Levaquin [Levofloxacin Hemihydrate] Other (See Comments) and Rash     chest pain    Sulfa Antibiotics GI Disturbance

## 2024-10-19 NOTE — PLAN OF CARE
Summary:  Presented from TCU w/ weakness and increased confusion, abnormal UA found to have UTI. Hx of cognitive impairment, recently hospitalized on 9/30 for fall w/ subdural hematoma of L temporal lobe.    DATE & TIME: 10/18/24 5326-8187  Cognitive Concerns/ Orientation: Pt aware she is in hospital and it is October; Intermittent ConfusionBEHAVIOR & AGGRESSION TOOL COLOR: GREEN  ABNL VS/O2: /83 otherwise VSS, room air with continuous pulse ox  MOBILITY: Assist-2 w/ sera steady. Not out of bed due to generalized weakness and low hgb; Turn/repo Q2hrs  PAIN MANAGMENT: Denies  DIET: Mechanical dental soft, thin liquids, 1:1 assist; meds crushed w/ applesauce  BOWEL/BLADDER: Incontinent, purewick in place  ABNL LAB/BG: Hgb 7.4-received 1 unit PRBCs on evening--Hgb to be recheck 10/19 am  DRAIN/DEVICES: 2 R PIVs saline locked  SKIN: Pale. Blanchable redness to buttocks; pale  TESTS/PROCEDURES: Occult stool test negative from 10/19 am  D/C DATE/GOALS/PLACE: Plan for discharge to TCU-SW following (needs transport via [facility])  OTHER IMPORTANT INFO: GI following

## 2024-10-19 NOTE — PROVIDER NOTIFICATION
"Patient just had blood transfusion completed for Hgb 7.4. Paged Dr Christiansen via [employer] asking if he want hgb recheck today or tomorrow.  Dr Christiansen replied back via [employer], \"Tomorrow is fine\".  "

## 2024-10-19 NOTE — PROGRESS NOTES
Speech Language Therapy Discharge Summary    Reason for therapy discharge:    All goals and outcomes met, no further needs identified.    Progress towards therapy goal(s). See goals on Care Plan in [facility] electronic health record for goal details.  Goals met    Therapy recommendation(s):    No further therapy is recommended.  See below  Consult SLP after discharge if increased swallowing difficulty noted.         10/19/24 1350   Swallowing Dysfunction &/or Oral Function for Feeding   Treatment Detail/Skilled Intervention Pt was alert and cooperative.  Pt feels that her swallow function is at baseline with good tolerance of po intake.  Pt demonstrated decreased laryngeal elevation and need for extra swallows during a snack observation.  Min-mod cues provided to use strategies.  No aspiration signs were noted after solid bites x 5 or thin using pt's sippy cup x 5.  Slow mastication and min oral residue noted. Pt states she prefers current diet to dry hard foods.  Educated pt to contiinue strategies.  Pt verbalized understanding and agreed no further swallow Tx needs are present.  RN updated.   SLP Discharge Planning   SLP Plan discontinue SLP swallow Tx   SLP Discharge Recommendation home   SLP Rationale for DC Rec defer to OT/PT for discharge needs; swallow Tx goal has been met   SLP Brief overview of current status  Swallow function appears to be at recent baseline with mild deficits; Rec: continue a mechanical/dental soft diet with thin liquids using controlled amounts by cup/pt's sippy cup and reminders to use safe swallow strategies

## 2024-10-20 LAB — BACTERIA BLD CULT: NO GROWTH

## 2024-10-21 ENCOUNTER — PATIENT OUTREACH (OUTPATIENT)
Dept: ONCOLOGY | Facility: CLINIC | Age: 84
End: 2024-10-21
Payer: COMMERCIAL

## 2024-10-21 NOTE — PROGRESS NOTES
"[facility] GERIATRIC SERVICES    Chief Complaint   Patient presents with    Nursing Home Acute       [facility] Medical Record Number:  [id number]  Place of Service where encounter took place:  [facility]) [[id number]]    HPI:    Marcela Sandhu is a 84 year old  (1940), who is being seen today for an episodic care visit.  HPI information obtained from: facility chart records, facility staff, patient report, and [facility] chart review.Today's concern is:     Urinary tract infection due to Proteus  Thrush  Sepsis with encephalopathy without septic shock, due to unspecified organism (H)  Anemia, unspecified type  Tachycardia  Benign essential hypertension  Fall, sequela  History of subarachnoid hemorrhage  SDH (subdural hematoma) (H)  Seizures (H)  Mild cognitive impairment  Physical deconditioning    HPI:  This pt was  sent   hospital from 10/15-19 for increasing confusion, UTI and CATRACHO--septic, improved with Abx, CATRACHO and dehydration improved,, found to have a significant Hgb drop to <7, got a unit of blood, IV Fe and seen by GI,  they did an endoscopy which was essentially normal on 10/18/2024/ she was  put on PPI indefinitely and  sent back to TCU  Seen today in room. Very alert, appropriate, says feels \"pretty good\".    TODAY DURING EXAM/ROS:  No CP, SOB, Cough, dizziness, fevers, chills, HA, N/V, dysuria or Bowel Abnormalities. Appetite is down.  No pain    ALLERGIES: Citalopram, Hydrocodone, Levaquin [levofloxacin hemihydrate], and Sulfa antibiotics  Past Medical, Surgical, Family and Social History reviewed and updated in [facility].    Current Outpatient Medications   Medication Sig Dispense Refill    vitamin C (ASCORBIC ACID) 250 MG tablet Take 250 mg by mouth every other day. With Fe gluc      acetaminophen (TYLENOL) 500 MG tablet Take 1,000 mg by mouth every 6 hours as needed for mild pain.      amLODIPine (NORVASC) 10 MG tablet Take 1 tablet (10 mg) by " "mouth daily.      donepezil (ARICEPT) 5 MG tablet Take 1 tablet (5 mg) by mouth at bedtime.      ferrous gluconate (FERGON) 324 (38 Fe) MG tablet Take 1 tablet (324 mg) by mouth every other day.      hydrOXYzine HCl (ATARAX) 10 MG tablet TAKE 1 TABLET (10 MG) BY MOUTH 3 TIMES DAILY AS NEEDED FOR ANXIETY 30 tablet 3    levETIRAcetam (KEPPRA) 500 MG tablet Take 1 tablet (500 mg) by mouth 2 times daily.      mirtazapine (REMERON) 7.5 MG tablet TAKE 1 TABLET (7.5 MG) BY MOUTH AT BEDTIME 90 tablet 0    nystatin (MYCOSTATIN) 244969 UNIT/ML suspension Swish and spit 500,000 Units in mouth 3 times daily. Has been on abx until 10/22/24 so cont til 10/28/24      pantoprazole (PROTONIX) 40 MG EC tablet Take 1 tablet (40 mg) by mouth every morning (before breakfast).      polyethylene glycol (MIRALAX) 17 GM/Dose powder Take 17 g by mouth daily as needed for constipation.      sertraline (ZOLOFT) 100 MG tablet TAKE 2 TABLETS (200 MG) BY MOUTH DAILY 180 tablet 0    valsartan (DIOVAN) 160 MG tablet Take 1 tablet (160 mg) by mouth daily.          Physical Exam:  BP (!) 144/77   Pulse 102   Temp 97.8  F (36.6  C)   Resp 18   Ht 1.575 m (5' 2\")   Wt 55.9 kg (123 lb 4.8 oz)   LMP  (LMP Unknown)   SpO2 93%   BMI 22.55 kg/m      GENERAL APPEARANCE:  Alert, in no distress, oriented, cooperative  ENT:  Mouth with moist mucous membranes--no thrush noted today, normal hearing acuity  EYES:  EOM, conjunctivae, lids, pupils and irises normal  RESP:  respiratory effort  of chest normal, lungs clear to auscultation , no respiratory distress  CV:  Auscultation of heart done , RRR, no murmur or edema, +2 pedal pulses  ABDOMEN:  normal bowel sounds, soft, nontender  M/S:   HAYDEN equally, up with assist but seen in chair  SKIN:  Inspection of skin and subcutaneous tissue baseline  NEURO:   Cranial nerves are  grossly at patient's baseline-no head tremors noted today  PSYCH:  oriented X 3, memory impaired , affect and mood normal         CBC " RESULTS:   Recent Labs   Lab Test 10/19/24  0928 10/18/24  0658   WBC 4.8 5.0   RBC 2.70* 2.33*   HGB 8.7* 7.4*   HCT 26.7* 23.8*   MCV 99 102*   MCH 32.2 31.8   MCHC 32.6 31.1*   RDW 14.0 13.2    269       Last Basic Metabolic Panel:  Recent Labs   Lab Test 10/18/24  0658 10/17/24  1506 10/17/24  0648    144 148*   POTASSIUM 3.9  --  4.0   CHLORIDE 109*  --  114*   DENIS 8.6*  --  8.6*   CO2 26  --  26   BUN 21.9  --  32.2*   CR 0.75  --  0.88   *  --  101*       Liver Function Studies -   Recent Labs   Lab Test 10/15/24  1308 07/27/23  1228   PROTTOTAL 7.1 7.5   ALBUMIN 3.4* 4.8   BILITOTAL 0.3 0.3   ALKPHOS 83 38   AST 30 27   ALT 20 11       TSH   Date Value Ref Range Status   10/15/2024 0.61 0.30 - 4.20 uIU/mL Final   07/27/2023 0.95 0.30 - 4.20 uIU/mL Final   07/21/2015 1.10 mcU/mL Final   ]    Lab Results   Component Value Date    A1C 5.9 10/15/2024    A1C 5.3 08/31/2022     ASSESSMENT / PLAN:  (N39.0,  B96.4) Urinary tract infection due to Proteus  (primary encounter diagnosis)  (A41.9,  R65.20,  G93.41) Sepsis with encephalopathy without septic shock, due to unspecified organism (H)  (B37.0) Thrush  Comment: completing Abx today, will cont nystatin thru 10/28 then discontinue    (D64.9) Anemia, unspecified type  Comment: ? Etiology, cont Fe gluc and add Vit C, check CMP and CBC in am. F/u heme-onc on 10/24. Also needs appt with Dr Olivares for GI in 1-2 weeks--son working on that appt    (R00.0) Tachycardia  (I10) Benign essential hypertension  Comment:  HR runs , mostly . Encourage po, on Fe, cont Norvasc and Diovan, checking CMP in am, monitor.    (W19.XXXS) Fall, sequela  (Z86.79) History of subarachnoid hemorrhage  (S06.5XAA) SDH (subdural hematoma) (H)  (R56.9) Seizures (H)  (G31.84) Mild cognitive impairment  Comment: no sx=cont Keppra, F/u appts with neuro sx for CT on 11/12, F/u epilepsy clinic--son arrange.    (R53.81) Physical deconditioning  Comment: PT OT      Total  time for the visit was 45 minutes including, but not limited to, non-face-to-face time spent reviewing records, counseling, and coordination of care.   I d/w son Erasmo, who was in room and  gave an update, answered questions on the POC and care coordination.      Electronically signed by  DARIO De CNP

## 2024-10-21 NOTE — PLAN OF CARE
Physical Therapy Discharge Summary     Reason for therapy discharge:    Discharged to transitional care facility.     Progress towards therapy goal(s). See goals on Care Plan in [facility] electronic health record for goal details.  Goals not met.  Barriers to achieving goals:   discharge from facility.     Therapy recommendation(s):    Continued therapy is recommended.  Rationale/Recommendations:  Patient would benefit from PT eval at TCU in order to increase strength, activity tolerance, balance and independence with mobility.    **Pt not seen by discharging therapist on this date, note written based on previous treating therapist's notes and recommendations

## 2024-10-21 NOTE — TELEPHONE ENCOUNTER
RECORDS STATUS - ALL OTHER DIAGNOSIS      RECORDS RECEIVED FROM: [facility]   NOTES STATUS DETAILS   OFFICE NOTE from referring provider Epic Dr. Kathryn Wilson via admission on 10/15/2024   DISCHARGE SUMMARY from hospital [facility] 10/15/2024, 9/30/2024 - [facility]   MEDICATION LIST [facility]    LABS     PATHOLOGY REPORTS [facility] 10/16/2024 - [id number]    ANYTHING RELATED TO DIAGNOSIS Epic 10/19/2024

## 2024-10-21 NOTE — PROGRESS NOTES
New Patient Oncology Nurse Navigator Note     Referral Received: 10/21/24      Referring provider: Kathryn Wilson MD     Referring Clinic/Organization: [facility]     Referred to: Benign Hematology    Requested provider (if applicable): First available - did not specify      Evaluation for :   macrocytic anemia  consider MDS        h/o mtx use, but no use for many months.      Clinical History (per Nurse review of records provided):      Discharge note from recent hospitalization:     Anemia, NOS  Chronic macrocytic anemia:   -    Hgb trended down daily this admission:  8.0 -->7.7 --> 7.4.     no black / bloody stools since admit.   She has h/o chronic anemia,   - 10/18/24:  EGD =    - Normal esophagus.                             - Small hiatal hernia.                             - Erythematous mucosa in the stomach.                             - Normal first portion of the duodenum and second                             portion of the duodenum.                             - No specimens collected.                             - Will recommend to trend Hgb before futher work up                             given advance age and co-morbidities. B  -Follow up with [facility] in 1-2 weeks. Our clinical staff will call the patient to schedule. Office phone: [phone]       - she has TANYA and ACD.    Venofer 300 X 1 given on 10/16/24  One unit PRBC given 10/18/24  (7.4 --> 8.7 at discharge)     - will start oral iron every other day.   Watch for side effects.       -- continue ppi indefinately.       - Hemmocult X 1 is negative     --Baseline Hgb has ranged from 8-10 over the past year.      -H/o methotrexate for RA,  but stopped this 6 months ago.      -   B12, folate, TSH are all normal.      -  Consider Myelodysplastic syndrome, if anemia persists.      -- discussed all of the above with family (Erasmo, son and  Ebony, dil) each day this admission.    Decided on oupatient hematology referral, that will get  scheduled by jayden (they will call Erasmo).   Can review/discuss her anemia and speculate whether this could be a production problem such as MDS, or consider whether colonoscopy or BMBx or monitoring only is warranted.       - meantime, patient may need prn PRBC to keep Hgb > 7 if continues to trend down.            - TCU can trend hgb, transfuse prn hgb< 7.5.   decide if colonoscopy warranted at GI follow up based on hgb trending      Latest Reference Range & Units 10/17/24 06:48 10/18/24 06:58 10/19/24 09:28   WBC 4.0 - 11.0 10e3/uL 5.6 5.0 4.8   Hemoglobin 11.7 - 15.7 g/dL 7.7 (L) 7.4 (L) 8.7 (L)   Hematocrit 35.0 - 47.0 % 24.7 (L) 23.8 (L) 26.7 (L)   Platelet Count 150 - 450 10e3/uL 275 269 260   RBC Count 3.80 - 5.20 10e6/uL 2.34 (L) 2.33 (L) 2.70 (L)   MCV 78 - 100 fL 106 (H) 102 (H) 99   MCH 26.5 - 33.0 pg 32.9 31.8 32.2   MCHC 31.5 - 36.5 g/dL 31.2 (L) 31.1 (L) 32.6   RDW 10.0 - 15.0 % 13.5 13.2 14.0   (L): Data is abnormally low  (H): Data is abnormally high  Records Location: [facility]     Additional testing needed prior to consult:     ?    Referral updates and Plan:     10/21/2024 11:19 AM -  Referral received and reviewed.  Slot held with CHEIKH Camarillo 10/24. Called son and he is waiting to hear from the provider at the TCU.  He will call me back and let me know if they would like to be seen.     Tess Wong, LAINAN, RN  Hematology/Oncology Nurse Navigator  [facility]  [phone] / [phone]

## 2024-10-22 ENCOUNTER — LAB REQUISITION (OUTPATIENT)
Dept: LAB | Facility: CLINIC | Age: 84
End: 2024-10-22
Payer: COMMERCIAL

## 2024-10-22 ENCOUNTER — PATIENT OUTREACH (OUTPATIENT)
Dept: CARE COORDINATION | Facility: CLINIC | Age: 84
End: 2024-10-22

## 2024-10-22 ENCOUNTER — TRANSITIONAL CARE UNIT VISIT (OUTPATIENT)
Dept: GERIATRICS | Facility: CLINIC | Age: 84
End: 2024-10-22
Payer: COMMERCIAL

## 2024-10-22 VITALS
DIASTOLIC BLOOD PRESSURE: 77 MMHG | OXYGEN SATURATION: 93 % | RESPIRATION RATE: 18 BRPM | SYSTOLIC BLOOD PRESSURE: 144 MMHG | TEMPERATURE: 97.8 F | WEIGHT: 123.3 LBS | HEIGHT: 62 IN | HEART RATE: 102 BPM | BODY MASS INDEX: 22.69 KG/M2

## 2024-10-22 DIAGNOSIS — D64.9 ANEMIA, UNSPECIFIED: ICD-10-CM

## 2024-10-22 DIAGNOSIS — G93.41 SEPSIS WITH ENCEPHALOPATHY WITHOUT SEPTIC SHOCK, DUE TO UNSPECIFIED ORGANISM (H): ICD-10-CM

## 2024-10-22 DIAGNOSIS — R00.0 TACHYCARDIA: ICD-10-CM

## 2024-10-22 DIAGNOSIS — N18.9 CHRONIC KIDNEY DISEASE, UNSPECIFIED: ICD-10-CM

## 2024-10-22 DIAGNOSIS — R53.81 PHYSICAL DECONDITIONING: ICD-10-CM

## 2024-10-22 DIAGNOSIS — E46 UNSPECIFIED PROTEIN-CALORIE MALNUTRITION (H): ICD-10-CM

## 2024-10-22 DIAGNOSIS — R56.9 SEIZURES (H): ICD-10-CM

## 2024-10-22 DIAGNOSIS — G31.84 MILD COGNITIVE IMPAIRMENT: ICD-10-CM

## 2024-10-22 DIAGNOSIS — S06.5XAA SDH (SUBDURAL HEMATOMA) (H): ICD-10-CM

## 2024-10-22 DIAGNOSIS — B37.0 THRUSH: ICD-10-CM

## 2024-10-22 DIAGNOSIS — I10 BENIGN ESSENTIAL HYPERTENSION: ICD-10-CM

## 2024-10-22 DIAGNOSIS — N39.0 URINARY TRACT INFECTION DUE TO PROTEUS: Primary | ICD-10-CM

## 2024-10-22 DIAGNOSIS — B96.4 URINARY TRACT INFECTION DUE TO PROTEUS: Primary | ICD-10-CM

## 2024-10-22 DIAGNOSIS — D64.9 ANEMIA, UNSPECIFIED TYPE: ICD-10-CM

## 2024-10-22 DIAGNOSIS — Z86.79 HISTORY OF SUBARACHNOID HEMORRHAGE: ICD-10-CM
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
## 2024-10-22 DIAGNOSIS — A41.9 SEPSIS WITH ENCEPHALOPATHY WITHOUT SEPTIC SHOCK, DUE TO UNSPECIFIED ORGANISM (H): ICD-10-CM

## 2024-10-22 DIAGNOSIS — R65.20 SEPSIS WITH ENCEPHALOPATHY WITHOUT SEPTIC SHOCK, DUE TO UNSPECIFIED ORGANISM (H): ICD-10-CM

## 2024-10-22 PROCEDURE — 99310 SBSQ NF CARE HIGH MDM 45: CPT | Performed by: NURSE PRACTITIONER

## 2024-10-22 RX ORDER — MULTIVIT WITH MINERALS/LUTEIN
250 TABLET ORAL EVERY OTHER DAY
COMMUNITY
Start: 2024-10-22

## 2024-10-22 NOTE — PROGRESS NOTES
Attempted to reach patient and left VM. Current preventative visit is overdue. left scheduling number for patient to call. [phone]

## 2024-10-23 VITALS
HEIGHT: 62 IN | WEIGHT: 123.3 LBS | DIASTOLIC BLOOD PRESSURE: 69 MMHG | BODY MASS INDEX: 22.69 KG/M2 | RESPIRATION RATE: 18 BRPM | HEART RATE: 95 BPM | TEMPERATURE: 98.1 F | SYSTOLIC BLOOD PRESSURE: 122 MMHG | OXYGEN SATURATION: 96 %

## 2024-10-23 LAB
ALBUMIN SERPL BCG-MCNC: 3.4 G/DL (ref 3.5–5.2)
ALP SERPL-CCNC: 73 U/L (ref 40–150)
ALT SERPL W P-5'-P-CCNC: 16 U/L (ref 0–50)
ANION GAP SERPL CALCULATED.3IONS-SCNC: 12 MMOL/L (ref 7–15)
AST SERPL W P-5'-P-CCNC: 29 U/L (ref 0–45)
BASOPHILS # BLD AUTO: 0 10E3/UL (ref 0–0.2)
BASOPHILS NFR BLD AUTO: 1 %
BILIRUB SERPL-MCNC: 0.3 MG/DL
BUN SERPL-MCNC: 34.6 MG/DL (ref 8–23)
CALCIUM SERPL-MCNC: 9.4 MG/DL (ref 8.8–10.4)
CHLORIDE SERPL-SCNC: 104 MMOL/L (ref 98–107)
CREAT SERPL-MCNC: 1.03 MG/DL (ref 0.51–0.95)
EGFRCR SERPLBLD CKD-EPI 2021: 54 ML/MIN/1.73M2
EOSINOPHIL # BLD AUTO: 0 10E3/UL (ref 0–0.7)
EOSINOPHIL NFR BLD AUTO: 0 %
ERYTHROCYTE [DISTWIDTH] IN BLOOD BY AUTOMATED COUNT: 13.8 % (ref 10–15)
GLUCOSE SERPL-MCNC: 121 MG/DL (ref 70–99)
HCO3 SERPL-SCNC: 27 MMOL/L (ref 22–29)
HCT VFR BLD AUTO: 31.4 % (ref 35–47)
HGB BLD-MCNC: 9.5 G/DL (ref 11.7–15.7)
IMM GRANULOCYTES # BLD: 0 10E3/UL
IMM GRANULOCYTES NFR BLD: 1 %
LYMPHOCYTES # BLD AUTO: 0.9 10E3/UL (ref 0.8–5.3)
LYMPHOCYTES NFR BLD AUTO: 21 %
MCH RBC QN AUTO: 32 PG (ref 26.5–33)
MCHC RBC AUTO-ENTMCNC: 30.3 G/DL (ref 31.5–36.5)
MCV RBC AUTO: 106 FL (ref 78–100)
MONOCYTES # BLD AUTO: 0.4 10E3/UL (ref 0–1.3)
MONOCYTES NFR BLD AUTO: 9 %
NEUTROPHILS # BLD AUTO: 2.8 10E3/UL (ref 1.6–8.3)
NEUTROPHILS NFR BLD AUTO: 69 %
NRBC # BLD AUTO: 0 10E3/UL
NRBC BLD AUTO-RTO: 0 /100
PLATELET # BLD AUTO: 279 10E3/UL (ref 150–450)
POTASSIUM SERPL-SCNC: 3.8 MMOL/L (ref 3.4–5.3)
PROT SERPL-MCNC: 7.1 G/DL (ref 6.4–8.3)
RBC # BLD AUTO: 2.97 10E6/UL (ref 3.8–5.2)
SODIUM SERPL-SCNC: 143 MMOL/L (ref 135–145)
WBC # BLD AUTO: 4.1 10E3/UL (ref 4–11)

## 2024-10-23 PROCEDURE — 85025 COMPLETE CBC W/AUTO DIFF WBC: CPT | Mod: ORL | Performed by: NURSE PRACTITIONER

## 2024-10-23 PROCEDURE — P9604 ONE-WAY ALLOW PRORATED TRIP: HCPCS | Mod: ORL | Performed by: NURSE PRACTITIONER

## 2024-10-23 PROCEDURE — 80053 COMPREHEN METABOLIC PANEL: CPT | Mod: ORL | Performed by: NURSE PRACTITIONER

## 2024-10-23 PROCEDURE — 36415 COLL VENOUS BLD VENIPUNCTURE: CPT | Mod: ORL | Performed by: NURSE PRACTITIONER

## 2024-10-24 ENCOUNTER — ONCOLOGY VISIT (OUTPATIENT)
Dept: ONCOLOGY | Facility: CLINIC | Age: 84
End: 2024-10-24
Attending: INTERNAL MEDICINE
Payer: COMMERCIAL

## 2024-10-24 ENCOUNTER — PRE VISIT (OUTPATIENT)
Dept: ONCOLOGY | Facility: CLINIC | Age: 84
End: 2024-10-24
Payer: COMMERCIAL

## 2024-10-24 ENCOUNTER — TRANSITIONAL CARE UNIT VISIT (OUTPATIENT)
Dept: GERIATRICS | Facility: CLINIC | Age: 84
End: 2024-10-24
Payer: COMMERCIAL

## 2024-10-24 VITALS
RESPIRATION RATE: 14 BRPM | OXYGEN SATURATION: 97 % | SYSTOLIC BLOOD PRESSURE: 121 MMHG | WEIGHT: 124 LBS | HEIGHT: 62 IN | TEMPERATURE: 99.5 F | BODY MASS INDEX: 22.82 KG/M2 | DIASTOLIC BLOOD PRESSURE: 70 MMHG | HEART RATE: 103 BPM
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
## 2024-10-24 DIAGNOSIS — D64.9 ANEMIA, UNSPECIFIED TYPE: Primary | ICD-10-CM

## 2024-10-24 DIAGNOSIS — R53.81 PHYSICAL DECONDITIONING: ICD-10-CM

## 2024-10-24 DIAGNOSIS — R56.9 SEIZURES (H): ICD-10-CM

## 2024-10-24 DIAGNOSIS — B37.0 THRUSH: Primary | ICD-10-CM

## 2024-10-24 DIAGNOSIS — K59.00 CONSTIPATION, UNSPECIFIED CONSTIPATION TYPE: ICD-10-CM

## 2024-10-24 DIAGNOSIS — I10 BENIGN ESSENTIAL HYPERTENSION: ICD-10-CM

## 2024-10-24 DIAGNOSIS — S06.5XAA SDH (SUBDURAL HEMATOMA) (H): ICD-10-CM

## 2024-10-24 DIAGNOSIS — D64.9 ANEMIA, UNSPECIFIED TYPE: ICD-10-CM

## 2024-10-24 DIAGNOSIS — G31.84 MILD COGNITIVE IMPAIRMENT: ICD-10-CM

## 2024-10-24 DIAGNOSIS — Z86.79 HISTORY OF SUBARACHNOID HEMORRHAGE: ICD-10-CM

## 2024-10-24 PROCEDURE — 99309 SBSQ NF CARE MODERATE MDM 30: CPT | Performed by: NURSE PRACTITIONER

## 2024-10-24 PROCEDURE — G2211 COMPLEX E/M VISIT ADD ON: HCPCS | Performed by: INTERNAL MEDICINE

## 2024-10-24 PROCEDURE — G0463 HOSPITAL OUTPT CLINIC VISIT: HCPCS | Performed by: INTERNAL MEDICINE

## 2024-10-24 PROCEDURE — 99204 OFFICE O/P NEW MOD 45 MIN: CPT | Performed by: INTERNAL MEDICINE

## 2024-10-24 ASSESSMENT — PAIN SCALES - GENERAL: PAINLEVEL_OUTOF10: NO PAIN (0)

## 2024-10-24 NOTE — LETTER
"    10/24/2024         RE: Marcela Sandhu  [address]  [place] [place] [address]      Oncology Rooming Note    October 24, 2024 1:42 PM   Marcela Sandhu is a 84 year old female who presents for:    Chief Complaint   Patient presents with     Oncology Clinic Visit     Initial Vitals: /70   Pulse 103   Temp 99.5  F (37.5  C) (Oral)   Resp 14   Ht 1.575 m (5' 2\")   Wt 56.2 kg (124 lb)   LMP  (LMP Unknown)   SpO2 97%   BMI 22.68 kg/m   Estimated body mass index is 22.68 kg/m  as calculated from the following:    Height as of this encounter: 1.575 m (5' 2\").    Weight as of this encounter: 56.2 kg (124 lb). Body surface area is 1.57 meters squared.  No Pain (0) Comment: Data Unavailable   No LMP recorded (lmp unknown). Patient is postmenopausal.  Allergies reviewed: Yes  Medications reviewed: Yes    Medications: Medication refills not needed today.  Pharmacy name entered into EPIC:    [facility] - [place], [place] - [address] [facility][1] & [id number] - [place], [place] - [address] [facility] - [place] [address] [place] [address] DRUG STORE #[id number] - [facility] [address] AT [facility] [address]  [facility] - [place], [place] - [address] [place] [address]    Frailty Screening:   Is the patient here for a new oncology consult visit in cancer care? 1. Yes. Over the past month, have you experienced difficulty or required a caregiver to assist with:   1. Balance, walking or general mobility (including any falls)? YES  2. Completion of self-care tasks such as bathing, dressing, toileting, grooming/hygiene?  YES  3. Concentration or memory that affects your daily life?  YES       Clinical concerns: Low hgb  Dr. Camarillo  was notified.      Shari J. Schoenberger, [facility]            This consult has been requested by Dr. Kathryn Wilson for anemia.    Patient was brought to the clinic in a wheelchair by her son.    Ms. Sandhu is an 84-year-old female " with multiple medical problem including rheumatoid arthritis, hypertension and GERD.  Patient has chronic anemia.  Recently she was in the hospital and found to have worsening anemia.  Her investigations are reviewed and summarized below.  1.  On 05/12/2010, hemoglobin of 11.4.  -Since then multiple CBC have revealed anemia.  2.  On 10/15/2024, she was admitted to the hospital with sepsis.  Multiple investigations done.  -WBC of 10.7, hemoglobin of 8.6 and platelet of 304.  MCV of 102.  -CMP revealed few abnormalities.  Creatinine of 1.11.  Normal calcium.  -Normal vitamin B12  3.  On 10/16/2024:  -Hemoglobin of 6.9.  Normal WBC and platelet.  Reticulocyte of 0.7%.  -Peripheral blood smear reveals macrocytic, hypochromic anemia.  -Iron of 18 and iron saturation index of 12%.  -Ferritin of 522.  -Normal folate.  -Elevated haptoglobin.  4.  EGD on 10/18/2024 revealed small hiatal hernia and erythematous mucosa in the stomach.  5.  Patient received IV Venofer 300 mg on 10/16/2024.  6.  On 10/23/2024:  -Hemoglobin of 9.5 with MCV of 106.  Normal WBC and platelet.  -Creatinine of 1.03  -Normal LFT.    Patient had multiple hospitalizations recently.  She was hospitalized in September 2024 with acute traumatic subdural hematoma.  She was admitted on 10/15/2024 with sepsis.    Patient's main problem is generalized weakness.  She needs help with activities of daily living.    No headache.  She gets some lightheadedness.  No chest pain.  No shortness of breath at rest.  No abdominal pain.  No nausea or vomiting.  No urinary or bowel complaints.  Denies bleeding from any site.  No coughing or vomiting blood.  She is on oral iron.  His stools are dark because of that.    Allergies: Reviewed    Medications: Reviewed.    Past medical history:  -Chronic anemia  -Rheumatoid arthritis  -GERD  -Hypertension  -Benign essential tremor  -Chronic kidney disease  -Depression  -Osteopenia  -Peripheral neuropathy  -Restless leg  syndrome  -Breast cancer for which she had bilateral mastectomy in 2007.  No chemotherapy or radiation.  Details not available.  -Appendectomy  -Lumbar vertebral surgery  -Knee arthroplasty  -Bunion surgery  -Blepharoplasty  -Carpal tunnel surgery  -Bunion surgery    Social history:  -No smoking  -No alcohol use.    Exam:  Patient is alert and oriented x 3.  She looked weak.  Vitals: Reviewed.  Rest of system not examined.    Labs: Reviewed.    Assessment:  1.  An 84-year-old female with chronic macrocytic anemia.  Anemia is multifactorial from anemia of chronic disease, iron deficiency and likely myelodysplastic syndrome.  Recent worsening of anemia secondary to GI bleed.  2.  Iron deficiency.  3.  Multiple other medical problems including rheumatoid arthritis, hypertension and chronic kidney disease.    Recommendation:  -Continue oral iron.  -Labs in 1 month.  -Follow up after labs.    Discussion:  1.  Labs were reviewed with the patient and her son.  Patient has chronic anemia for more than 10 years.  Her chronic anemia is secondary to anemia of chronic disease and chronic kidney disease. Given her age, she most likely she has myelodysplastic syndrome.     During last hospitalization, her hemoglobin was down to 6.9.  This was likely from some GI bleed.  EGD did not reveal any active bleeding.  Labs revealed iron deficiency.  She received IV iron.  She is on oral iron.  Hemoglobin has improved.    2.  Discussed regarding further workup.  At this time I will simply recommend monitoring her labs.  Explained to the patient that she is always going to be anemic because of her multiple medical problems including renal disease.    If her anemia progressively gets worse, we can consider bone marrow biopsy to look for MDS.    3.  Patient is currently on oral iron.  She will continue on that.  In a month, will check labs including CBC iron and ferritin.  If she still has iron deficiency, we can give her IV iron.    4.  Son  had a few questions which were all answered.  I will see her in a month with labs.    Thanks for the consult.    Total visit time of 45 minutes.  Time spent in today's visit, review of chart/investigations today and documentation.                      Marquis Camarillo MD

## 2024-10-24 NOTE — LETTER
"10/24/2024      Marcela Sandhu  [address]  [facility] [address]      Dear Colleague,    Thank you for referring your patient, Marcela Sandhu, to the [facility]. Please see a copy of my visit note below.    Oncology Rooming Note    October 24, 2024 1:42 PM   Marcela Sandhu is a 84 year old female who presents for:    Chief Complaint   Patient presents with     Oncology Clinic Visit     Initial Vitals: /70   Pulse 103   Temp 99.5  F (37.5  C) (Oral)   Resp 14   Ht 1.575 m (5' 2\")   Wt 56.2 kg (124 lb)   LMP  (LMP Unknown)   SpO2 97%   BMI 22.68 kg/m   Estimated body mass index is 22.68 kg/m  as calculated from the following:    Height as of this encounter: 1.575 m (5' 2\").    Weight as of this encounter: 56.2 kg (124 lb). Body surface area is 1.57 meters squared.  No Pain (0) Comment: Data Unavailable   No LMP recorded (lmp unknown). Patient is postmenopausal.  Allergies reviewed: Yes  Medications reviewed: Yes    Medications: Medication refills not needed today.  Pharmacy name entered into EPIC:    [facility] - [place], [place] - [address] [facility][id number] - [facility] [address] HOME DELIVERY - [place], [place] - [address] [place] [address] DRUG STORE #[id number] - [place], [place] - [address] AT [facility] & [address] [place] [address] DRUG - [place], [place] - [address] [place] [address]    Frailty Screening:   Is the patient here for a new oncology consult visit in cancer care? 1. Yes. Over the past month, have you experienced difficulty or required a caregiver to assist with:   1. Balance, walking or general mobility (including any falls)? YES  2. Completion of self-care tasks such as bathing, dressing, toileting, grooming/hygiene?  YES  3. Concentration or memory that affects your daily life?  YES       Clinical concerns: Low hgb  Dr. Camarillo  was notified.      Shari J. Schoenberger, ARIES            This consult has " been requested by Dr. Kathryn Wilson for anemia.    Patient was brought to the clinic in a wheelchair by her son.    Ms. Sandhu is an 84-year-old female with multiple medical problem including rheumatoid arthritis, hypertension and GERD.  Patient has chronic anemia.  Recently she was in the hospital and found to have worsening anemia.  Her investigations are reviewed and summarized below.  1.  On 05/12/2010, hemoglobin of 11.4.  -Since then multiple CBC have revealed anemia.  2.  On 10/15/2024, she was admitted to the hospital with sepsis.  Multiple investigations done.  -WBC of 10.7, hemoglobin of 8.6 and platelet of 304.  MCV of 102.  -CMP revealed few abnormalities.  Creatinine of 1.11.  Normal calcium.  -Normal vitamin B12  3.  On 10/16/2024:  -Hemoglobin of 6.9.  Normal WBC and platelet.  Reticulocyte of 0.7%.  -Peripheral blood smear reveals macrocytic, hypochromic anemia.  -Iron of 18 and iron saturation index of 12%.  -Ferritin of 522.  -Normal folate.  -Elevated haptoglobin.  4.  EGD on 10/18/2024 revealed small hiatal hernia and erythematous mucosa in the stomach.  5.  Patient received IV Venofer 300 mg on 10/16/2024.  6.  On 10/23/2024:  -Hemoglobin of 9.5 with MCV of 106.  Normal WBC and platelet.  -Creatinine of 1.03  -Normal LFT.    Patient had multiple hospitalizations recently.  She was hospitalized in September 2024 with acute traumatic subdural hematoma.  She was admitted on 10/15/2024 with sepsis.    Patient's main problem is generalized weakness.  She needs help with activities of daily living.    No headache.  She gets some lightheadedness.  No chest pain.  No shortness of breath at rest.  No abdominal pain.  No nausea or vomiting.  No urinary or bowel complaints.  Denies bleeding from any site.  No coughing or vomiting blood.  She is on oral iron.  His stools are dark because of that.    Allergies: Reviewed    Medications: Reviewed.    Past medical history:  -Chronic anemia  -Rheumatoid  arthritis  -GERD  -Hypertension  -Benign essential tremor  -Chronic kidney disease  -Depression  -Osteopenia  -Peripheral neuropathy  -Restless leg syndrome  -Breast cancer for which she had bilateral mastectomy in 2007.  No chemotherapy or radiation.  Details not available.  -Appendectomy  -Lumbar vertebral surgery  -Knee arthroplasty  -Bunion surgery  -Blepharoplasty  -Carpal tunnel surgery  -Bunion surgery    Social history:  -No smoking  -No alcohol use.    Exam:  Patient is alert and oriented x 3.  She looked weak.  Vitals: Reviewed.  Rest of system not examined.    Labs: Reviewed.    Assessment:  1.  An 84-year-old female with chronic macrocytic anemia.  Anemia is multifactorial from anemia of chronic disease, iron deficiency and likely myelodysplastic syndrome.  Recent worsening of anemia secondary to GI bleed.  2.  Iron deficiency.  3.  Multiple other medical problems including rheumatoid arthritis, hypertension and chronic kidney disease.    Recommendation:  -Continue oral iron.  -Labs in 1 month.  -Follow up after labs.    Discussion:  1.  Labs were reviewed with the patient and her son.  Patient has chronic anemia for more than 10 years.  Her chronic anemia is secondary to anemia of chronic disease and chronic kidney disease. Given her age, she most likely she has myelodysplastic syndrome.     During last hospitalization, her hemoglobin was down to 6.9.  This was likely from some GI bleed.  EGD did not reveal any active bleeding.  Labs revealed iron deficiency.  She received IV iron.  She is on oral iron.  Hemoglobin has improved.    2.  Discussed regarding further workup.  At this time I will simply recommend monitoring her labs.  Explained to the patient that she is always going to be anemic because of her multiple medical problems including renal disease.    If her anemia progressively gets worse, we can consider bone marrow biopsy to look for MDS.    3.  Patient is currently on oral iron.  She will  continue on that.  In a month, will check labs including CBC iron and ferritin.  If she still has iron deficiency, we can give her IV iron.    4.  Son had a few questions which were all answered.  I will see her in a month with labs.    Thanks for the consult.    Total visit time of 45 minutes.  Time spent in today's visit, review of chart/investigations today and documentation.                    Again, thank you for allowing me to participate in the care of your patient.        Sincerely,        Marquis Camarillo MD

## 2024-10-24 NOTE — PROGRESS NOTES
[facility] GERIATRIC SERVICES    Chief Complaint   Patient presents with    Nursing Home Acute       [facility] Medical Record Number:  [id number]  Place of Service where encounter took place:  [facility] ([facility]) [[id number]]    HPI:    Marcela Sandhu is a 84 year old  (1940), who is being seen today for an episodic care visit.  HPI information obtained from: facility chart records, facility staff, patient report, and [facility] chart review.Today's concern is: feeling good.      Thrush  Anemia, unspecified type  Benign essential hypertension  Fall, sequela  History of subarachnoid hemorrhage  Seizures (H)  SDH (subdural hematoma) (H)  Mild cognitive impairment  Physical deconditioning     TODAY DURING EXAM/ROS:  No CP, SOB, Cough, dizziness, fevers, chills, HA, N/V, dysuria or Bowel Abnormalities. Appetite is fair today.  No pain    ALLERGIES: Citalopram, Hydrocodone, Levaquin [levofloxacin hemihydrate], and Sulfa antibiotics  Past Medical, Surgical, Family and Social History reviewed and updated in [facility].    Current Outpatient Medications   Medication Sig Dispense Refill    acetaminophen (TYLENOL) 500 MG tablet Take 1,000 mg by mouth every 6 hours as needed for mild pain.      amLODIPine (NORVASC) 10 MG tablet Take 1 tablet (10 mg) by mouth daily.      donepezil (ARICEPT) 5 MG tablet Take 1 tablet (5 mg) by mouth at bedtime. (Patient taking differently: Take 10 mg by mouth at bedtime.)      ferrous gluconate (FERGON) 324 (38 Fe) MG tablet Take 1 tablet (324 mg) by mouth every other day.      hydrOXYzine HCl (ATARAX) 10 MG tablet TAKE 1 TABLET (10 MG) BY MOUTH 3 TIMES DAILY AS NEEDED FOR ANXIETY 30 tablet 3    levETIRAcetam (KEPPRA) 500 MG tablet Take 1 tablet (500 mg) by mouth 2 times daily.      mirtazapine (REMERON) 7.5 MG tablet TAKE 1 TABLET (7.5 MG) BY MOUTH AT BEDTIME 90 tablet 0    nystatin (MYCOSTATIN) 533031 UNIT/ML suspension Swish and spit 500,000 Units  "in mouth 3 times daily. Has been on abx until 10/22/24 so cont til 10/28/24      pantoprazole (PROTONIX) 40 MG EC tablet Take 1 tablet (40 mg) by mouth every morning (before breakfast).      polyethylene glycol (MIRALAX) 17 GM/Dose powder Take 17 g by mouth daily as needed for constipation.      sertraline (ZOLOFT) 100 MG tablet TAKE 2 TABLETS (200 MG) BY MOUTH DAILY 180 tablet 0    valsartan (DIOVAN) 160 MG tablet Take 1 tablet (160 mg) by mouth daily.      vitamin C (ASCORBIC ACID) 250 MG tablet Take 250 mg by mouth every other day. With Fe gluc          Physical Exam:  /69   Pulse 95   Temp 98.1  F (36.7  C)   Resp 18   Ht 1.575 m (5' 2\")   Wt 55.9 kg (123 lb 4.8 oz)   LMP  (LMP Unknown)   SpO2 96%   BMI 22.55 kg/m      GENERAL APPEARANCE:  Alert, in no distress, oriented, cooperative  ENT:  Mouth with moist mucous membranes--no thrush .  EYES:  Conjunctivae, lids, pupils and irises normal  RESP:  respiratory effort  of chest normal, lungs CTA but decreased , NAD  CV:  Auscultation of heart done , RRR, no murmur or edema, +2 pedal pulses  ABDOMEN:  normal bowel sounds, soft, nontender  M/S:   HAYDEN equally, up with assist but seen in chair  SKIN:  Inspection of skin at baseline  NEURO:   Cranial nerves are  grossly at patient's baseline.  PSYCH:  oriented X 3, memory impaired , affect and mood normal     CBC RESULTS:   Recent Labs   Lab Test 10/23/24  1001   WBC 4.1   RBC 2.97*   HGB 9.5*   HCT 31.4*   *   MCH 32.0   MCHC 30.3*   RDW 13.8        Recent Labs   Lab Test 10/19/24  0928 10/18/24  0658   WBC 4.8 5.0   RBC 2.70* 2.33*   HGB 8.7* 7.4*   HCT 26.7* 23.8*   MCV 99 102*   MCH 32.2 31.8   MCHC 32.6 31.1*   RDW 14.0 13.2    269     Recent Labs   Lab Test 10/23/24  1001 10/18/24  0658    142   POTASSIUM 3.8 3.9   CHLORIDE 104 109*   CO2 27 26   ANIONGAP 12 7   * 115*   BUN 34.6* 21.9   CR 1.03* 0.75   DENIS 9.4 8.6*     Recent Labs   Lab Test 10/17/24  1506 " 10/17/24  0648    148*   POTASSIUM  --  4.0   CHLORIDE  --  114*   DENIS  --  8.6*   CO2  --  26   BUN  --  32.2*   CR  --  0.88   GLC  --  101*          ASSESSMENT / PLAN:  (B37.0) Thrush  (primary encounter diagnosis)  Comment: resolving, cont nystatin thru 10/28    (D64.9) Anemia, unspecified type  Comment: better , cont Vit C and FeGluc, has appt with oncology/hematology. Check CBC in 2-3 weeks or prn    (I10) Benign essential hypertension  Comment: 110-130, no changes to POC, monitor.Norvasc and Diovan    (W19.XXXS) Fall, sequela  (Z86.79) History of subarachnoid hemorrhage  (S06.5XAA) SDH (subdural hematoma) (H)  (G31.84) Mild cognitive impairment  (R56.9) Seizures (H)  Comment: no sx=cont Keppra, F/u appts with neuro sx for CT on 11/12, F/u epilepsy clinic--son arranging.    (R53.81) Physical deconditioning  Comment: PT OT      Total time for the visit was 30 minutes including, but not limited to, non-face-to-face time spent reviewing records, counseling, and coordination of care.       Electronically signed by  DARIO De CNP

## 2024-10-24 NOTE — PROGRESS NOTES
"Oncology Rooming Note    October 24, 2024 1:42 PM   Marcela Sandhu is a 84 year old female who presents for:    Chief Complaint   Patient presents with    Oncology Clinic Visit     Initial Vitals: /70   Pulse 103   Temp 99.5  F (37.5  C) (Oral)   Resp 14   Ht 1.575 m (5' 2\")   Wt 56.2 kg (124 lb)   LMP  (LMP Unknown)   SpO2 97%   BMI 22.68 kg/m   Estimated body mass index is 22.68 kg/m  as calculated from the following:    Height as of this encounter: 1.575 m (5' 2\").    Weight as of this encounter: 56.2 kg (124 lb). Body surface area is 1.57 meters squared.  No Pain (0) Comment: Data Unavailable   No LMP recorded (lmp unknown). Patient is postmenopausal.  Allergies reviewed: Yes  Medications reviewed: Yes    Medications: Medication refills not needed today.  Pharmacy name entered into EPIC:    [facility] - [place], [place] - [address] [facility][1] & [id number] - [place], [place] - [address] [facility] - [place] - [address] [place] [address] DRUG STORE #[id number] - [place], [place] - [address] AT [facility] [address] [place] [address] DRUG - [place], [place] - [address]    Frailty Screening:   Is the patient here for a new oncology consult visit in cancer care? 1. Yes. Over the past month, have you experienced difficulty or required a caregiver to assist with:   1. Balance, walking or general mobility (including any falls)? YES  2. Completion of self-care tasks such as bathing, dressing, toileting, grooming/hygiene?  YES  3. Concentration or memory that affects your daily life?  YES       Clinical concerns: Low hgb  Dr. Camarillo  was notified.      Shari J. Schoenberger, [facility]          "

## 2024-10-26 NOTE — PROGRESS NOTES
This consult has been requested by Dr. Kathryn Wilson for anemia.    Patient was brought to the clinic in a wheelchair by her son.    Ms. Sandhu is an 84-year-old female with multiple medical problem including rheumatoid arthritis, hypertension and GERD.  Patient has chronic anemia.  Recently she was in the hospital and found to have worsening anemia.  Her investigations are reviewed and summarized below.  1.  On 05/12/2010, hemoglobin of 11.4.  -Since then multiple CBC have revealed anemia.  2.  On 10/15/2024, she was admitted to the hospital with sepsis.  Multiple investigations done.  -WBC of 10.7, hemoglobin of 8.6 and platelet of 304.  MCV of 102.  -CMP revealed few abnormalities.  Creatinine of 1.11.  Normal calcium.  -Normal vitamin B12  3.  On 10/16/2024:  -Hemoglobin of 6.9.  Normal WBC and platelet.  Reticulocyte of 0.7%.  -Peripheral blood smear reveals macrocytic, hypochromic anemia.  -Iron of 18 and iron saturation index of 12%.  -Ferritin of 522.  -Normal folate.  -Elevated haptoglobin.  4.  EGD on 10/18/2024 revealed small hiatal hernia and erythematous mucosa in the stomach.  5.  Patient received IV Venofer 300 mg on 10/16/2024.  6.  On 10/23/2024:  -Hemoglobin of 9.5 with MCV of 106.  Normal WBC and platelet.  -Creatinine of 1.03  -Normal LFT.    Patient had multiple hospitalizations recently.  She was hospitalized in September 2024 with acute traumatic subdural hematoma.  She was admitted on 10/15/2024 with sepsis.    Patient's main problem is generalized weakness.  She needs help with activities of daily living.    No headache.  She gets some lightheadedness.  No chest pain.  No shortness of breath at rest.  No abdominal pain.  No nausea or vomiting.  No urinary or bowel complaints.  Denies bleeding from any site.  No coughing or vomiting blood.  She is on oral iron.  His stools are dark because of that.    Allergies: Reviewed    Medications: Reviewed.    Past medical history:  -Chronic  anemia  -Rheumatoid arthritis  -GERD  -Hypertension  -Benign essential tremor  -Chronic kidney disease  -Depression  -Osteopenia  -Peripheral neuropathy  -Restless leg syndrome  -Breast cancer for which she had bilateral mastectomy in 2007.  No chemotherapy or radiation.  Details not available.  -Appendectomy  -Lumbar vertebral surgery  -Knee arthroplasty  -Bunion surgery  -Blepharoplasty  -Carpal tunnel surgery  -Bunion surgery    Social history:  -No smoking  -No alcohol use.    Exam:  Patient is alert and oriented x 3.  She looked weak.  Vitals: Reviewed.  Rest of system not examined.    Labs: Reviewed.    Assessment:  1.  An 84-year-old female with chronic macrocytic anemia.  Anemia is multifactorial from anemia of chronic disease, iron deficiency and likely myelodysplastic syndrome.  Recent worsening of anemia secondary to GI bleed.  2.  Iron deficiency.  3.  Multiple other medical problems including rheumatoid arthritis, hypertension and chronic kidney disease.    Recommendation:  -Continue oral iron.  -Labs in 1 month.  -Follow up after labs.    Discussion:  1.  Labs were reviewed with the patient and her son.  Patient has chronic anemia for more than 10 years.  Her chronic anemia is secondary to anemia of chronic disease and chronic kidney disease. Given her age, she most likely she has myelodysplastic syndrome.     During last hospitalization, her hemoglobin was down to 6.9.  This was likely from some GI bleed.  EGD did not reveal any active bleeding.  Labs revealed iron deficiency.  She received IV iron.  She is on oral iron.  Hemoglobin has improved.    2.  Discussed regarding further workup.  At this time I will simply recommend monitoring her labs.  Explained to the patient that she is always going to be anemic because of her multiple medical problems including renal disease.    If her anemia progressively gets worse, we can consider bone marrow biopsy to look for MDS.    3.  Patient is currently on  oral iron.  She will continue on that.  In a month, will check labs including CBC iron and ferritin.  If she still has iron deficiency, we can give her IV iron.    4.  Son had a few questions which were all answered.  I will see her in a month with labs.    Thanks for the consult.    Total visit time of 45 minutes.  Time spent in today's visit, review of chart/investigations today and documentation.

## 2024-10-28 VITALS
HEIGHT: 62 IN | HEART RATE: 106 BPM | SYSTOLIC BLOOD PRESSURE: 139 MMHG | DIASTOLIC BLOOD PRESSURE: 72 MMHG | BODY MASS INDEX: 22.82 KG/M2 | WEIGHT: 124 LBS | RESPIRATION RATE: 18 BRPM | OXYGEN SATURATION: 95 % | TEMPERATURE: 97.5 F

## 2024-10-29 ENCOUNTER — TRANSITIONAL CARE UNIT VISIT (OUTPATIENT)
Dept: GERIATRICS | Facility: CLINIC | Age: 84
End: 2024-10-29
Payer: COMMERCIAL

## 2024-10-29 DIAGNOSIS — I10 BENIGN ESSENTIAL HYPERTENSION: ICD-10-CM

## 2024-10-29 DIAGNOSIS — S06.5XAA SDH (SUBDURAL HEMATOMA) (H): Primary | ICD-10-CM

## 2024-10-29 DIAGNOSIS — R53.81 PHYSICAL DECONDITIONING: ICD-10-CM

## 2024-10-29 DIAGNOSIS — D64.9 ANEMIA, UNSPECIFIED TYPE: ICD-10-CM

## 2024-10-29 DIAGNOSIS — Z86.79 HISTORY OF SUBARACHNOID HEMORRHAGE: ICD-10-CM

## 2024-10-29 DIAGNOSIS — L85.3 XEROSIS CUTIS: ICD-10-CM
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
## 2024-10-29 DIAGNOSIS — F03.B4 MODERATE DEMENTIA WITH ANXIETY, UNSPECIFIED DEMENTIA TYPE (H): ICD-10-CM

## 2024-10-29 DIAGNOSIS — K59.00 CONSTIPATION, UNSPECIFIED CONSTIPATION TYPE: ICD-10-CM

## 2024-10-29 DIAGNOSIS — R56.9 SEIZURES (H): ICD-10-CM

## 2024-10-29 PROCEDURE — 99309 SBSQ NF CARE MODERATE MDM 30: CPT | Performed by: NURSE PRACTITIONER

## 2024-10-29 RX ORDER — BENZOCAINE/MENTHOL 6 MG-10 MG
LOZENGE MUCOUS MEMBRANE 2 TIMES DAILY PRN
COMMUNITY
Start: 2024-10-29

## 2024-10-29 RX ORDER — AMOXICILLIN 250 MG
1 CAPSULE ORAL DAILY
COMMUNITY
Start: 2024-10-29

## 2024-10-29 RX ORDER — BISACODYL 10 MG
10 SUPPOSITORY, RECTAL RECTAL DAILY PRN
COMMUNITY
Start: 2024-10-29

## 2024-10-29 RX ORDER — AMMONIUM LACTATE 12 G/100G
LOTION TOPICAL 2 TIMES DAILY
COMMUNITY
Start: 2024-10-29

## 2024-10-29 NOTE — PROGRESS NOTES
[facility] GERIATRIC SERVICES    Chief Complaint   Patient presents with    Nursing Home Acute       [facility] Medical Record Number:  [id number]  Place of Service where encounter took place:  [facility] ([facility]) [[id number]]    HPI:    Marcela Sandhu is a 84 year old  (1940), who is being seen today for an episodic care visit.  HPI information obtained from: facility chart records, facility staff, patient report, and [facility] chart review.Today's concern is: feeling good.      SDH (subdural hematoma) (H)  History of subarachnoid hemorrhage  Seizures (H)  Fall, sequela  Anemia, unspecified type  Benign essential hypertension  Mild cognitive impairment  Physical deconditioning  Xerosis cutis  Constipation, unspecified constipation type     TODAY DURING EXAM/ROS:  No CP, SOB, Cough, dizziness, fevers, chills, HA, N/V, dysuria or Bowel Abnormalities. Appetite is fair today.  No pain    ALLERGIES: Citalopram, Hydrocodone, Levaquin [levofloxacin hemihydrate], and Sulfa antibiotics  Past Medical, Surgical, Family and Social History reviewed and updated in [facility].    Current Outpatient Medications   Medication Sig Dispense Refill    ammonium lactate (LAC-HYDRIN) 12 % external lotion Apply topically 2 times daily. To dry areas on back      bisacodyl (DULCOLAX) 10 MG suppository Place 10 mg rectally daily as needed for constipation.      hydrocortisone (CORTAID) 1 % external cream Apply topically 2 times daily as needed for itching.      senna-docusate (SENOKOT-S/PERICOLACE) 8.6-50 MG tablet Take 1 tablet by mouth daily.      acetaminophen (TYLENOL) 500 MG tablet Take 1,000 mg by mouth every 6 hours as needed for mild pain.      amLODIPine (NORVASC) 10 MG tablet Take 1 tablet (10 mg) by mouth daily.      donepezil (ARICEPT) 5 MG tablet Take 1 tablet (5 mg) by mouth at bedtime. (Patient taking differently: Take 10 mg by mouth at bedtime.)      ferrous gluconate (FERGON) 324  "(38 Fe) MG tablet Take 1 tablet (324 mg) by mouth every other day.      hydrOXYzine HCl (ATARAX) 10 MG tablet TAKE 1 TABLET (10 MG) BY MOUTH 3 TIMES DAILY AS NEEDED FOR ANXIETY 30 tablet 3    levETIRAcetam (KEPPRA) 500 MG tablet Take 1 tablet (500 mg) by mouth 2 times daily.      mirtazapine (REMERON) 7.5 MG tablet TAKE 1 TABLET (7.5 MG) BY MOUTH AT BEDTIME 90 tablet 0    pantoprazole (PROTONIX) 40 MG EC tablet Take 1 tablet (40 mg) by mouth every morning (before breakfast).      polyethylene glycol (MIRALAX) 17 GM/Dose powder Take 17 g by mouth daily as needed for constipation.      sertraline (ZOLOFT) 100 MG tablet TAKE 2 TABLETS (200 MG) BY MOUTH DAILY 180 tablet 0    valsartan (DIOVAN) 160 MG tablet Take 1 tablet (160 mg) by mouth daily.      vitamin C (ASCORBIC ACID) 250 MG tablet Take 250 mg by mouth every other day. With Fe gluc          Physical Exam:  /72   Pulse 106   Temp 97.5  F (36.4  C)   Resp 18   Ht 1.575 m (5' 2\")   Wt 56.2 kg (124 lb)   LMP  (LMP Unknown)   SpO2 95%   BMI 22.68 kg/m      GENERAL APPEARANCE:  Alert, in no distress, oriented, cooperative  ENT:  Mouth with moist mucous membranes--no thrush .  EYES:  Conjunctivae, lids, pupils and irises normal  RESP:  respiratory effort  of chest normal, lungs CTA but decreased , NAD  CV:  Auscultation of heart done , RRR, no murmur or edema, +2 pedal pulses  ABDOMEN:  normal bowel sounds, soft, nontender  M/S:   HAYDEN equally, up with assist but seen in chair  SKIN:  Inspection of skin at baseline  NEURO:   Cranial nerves are  grossly at patient's baseline.  PSYCH:  oriented X 3, memory impaired , affect and mood normal     CBC RESULTS:   Recent Labs   Lab Test 10/23/24  1001   WBC 4.1   RBC 2.97*   HGB 9.5*   HCT 31.4*   *   MCH 32.0   MCHC 30.3*   RDW 13.8        Recent Labs   Lab Test 10/19/24  0928 10/18/24  0658   WBC 4.8 5.0   RBC 2.70* 2.33*   HGB 8.7* 7.4*   HCT 26.7* 23.8*   MCV 99 102*   MCH 32.2 31.8   MCHC 32.6 " 31.1*   RDW 14.0 13.2    269     Recent Labs   Lab Test 10/23/24  1001 10/18/24  0658    142   POTASSIUM 3.8 3.9   CHLORIDE 104 109*   CO2 27 26   ANIONGAP 12 7   * 115*   BUN 34.6* 21.9   CR 1.03* 0.75   DENIS 9.4 8.6*         ASSESSMENT / PLAN:  (S06.5XAA) SDH (subdural hematoma) (H)  (primary encounter diagnosis)  (Z86.79) History of subarachnoid hemorrhage  (R56.9) Seizures (H)  (W19.XXXS) Fall, sequela  Comment:   no sx=cont Keppra, F/u appts with neuro sx for CT on 11/12, F/u epilepsy clinic--son arranging. monitor    (D64.9) Anemia, unspecified type  Comment: check in 2-3 weeks or prn, no changes to regimen FE, VitC    (I10) Benign essential hypertension  Comment: runs usually 115-130 and HR 70-80 , cont same POC, Norvasc Diovan    (R53.81) Physical deconditioning  Comment: PT OT, no LCD yet    (F03.B4) Moderate dementia with anxiety, unspecified dementia type (H)  Comment: SLUMS 6/30, calm , cont same POC, Zoloft, Remeron, Aricept., monitor.     (L85.3) Xerosis cutis  Comment: add lac hydrin and prn HC cream--weather is changing to cooler dry.    (K59.00) Constipation, unspecified constipation type  Comment: add bowel meds, monitor.    Total time for the visit was 30 minutes including, but not limited to, non-face-to-face time spent reviewing records, counseling, and coordination of care.       Electronically signed by  DARIO De CNP

## 2024-10-30 ENCOUNTER — VIRTUAL VISIT (OUTPATIENT)
Dept: ENDOCRINOLOGY | Facility: CLINIC | Age: 84
End: 2024-10-30
Attending: NURSE PRACTITIONER
Payer: COMMERCIAL

## 2024-10-30 DIAGNOSIS — M80.00XG OSTEOPOROSIS WITH CURRENT PATHOLOGICAL FRACTURE WITH DELAYED HEALING, UNSPECIFIED OSTEOPOROSIS TYPE, SUBSEQUENT ENCOUNTER: ICD-10-CM

## 2024-10-30 DIAGNOSIS — Z87.81 HISTORY OF VERTEBRAL COMPRESSION FRACTURE: ICD-10-CM

## 2024-10-30 PROCEDURE — 99204 OFFICE O/P NEW MOD 45 MIN: CPT | Mod: 95 | Performed by: INTERNAL MEDICINE

## 2024-10-30 NOTE — LETTER
10/30/2024      Marcela Sandhu  [address]  [facility] [address]      Dear Colleague,    Thank you for referring your patient, Marcela Sandhu, to the [facility]. Please see a copy of my visit note below.      Video-Visit Details    Type of service:  Video Visit  Video Start Time: 3:39  Video End Time: 4:15  Originating Location (pt. Location): [place], [place]  Distant Location (provider location):  On Site, [facility]  Platform used for Video Visit: Cuong Holguin MD    Marcela Sandhu is a 84 year old year old female here for evaluation of osteoporosis via a billable video visit. She also has PMHx of moderate dementia, recurrent falls, CKD stage III, HTN, fibromyalgia, GERD, breast Ca. She was recently hosptialized 9/30-10/15 for a fall c/b subdural  hemorrhage and temporal bone fx. She is accompanied by her daughter in law Liliya today. She is currently living in a TCU recovering from the hospitalization/fall.      Chief Complaint   Patient presents with     New Patient     History of vertebral compression fracture  Osteoporosis with current pathological fracture with delayed healing, unspecified osteoporosis type, subsequent encounter     Recent hospitalization for fall-- Sept 2024-     1) Osteoporosis  Diagnosis: 2021 on DEXA  Previous Fractures: Fall 2020- nondisplaced insufficiency-type fracture pelvis, ribs, and   Bone-specific therapy: reclast at some point, unknown how much, unknown when. 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 At assisted living facilty, now in rehab working to get moving  Smoking: none   Alcohol: none      Relevant Family History:    Active diagnoses this visit:     History of vertebral compression fracture  Osteoporosis with current pathological fracture with delayed healing, unspecified osteoporosis type, subsequent encounter     ROS: 10 point ROS neg other than the symptoms noted above in the HPI.      Objective:  LMP  (LMP Unknown)       Physical Exam (visual exam)  VS:  no vital signs taken for video visit  CONSTITUTIONAL: healthy, alert and NAD, responding appropriately  ENT: normocephalic, no visual evidence of trauma, normal nose and oral mucosa  EYES: conjunctivae and sclerae normal, no exophthalmos or proptosis  THYROID:  no visualized nodules or goiter  LUNGS: no audible wheeze, cough or visible cyanosis, no visible retractions or increased work of breathing  EXTREMITIES: no hand tremors  NEUROLOGY: cranial nerves grossly intact with no obvious deficit.  SKIN:  no visualized skin lesions or rash, no edema visualized  PSYCH: mentation appears normal, normal judgement        Lab Results   Component Value Date/Time    TSH 0.61 10/15/2024 03:26 PM    TSH 1.10 07/21/2015 12:00 AM     Last Comprehensive Metabolic Panel:  Sodium   Date Value Ref Range Status   10/23/2024 143 135 - 145 mmol/L Final   03/03/2021 140 133 - 144 mmol/L Final     Potassium   Date Value Ref Range Status   10/23/2024 3.8 3.4 - 5.3 mmol/L Final   12/31/2022 3.6 3.4 - 5.3 mmol/L Final   03/03/2021 4.3 3.4 - 5.3 mmol/L Final     Chloride   Date Value Ref Range Status   10/23/2024 104 98 - 107 mmol/L Final   12/31/2022 107 94 - 109 mmol/L Final   03/03/2021 110 (H) 94 - 109 mmol/L  Final     Carbon Dioxide   Date Value Ref Range Status   03/03/2021 26 20 - 32 mmol/L Final     Carbon Dioxide (CO2)   Date Value Ref Range Status   10/23/2024 27 22 - 29 mmol/L Final   12/31/2022 26 20 - 32 mmol/L Final     Anion Gap   Date Value Ref Range Status   10/23/2024 12 7 - 15 mmol/L Final   12/31/2022 7 3 - 14 mmol/L Final   03/03/2021 4 3 - 14 mmol/L Final     Glucose   Date Value Ref Range Status   10/23/2024 121 (H) 70 - 99 mg/dL Final   12/31/2022 103 (H) 70 - 99 mg/dL Final   03/03/2021 96 70 - 99 mg/dL Final     GLUCOSE BY METER POCT   Date Value Ref Range Status   09/30/2024 122 (H) 70 - 99 mg/dL Final     Urea Nitrogen   Date Value Ref Range Status   10/23/2024 34.6 (H) 8.0 - 23.0 mg/dL Final   12/31/2022 18 7 - 30 mg/dL Final   03/03/2021 31 (H) 7 - 30 mg/dL Final     Creatinine   Date Value Ref Range Status   10/23/2024 1.03 (H) 0.51 - 0.95 mg/dL Final   03/03/2021 1.12 (H) 0.52 - 1.04 mg/dL Final     GFR Estimate   Date Value Ref Range Status   10/23/2024 54 (L) >60 mL/min/1.73m2 Final   03/03/2021 47 (L) >60 mL/min/[1.73_m2] Final     Comment:     Non  GFR Calc  Starting 12/18/2018, serum creatinine based estimated GFR (eGFR) will be   calculated using the Chronic Kidney Disease Epidemiology Collaboration   (CKD-EPI) equation.       Calcium   Date Value Ref Range Status   10/23/2024 9.4 8.8 - 10.4 mg/dL Final     Comment:     Reference intervals for this test were updated on 7/16/2024 to reflect our healthy population more accurately. There may be differences in the flagging of prior results with similar values performed with this method. Those prior results can be interpreted in the context of the updated reference intervals.   03/03/2021 9.8 8.5 - 10.1 mg/dL Final         Alkaline Phosphatase   Date Value Ref Range Status   10/23/2024 73 40 - 150 U/L Final   03/03/2021 46 40 - 150 U/L Final           DXA (2/9/2024)- Images were personally reviewed today by me--  DXA  RESULTS  -Lumbar Spine: L1-L2: BMD: 0.871 g/cm2. T-score: -2.5. Z-score: -0.6. Degenerative change may artifactually increase BMD.  -RIGHT Hip Total: BMD: 0.788 g/cm2. T-score: -1.7. Z-score: 0.3.  -RIGHT Hip Femoral neck: BMD: 0.830 g/cm2. T-score: -1.5. Z-score: 0.7.  -LEFT Hip Total: BMD: 0.817 g/cm2. T-score: -1.5. Z-score: 0.6.  -LEFT Hip Femoral neck: BMD: 0.904 g/cm2. T-score: -1.0. Z-score: 1.2.      ASSESSMENT / PLAN:  (Z87.81) History of vertebral compression fracture  (M80.00XG) Osteoporosis with current pathological fracture with delayed healing, unspecified osteoporosis type, subsequent encounter      1) Osteoporosis  Historically had pelvic insufficiency fracture 2020, ?vertebral fracture (in diagnosis codes, but I cannot find imaging, etc confirming this).   Had a few years of reclast, unclear how much and when last dose was. Appears to be around 2013 (based on note by Dr Jesus at the time). Will obtain records from [facility] to see if they were administering it. Liliya will also call  - Will plan for reclast as first option, if has received >6 years, likely will recommend prolia indefinitely.   Discussed 1/3 risk of flu symptoms (acute phase reaction) after treatment with IV zoledronic acid. Discussed risks of osteonecrosis of the jaw (1/200 after tooth extraction, 1/2500 spontaneous) and atypical femur fractures (1/1000) with chronic bisphosphonates. For every atypical femur fracture, 100 typical femur fractures are prevented.      10-year probability of a hip fracture >= 3% or a 10-year probability of a major osteoporosis-related fracture >= 20% warrants treatment.     No orders of the defined types were placed in this encounter.      Return to clinic: 1 year    A total of 45 minutes were spent today 10/30/24 on this visit including chart review, history and counseling, documentation and other activities as detailed above.       Again, thank you for allowing me to participate in  the care of your patient.        Sincerely,        Esther Holguin MD

## 2024-10-30 NOTE — PROGRESS NOTES
Video-Visit Details    Type of service:  Video Visit  Video Start Time: 3:39  Video End Time: 4:15  Originating Location (pt. Location): [place], [place]  Distant Location (provider location):  On Site, [facility]  Platform used for Video Visit: Cuong Holguin MD    Marcela Sandhu is a 84 year old year old female here for evaluation of osteoporosis via a billable video visit. She also has PMHx of moderate dementia, recurrent falls, CKD stage III, HTN, fibromyalgia, GERD, breast Ca. She was recently hosptialized 9/30-10/15 for a fall c/b subdural  hemorrhage and temporal bone fx. She is accompanied by her daughter in law Liliya today. She is currently living in a TCU recovering from the hospitalization/fall.      Chief Complaint   Patient presents with    New Patient     History of vertebral compression fracture  Osteoporosis with current pathological fracture with delayed healing, unspecified osteoporosis type, subsequent encounter     Recent hospitalization for fall-- Sept 2024-     1) Osteoporosis  Diagnosis: 2021 on DEXA  Previous Fractures: Fall 2020- nondisplaced insufficiency-type fracture pelvis, ribs, and   Bone-specific therapy: reclast at some point, unknown how much, unknown when. Dr Penny notes 2013, no other mention, not within 5 years  Family History of Hip Fx: Father- hip replacement (unknown why)  Hormone History (Menopause, Testosterone):  normal age  Steroids: off/on   PPIs: pantoprazole on our med list, not currently taking  Antiepileptics: Keppra started recently given fall in September (first seizure)  Anticoagulation: none  Autoimmune disease: Rheumatoid Arthritis on methotrexate   CKD: stage 3  Nephrolithiasis: none   Other risk factors: Breast Cancer s/p double mastectomy 2007, no chemo/rads, unclear if follow-up meds  Ca/D: Taking Ca supplement, unknown how much, yogurt a few times a week, Vitamin D unknown  Exercise: working on moving more, previously was going to exercise  class 5x weekly. At assisted living facil, now in rehab working to get moving  Smoking: none   Alcohol: none      Relevant Family History:    Active diagnoses this visit:     History of vertebral compression fracture  Osteoporosis with current pathological fracture with delayed healing, unspecified osteoporosis type, subsequent encounter     ROS: 10 point ROS neg other than the symptoms noted above in the HPI.      Objective:  LMP  (LMP Unknown)       Physical Exam (visual exam)  VS:  no vital signs taken for video visit  CONSTITUTIONAL: healthy, alert and NAD, responding appropriately  ENT: normocephalic, no visual evidence of trauma, normal nose and oral mucosa  EYES: conjunctivae and sclerae normal, no exophthalmos or proptosis  THYROID:  no visualized nodules or goiter  LUNGS: no audible wheeze, cough or visible cyanosis, no visible retractions or increased work of breathing  EXTREMITIES: no hand tremors  NEUROLOGY: cranial nerves grossly intact with no obvious deficit.  SKIN:  no visualized skin lesions or rash, no edema visualized  PSYCH: mentation appears normal, normal judgement        Lab Results   Component Value Date/Time    TSH 0.61 10/15/2024 03:26 PM    TSH 1.10 07/21/2015 12:00 AM     Last Comprehensive Metabolic Panel:  Sodium   Date Value Ref Range Status   10/23/2024 143 135 - 145 mmol/L Final   03/03/2021 140 133 - 144 mmol/L Final     Potassium   Date Value Ref Range Status   10/23/2024 3.8 3.4 - 5.3 mmol/L Final   12/31/2022 3.6 3.4 - 5.3 mmol/L Final   03/03/2021 4.3 3.4 - 5.3 mmol/L Final     Chloride   Date Value Ref Range Status   10/23/2024 104 98 - 107 mmol/L Final   12/31/2022 107 94 - 109 mmol/L Final   03/03/2021 110 (H) 94 - 109 mmol/L Final     Carbon Dioxide   Date Value Ref Range Status   03/03/2021 26 20 - 32 mmol/L Final     Carbon Dioxide (CO2)   Date Value Ref Range Status   10/23/2024 27 22 - 29 mmol/L Final   12/31/2022 26 20 - 32 mmol/L Final     Anion Gap   Date Value Ref  Range Status   10/23/2024 12 7 - 15 mmol/L Final   12/31/2022 7 3 - 14 mmol/L Final   03/03/2021 4 3 - 14 mmol/L Final     Glucose   Date Value Ref Range Status   10/23/2024 121 (H) 70 - 99 mg/dL Final   12/31/2022 103 (H) 70 - 99 mg/dL Final   03/03/2021 96 70 - 99 mg/dL Final     GLUCOSE BY METER POCT   Date Value Ref Range Status   09/30/2024 122 (H) 70 - 99 mg/dL Final     Urea Nitrogen   Date Value Ref Range Status   10/23/2024 34.6 (H) 8.0 - 23.0 mg/dL Final   12/31/2022 18 7 - 30 mg/dL Final   03/03/2021 31 (H) 7 - 30 mg/dL Final     Creatinine   Date Value Ref Range Status   10/23/2024 1.03 (H) 0.51 - 0.95 mg/dL Final   03/03/2021 1.12 (H) 0.52 - 1.04 mg/dL Final     GFR Estimate   Date Value Ref Range Status   10/23/2024 54 (L) >60 mL/min/1.73m2 Final   03/03/2021 47 (L) >60 mL/min/[1.73_m2] Final     Comment:     Non  GFR Calc  Starting 12/18/2018, serum creatinine based estimated GFR (eGFR) will be   calculated using the Chronic Kidney Disease Epidemiology Collaboration   (CKD-EPI) equation.       Calcium   Date Value Ref Range Status   10/23/2024 9.4 8.8 - 10.4 mg/dL Final     Comment:     Reference intervals for this test were updated on 7/16/2024 to reflect our healthy population more accurately. There may be differences in the flagging of prior results with similar values performed with this method. Those prior results can be interpreted in the context of the updated reference intervals.   03/03/2021 9.8 8.5 - 10.1 mg/dL Final         Alkaline Phosphatase   Date Value Ref Range Status   10/23/2024 73 40 - 150 U/L Final   03/03/2021 46 40 - 150 U/L Final           DXA (2/9/2024)- Images were personally reviewed today by me--  DXA RESULTS  -Lumbar Spine: L1-L2: BMD: 0.871 g/cm2. T-score: -2.5. Z-score: -0.6. Degenerative change may artifactually increase BMD.  -RIGHT Hip Total: BMD: 0.788 g/cm2. T-score: -1.7. Z-score: 0.3.  -RIGHT Hip Femoral neck: BMD: 0.830 g/cm2. T-score: -1.5.  Z-score: 0.7.  -LEFT Hip Total: BMD: 0.817 g/cm2. T-score: -1.5. Z-score: 0.6.  -LEFT Hip Femoral neck: BMD: 0.904 g/cm2. T-score: -1.0. Z-score: 1.2.      ASSESSMENT / PLAN:  (Z87.81) History of vertebral compression fracture  (M80.00XG) Osteoporosis with current pathological fracture with delayed healing, unspecified osteoporosis type, subsequent encounter      1) Osteoporosis  Historically had pelvic insufficiency fracture 2020, ?vertebral fracture (in diagnosis codes, but I cannot find imaging, etc confirming this).   Had a few years of reclast, unclear how much and when last dose was. Appears to be around 2013 (based on note by Dr Jesus at the time). Will obtain records from [facility] to see if they were administering it. Liliya will also call  - Will plan for reclast as first option, if has received >6 years, likely will recommend prolia indefinitely.   Discussed 1/3 risk of flu symptoms (acute phase reaction) after treatment with IV zoledronic acid. Discussed risks of osteonecrosis of the jaw (1/200 after tooth extraction, 1/2500 spontaneous) and atypical femur fractures (1/1000) with chronic bisphosphonates. For every atypical femur fracture, 100 typical femur fractures are prevented.      10-year probability of a hip fracture >= 3% or a 10-year probability of a major osteoporosis-related fracture >= 20% warrants treatment.     No orders of the defined types were placed in this encounter.      Return to clinic: 1 year    A total of 45 minutes were spent today 10/30/24 on this visit including chart review, history and counseling, documentation and other activities as detailed above.

## 2024-10-31 ENCOUNTER — PATIENT OUTREACH (OUTPATIENT)
Dept: CARE COORDINATION | Facility: CLINIC | Age: 84
End: 2024-10-31
Payer: COMMERCIAL

## 2024-10-31 ENCOUNTER — TRANSFERRED RECORDS (OUTPATIENT)
Dept: HEALTH INFORMATION MANAGEMENT | Facility: CLINIC | Age: 84
End: 2024-10-31
Payer: COMMERCIAL

## 2024-10-31 NOTE — PROGRESS NOTES
Clinic Care Coordination Contact    Situation: Patient chart reviewed by care coordinator.    Background:   Patient was hospitalized at [facility] from 9/30/24 to 10/5/24 with diagnosis of   Acute traumatic subdural hematoma parenchymal contusion, left temporal lobe with associated edema  Acute traumatic subarachnoid hemorrhage left temporal and left frontal lobe  Acute traumatic subdural hemorrhage left lateral cerebral convexity   Suspected left tegmen mastoideum fracture with pneumocephalus   Right posterior scalp hematoma   Fall   Seizure     Discharged to [facility] TCU 10/5/2024    Assessment:   Patient currently still admitted to TCU.     Plan/Recommendations:   Care Coordination will monitor chart monthly and upon discharge notification.     Jailene Kumar, RN Clinic Care Coordinator  [facility] Clinics: [place], [place] (on-site Wednesdays), [facility] (on-site Thursdays) & [facility].  [email or web address]  Phone: [phone]

## 2024-11-05 ENCOUNTER — LAB REQUISITION (OUTPATIENT)
Dept: LAB | Facility: CLINIC | Age: 84
End: 2024-11-05
Payer: COMMERCIAL

## 2024-11-05 ENCOUNTER — TRANSITIONAL CARE UNIT VISIT (OUTPATIENT)
Dept: GERIATRICS | Facility: CLINIC | Age: 84
End: 2024-11-05
Payer: COMMERCIAL

## 2024-11-05 ENCOUNTER — DOCUMENTATION ONLY (OUTPATIENT)
Dept: OTHER | Facility: CLINIC | Age: 84
End: 2024-11-05

## 2024-11-05 VITALS
HEIGHT: 62 IN | TEMPERATURE: 97.7 F | WEIGHT: 124 LBS | SYSTOLIC BLOOD PRESSURE: 134 MMHG | DIASTOLIC BLOOD PRESSURE: 66 MMHG | BODY MASS INDEX: 22.82 KG/M2 | RESPIRATION RATE: 18 BRPM | OXYGEN SATURATION: 96 % | HEART RATE: 100 BPM

## 2024-11-05 DIAGNOSIS — S06.5XAA SDH (SUBDURAL HEMATOMA) (H): Primary | ICD-10-CM

## 2024-11-05 DIAGNOSIS — G31.84 MILD COGNITIVE IMPAIRMENT: ICD-10-CM

## 2024-11-05 DIAGNOSIS — R56.9 SEIZURES (H): ICD-10-CM

## 2024-11-05 DIAGNOSIS — D64.9 ANEMIA, UNSPECIFIED TYPE: ICD-10-CM

## 2024-11-05 DIAGNOSIS — R53.81 PHYSICAL DECONDITIONING: ICD-10-CM
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
## 2024-11-05 DIAGNOSIS — D64.9 ANEMIA, UNSPECIFIED: ICD-10-CM

## 2024-11-05 DIAGNOSIS — I10 ESSENTIAL (PRIMARY) HYPERTENSION: ICD-10-CM

## 2024-11-05 DIAGNOSIS — I10 BENIGN ESSENTIAL HYPERTENSION: ICD-10-CM

## 2024-11-05 DIAGNOSIS — Z86.79 HISTORY OF SUBARACHNOID HEMORRHAGE: ICD-10-CM

## 2024-11-05 PROCEDURE — 99309 SBSQ NF CARE MODERATE MDM 30: CPT | Performed by: NURSE PRACTITIONER

## 2024-11-05 NOTE — PROGRESS NOTES
[facility] GERIATRIC SERVICES    Chief Complaint   Patient presents with    Nursing Home Acute       [facility] Medical Record Number:  [id number]  Place of Service where encounter took place:  [facility] ([facility]) [[id number]]    HPI:    Marcela Sandhu is a 84 year old  (1940), who is being seen today for an episodic care visit.  HPI information obtained from: facility chart records, facility staff, patient report, and [facility] chart review.Today's concern is: eating well, wt up, stronger per staff and therapies.      SDH (subdural hematoma) (H)  History of subarachnoid hemorrhage  Seizures (H)  Fall, sequela  Anemia, unspecified type  Benign essential hypertension  Mild cognitive impairment  Physical deconditioning     TODAY DURING EXAM/ROS:  No CP, SOB, Cough, dizziness, fevers, chills, HA, N/V, dysuria or Bowel Abnormalities. Appetite better.  No pain    ALLERGIES: Citalopram, Hydrocodone, Levaquin [levofloxacin hemihydrate], and Sulfa antibiotics  Past Medical, Surgical, Family and Social [facility]    Current Outpatient Medications   Medication Sig Dispense Refill    acetaminophen (TYLENOL) 500 MG tablet Take 1,000 mg by mouth every 6 hours as needed for mild pain.      amLODIPine (NORVASC) 10 MG tablet Take 1 tablet (10 mg) by mouth daily.      ammonium lactate (LAC-HYDRIN) 12 % external lotion Apply topically 2 times daily. To dry areas on back      bisacodyl (DULCOLAX) 10 MG suppository Place 10 mg rectally daily as needed for constipation.      donepezil (ARICEPT) 5 MG tablet Take 1 tablet (5 mg) by mouth at bedtime. (Patient taking differently: Take 10 mg by mouth at bedtime.)      ferrous gluconate (FERGON) 324 (38 Fe) MG tablet Take 1 tablet (324 mg) by mouth every other day.      hydrocortisone (CORTAID) 1 % external cream Apply topically 2 times daily as needed for itching.      hydrOXYzine HCl (ATARAX) 10 MG tablet TAKE 1  "TABLET (10 MG) BY MOUTH 3 TIMES DAILY AS NEEDED FOR ANXIETY 30 tablet 3    levETIRAcetam (KEPPRA) 500 MG tablet Take 1 tablet (500 mg) by mouth 2 times daily.      mirtazapine (REMERON) 7.5 MG tablet TAKE 1 TABLET (7.5 MG) BY MOUTH AT BEDTIME 90 tablet 0    pantoprazole (PROTONIX) 40 MG EC tablet Take 1 tablet (40 mg) by mouth every morning (before breakfast).      polyethylene glycol (MIRALAX) 17 GM/Dose powder Take 17 g by mouth daily as needed for constipation.      senna-docusate (SENOKOT-S/PERICOLACE) 8.6-50 MG tablet Take 1 tablet by mouth daily.      sertraline (ZOLOFT) 100 MG tablet TAKE 2 TABLETS (200 MG) BY MOUTH DAILY 180 tablet 0    valsartan (DIOVAN) 160 MG tablet Take 1 tablet (160 mg) by mouth daily.      vitamin C (ASCORBIC ACID) 250 MG tablet Take 250 mg by mouth every other day. With Fe gluc          Physical Exam:  /66   Pulse 100   Temp 97.7  F (36.5  C)   Resp 18   Ht 1.575 m (5' 2\")   Wt 56.2 kg (124 lb)   LMP  (LMP Unknown)   SpO2 96%   BMI 22.68 kg/m      GENERAL APPEARANCE:  Alert, in no distress, oriented, cooperative  ENT:  Mouth with moist mucous membranes.  EYES:  Conjunctivae, lids, pupils and irises normal  RESP:  respiratory effort  of chest normal, lungs clear but mildly decreased  CV:  Auscultation of heart done , RRR, no edema, + pedal pulses  ABDOMEN:  normal bowel sounds, soft, nontender  M/S:   HAYDEN equally, up with assist but seen in chair  SKIN:  Inspection of skin at baseline  NEURO:   Cranial nerves are  grossly at patient's baseline.  PSYCH:  oriented X 3, memory impaired , affect and mood normal     CBC RESULTS:   Recent Labs   Lab Test 10/23/24  1001   WBC 4.1   RBC 2.97*   HGB 9.5*   HCT 31.4*   *   MCH 32.0   MCHC 30.3*   RDW 13.8        Recent Labs   Lab Test 10/19/24  0928 10/18/24  0658   WBC 4.8 5.0   RBC 2.70* 2.33*   HGB 8.7* 7.4*   HCT 26.7* 23.8*   MCV 99 102*   MCH 32.2 31.8   MCHC 32.6 31.1*   RDW 14.0 13.2    269     Recent " Labs   Lab Test 10/23/24  1001 10/18/24  0658    142   POTASSIUM 3.8 3.9   CHLORIDE 104 109*   CO2 27 26   ANIONGAP 12 7   * 115*   BUN 34.6* 21.9   CR 1.03* 0.75   DENIS 9.4 8.6*         ASSESSMENT / PLAN:  (S06.5XAA) SDH (subdural hematoma) (H)  (primary encounter diagnosis)  (Z86.79) History of subarachnoid hemorrhage  (R56.9) Seizures (H)  (W19.XXXS) Fall, sequela  Comment:   no seizures=cont Keppra, F/u appts with neuro sx for CT on 11/12, F/u epilepsy clinic-  monitor    (D64.9) Anemia, unspecified type  Comment: check on 11/7, cont FE, VitC    (I10) Benign essential hypertension  Comment: runs usually 120-130 and HR 70-80 , cont same POC, Norvasc, Diovan, check BMP 11/7    (R53.81) Physical deconditioning  Comment: PT OT, no LCD yet    (F03.B4) Moderate dementia with anxiety, unspecified dementia type (H)  Comment: SLUMS 6/30, cont same POC, Zoloft, Remeron, Aricept., monitor.        Total time for the visit was 30 minutes including, but not limited to, non-face-to-face time spent reviewing records, counseling, and coordination of care.       Electronically signed by  DARIO De CNP

## 2024-11-07 ENCOUNTER — DISCHARGE SUMMARY NURSING HOME (OUTPATIENT)
Dept: GERIATRICS | Facility: CLINIC | Age: 84
End: 2024-11-07
Payer: COMMERCIAL

## 2024-11-07 VITALS
BODY MASS INDEX: 22.82 KG/M2 | WEIGHT: 124 LBS | HEIGHT: 62 IN | DIASTOLIC BLOOD PRESSURE: 73 MMHG | RESPIRATION RATE: 18 BRPM | TEMPERATURE: 98.3 F | OXYGEN SATURATION: 97 % | SYSTOLIC BLOOD PRESSURE: 129 MMHG | HEART RATE: 94 BPM

## 2024-11-07 DIAGNOSIS — G25.0 BENIGN ESSENTIAL TREMOR: ICD-10-CM

## 2024-11-07 DIAGNOSIS — N18.31 STAGE 3A CHRONIC KIDNEY DISEASE (H): ICD-10-CM

## 2024-11-07 DIAGNOSIS — R13.10 DYSPHAGIA, UNSPECIFIED TYPE: ICD-10-CM

## 2024-11-07 DIAGNOSIS — G31.84 MILD COGNITIVE IMPAIRMENT: ICD-10-CM

## 2024-11-07 DIAGNOSIS — S06.5XAA SDH (SUBDURAL HEMATOMA) (H): Primary | ICD-10-CM

## 2024-11-07 DIAGNOSIS — S02.19XD CLOSED FRACTURE OF TEMPORAL BONE WITH ROUTINE HEALING, SUBSEQUENT ENCOUNTER: ICD-10-CM
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
## 2024-11-07 DIAGNOSIS — R53.81 PHYSICAL DECONDITIONING: ICD-10-CM

## 2024-11-07 DIAGNOSIS — D64.9 ANEMIA, UNSPECIFIED TYPE: ICD-10-CM

## 2024-11-07 DIAGNOSIS — F32.9 MAJOR DEPRESSIVE DISORDER WITH CURRENT ACTIVE EPISODE, UNSPECIFIED DEPRESSION EPISODE SEVERITY, UNSPECIFIED WHETHER RECURRENT: ICD-10-CM

## 2024-11-07 DIAGNOSIS — R56.9 SEIZURES (H): ICD-10-CM

## 2024-11-07 DIAGNOSIS — D69.6 THROMBOCYTOPENIA (H): ICD-10-CM

## 2024-11-07 DIAGNOSIS — I60.9 SAH (SUBARACHNOID HEMORRHAGE) (H): ICD-10-CM

## 2024-11-07 DIAGNOSIS — I10 BENIGN ESSENTIAL HYPERTENSION: ICD-10-CM

## 2024-11-07 LAB
ANION GAP SERPL CALCULATED.3IONS-SCNC: 12 MMOL/L (ref 7–15)
BUN SERPL-MCNC: 38.6 MG/DL (ref 8–23)
CALCIUM SERPL-MCNC: 9.4 MG/DL (ref 8.8–10.4)
CHLORIDE SERPL-SCNC: 104 MMOL/L (ref 98–107)
CREAT SERPL-MCNC: 1.01 MG/DL (ref 0.51–0.95)
EGFRCR SERPLBLD CKD-EPI 2021: 55 ML/MIN/1.73M2
GLUCOSE SERPL-MCNC: 116 MG/DL (ref 70–99)
HCO3 SERPL-SCNC: 24 MMOL/L (ref 22–29)
HGB BLD-MCNC: 8.9 G/DL (ref 11.7–15.7)
POTASSIUM SERPL-SCNC: 4.2 MMOL/L (ref 3.4–5.3)
SODIUM SERPL-SCNC: 140 MMOL/L (ref 135–145)

## 2024-11-07 PROCEDURE — 36415 COLL VENOUS BLD VENIPUNCTURE: CPT | Mod: ORL | Performed by: NURSE PRACTITIONER

## 2024-11-07 PROCEDURE — 80048 BASIC METABOLIC PNL TOTAL CA: CPT | Mod: ORL | Performed by: NURSE PRACTITIONER

## 2024-11-07 PROCEDURE — P9603 ONE-WAY ALLOW PRORATED MILES: HCPCS | Mod: ORL | Performed by: NURSE PRACTITIONER

## 2024-11-07 PROCEDURE — 99316 NF DSCHRG MGMT 30 MIN+: CPT | Performed by: NURSE PRACTITIONER

## 2024-11-07 PROCEDURE — 85018 HEMOGLOBIN: CPT | Mod: ORL | Performed by: NURSE PRACTITIONER

## 2024-11-07 NOTE — PROGRESS NOTES
[place] [facility] DISCHARGE SUMMARY    PATIENT'S NAME: Marcela Sandhu  YOB: 1940    PRIMARY CARE PROVIDER AND CLINIC RESPONSIBLE AFTER TRANSFER: Kelley Haney     CODE STATUS: No CPR- Do NOT Intubate     TRANSFERRING PROVIDERS: DARIO De CNP, Dr. Meliza Bowens MD      DATE OF SNF ADMISSION:  October / 05 / 2024    DATE OF SNF DISCHARGE (including anticipating DC): November / 08 / 2024  [facility] TCU plans to stay as LTC    DISCHARGE DISPOSITION: [facility] Provider    Nursing Facility: [facility] stay 9/30/24 to 10/5/24.   Then again from 10/15 to 10/19/2024    Condition on Discharge:  Improving.    Function:  Ambulates:  ft w/4ww, Transfers: min -max  Cognitive Scores: SLUMS 130/30 and CPT 3.8-5.6     Physical Function:  TBA  Equipment: walker  DME: Walker    DISCHARGE DIAGNOSIS:      SDH (subdural hematoma) (H)  Closed fracture of temporal bone with routine healing, subsequent encounter  SAH (subarachnoid hemorrhage) (H)  Fall, sequela  Seizures (H)  Dysphagia, unspecified type  Physical deconditioning  Mild cognitive impairment  Major depressive disorder with current active episode, unspecified depression episode severity, unspecified whether recurrent  Benign essential tremor  Benign essential hypertension  Stage 3a chronic kidney disease (H)  Thrombocytopenia (H)  Anemia, unspecified type        HOSPITAL COURSE: pt had a fall in her AL setting.she lives at [facility] in [place].  She hit her head,  had a SAH, SDH, and left temporal bone fracture: all on left side.  She also had a right posterior scalp hematoma.  Seizure was witnessed by the EMS team. She was seen by neurology and put on Keppra--no further sz. Had a Hgb drop  but a GIB was ruled out. Her dysphagia which is chronic had worsened as did her  cognition--she was very confused. Of note, her Wellbutrin was dc'd  due to sz. Plts also dropped but improved. Sent for rehab.  **This pt was  sent   hospital from 10/15-19 for increasing confusion, UTI and CATRACHO--sepsis, This improved with Abx, CATRACHO and dehydration improved,, she was found to have a significant Hgb drop to <7, got a unit of blood, IV Fe and seen by GI,  they did an endoscopy which was essentially normal on 10/18/2024/ she was  put on PPI indefinitely and  sent back to TCU    TCU/SNF COURSE: she has progressed since then, eating better, no GIB or stool issues. No pain or HA. Has seen Gif or F/u--no new orders Hgb is better, on FE/VitC, Hgb pending today. Her wt is 124--steady. Plan is to cont rehab at [facility] as they have a LTC room available for her.      PAST MEDICAL HISTORY:  Past Medical History:   Diagnosis Date    Acid reflux disease     Benign essential hypertension     Benign essential tremor     Chronic kidney disease, stage III (moderate) (H)     History of hepatitis C virus infection     treated with interferon    MDD (major depressive disorder)     Mild cognitive impairment     Osteopenia     Peripheral neuropathy     Personal history of malignant neoplasm of breast 2007    RA (rheumatoid arthritis) (H)     Restless legs syndrome (RLS)        DISCHARGE MEDICATIONS:  Current Outpatient Medications   Medication Sig Dispense Refill    acetaminophen (TYLENOL) 500 MG tablet Take 1,000 mg by mouth every 6 hours as needed for mild pain.      amLODIPine (NORVASC) 10 MG tablet Take 1 tablet (10 mg) by mouth daily.      ammonium lactate (LAC-HYDRIN) 12 % external lotion Apply topically 2 times daily. To dry areas on back      bisacodyl (DULCOLAX) 10 MG suppository Place 10 mg rectally daily as needed for constipation.      ferrous gluconate (FERGON) 324 (38 Fe) MG tablet Take 1 tablet (324 mg) by mouth every other day.      hydrocortisone (CORTAID) 1 % external cream Apply topically 2 times daily as needed for itching.      levETIRAcetam (KEPPRA) 500 MG tablet  "Take 1 tablet (500 mg) by mouth 2 times daily.      mirtazapine (REMERON) 7.5 MG tablet TAKE 1 TABLET (7.5 MG) BY MOUTH AT BEDTIME 90 tablet 0    pantoprazole (PROTONIX) 40 MG EC tablet Take 1 tablet (40 mg) by mouth every morning (before breakfast).      polyethylene glycol (MIRALAX) 17 GM/Dose powder Take 17 g by mouth daily as needed for constipation.      senna-docusate (SENOKOT-S/PERICOLACE) 8.6-50 MG tablet Take 1 tablet by mouth daily.      sertraline (ZOLOFT) 100 MG tablet TAKE 2 TABLETS (200 MG) BY MOUTH DAILY 180 tablet 0    valsartan (DIOVAN) 160 MG tablet Take 1 tablet (160 mg) by mouth daily.      vitamin C (ASCORBIC ACID) 250 MG tablet Take 250 mg by mouth every other day. With Fe gluc       MED REC REQUIRED   Post Medication Reconciliation Status: discharge medications reconciled and changed, per note/orders     MEDICATION CHANGES/RATIONALE:   Added Vit C Fe this stay  /73   Pulse 94   Temp 98.3  F (36.8  C)   Resp 18   Ht 1.575 m (5' 2\")   Wt 56.2 kg (124 lb)   LMP  (LMP Unknown)   SpO2 97%   BMI 22.68 kg/m      TODAY DURING EXAM/ROS:  No CP, SOB, Cough, dizziness, fevers, chills, HA, N/V, dysuria or Bowel Abnormalities today but occas constipated. Appetite is better.  No pain      PHYSICAL EXAM Today:  A & O x 3, NAD. Lungs CTA, non labored. RRR, S1/S2 w/o murmur,gallop or rub.  No edema.  Abdomen soft, nontender, +BT'S. No focal neurological deficits. HAYDEN and up with walker and therapies.  .       SNF LABS  Hemoglobin   Date Value Ref Range Status   10/23/2024 9.5 (L) 11.7 - 15.7 g/dL Final   10/19/2024 8.7 (L) 11.7 - 15.7 g/dL Final   09/09/2019 10.7 (L) 11.7 - 15.7 g/dL Final   11/26/2016 10.7 (L) 11.7 - 15.7 g/dL Final             DISCHARGE PLAN:  From:  N/A and N/A  Follow-up with PCP in 7 days: 7 days.    Current [place] or other scheduled appointments:  Future Appointments   Date Time Provider Department Center   11/12/2024 11:20 AM [place] [address] [facility] [[email or web address]]   11/12/2024  1:00 PM " Otis Calloway PA-C [facility]   10/7/2025  1:00 PM Esther Holguin MD [facility]         MT referral needed and placed by this provider: none    Pending labs: Hgb and bmp pending today     Discharge Treatments:none       TOTAL DISCHARGE TIME:   Greater than 30 minutes    DARIO De [facility] GERIATRIC SERVICES

## 2024-11-07 NOTE — PATIENT INSTRUCTIONS
[facility] Discharge Orders    Name: Marcela Sandhu  : 1940  Planned Discharge Date: 24  Discharged to: [facility] skilled nursing [facility] for TCU and then for LTC    MEDICAL FOLLOW UP  Follow up with PCP in 1-2 weeks at facility  Follow up with [facility] per PCP-[facility] has appts for Rheum , neuro sx  with CT scan, neurology(for seizures) . GI F/u per their recs. Heme-Onc F/u per their recs      FUTURE LABS: Hgb and BMP are pending today      ORDER CHANGES:  See below--on FE and Vit C this stay    DISCHARGE MEDICATIONS:  The patient s pharmacy is authorized to dispense a 30-day supply of medications. Refill requests should be directed to the primary provider, Kelley Haney .   No narcotics are prescribed at time of discharge.   Current Outpatient Medications   Medication Sig Dispense Refill    acetaminophen (TYLENOL) 500 MG tablet Take 1,000 mg by mouth every 6 hours as needed for mild pain.      amLODIPine (NORVASC) 10 MG tablet Take 1 tablet (10 mg) by mouth daily.      ammonium lactate (LAC-HYDRIN) 12 % external lotion Apply topically 2 times daily. To dry areas on back      bisacodyl (DULCOLAX) 10 MG suppository Place 10 mg rectally daily as needed for constipation.      ferrous gluconate (FERGON) 324 (38 Fe) MG tablet Take 1 tablet (324 mg) by mouth every other day.      hydrocortisone (CORTAID) 1 % external cream Apply topically 2 times daily as needed for itching.      levETIRAcetam (KEPPRA) 500 MG tablet Take 1 tablet (500 mg) by mouth 2 times daily.      mirtazapine (REMERON) 7.5 MG tablet TAKE 1 TABLET (7.5 MG) BY MOUTH AT BEDTIME 90 tablet 0    pantoprazole (PROTONIX) 40 MG EC tablet Take 1 tablet (40 mg) by mouth every morning (before breakfast).      polyethylene glycol (MIRALAX) 17 GM/Dose powder Take 17 g by mouth daily as needed for constipation.      senna-docusate (SENOKOT-S/PERICOLACE) 8.6-50 MG tablet Take 1 tablet by mouth daily.       sertraline (ZOLOFT) 100 MG tablet TAKE 2 TABLETS (200 MG) BY MOUTH DAILY 180 tablet 0    valsartan (DIOVAN) 160 MG tablet Take 1 tablet (160 mg) by mouth daily.      vitamin C (ASCORBIC ACID) 250 MG tablet Take 250 mg by mouth every other day. With Fe gluc         SERVICES: tor resume PT OT SLP eval and treat at new TCU facility    ADDITIONAL INSTRUCTIONS:  None    DARIO De CNP  This document was electronically signed on November 7, 2024

## 2024-11-12 ENCOUNTER — OFFICE VISIT (OUTPATIENT)
Dept: NEUROSURGERY | Facility: CLINIC | Age: 84
End: 2024-11-12
Payer: COMMERCIAL

## 2024-11-12 ENCOUNTER — TELEPHONE (OUTPATIENT)
Dept: ONCOLOGY | Facility: CLINIC | Age: 84
End: 2024-11-12
Payer: COMMERCIAL

## 2024-11-12 ENCOUNTER — HOSPITAL ENCOUNTER (OUTPATIENT)
Dept: CT IMAGING | Facility: CLINIC | Age: 84
Discharge: HOME | End: 2024-11-12
Payer: COMMERCIAL

## 2024-11-12 ENCOUNTER — LAB REQUISITION (OUTPATIENT)
Dept: LAB | Facility: CLINIC | Age: 84
End: 2024-11-12
Payer: COMMERCIAL

## 2024-11-12 VITALS
SYSTOLIC BLOOD PRESSURE: 127 MMHG | DIASTOLIC BLOOD PRESSURE: 75 MMHG | OXYGEN SATURATION: 97 % | BODY MASS INDEX: 22.82 KG/M2 | HEIGHT: 62 IN | HEART RATE: 85 BPM | WEIGHT: 124 LBS

## 2024-11-12 DIAGNOSIS — I60.9 SAH (SUBARACHNOID HEMORRHAGE) (H): ICD-10-CM

## 2024-11-12 DIAGNOSIS — S06.5XAA SDH (SUBDURAL HEMATOMA) (H): ICD-10-CM

## 2024-11-12 DIAGNOSIS — S06.5XAA SDH (SUBDURAL HEMATOMA) (H): Primary | ICD-10-CM

## 2024-11-12 DIAGNOSIS — D64.9 ANEMIA, UNSPECIFIED: ICD-10-CM

## 2024-11-12 PROCEDURE — G0463 HOSPITAL OUTPT CLINIC VISIT: HCPCS | Performed by: PHYSICIAN ASSISTANT

## 2024-11-12 PROCEDURE — 99203 OFFICE O/P NEW LOW 30 MIN: CPT | Performed by: PHYSICIAN ASSISTANT

## 2024-11-12 PROCEDURE — 70450 CT HEAD/BRAIN W/O DYE: CPT

## 2024-11-12 ASSESSMENT — PAIN SCALES - GENERAL: PAINLEVEL_OUTOF10: NO PAIN (0)

## 2024-11-12 NOTE — LETTER
.   11/12/2024      Marcela Sandhu  [address]  [facility] [facility] [address]      Dear Colleague,    Thank you for referring your patient, Marcela Sandhu, to the [facility] [place]. Please see a copy of my visit note below.    NEUROSURGERY CLINIC PROGRESS NOTE    DATE OF VISIT: 11/12/2024    HPI:     Marcela Sandhu is a pleasant 84 year old female who presents to the clinic today for a six-week follow-up visit. On 09/30/2024 we initially evaluated her after a fall at her assisted living facility with seizure activity witnessed by EMS. Neurosurgery was consulted for SAH in left temporal lobe and left frontal lobe, and thin SDH long left lateral cerebral convexity.   Today, the patient reports that she is doing quite well and has no concerns. She is currently working with OT/PT.     Current Outpatient Medications   Medication Sig Dispense Refill     acetaminophen (TYLENOL) 500 MG tablet Take 1,000 mg by mouth every 6 hours as needed for mild pain.       amLODIPine (NORVASC) 10 MG tablet Take 1 tablet (10 mg) by mouth daily.       ammonium lactate (LAC-HYDRIN) 12 % external lotion Apply topically 2 times daily. To dry areas on back       bisacodyl (DULCOLAX) 10 MG suppository Place 10 mg rectally daily as needed for constipation.       ferrous gluconate (FERGON) 324 (38 Fe) MG tablet Take 1 tablet (324 mg) by mouth every other day.       hydrocortisone (CORTAID) 1 % external cream Apply topically 2 times daily as needed for itching.       levETIRAcetam (KEPPRA) 500 MG tablet Take 1 tablet (500 mg) by mouth 2 times daily.       mirtazapine (REMERON) 7.5 MG tablet TAKE 1 TABLET (7.5 MG) BY MOUTH AT BEDTIME 90 tablet 0     pantoprazole (PROTONIX) 40 MG EC tablet Take 1 tablet (40 mg) by mouth every morning (before breakfast).       polyethylene glycol (MIRALAX) 17 GM/Dose powder Take 17 g by mouth daily as needed for constipation.       senna-docusate (SENOKOT-S/PERICOLACE) 8.6-50  "MG tablet Take 1 tablet by mouth daily.       sertraline (ZOLOFT) 100 MG tablet TAKE 2 TABLETS (200 MG) BY MOUTH DAILY 180 tablet 0     valsartan (DIOVAN) 160 MG tablet Take 1 tablet (160 mg) by mouth daily.       vitamin C (ASCORBIC ACID) 250 MG tablet Take 250 mg by mouth every other day. With Fe gluc       No current facility-administered medications for this visit.       Allergies   Allergen Reactions     Citalopram Unknown     Hydrocodone GI Disturbance     Levaquin [Levofloxacin Hemihydrate] Other (See Comments) and Rash     chest pain     Sulfa Antibiotics GI Disturbance       Past Medical History:   Diagnosis Date     Acid reflux disease      Benign essential hypertension      Benign essential tremor      Chronic kidney disease, stage III (moderate) (H)      History of hepatitis C virus infection     treated with interferon     MDD (major depressive disorder)      Mild cognitive impairment      Osteopenia      Peripheral neuropathy      Personal history of malignant neoplasm of breast 2007     RA (rheumatoid arthritis) (H)      Restless legs syndrome (RLS)        Review Of Systems    Skin: negative  Eyes: negative  Ears/Nose/Throat: negative  Respiratory: No shortness of breath, dyspnea on exertion, cough, or hemoptysis  Cardiovascular: negative  Gastrointestinal: negative  Musculoskeletal: negative  Neurologic: negative  Psychiatric: negative  Hematologic/Lymphatic/Immunologic: negative  Endocrine: negative    OBJECTIVE:    /75   Pulse 85   Ht 1.575 m (5' 2\")   Wt 56.2 kg (124 lb)   LMP  (LMP Unknown)   SpO2 97%   BMI 22.68 kg/m      Imaging:    CT HEAD WITHOUT CONTRAST 11/12/2024 11:14 AM    INDICATION: SAH (subarachnoid hemorrhage) (H). SDH (subdural hematoma)  (H).    TECHNIQUE: CT scan of the head without contrast. Dose reduction  techniques were used.  CONTRAST: None.    COMPARISON: 9/30/2024    FINDINGS: The previously seen hyperdense multi-compartmental  intracranial hemorrhage in the " left frontotemporal region has  resolved. No residual or new hemorrhage. Mild presumed chronic small  vessel ischemic changes. Mild generalized volume loss. The ventricles  are proportional to the sulci. Osseous structures are intact.  Unremarkable orbits. Paranasal sinuses are free of significant  disease. Clear mastoid air cells.    IMPRESSION:  1. Resolved intracranial hemorrhage.  2. No new acute intracranial finding.     Radiographic Findings: Full radiological report in chart. I personally reviewed the images with the patient today.    Exam:    She appears comfortable and in no apparent distress, moving all extremities.   CN II-XII intact, alert and appropriate with conversation and following commands.   Able to name 3/3 objects.   Finger to nose slow and accurate.   Rapid hand movements intact.   Absent for upper and lower extremity drift.   Bilateral upper and lower extremities 5/5. Sensation intact throughout. Normal ROM.   Calves soft and non-tender.       ASSESSMENT:    No diagnosis found.    PLAN:    Marcela Sandhu is six weeks out from a fall at her assisted living facility with seizure activity witnessed by EMS. This fall resulted in a SAH in left temporal lobe and left frontal lobe, and thin SDH long left lateral cerebral convexity.   Today, the patient reports that she is doing quite well and has no concerns. She is currently working with OT/PT.     Imaging was reviewed today and show the previously seen hyperdense multi-compartmental intracranial hemorrhage in the left frontotemporal region has resolved.    The patient has remained compliant with the restrictions which included  not lifting anything greater than 5-10 lbs. Today we discussed increasing activity from  10 lbs by 2-5 lbs per week, but encouraged continuing to avoid excessive jostling and jarring activities.     She will continue to work with PT/OT at her care facility.     The patient gave verbal understanding and is in agreement with  the above plan. She will call or return to the clinic for any worsening or changes in symptoms.        Respectfully,     NESHA Alford PA-C      Again, thank you for allowing me to participate in the care of your patient.        Sincerely,        Otis Calloway PA-C

## 2024-11-12 NOTE — PROGRESS NOTES
NEUROSURGERY CLINIC PROGRESS NOTE    DATE OF VISIT: 11/12/2024    HPI:     Marcela Sandhu is a pleasant 84 year old female who presents to the clinic today for a six-week follow-up visit. On 09/30/2024 we initially evaluated her after a fall at her assisted living facility with seizure activity witnessed by EMS. Neurosurgery was consulted for SAH in left temporal lobe and left frontal lobe, and thin SDH long left lateral cerebral convexity.   Today, the patient reports that she is doing quite well and has no concerns. She is currently working with OT/PT.     Current Outpatient Medications   Medication Sig Dispense Refill    acetaminophen (TYLENOL) 500 MG tablet Take 1,000 mg by mouth every 6 hours as needed for mild pain.      amLODIPine (NORVASC) 10 MG tablet Take 1 tablet (10 mg) by mouth daily.      ammonium lactate (LAC-HYDRIN) 12 % external lotion Apply topically 2 times daily. To dry areas on back      bisacodyl (DULCOLAX) 10 MG suppository Place 10 mg rectally daily as needed for constipation.      ferrous gluconate (FERGON) 324 (38 Fe) MG tablet Take 1 tablet (324 mg) by mouth every other day.      hydrocortisone (CORTAID) 1 % external cream Apply topically 2 times daily as needed for itching.      levETIRAcetam (KEPPRA) 500 MG tablet Take 1 tablet (500 mg) by mouth 2 times daily.      mirtazapine (REMERON) 7.5 MG tablet TAKE 1 TABLET (7.5 MG) BY MOUTH AT BEDTIME 90 tablet 0    pantoprazole (PROTONIX) 40 MG EC tablet Take 1 tablet (40 mg) by mouth every morning (before breakfast).      polyethylene glycol (MIRALAX) 17 GM/Dose powder Take 17 g by mouth daily as needed for constipation.      senna-docusate (SENOKOT-S/PERICOLACE) 8.6-50 MG tablet Take 1 tablet by mouth daily.      sertraline (ZOLOFT) 100 MG tablet TAKE 2 TABLETS (200 MG) BY MOUTH DAILY 180 tablet 0    valsartan (DIOVAN) 160 MG tablet Take 1 tablet (160 mg) by mouth daily.      vitamin C (ASCORBIC ACID) 250 MG tablet Take 250 mg by mouth every  "other day. With Fe gluc       No current facility-administered medications for this visit.       Allergies   Allergen Reactions    Citalopram Unknown    Hydrocodone GI Disturbance    Levaquin [Levofloxacin Hemihydrate] Other (See Comments) and Rash     chest pain    Sulfa Antibiotics GI Disturbance       Past Medical History:   Diagnosis Date    Acid reflux disease     Benign essential hypertension     Benign essential tremor     Chronic kidney disease, stage III (moderate) (H)     History of hepatitis C virus infection     treated with interferon    MDD (major depressive disorder)     Mild cognitive impairment     Osteopenia     Peripheral neuropathy     Personal history of malignant neoplasm of breast 2007    RA (rheumatoid arthritis) (H)     Restless legs syndrome (RLS)        Review Of Systems    Skin: negative  Eyes: negative  Ears/Nose/Throat: negative  Respiratory: No shortness of breath, dyspnea on exertion, cough, or hemoptysis  Cardiovascular: negative  Gastrointestinal: negative  Musculoskeletal: negative  Neurologic: negative  Psychiatric: negative  Hematologic/Lymphatic/Immunologic: negative  Endocrine: negative    OBJECTIVE:    /75   Pulse 85   Ht 1.575 m (5' 2\")   Wt 56.2 kg (124 lb)   LMP  (LMP Unknown)   SpO2 97%   BMI 22.68 kg/m      Imaging:    CT HEAD WITHOUT CONTRAST 11/12/2024 11:14 AM    INDICATION: SAH (subarachnoid hemorrhage) (H). SDH (subdural hematoma)  (H).    TECHNIQUE: CT scan of the head without contrast. Dose reduction  techniques were used.  CONTRAST: None.    COMPARISON: 9/30/2024    FINDINGS: The previously seen hyperdense multi-compartmental  intracranial hemorrhage in the left frontotemporal region has  resolved. No residual or new hemorrhage. Mild presumed chronic small  vessel ischemic changes. Mild generalized volume loss. The ventricles  are proportional to the sulci. Osseous structures are intact.  Unremarkable orbits. Paranasal sinuses are free of " significant  disease. Clear mastoid air cells.    IMPRESSION:  1. Resolved intracranial hemorrhage.  2. No new acute intracranial finding.     Radiographic Findings: Full radiological report in chart. I personally reviewed the images with the patient today.    Exam:    She appears comfortable and in no apparent distress, moving all extremities.   CN II-XII intact, alert and appropriate with conversation and following commands.   Able to name 3/3 objects.   Finger to nose slow and accurate.   Rapid hand movements intact.   Absent for upper and lower extremity drift.   Bilateral upper and lower extremities 5/5. Sensation intact throughout. Normal ROM.   Calves soft and non-tender.       ASSESSMENT:    No diagnosis found.    PLAN:    Marcela Sandhu is six weeks out from a fall at her assisted living facility with seizure activity witnessed by EMS. This fall resulted in a SAH in left temporal lobe and left frontal lobe, and thin SDH long left lateral cerebral convexity.   Today, the patient reports that she is doing quite well and has no concerns. She is currently working with OT/PT.     Imaging was reviewed today and show the previously seen hyperdense multi-compartmental intracranial hemorrhage in the left frontotemporal region has resolved.    The patient has remained compliant with the restrictions which included  not lifting anything greater than 5-10 lbs. Today we discussed increasing activity from  10 lbs by 2-5 lbs per week, but encouraged continuing to avoid excessive jostling and jarring activities.     She will continue to work with PT/OT at her care facility.     The patient gave verbal understanding and is in agreement with the above plan. She will call or return to the clinic for any worsening or changes in symptoms.        Respectfully,     NESHA Alford PA-C

## 2024-11-12 NOTE — TELEPHONE ENCOUNTER
----- Message from Tabatha HONG sent at 11/11/2024 12:23 PM CST -----  Regarding: LAB ORDER  Patient would like lab orders sent to [phone] [facility].  Patient will get labs drawn there.  Thanks

## 2024-11-12 NOTE — NURSING NOTE
"Marcela Sandhu is a 84 year old female who presents for:  Chief Complaint   Patient presents with    Hospital F/U     4 week hospital follow up for subarachnoid hemorrhage, subdural hematoma. Patient reports no pain, no headaches.        Initial Vitals:  /75   Pulse 85   Ht 5' 2\" (1.575 m)   Wt 124 lb (56.2 kg)   LMP  (LMP Unknown)   SpO2 97%   BMI 22.68 kg/m   Estimated body mass index is 22.68 kg/m  as calculated from the following:    Height as of this encounter: 5' 2\" (1.575 m).    Weight as of this encounter: 124 lb (56.2 kg). Body surface area is 1.57 meters squared. BP completed using cuff size: regular  No Pain (0)        Farooq Cerda    "

## 2024-11-12 NOTE — TELEPHONE ENCOUNTER
Writer has faxed on 11/11  and confirmed with rightfax that fax was successfully sent..    Christiana Viramontes RN

## 2024-11-12 NOTE — LETTER
Patient:  Marcela Sandhu  :   1940  MRN:     [id number]        Ms.Shirley MITCHELL Sandhu  [address]   [place] [address]        2024    Dear Ms. Sandhu,     I reviewed your blood tests. There are few abnormalities. We will review it during appointment.  -Hemoglobin is lower at 7.7.  -Ferritin is high.  -Iron is normal.  -SPEP is normal.     Please, call me with any questions.     Marquis Camarillo MD    Resulted Orders   Ferritin   Result Value Ref Range    Ferritin 1,183 (H) 11 - 328 ng/mL   Iron & Iron Binding Capacity   Result Value Ref Range    Iron 46 37 - 145 ug/dL    Iron Binding Capacity 200 (L) 240 - 430 ug/dL    Iron Sat Index 23 15 - 46 %   Transferrin   Result Value Ref Range    Transferrin 156.0 (L) 200.0 - 360.0 mg/dL   Reticulocyte count   Result Value Ref Range    % Reticulocyte 2.7 (H) 0.5 - 2.0 %    Absolute Reticulocyte 0.068 0.025 - 0.095 10e6/uL   CBC with platelets and differential   Result Value Ref Range    WBC Count 4.5 4.0 - 11.0 10e3/uL    RBC Count 2.53 (L) 3.80 - 5.20 10e6/uL    Hemoglobin 7.7 (L) 11.7 - 15.7 g/dL    Hematocrit 25.9 (L) 35.0 - 47.0 %     (H) 78 - 100 fL    MCH 30.4 26.5 - 33.0 pg    MCHC 29.7 (L) 31.5 - 36.5 g/dL    RDW 14.6 10.0 - 15.0 %    Platelet Count 284 150 - 450 10e3/uL    % Neutrophils 52 %    % Lymphocytes 33 %    % Monocytes 14 %    % Eosinophils 0 %    % Basophils 1 %    % Immature Granulocytes 1 %    NRBCs per 100 WBC 0 <1 /100    Absolute Neutrophils 2.3 1.6 - 8.3 10e3/uL    Absolute Lymphocytes 1.5 0.8 - 5.3 10e3/uL    Absolute Monocytes 0.6 0.0 - 1.3 10e3/uL    Absolute Eosinophils 0.0 0.0 - 0.7 10e3/uL    Absolute Basophils 0.0 0.0 - 0.2 10e3/uL    Absolute Immature Granulocytes 0.0 <=0.4 10e3/uL    Absolute NRBCs 0.0 10e3/uL   Total Protein, Serum for ELP   Result Value Ref Range    Total Protein Serum for ELP 6.2 (L) 6.4 - 8.3 g/dL   Protein Electrophoresis, Serum   Result Value Ref Range    Albumin 3.3 (L) 3.7 - 5.1  g/dL    Alpha 1 0.6 (H) 0.2 - 0.4 g/dL    Alpha 2 0.7 0.5 - 0.9 g/dL    Beta Globulin 0.7 0.6 - 1.0 g/dL    Gamma Globulin 1.0 0.7 - 1.6 g/dL    Monoclonal Peak 0.0 <=0.0 g/dL    ELP Interpretation       Hypoalbuminemia with a slightl increase in alpha 1 globulins. No monoclonal protein seen. Pathologic significance requires clinical correlation. Devika Page M.D.                     [id number]  1940

## 2024-11-13 DIAGNOSIS — Z09 HOSPITAL DISCHARGE FOLLOW-UP: ICD-10-CM

## 2024-11-14 ENCOUNTER — TRANSFERRED RECORDS (OUTPATIENT)
Dept: HEALTH INFORMATION MANAGEMENT | Facility: CLINIC | Age: 84
End: 2024-11-14

## 2024-11-14 LAB
BASOPHILS # BLD AUTO: 0 10E3/UL (ref 0–0.2)
BASOPHILS NFR BLD AUTO: 1 %
EOSINOPHIL # BLD AUTO: 0 10E3/UL (ref 0–0.7)
EOSINOPHIL NFR BLD AUTO: 0 %
ERYTHROCYTE [DISTWIDTH] IN BLOOD BY AUTOMATED COUNT: 14.6 % (ref 10–15)
FERRITIN SERPL-MCNC: 1183 NG/ML (ref 11–328)
HCT VFR BLD AUTO: 25.9 % (ref 35–47)
HGB BLD-MCNC: 7.7 G/DL (ref 11.7–15.7)
IMM GRANULOCYTES # BLD: 0 10E3/UL
IMM GRANULOCYTES NFR BLD: 1 %
IRON BINDING CAPACITY (ROCHE): 200 UG/DL (ref 240–430)
IRON SATN MFR SERPL: 23 % (ref 15–46)
IRON SERPL-MCNC: 46 UG/DL (ref 37–145)
LYMPHOCYTES # BLD AUTO: 1.5 10E3/UL (ref 0.8–5.3)
LYMPHOCYTES NFR BLD AUTO: 33 %
MCH RBC QN AUTO: 30.4 PG (ref 26.5–33)
MCHC RBC AUTO-ENTMCNC: 29.7 G/DL (ref 31.5–36.5)
MCV RBC AUTO: 102 FL (ref 78–100)
MONOCYTES # BLD AUTO: 0.6 10E3/UL (ref 0–1.3)
MONOCYTES NFR BLD AUTO: 14 %
NEUTROPHILS # BLD AUTO: 2.3 10E3/UL (ref 1.6–8.3)
NEUTROPHILS NFR BLD AUTO: 52 %
NRBC # BLD AUTO: 0 10E3/UL
NRBC BLD AUTO-RTO: 0 /100
PLATELET # BLD AUTO: 284 10E3/UL (ref 150–450)
RBC # BLD AUTO: 2.53 10E6/UL (ref 3.8–5.2)
RETICS # AUTO: 0.07 10E6/UL (ref 0.03–0.1)
RETICS/RBC NFR AUTO: 2.7 % (ref 0.5–2)
TOTAL PROTEIN SERUM FOR ELP: 6.2 G/DL (ref 6.4–8.3)
TRANSFERRIN SERPL-MCNC: 156 MG/DL (ref 200–360)
WBC # BLD AUTO: 4.5 10E3/UL (ref 4–11)

## 2024-11-14 PROCEDURE — 84466 ASSAY OF TRANSFERRIN: CPT | Performed by: INTERNAL MEDICINE

## 2024-11-14 PROCEDURE — 36415 COLL VENOUS BLD VENIPUNCTURE: CPT | Performed by: INTERNAL MEDICINE

## 2024-11-14 PROCEDURE — 82728 ASSAY OF FERRITIN: CPT | Performed by: INTERNAL MEDICINE

## 2024-11-14 PROCEDURE — 83540 ASSAY OF IRON: CPT | Performed by: INTERNAL MEDICINE

## 2024-11-14 PROCEDURE — 85045 AUTOMATED RETICULOCYTE COUNT: CPT | Performed by: INTERNAL MEDICINE

## 2024-11-14 PROCEDURE — 84165 PROTEIN E-PHORESIS SERUM: CPT | Performed by: PATHOLOGY

## 2024-11-14 PROCEDURE — 83550 IRON BINDING TEST: CPT | Performed by: INTERNAL MEDICINE

## 2024-11-14 PROCEDURE — P9604 ONE-WAY ALLOW PRORATED TRIP: HCPCS | Performed by: INTERNAL MEDICINE

## 2024-11-14 PROCEDURE — 84155 ASSAY OF PROTEIN SERUM: CPT | Performed by: INTERNAL MEDICINE

## 2024-11-14 PROCEDURE — 85025 COMPLETE CBC W/AUTO DIFF WBC: CPT | Performed by: INTERNAL MEDICINE

## 2024-11-15 LAB
ALBUMIN SERPL ELPH-MCNC: 3.3 G/DL (ref 3.7–5.1)
ALPHA1 GLOB SERPL ELPH-MCNC: 0.6 G/DL (ref 0.2–0.4)
ALPHA2 GLOB SERPL ELPH-MCNC: 0.7 G/DL (ref 0.5–0.9)
B-GLOBULIN SERPL ELPH-MCNC: 0.7 G/DL (ref 0.6–1)
GAMMA GLOB SERPL ELPH-MCNC: 1 G/DL (ref 0.7–1.6)
M PROTEIN SERPL ELPH-MCNC: 0 G/DL
PROT PATTERN SERPL ELPH-IMP: ABNORMAL

## 2024-11-16 NOTE — RESULT ENCOUNTER NOTE
Dear Ms. Sandhu,    I reviewed your blood tests. There are few abnormalities. We will review it during appointment.  -Hemoglobin is lower at 7.7.  -Ferritin is high.  -Iron is normal.  -SPEP is normal.    Please, call me with any questions.    Marquis Camarillo MD

## 2024-12-02 ENCOUNTER — VIRTUAL VISIT (OUTPATIENT)
Dept: ONCOLOGY | Facility: CLINIC | Age: 84
End: 2024-12-02
Attending: INTERNAL MEDICINE
Payer: COMMERCIAL

## 2024-12-02 VITALS — HEIGHT: 62 IN | BODY MASS INDEX: 23 KG/M2 | WEIGHT: 125 LBS

## 2024-12-02 DIAGNOSIS — D64.9 ANEMIA, UNSPECIFIED TYPE: Primary | ICD-10-CM

## 2024-12-02 PROCEDURE — G2211 COMPLEX E/M VISIT ADD ON: HCPCS | Mod: 95 | Performed by: INTERNAL MEDICINE

## 2024-12-02 PROCEDURE — 99213 OFFICE O/P EST LOW 20 MIN: CPT | Mod: 95 | Performed by: INTERNAL MEDICINE

## 2024-12-02 ASSESSMENT — PAIN SCALES - GENERAL: PAINLEVEL_OUTOF10: NO PAIN (0)

## 2024-12-02 NOTE — PROGRESS NOTES
Virtual Visit Details    Type of service:  Video Visit     Originating Location (pt. Location): Home    Distant Location (provider location):  On-site  Platform used for Video Visit: [employer]    HEMATOLOGY HISTORY: Ms. Sandhu is a female with chronic macrocytic anemia.  Anemia is multifactorial from anemia of chronic disease, iron deficiency and likely myelodysplastic syndrome.    1.  On 05/12/2010, hemoglobin of 11.4.  -Since then multiple CBC have revealed anemia.  2.  On 10/15/2024, she was admitted to the hospital with sepsis.  Multiple investigations done.  -WBC of 10.7, hemoglobin of 8.6 and platelet of 304.  MCV of 102.  -CMP revealed few abnormalities.  Creatinine of 1.11.  Normal calcium.  -Normal vitamin B12  3.  On 10/16/2024:  -Hemoglobin of 6.9.  Normal WBC and platelet.  Reticulocyte of 0.7%.  -Peripheral blood smear reveals macrocytic, hypochromic anemia.  -Iron of 18 and iron saturation index of 12%.  -Ferritin of 522.  -Normal folate.  -Elevated haptoglobin.  4.  EGD on 10/18/2024 revealed small hiatal hernia and erythematous mucosa in the stomach.  5.  Patient received IV Venofer 300 mg on 10/16/2024.  6.  On 10/23/2024:  -Hemoglobin of 9.5 with MCV of 106.  Normal WBC and platelet.  -Creatinine of 1.03  -Normal LFT.    Subjective:  Son was on the video.    Ms. Sandhu is a 84-year-old female who was recently seen for anemia.  Patient has chronic macrocytic anemia.  Anemia is multifactorial from anemia of chronic disease, renal disease and likely myelodysplastic syndrome.  She recently had worsening of anemia secondary to GI bleed and iron deficiency.  She is on oral ferrous gluconate.    Multiple labs done on 11/14/2024:  -Hemoglobin of 7.7.  Normal WBC and platelet.  MCV of 102.  Reticulocyte of 2.7%.  -Normal iron of 46.  -Elevated ferritin of 1183  -SPEP is normal.     Patient had multiple hospitalizations recently.  She was hospitalized in September 2024 with acute traumatic subdural hematoma.  She  was admitted on 10/15/2024 with sepsis.    Patient has generalized weakness. She needs help with activities of daily living.     No headache.  She gets intermittent dizziness.  No chest pain.  No shortness of breath at rest.  No abdominal pain.  No nausea or vomiting.  No urinary or bowel complaints.  Denies bleeding from any site.  She is on oral iron.  His stools are dark because of that.    Exam:  Patient is alert and oriented x 3.  She looked weak.  Vitals: Reviewed.  Rest of system not examined.     Labs: Reviewed. On 11/14/2024, hemoglobin is 7.7.     Assessment:  1.  An 84-year-old female with chronic macrocytic anemia.  Anemia is multifactorial from anemia of chronic disease, renal disease deficiency and likely myelodysplastic syndrome.    2.  Iron deficiency. Resolved.  3.  Multiple other medical problems including rheumatoid arthritis, hypertension and chronic kidney disease.    Plan:  -CBC every 2 weeks.  -Virtual visit in 1 month.     Discussion:  1.  I had a video visit with the patient.  Son was on the video.  Patient continues to feel weak.    CBC reviewed.  Patient continues to be anemic.  Hemoglobin has been decreasing.  On 10/23/2024, hemoglobin was 9.5.  On 11/14/2024, hemoglobin is 7.7.    Anemia is multifactorial from renal disease and anemia of chronic disease.  Patient is elderly.  She likely has myelodysplastic syndrome.    We discussed regarding further workup.  After discussion, plan is to monitor CBC.  I will see her in a month.  If anemia continues to get worse, will consider bone marrow biopsy.    2.  Patient had iron deficiency.  That has resolved.  She is on oral ferrous gluconate.  She is tolerating it well.  For now I will leave her on ferrous gluconate.  Will consider stopping it in future.    3.  Son had a few questions which were all answered.  I will see her in a month.     Total video visit time of 20 minutes.  Time spent in today's visit, review of chart/investigations today  and documentation.

## 2024-12-02 NOTE — LETTER
12/2/2024      Marcela Sandhu  [address]  [place] [place] [facility] [address]      Dear Colleague,    Thank you for referring your patient, Mracela Sandhu, to the [facility] [place]. Please see a copy of my visit note below.    Virtual Visit Details    Type of service:  Video Visit     Originating Location (pt. Location): Home    Distant Location (provider location):  On-site  Platform used for Video Visit: [facility]    HEMATOLOGY HISTORY: Ms. Sandhu is a female with chronic macrocytic anemia.  Anemia is multifactorial from anemia of chronic disease, iron deficiency and likely myelodysplastic syndrome.    1.  On 05/12/2010, hemoglobin of 11.4.  -Since then multiple CBC have revealed anemia.  2.  On 10/15/2024, she was admitted to the hospital with sepsis.  Multiple investigations done.  -WBC of 10.7, hemoglobin of 8.6 and platelet of 304.  MCV of 102.  -CMP revealed few abnormalities.  Creatinine of 1.11.  Normal calcium.  -Normal vitamin B12  3.  On 10/16/2024:  -Hemoglobin of 6.9.  Normal WBC and platelet.  Reticulocyte of 0.7%.  -Peripheral blood smear reveals macrocytic, hypochromic anemia.  -Iron of 18 and iron saturation index of 12%.  -Ferritin of 522.  -Normal folate.  -Elevated haptoglobin.  4.  EGD on 10/18/2024 revealed small hiatal hernia and erythematous mucosa in the stomach.  5.  Patient received IV Venofer 300 mg on 10/16/2024.  6.  On 10/23/2024:  -Hemoglobin of 9.5 with MCV of 106.  Normal WBC and platelet.  -Creatinine of 1.03  -Normal LFT.    Subjective:  Son was on the video.    Ms. Sandhu is a 84-year-old female who was recently seen for anemia.  Patient has chronic macrocytic anemia.  Anemia is multifactorial from anemia of chronic disease, renal disease and likely myelodysplastic syndrome.  She recently had worsening of anemia secondary to GI bleed and iron deficiency.  She is on oral ferrous gluconate.    Multiple labs done on 11/14/2024:  -Hemoglobin of 7.7.  Normal WBC and  platelet.  MCV of 102.  Reticulocyte of 2.7%.  -Normal iron of 46.  -Elevated ferritin of 1183  -SPEP is normal.     Patient had multiple hospitalizations recently.  She was hospitalized in September 2024 with acute traumatic subdural hematoma.  She was admitted on 10/15/2024 with sepsis.    Patient has generalized weakness. She needs help with activities of daily living.     No headache.  She gets intermittent dizziness.  No chest pain.  No shortness of breath at rest.  No abdominal pain.  No nausea or vomiting.  No urinary or bowel complaints.  Denies bleeding from any site.  She is on oral iron.  His stools are dark because of that.    Exam:  Patient is alert and oriented x 3.  She looked weak.  Vitals: Reviewed.  Rest of system not examined.     Labs: Reviewed. On 11/14/2024, hemoglobin is 7.7.     Assessment:  1.  An 84-year-old female with chronic macrocytic anemia.  Anemia is multifactorial from anemia of chronic disease, renal disease deficiency and likely myelodysplastic syndrome.    2.  Iron deficiency. Resolved.  3.  Multiple other medical problems including rheumatoid arthritis, hypertension and chronic kidney disease.    Plan:  -CBC every 2 weeks.  -Virtual visit in 1 month.     Discussion:  1.  I had a video visit with the patient.  Son was on the video.  Patient continues to feel weak.    CBC reviewed.  Patient continues to be anemic.  Hemoglobin has been decreasing.  On 10/23/2024, hemoglobin was 9.5.  On 11/14/2024, hemoglobin is 7.7.    Anemia is multifactorial from renal disease and anemia of chronic disease.  Patient is elderly.  She likely has myelodysplastic syndrome.    We discussed regarding further workup.  After discussion, plan is to monitor CBC.  I will see her in a month.  If anemia continues to get worse, will consider bone marrow biopsy.    2.  Patient had iron deficiency.  That has resolved.  She is on oral ferrous gluconate.  She is tolerating it well.  For now I will leave her on  ferrous gluconate.  Will consider stopping it in future.    3.  Son had a few questions which were all answered.  I will see her in a month.     Total video visit time of 20 minutes.  Time spent in today's visit, review of chart/investigations today and documentation.         Again, thank you for allowing me to participate in the care of your patient.        Sincerely,        Marquis Camarillo MD

## 2024-12-02 NOTE — LETTER
12/2/2024      Marcela Sandhu  [address]  [place] [facility] [address]      Dear Colleague,    Thank you for referring your patient, Marcela Sandhu, to the [facility] [place]. Please see a copy of my visit note below.    Virtual Visit Details    Type of service:  Video Visit     Originating Location (pt. Location): Home    Distant Location (provider location):  On-site  Platform used for Video Visit: [facility]    HEMATOLOGY HISTORY: Ms. Sandhu is a female with chronic macrocytic anemia.  Anemia is multifactorial from anemia of chronic disease, iron deficiency and likely myelodysplastic syndrome.    1.  On 05/12/2010, hemoglobin of 11.4.  -Since then multiple CBC have revealed anemia.  2.  On 10/15/2024, she was admitted to the hospital with sepsis.  Multiple investigations done.  -WBC of 10.7, hemoglobin of 8.6 and platelet of 304.  MCV of 102.  -CMP revealed few abnormalities.  Creatinine of 1.11.  Normal calcium.  -Normal vitamin B12  3.  On 10/16/2024:  -Hemoglobin of 6.9.  Normal WBC and platelet.  Reticulocyte of 0.7%.  -Peripheral blood smear reveals macrocytic, hypochromic anemia.  -Iron of 18 and iron saturation index of 12%.  -Ferritin of 522.  -Normal folate.  -Elevated haptoglobin.  4.  EGD on 10/18/2024 revealed small hiatal hernia and erythematous mucosa in the stomach.  5.  Patient received IV Venofer 300 mg on 10/16/2024.  6.  On 10/23/2024:  -Hemoglobin of 9.5 with MCV of 106.  Normal WBC and platelet.  -Creatinine of 1.03  -Normal LFT.    Subjective:  Son was on the video.    Ms. Sandhu is a 84-year-old female who was recently seen for anemia.  Patient has chronic macrocytic anemia.  Anemia is multifactorial from anemia of chronic disease, renal disease and likely myelodysplastic syndrome.  She recently had worsening of anemia secondary to GI bleed and iron deficiency.  She is on oral ferrous gluconate.    Multiple labs done on 11/14/2024:  -Hemoglobin of 7.7.  Normal WBC and  platelet.  MCV of 102.  Reticulocyte of 2.7%.  -Normal iron of 46.  -Elevated ferritin of 1183  -SPEP is normal.     Patient had multiple hospitalizations recently.  She was hospitalized in September 2024 with acute traumatic subdural hematoma.  She was admitted on 10/15/2024 with sepsis.    Patient has generalized weakness. She needs help with activities of daily living.     No headache.  She gets intermittent dizziness.  No chest pain.  No shortness of breath at rest.  No abdominal pain.  No nausea or vomiting.  No urinary or bowel complaints.  Denies bleeding from any site.  She is on oral iron.  His stools are dark because of that.    Exam:  Patient is alert and oriented x 3.  She looked weak.  Vitals: Reviewed.  Rest of system not examined.     Labs: Reviewed. On 11/14/2024, hemoglobin is 7.7.     Assessment:  1.  An 84-year-old female with chronic macrocytic anemia.  Anemia is multifactorial from anemia of chronic disease, renal disease deficiency and likely myelodysplastic syndrome.    2.  Iron deficiency. Resolved.  3.  Multiple other medical problems including rheumatoid arthritis, hypertension and chronic kidney disease.    Plan:  -CBC every 2 weeks.  -Virtual visit in 1 month.     Discussion:  1.  I had a video visit with the patient.  Son was on the video.  Patient continues to feel weak.    CBC reviewed.  Patient continues to be anemic.  Hemoglobin has been decreasing.  On 10/23/2024, hemoglobin was 9.5.  On 11/14/2024, hemoglobin is 7.7.    Anemia is multifactorial from renal disease and anemia of chronic disease.  Patient is elderly.  She likely has myelodysplastic syndrome.    We discussed regarding further workup.  After discussion, plan is to monitor CBC.  I will see her in a month.  If anemia continues to get worse, will consider bone marrow biopsy.    2.  Patient had iron deficiency.  That has resolved.  She is on oral ferrous gluconate.  She is tolerating it well.  For now I will leave her on  ferrous gluconate.  Will consider stopping it in future.    3.  Son had a few questions which were all answered.  I will see her in a month.     Total video visit time of 20 minutes.  Time spent in today's visit, review of chart/investigations today and documentation.         Again, thank you for allowing me to participate in the care of your patient.        Sincerely,        Marquis Camarillo MD

## 2024-12-02 NOTE — NURSING NOTE
Current patient location: [address]     Is the patient currently in the state of [place]? YES    Visit mode:VIDEO    If the visit is dropped, the patient can be reconnected by:VIDEO VISIT: Text to cell phone:   Telephone Information:   Mobile [phone]       Will anyone else be joining the visit? NO  (If patient encounters technical issues they should call [phone]    Are changes needed to the allergy or medication list? Pt stated no changes to allergies and Pt stated no med changes    Are refills needed on medications prescribed by this physician? NO    Rooming Documentation:  Unable to complete questionnaire(s) due to time    Reason for visit: RECHECK    Zoe WILSON

## 2024-12-03 ENCOUNTER — PATIENT OUTREACH (OUTPATIENT)
Dept: CARE COORDINATION | Facility: CLINIC | Age: 84
End: 2024-12-03
Payer: COMMERCIAL

## 2024-12-03 NOTE — PROGRESS NOTES
Clinic Care Coordination Contact  Care Coordination Clinician Chart Review    Situation: Patient chart reviewed by care coordinator.    Background: Patient was discharged from the hospital to [facility] TCU, and RN/SW CC has been monitoring for TCU discharge to identify any outstanding Clinic Care Coordination needs/concerns. Refer to initial patient outreach encounter by this writer dated 10/31/24 for additional details.    Assessment: Upon chart review, patient is not a candidate for Primary Care Clinic Care Coordination enrollment due to reason stated below:  Patient transitioned to Long Term Care. [facility] on 11/12/24.    Plan/Recommendations: Clinic Care Coordination Referral/order cancelled. RN/SW CC will perform no further monitoring/outreaches at this time and will remain available as needed. If new needs arise, a new Care Coordination Referral may be placed.    Jailene Kumar RN Clinic Care Coordinator  [facility] Clinics: [place], [place] (on-site Wednesdays), [facility] (on-site Thursdays) & [facility].  [email or web address]  Phone: [phone]

## 2024-12-11 ENCOUNTER — LAB REQUISITION (OUTPATIENT)
Dept: LAB | Facility: CLINIC | Age: 84
End: 2024-12-11
Payer: COMMERCIAL

## 2024-12-11 DIAGNOSIS — R82.90 UNSPECIFIED ABNORMAL FINDINGS IN URINE: ICD-10-CM

## 2024-12-11 LAB
ALBUMIN UR-MCNC: 10 MG/DL
APPEARANCE UR: ABNORMAL
BACTERIA #/AREA URNS HPF: ABNORMAL /HPF
BILIRUB UR QL STRIP: NEGATIVE
COLOR UR AUTO: YELLOW
GLUCOSE UR STRIP-MCNC: NEGATIVE MG/DL
HGB UR QL STRIP: NEGATIVE
HYALINE CASTS: 2 /LPF
KETONES UR STRIP-MCNC: ABNORMAL MG/DL
LEUKOCYTE ESTERASE UR QL STRIP: ABNORMAL
MUCOUS THREADS #/AREA URNS LPF: PRESENT /LPF
NITRATE UR QL: NEGATIVE
PH UR STRIP: 5.5 [PH] (ref 5–7)
RBC URINE: 2 /HPF
SP GR UR STRIP: 1.03 (ref 1–1.03)
SQUAMOUS EPITHELIAL: 1 /HPF
UROBILINOGEN UR STRIP-MCNC: NORMAL MG/DL
WBC URINE: 43 /HPF

## 2024-12-11 PROCEDURE — 81001 URINALYSIS AUTO W/SCOPE: CPT | Performed by: NURSE PRACTITIONER

## 2024-12-12 ENCOUNTER — TRANSFERRED RECORDS (OUTPATIENT)
Dept: HEALTH INFORMATION MANAGEMENT | Facility: CLINIC | Age: 84
End: 2024-12-12
Payer: COMMERCIAL

## 2024-12-12 ENCOUNTER — TELEPHONE (OUTPATIENT)
Dept: ONCOLOGY | Facility: CLINIC | Age: 84
End: 2024-12-12
Payer: COMMERCIAL

## 2024-12-12 LAB — BACTERIA UR CULT: ABNORMAL

## 2024-12-12 NOTE — TELEPHONE ENCOUNTER
PICC Line Procedure Note      Indications:  IV access    ED staff attempted US PIV's and midlines x 4 unable to place     Order for PICC line received and acknowledged, lab values, diagnostics, and patient medical history also reviewed. Risks and benefits of PICC line placement were discussed with patient/family including use of Lidocaine as local anesthetic, risks of bleeding, infection, thrombosis, tissue damage, and possible arterial puncture. Informed consent was obtained. Stop sign was placed on patient door prior to procedure. There was/were 1 additional person(lashay) present during procedure who also donned gloves, hat, and mask after performing hand hygiene. Procedure Details   # 4 Fr, Double lumen Bard PICC line was attempted in the Right Brachial vein(s) with 1 poke(s), using maximum sterile technique including chloraprep, cap, gloves, gown, hand hygiene, mask and drape per hospital protocol. Writer unable to place PICC line due to not able to thread wire past axilla wire would buckle. Basilic vein not appropriate for PICC or midline. Patient has scar tissue around the brachial vein as well. Patient has had numerous midline/piccs in the brachial vein. PICC team was not able to place any kind of vascular access at this time. Left arm was not used due to arm being contracted and not able to place in position for PICC/midline. MD and RN are aware. Patient tolerated attempted procedure well. Recommendations:  Kathy Siddiqui RN notified of unsuccessful line placement. If patient requires central access then new order to be entered for IR to place line.         Leslie Sykes RN  [facility] PICC Team  [phone] Talked to son Jeromy to follow up on fax number to send lab orders to TCU. Son will be calling back today with the fax number

## 2024-12-20 ENCOUNTER — LAB REQUISITION (OUTPATIENT)
Dept: LAB | Facility: CLINIC | Age: 84
End: 2024-12-20
Payer: COMMERCIAL

## 2024-12-20 DIAGNOSIS — R68.89 OTHER GENERAL SYMPTOMS AND SIGNS: ICD-10-CM

## 2024-12-22 ENCOUNTER — HEALTH MAINTENANCE LETTER (OUTPATIENT)
Age: 84
End: 2024-12-22

## 2024-12-26 LAB
ERYTHROCYTE [DISTWIDTH] IN BLOOD BY AUTOMATED COUNT: 14.8 % (ref 10–15)
HCT VFR BLD AUTO: 27.3 % (ref 35–47)
HGB BLD-MCNC: 8.1 G/DL (ref 11.7–15.7)
MCH RBC QN AUTO: 30 PG (ref 26.5–33)
MCHC RBC AUTO-ENTMCNC: 29.7 G/DL (ref 31.5–36.5)
MCV RBC AUTO: 101 FL (ref 78–100)
PLATELET # BLD AUTO: 173 10E3/UL (ref 150–450)
RBC # BLD AUTO: 2.7 10E6/UL (ref 3.8–5.2)
WBC # BLD AUTO: 3.5 10E3/UL (ref 4–11)

## 2024-12-26 PROCEDURE — 36415 COLL VENOUS BLD VENIPUNCTURE: CPT | Performed by: NURSE PRACTITIONER

## 2024-12-26 PROCEDURE — P9604 ONE-WAY ALLOW PRORATED TRIP: HCPCS | Performed by: NURSE PRACTITIONER

## 2024-12-26 PROCEDURE — 85027 COMPLETE CBC AUTOMATED: CPT | Mod: ORL | Performed by: NURSE PRACTITIONER

## 2025-01-15 ENCOUNTER — VIRTUAL VISIT (OUTPATIENT)
Dept: ONCOLOGY | Facility: CLINIC | Age: 85
End: 2025-01-15
Attending: INTERNAL MEDICINE
Payer: COMMERCIAL

## 2025-01-15 VITALS — BODY MASS INDEX: 23 KG/M2 | WEIGHT: 125 LBS | HEIGHT: 62 IN

## 2025-01-15 DIAGNOSIS — D64.9 ANEMIA, UNSPECIFIED TYPE: Primary | ICD-10-CM

## 2025-01-15 NOTE — LETTER
1/15/2025      Marcela Sandhu  [address]  [place] [address]      Dear Colleague,    Thank you for referring your patient, Marcela Sandhu, to the [facility] [place]. Please see a copy of my visit note below.    Virtual Visit Details    Type of service:  Video Visit     Originating Location (pt. Location): Long term Care    Distant Location (provider location):  On-site  Platform used for Video Visit: [facility]    HEMATOLOGY HISTORY: Ms. Sandhu is a female with chronic macrocytic anemia.  Anemia is multifactorial from anemia of chronic disease, iron deficiency and likely myelodysplastic syndrome.     1.  On 05/12/2010, hemoglobin of 11.4.  -Since then multiple CBC have revealed anemia.  2.  On 10/15/2024, she was admitted to the hospital with sepsis.  Multiple investigations done.  -WBC of 10.7, hemoglobin of 8.6 and platelet of 304.  MCV of 102.  -CMP revealed few abnormalities.  Creatinine of 1.11.  Normal calcium.  -Normal vitamin B12  3.  On 10/16/2024:  -Hemoglobin of 6.9.  Normal WBC and platelet.  Reticulocyte of 0.7%.  -Peripheral blood smear reveals macrocytic, hypochromic anemia.  -Iron of 18 and iron saturation index of 12%.  -Ferritin of 522.  -Normal folate.  -Elevated haptoglobin.  4.  EGD on 10/18/2024 revealed small hiatal hernia and erythematous mucosa in the stomach.  5.  Patient received IV Venofer 300 mg on 10/16/2024.  6.  On 10/23/2024:  -Hemoglobin of 9.5 with MCV of 106.  Normal WBC and platelet.  -Creatinine of 1.03  -Normal LFT.     Subjective:  Son was on the video.     Ms. Sandhu is a 84-year-old female with chronic macrocytic anemia.  Anemia is multifactorial from anemia of chronic disease, renal disease and likely myelodysplastic syndrome.  She previously had worsening of anemia secondary to GI bleed and iron deficiency.  She is on oral ferrous gluconate.    CBC on 12/26/2024 revealed hemoglobin of 8.1.  WBC also mildly low at 3.5.  Normal  platelet.    Patient's overall condition is stable.  She has generalized weakness.  She needs help with activities of daily living.  She cannot walk independently.  She needs assistance.    No headache.  No dizziness.  No chest pain.  No shortness of breath.  No nausea or vomiting.  No bleeding.  Sometimes she gets pain in the legs.  Tylenol helps.     Exam:  Patient is alert and oriented x 3.  She looked weak.  Vitals: Reviewed.  Rest of system not examined.     Labs: CBC done on 12/26/2024 reviewed.       Assessment:  1.  An 84-year-old female with chronic macrocytic anemia.  Anemia is multifactorial from anemia of chronic disease, renal disease deficiency and likely myelodysplastic syndrome.  Anemia is stable.  2.  Leukopenia.  3.  Multiple other medical problems including rheumatoid arthritis, hypertension and chronic kidney disease.     Plan:  -CBC every 4 weeks.  -Virtual visit in 6 month.     Discussion:  1.  I had a video visit with the patient.  Son was on the video.    Patient's overall condition is stable.  CBC reviewed with him.  WBC is mildly low at 3.5.  Hemoglobin is low but stable at 8.1.  Platelet is normal.    Anemia is multifactorial from renal disease and anemia of chronic disease.  Patient is elderly.  She likely has myelodysplastic syndrome.    Patient has mild leukopenia.  Previous CBC have revealed normal WBC.  Patient not having any recurrent infection.    At this time we will monitor CBC once a month.  Transfusion will be given as needed.    Given her age and multiple medical problems, we will avoid invasive procedures like bone marrow biopsy.  Even if we diagnose MDS, we will not be able to do much except supportive treatment.     2.  Patient had iron deficiency. She is on oral ferrous gluconate.  She is tolerating it well.  Will check iron studies.  If normal, oral iron will be discontinued.       3.  Patient advised to have CBC once a month.  Son had a few questions which were all  answered.  I will see her in 6 months.     Total video visit time of 20 minutes.  Time spent in today's visit, review of chart/investigations today and documentation today.         Again, thank you for allowing me to participate in the care of your patient.        Sincerely,        Marquis Camarillo MD    Electronically signed

## 2025-01-15 NOTE — LETTER
1/15/2025      Marcela Sandhu  [address]  [facility] [address]      Dear Colleague,    Thank you for referring your patient, Marcela Sandhu, to the [facility] [place]. Please see a copy of my visit note below.    Virtual Visit Details    Type of service:  Video Visit     Originating Location (pt. Location): Long term Care    Distant Location (provider location):  On-site  Platform used for Video Visit: [facility]    HEMATOLOGY HISTORY: Ms. Sandhu is a female with chronic macrocytic anemia.  Anemia is multifactorial from anemia of chronic disease, iron deficiency and likely myelodysplastic syndrome.     1.  On 05/12/2010, hemoglobin of 11.4.  -Since then multiple CBC have revealed anemia.  2.  On 10/15/2024, she was admitted to the hospital with sepsis.  Multiple investigations done.  -WBC of 10.7, hemoglobin of 8.6 and platelet of 304.  MCV of 102.  -CMP revealed few abnormalities.  Creatinine of 1.11.  Normal calcium.  -Normal vitamin B12  3.  On 10/16/2024:  -Hemoglobin of 6.9.  Normal WBC and platelet.  Reticulocyte of 0.7%.  -Peripheral blood smear reveals macrocytic, hypochromic anemia.  -Iron of 18 and iron saturation index of 12%.  -Ferritin of 522.  -Normal folate.  -Elevated haptoglobin.  4.  EGD on 10/18/2024 revealed small hiatal hernia and erythematous mucosa in the stomach.  5.  Patient received IV Venofer 300 mg on 10/16/2024.  6.  On 10/23/2024:  -Hemoglobin of 9.5 with MCV of 106.  Normal WBC and platelet.  -Creatinine of 1.03  -Normal LFT.     Subjective:  Son was on the video.     Ms. Sadnhu is a 84-year-old female with chronic macrocytic anemia.  Anemia is multifactorial from anemia of chronic disease, renal disease and likely myelodysplastic syndrome.  She previously had worsening of anemia secondary to GI bleed and iron deficiency.  She is on oral ferrous gluconate.    CBC on 12/26/2024 revealed hemoglobin of 8.1.  WBC also mildly low at 3.5.  Normal  platelet.    Patient's overall condition is stable.  She has generalized weakness.  She needs help with activities of daily living.  She cannot walk independently.  She needs assistance.    No headache.  No dizziness.  No chest pain.  No shortness of breath.  No nausea or vomiting.  No bleeding.  Sometimes she gets pain in the legs.  Tylenol helps.     Exam:  Patient is alert and oriented x 3.  She looked weak.  Vitals: Reviewed.  Rest of system not examined.     Labs: CBC done on 12/26/2024 reviewed.       Assessment:  1.  An 84-year-old female with chronic macrocytic anemia.  Anemia is multifactorial from anemia of chronic disease, renal disease deficiency and likely myelodysplastic syndrome.  Anemia is stable.  2.  Leukopenia.  3.  Multiple other medical problems including rheumatoid arthritis, hypertension and chronic kidney disease.     Plan:  -CBC every 4 weeks.  -Virtual visit in 6 month.     Discussion:  1.  I had a video visit with the patient.  Son was on the video.    Patient's overall condition is stable.  CBC reviewed with him.  WBC is mildly low at 3.5.  Hemoglobin is low but stable at 8.1.  Platelet is normal.    Anemia is multifactorial from renal disease and anemia of chronic disease.  Patient is elderly.  She likely has myelodysplastic syndrome.    Patient has mild leukopenia.  Previous CBC have revealed normal WBC.  Patient not having any recurrent infection.    At this time we will monitor CBC once a month.  Transfusion will be given as needed.    Given her age and multiple medical problems, we will avoid invasive procedures like bone marrow biopsy.  Even if we diagnose MDS, we will not be able to do much except supportive treatment.     2.  Patient had iron deficiency. She is on oral ferrous gluconate.  She is tolerating it well.  Will check iron studies.  If normal, oral iron will be discontinued.       3.  Patient advised to have CBC once a month.  Son had a few questions which were all  answered.  I will see her in 6 months.     Total video visit time of 20 minutes.  Time spent in today's visit, review of chart/investigations today and documentation today.         Again, thank you for allowing me to participate in the care of your patient.        Sincerely,        Marquis Camarillo MD    Electronically signed

## 2025-01-15 NOTE — NURSING NOTE
Patient confirms medications and allergies are accurate via patients echeck in completion, and or denies any changes since last reviewed/verified.     Current patient location: [address]   [facility] [address]    Is the patient currently in the state of [place]? YES    Visit mode: VIDEO    If the visit is dropped, the patient can be reconnected by:VIDEO VISIT: Text to cell phone:   Telephone Information:   Mobile [phone]       Will anyone else be joining the visit? Erasmo - Son  (If patient encounters technical issues they should call [phone]    Are changes needed to the allergy or medication list? No    Are refills needed on medications prescribed by this physician? NO    Rooming Documentation:  Unable to complete questionnaire(s) due to time    Reason for visit: RECHECK    Tammy WILSON

## 2025-01-15 NOTE — PROGRESS NOTES
Virtual Visit Details    Type of service:  Video Visit     Originating Location (pt. Location): Long term Care    Distant Location (provider location):  On-site  Platform used for Video Visit: [employer]    HEMATOLOGY HISTORY: Ms. Sandhu is a female with chronic macrocytic anemia.  Anemia is multifactorial from anemia of chronic disease, iron deficiency and likely myelodysplastic syndrome.     1.  On 05/12/2010, hemoglobin of 11.4.  -Since then multiple CBC have revealed anemia.  2.  On 10/15/2024, she was admitted to the hospital with sepsis.  Multiple investigations done.  -WBC of 10.7, hemoglobin of 8.6 and platelet of 304.  MCV of 102.  -CMP revealed few abnormalities.  Creatinine of 1.11.  Normal calcium.  -Normal vitamin B12  3.  On 10/16/2024:  -Hemoglobin of 6.9.  Normal WBC and platelet.  Reticulocyte of 0.7%.  -Peripheral blood smear reveals macrocytic, hypochromic anemia.  -Iron of 18 and iron saturation index of 12%.  -Ferritin of 522.  -Normal folate.  -Elevated haptoglobin.  4.  EGD on 10/18/2024 revealed small hiatal hernia and erythematous mucosa in the stomach.  5.  Patient received IV Venofer 300 mg on 10/16/2024.  6.  On 10/23/2024:  -Hemoglobin of 9.5 with MCV of 106.  Normal WBC and platelet.  -Creatinine of 1.03  -Normal LFT.     Subjective:  Son was on the video.     Ms. Sandhu is a 84-year-old female with chronic macrocytic anemia.  Anemia is multifactorial from anemia of chronic disease, renal disease and likely myelodysplastic syndrome.  She previously had worsening of anemia secondary to GI bleed and iron deficiency.  She is on oral ferrous gluconate.    CBC on 12/26/2024 revealed hemoglobin of 8.1.  WBC also mildly low at 3.5.  Normal platelet.    Patient's overall condition is stable.  She has generalized weakness.  She needs help with activities of daily living.  She cannot walk independently.  She needs assistance.    No headache.  No dizziness.  No chest pain.  No shortness of breath.  No  nausea or vomiting.  No bleeding.  Sometimes she gets pain in the legs.  Tylenol helps.     Exam:  Patient is alert and oriented x 3.  She looked weak.  Vitals: Reviewed.  Rest of system not examined.     Labs: CBC done on 12/26/2024 reviewed.       Assessment:  1.  An 84-year-old female with chronic macrocytic anemia.  Anemia is multifactorial from anemia of chronic disease, renal disease deficiency and likely myelodysplastic syndrome.  Anemia is stable.  2.  Leukopenia.  3.  Multiple other medical problems including rheumatoid arthritis, hypertension and chronic kidney disease.     Plan:  -CBC every 4 weeks.  -Virtual visit in 6 month.     Discussion:  1.  I had a video visit with the patient.  Son was on the video.    Patient's overall condition is stable.  CBC reviewed with him.  WBC is mildly low at 3.5.  Hemoglobin is low but stable at 8.1.  Platelet is normal.    Anemia is multifactorial from renal disease and anemia of chronic disease.  Patient is elderly.  She likely has myelodysplastic syndrome.    Patient has mild leukopenia.  Previous CBC have revealed normal WBC.  Patient not having any recurrent infection.    At this time we will monitor CBC once a month.  Transfusion will be given as needed.    Given her age and multiple medical problems, we will avoid invasive procedures like bone marrow biopsy.  Even if we diagnose MDS, we will not be able to do much except supportive treatment.     2.  Patient had iron deficiency. She is on oral ferrous gluconate.  She is tolerating it well.  Will check iron studies.  If normal, oral iron will be discontinued.       3.  Patient advised to have CBC once a month.  Son had a few questions which were all answered.  I will see her in 6 months.     Total video visit time of 20 minutes.  Time spent in today's visit, review of chart/investigations today and documentation today.

## 2025-03-17 PROCEDURE — 87637 SARSCOV2&INF A&B&RSV AMP PRB: CPT | Mod: ORL | Performed by: NURSE PRACTITIONER

## 2025-03-18 ENCOUNTER — LAB REQUISITION (OUTPATIENT)
Dept: LAB | Facility: CLINIC | Age: 85
End: 2025-03-18
Payer: COMMERCIAL

## 2025-03-18 DIAGNOSIS — R05.9 COUGH, UNSPECIFIED: ICD-10-CM

## 2025-03-18 LAB
FLUAV RNA SPEC QL NAA+PROBE: NEGATIVE
FLUBV RNA RESP QL NAA+PROBE: NEGATIVE
RSV RNA SPEC NAA+PROBE: NEGATIVE
SARS-COV-2 RNA RESP QL NAA+PROBE: NEGATIVE

## 2025-04-07 ENCOUNTER — LAB REQUISITION (OUTPATIENT)
Dept: LAB | Facility: CLINIC | Age: 85
End: 2025-04-07
Payer: COMMERCIAL

## 2025-04-07 DIAGNOSIS — K21.9 GASTRO-ESOPHAGEAL REFLUX DISEASE WITHOUT ESOPHAGITIS: ICD-10-CM

## 2025-04-08 ENCOUNTER — LAB REQUISITION (OUTPATIENT)
Dept: LAB | Facility: CLINIC | Age: 85
End: 2025-04-08
Payer: COMMERCIAL

## 2025-04-08 DIAGNOSIS — D64.9 ANEMIA, UNSPECIFIED: ICD-10-CM

## 2025-04-08 LAB
ALBUMIN SERPL BCG-MCNC: 3.8 G/DL (ref 3.5–5.2)
ALP SERPL-CCNC: 52 U/L (ref 40–150)
ALT SERPL W P-5'-P-CCNC: 7 U/L (ref 0–50)
ANION GAP SERPL CALCULATED.3IONS-SCNC: 8 MMOL/L (ref 7–15)
AST SERPL W P-5'-P-CCNC: 20 U/L (ref 0–45)
BASOPHILS # BLD AUTO: 0 10E3/UL (ref 0–0.2)
BASOPHILS NFR BLD AUTO: 1 %
BILIRUB DIRECT SERPL-MCNC: 0.09 MG/DL (ref 0–0.3)
BILIRUB SERPL-MCNC: 0.2 MG/DL
BUN SERPL-MCNC: 33 MG/DL (ref 8–23)
CALCIUM SERPL-MCNC: 9.3 MG/DL (ref 8.8–10.4)
CHLORIDE SERPL-SCNC: 108 MMOL/L (ref 98–107)
CREAT SERPL-MCNC: 0.89 MG/DL (ref 0.51–0.95)
EGFRCR SERPLBLD CKD-EPI 2021: 64 ML/MIN/1.73M2
EOSINOPHIL # BLD AUTO: 0 10E3/UL (ref 0–0.7)
EOSINOPHIL NFR BLD AUTO: 0 %
ERYTHROCYTE [DISTWIDTH] IN BLOOD BY AUTOMATED COUNT: 14.1 % (ref 10–15)
GLUCOSE SERPL-MCNC: 94 MG/DL (ref 70–99)
HCO3 SERPL-SCNC: 28 MMOL/L (ref 22–29)
HCT VFR BLD AUTO: 25.2 % (ref 35–47)
HGB BLD-MCNC: 7.9 G/DL (ref 11.7–15.7)
IMM GRANULOCYTES # BLD: 0 10E3/UL
IMM GRANULOCYTES NFR BLD: 0 %
LYMPHOCYTES # BLD AUTO: 1.6 10E3/UL (ref 0.8–5.3)
LYMPHOCYTES NFR BLD AUTO: 46 %
MCH RBC QN AUTO: 31.5 PG (ref 26.5–33)
MCHC RBC AUTO-ENTMCNC: 31.3 G/DL (ref 31.5–36.5)
MCV RBC AUTO: 100 FL (ref 78–100)
MONOCYTES # BLD AUTO: 0.3 10E3/UL (ref 0–1.3)
MONOCYTES NFR BLD AUTO: 7 %
NEUTROPHILS # BLD AUTO: 1.6 10E3/UL (ref 1.6–8.3)
NEUTROPHILS NFR BLD AUTO: 45 %
NRBC # BLD AUTO: 0 10E3/UL
NRBC BLD AUTO-RTO: 0 /100
PLATELET # BLD AUTO: 175 10E3/UL (ref 150–450)
POTASSIUM SERPL-SCNC: 4.8 MMOL/L (ref 3.4–5.3)
PROT SERPL-MCNC: 6.4 G/DL (ref 6.4–8.3)
RBC # BLD AUTO: 2.51 10E6/UL (ref 3.8–5.2)
SODIUM SERPL-SCNC: 144 MMOL/L (ref 135–145)
WBC # BLD AUTO: 3.5 10E3/UL (ref 4–11)

## 2025-04-08 PROCEDURE — 82248 BILIRUBIN DIRECT: CPT | Mod: ORL | Performed by: INTERNAL MEDICINE

## 2025-04-08 PROCEDURE — 85025 COMPLETE CBC W/AUTO DIFF WBC: CPT | Mod: ORL | Performed by: INTERNAL MEDICINE

## 2025-04-08 PROCEDURE — P9604 ONE-WAY ALLOW PRORATED TRIP: HCPCS | Mod: ORL | Performed by: INTERNAL MEDICINE

## 2025-04-08 PROCEDURE — 80053 COMPREHEN METABOLIC PANEL: CPT | Mod: ORL | Performed by: INTERNAL MEDICINE

## 2025-04-08 PROCEDURE — 36415 COLL VENOUS BLD VENIPUNCTURE: CPT | Mod: ORL | Performed by: INTERNAL MEDICINE

## 2025-04-10 LAB — HGB BLD-MCNC: 9.5 G/DL (ref 11.7–15.7)

## 2025-04-10 PROCEDURE — 36415 COLL VENOUS BLD VENIPUNCTURE: CPT | Mod: ORL | Performed by: NURSE PRACTITIONER

## 2025-04-10 PROCEDURE — 85018 HEMOGLOBIN: CPT | Mod: ORL | Performed by: NURSE PRACTITIONER

## 2025-04-10 PROCEDURE — P9604 ONE-WAY ALLOW PRORATED TRIP: HCPCS | Mod: ORL | Performed by: NURSE PRACTITIONER

## 2025-04-30 ENCOUNTER — PATIENT OUTREACH (OUTPATIENT)
Dept: CARE COORDINATION | Facility: CLINIC | Age: 85
End: 2025-04-30
Payer: COMMERCIAL

## 2025-05-31 ENCOUNTER — TELEPHONE (OUTPATIENT)
Dept: ONCOLOGY | Facility: CLINIC | Age: 85
End: 2025-05-31
Payer: COMMERCIAL

## 2025-05-31 NOTE — TELEPHONE ENCOUNTER
Contacted sonJeromy, to schedule follow up and request labs. Mathieu is reaching out to nursing home to ensure that labs are drawn q4w

## 2025-06-11 ENCOUNTER — TRANSFERRED RECORDS (OUTPATIENT)
Dept: HEALTH INFORMATION MANAGEMENT | Facility: CLINIC | Age: 85
End: 2025-06-11
Payer: COMMERCIAL

## 2025-06-19 ENCOUNTER — LAB REQUISITION (OUTPATIENT)
Dept: LAB | Facility: CLINIC | Age: 85
End: 2025-06-19
Payer: COMMERCIAL

## 2025-06-19 DIAGNOSIS — Z79.899 OTHER LONG TERM (CURRENT) DRUG THERAPY: ICD-10-CM

## 2025-06-19 DIAGNOSIS — Z51.81 ENCOUNTER FOR THERAPEUTIC DRUG LEVEL MONITORING: ICD-10-CM

## 2025-06-24 LAB
ALBUMIN SERPL BCG-MCNC: 4.2 G/DL (ref 3.5–5.2)
ALP SERPL-CCNC: 56 U/L (ref 40–150)
ALT SERPL W P-5'-P-CCNC: 6 U/L (ref 0–50)
AST SERPL W P-5'-P-CCNC: 19 U/L (ref 0–45)
BASOPHILS # BLD AUTO: 0 10E3/UL (ref 0–0.2)
BASOPHILS NFR BLD AUTO: 1 %
BILIRUB DIRECT SERPL-MCNC: <0.08 MG/DL (ref 0–0.45)
BILIRUB SERPL-MCNC: 0.2 MG/DL
EOSINOPHIL # BLD AUTO: 0 10E3/UL (ref 0–0.7)
EOSINOPHIL NFR BLD AUTO: 0 %
ERYTHROCYTE [DISTWIDTH] IN BLOOD BY AUTOMATED COUNT: 13.6 % (ref 10–15)
HCT VFR BLD AUTO: 28.7 % (ref 35–47)
HGB BLD-MCNC: 8.9 G/DL (ref 11.7–15.7)
IMM GRANULOCYTES # BLD: 0 10E3/UL
IMM GRANULOCYTES NFR BLD: 0 %
LEVETIRACETAM SERPL-MCNC: 44.4 ÂΜG/ML (ref 10–40)
LYMPHOCYTES # BLD AUTO: 1.5 10E3/UL (ref 0.8–5.3)
LYMPHOCYTES NFR BLD AUTO: 40 %
MCH RBC QN AUTO: 32 PG (ref 26.5–33)
MCHC RBC AUTO-ENTMCNC: 31 G/DL (ref 31.5–36.5)
MCV RBC AUTO: 103 FL (ref 78–100)
MONOCYTES # BLD AUTO: 0.3 10E3/UL (ref 0–1.3)
MONOCYTES NFR BLD AUTO: 9 %
NEUTROPHILS # BLD AUTO: 1.9 10E3/UL (ref 1.6–8.3)
NEUTROPHILS NFR BLD AUTO: 51 %
NRBC # BLD AUTO: 0 10E3/UL
NRBC BLD AUTO-RTO: 0 /100
PLATELET # BLD AUTO: 167 10E3/UL (ref 150–450)
PROT SERPL-MCNC: 7.2 G/DL (ref 6.4–8.3)
RBC # BLD AUTO: 2.78 10E6/UL (ref 3.8–5.2)
VIT D+METAB SERPL-MCNC: 52 NG/ML (ref 20–50)
WBC # BLD AUTO: 3.8 10E3/UL (ref 4–11)

## 2025-07-15 ENCOUNTER — VIRTUAL VISIT (OUTPATIENT)
Dept: ONCOLOGY | Facility: CLINIC | Age: 85
End: 2025-07-15
Attending: INTERNAL MEDICINE
Payer: COMMERCIAL

## 2025-07-15 VITALS — HEIGHT: 62 IN | BODY MASS INDEX: 24.84 KG/M2 | WEIGHT: 135 LBS

## 2025-07-15 DIAGNOSIS — D64.9 ANEMIA, UNSPECIFIED TYPE: Primary | ICD-10-CM

## 2025-07-15 PROCEDURE — 1126F AMNT PAIN NOTED NONE PRSNT: CPT | Performed by: INTERNAL MEDICINE

## 2025-07-15 PROCEDURE — 98006 SYNCH AUDIO-VIDEO EST MOD 30: CPT | Performed by: INTERNAL MEDICINE

## 2025-07-15 RX ORDER — ONDANSETRON 4 MG/1
TABLET, FILM COATED ORAL
COMMUNITY
Start: 2025-04-08

## 2025-07-15 ASSESSMENT — PAIN SCALES - GENERAL: PAINLEVEL_OUTOF10: NO PAIN (0)

## 2025-07-15 NOTE — LETTER
7/15/2025      Marcela Sandhu  [address] [place] [address]      Dear Colleague,    Thank you for referring your patient, Marcela Sandhu, to the [facility] [place]. Please see a copy of my visit note below.    Virtual Visit Details    Type of service:  Video Visit   Video Start Time: 9:42 AM  Video End Time:9:50 AM    Originating Location (pt. Location): Home    Distant Location (provider location):  On-site  Platform used for Video Visit: [facility]    HEMATOLOGY HISTORY: Ms. Sandhu is a female with chronic macrocytic anemia.  Anemia is multifactorial from anemia of chronic disease, iron deficiency and likely myelodysplastic syndrome.     1.  On 05/12/2010, hemoglobin of 11.4.  -Since then multiple CBC have revealed anemia.  2.  On 10/15/2024, she was admitted to the hospital with sepsis.  Multiple investigations done.  -WBC of 10.7, hemoglobin of 8.6 and platelet of 304.  MCV of 102.  -CMP revealed few abnormalities.  Creatinine of 1.11.  Normal calcium.  -Normal vitamin B12  3.  On 10/16/2024:  -Hemoglobin of 6.9.  Normal WBC and platelet.  Reticulocyte of 0.7%.  -Peripheral blood smear reveals macrocytic, hypochromic anemia.  -Iron of 18 and iron saturation index of 12%.  -Ferritin of 522.  -Normal folate.  -Elevated haptoglobin.  4.  EGD on 10/18/2024 revealed small hiatal hernia and erythematous mucosa in the stomach.  5.  Patient received IV Venofer 300 mg on 10/16/2024.  6.  On 10/23/2024:  -Hemoglobin of 9.5 with MCV of 106.  Normal WBC and platelet.  -Creatinine of 1.03  -Normal LFT.     Subjective:  Son was on the video.     Ms. Sandhu is a 84-year-old female with chronic macrocytic anemia.  Anemia is multifactorial from iron deficiency, anemia of chronic disease, renal disease and likely myelodysplastic syndrome. She is on oral ferrous gluconate.     CBC on 06/24/2025 reveals hemoglobin of 8.9.  WBC mildly low at 3.8.  Normal platelet.     Patient's overall condition is stable.  She has  generalized weakness.  She needs help with activities of daily living. No worsening of weakness.    Patient gets pain in her fingers and legs.  She has seen orthopedics.  She had trigger finger injections done in June 2025.     No headache.  No dizziness.  Her memory is not very good.  She is more forgetful. No chest pain.  No shortness of breath at rest.  No nausea or vomiting.  No bleeding.       Exam:  Patient is alert and oriented x 3.  She looked weak.  Rest of system not examined.     Labs: Reviewed.       Assessment:  1.  An 84-year-old female with chronic macrocytic anemia.  Anemia is multifactorial from anemia of chronic disease, renal disease deficiency and likely myelodysplastic syndrome.  Anemia is stable.  2.  Mild leukopenia.  No neutropenia.  Leukopenia is likely from rheumatoid arthritis.  MDS is another possibility.  3.  Multiple other medical problems including rheumatoid arthritis, hypertension and chronic kidney disease.     Plan:  -Stop ferrous gluconate.  -Follow-up in 6 months with labs.     Discussion:  1.  Patient's overall condition is stable.  CBC reviewed.  She has mild leukopenia which is stable.  No neutropenia.  She chronic anemia.  Hemoglobin is stable at 8.9.    Anemia is multifactorial including from MDS.  Leukopenia is likely from rheumatoid arthritis.  Other possibility is MDS.    At this time we will simply monitor CBC.  If there is progressive worsening of cytopenia, bone marrow biopsy will be considered depending on her overall health.    Patient does not require any transfusion or growth factor.    2.  Patient has been on oral ferrous gluconate. Last iron studies were normal.  Will stop ferrous gluconate.    3.  I will see her in 6 months time with labs.  Advised her to call us with any questions or concerns.    Total video visit time of 20 minutes.  Time spent in today's visit, review of chart/investigations today and documentation.       Again, thank you for allowing me to  participate in the care of your patient.        Sincerely,        Marquis Camarillo MD    Electronically signed

## 2025-07-15 NOTE — NURSING NOTE
Patient confirms medications and allergies are accurate via patients echeck in completion, and or denies any changes since last reviewed/verified.     Current patient location: [facility]    Is the patient currently in the state of [place]? YES    Visit mode: VIDEO    If the visit is dropped, the patient can be reconnected by:VIDEO VISIT: Text to cell phone:   Telephone Information:   Mobile [phone]       Will anyone else be joining the visit? son  (If patient encounters technical issues they should call [phone]    Are changes needed to the allergy or medication list? No    Are refills needed on medications prescribed by this physician? NO    Rooming Documentation:  Son did check in     Reason for visit: RECHECK    Tammy GUAMANF

## 2025-07-15 NOTE — PROGRESS NOTES
Virtual Visit Details    Type of service:  Video Visit   Video Start Time: 9:42 AM  Video End Time:9:50 AM    Originating Location (pt. Location): Home    Distant Location (provider location):  On-site  Platform used for Video Visit: [facility]    HEMATOLOGY HISTORY: Ms. Sandhu is a female with chronic macrocytic anemia.  Anemia is multifactorial from anemia of chronic disease, iron deficiency and likely myelodysplastic syndrome.     1.  On 05/12/2010, hemoglobin of 11.4.  -Since then multiple CBC have revealed anemia.  2.  On 10/15/2024, she was admitted to the hospital with sepsis.  Multiple investigations done.  -WBC of 10.7, hemoglobin of 8.6 and platelet of 304.  MCV of 102.  -CMP revealed few abnormalities.  Creatinine of 1.11.  Normal calcium.  -Normal vitamin B12  3.  On 10/16/2024:  -Hemoglobin of 6.9.  Normal WBC and platelet.  Reticulocyte of 0.7%.  -Peripheral blood smear reveals macrocytic, hypochromic anemia.  -Iron of 18 and iron saturation index of 12%.  -Ferritin of 522.  -Normal folate.  -Elevated haptoglobin.  4.  EGD on 10/18/2024 revealed small hiatal hernia and erythematous mucosa in the stomach.  5.  Patient received IV Venofer 300 mg on 10/16/2024.  6.  On 10/23/2024:  -Hemoglobin of 9.5 with MCV of 106.  Normal WBC and platelet.  -Creatinine of 1.03  -Normal LFT.     Subjective:  Son was on the video.     Ms. Sandhu is a 84-year-old female with chronic macrocytic anemia.  Anemia is multifactorial from iron deficiency, anemia of chronic disease, renal disease and likely myelodysplastic syndrome. She is on oral ferrous gluconate.     CBC on 06/24/2025 reveals hemoglobin of 8.9.  WBC mildly low at 3.8.  Normal platelet.     Patient's overall condition is stable.  She has generalized weakness.  She needs help with activities of daily living. No worsening of weakness.    Patient gets pain in her fingers and legs.  She has seen orthopedics.  She had trigger finger injections done in June 2025.     No  headache.  No dizziness.  Her memory is not very good.  She is more forgetful. No chest pain.  No shortness of breath at rest.  No nausea or vomiting.  No bleeding.       Exam:  Patient is alert and oriented x 3.  She looked weak.  Rest of system not examined.     Labs: Reviewed.       Assessment:  1.  An 84-year-old female with chronic macrocytic anemia.  Anemia is multifactorial from anemia of chronic disease, renal disease deficiency and likely myelodysplastic syndrome.  Anemia is stable.  2.  Mild leukopenia.  No neutropenia.  Leukopenia is likely from rheumatoid arthritis.  MDS is another possibility.  3.  Multiple other medical problems including rheumatoid arthritis, hypertension and chronic kidney disease.     Plan:  -Stop ferrous gluconate.  -Follow-up in 6 months with labs.     Discussion:  1.  Patient's overall condition is stable.  CBC reviewed.  She has mild leukopenia which is stable.  No neutropenia.  She chronic anemia.  Hemoglobin is stable at 8.9.    Anemia is multifactorial including from MDS.  Leukopenia is likely from rheumatoid arthritis.  Other possibility is MDS.    At this time we will simply monitor CBC.  If there is progressive worsening of cytopenia, bone marrow biopsy will be considered depending on her overall health.    Patient does not require any transfusion or growth factor.    2.  Patient has been on oral ferrous gluconate. Last iron studies were normal.  Will stop ferrous gluconate.    3.  I will see her in 6 months time with labs.  Advised her to call us with any questions or concerns.    Total video visit time of 20 minutes.  Time spent in today's visit, review of chart/investigations today and documentation.

## 2025-07-15 NOTE — LETTER
7/15/2025      Marcela Sandhu  [address] [place] [address]      Dear Colleague,    Thank you for referring your patient, Marcela Sandhu, to the [facility] [place]. Please see a copy of my visit note below.    Virtual Visit Details    Type of service:  Video Visit   Video Start Time: 9:42 AM  Video End Time:9:50 AM    Originating Location (pt. Location): Home    Distant Location (provider location):  On-site  Platform used for Video Visit: [facility]    HEMATOLOGY HISTORY: Ms. Sandhu is a female with chronic macrocytic anemia.  Anemia is multifactorial from anemia of chronic disease, iron deficiency and likely myelodysplastic syndrome.     1.  On 05/12/2010, hemoglobin of 11.4.  -Since then multiple CBC have revealed anemia.  2.  On 10/15/2024, she was admitted to the hospital with sepsis.  Multiple investigations done.  -WBC of 10.7, hemoglobin of 8.6 and platelet of 304.  MCV of 102.  -CMP revealed few abnormalities.  Creatinine of 1.11.  Normal calcium.  -Normal vitamin B12  3.  On 10/16/2024:  -Hemoglobin of 6.9.  Normal WBC and platelet.  Reticulocyte of 0.7%.  -Peripheral blood smear reveals macrocytic, hypochromic anemia.  -Iron of 18 and iron saturation index of 12%.  -Ferritin of 522.  -Normal folate.  -Elevated haptoglobin.  4.  EGD on 10/18/2024 revealed small hiatal hernia and erythematous mucosa in the stomach.  5.  Patient received IV Venofer 300 mg on 10/16/2024.  6.  On 10/23/2024:  -Hemoglobin of 9.5 with MCV of 106.  Normal WBC and platelet.  -Creatinine of 1.03  -Normal LFT.     Subjective:  Son was on the video.     Ms. Sandhu is a 84-year-old female with chronic macrocytic anemia.  Anemia is multifactorial from iron deficiency, anemia of chronic disease, renal disease and likely myelodysplastic syndrome. She is on oral ferrous gluconate.     CBC on 06/24/2025 reveals hemoglobin of 8.9.  WBC mildly low at 3.8.  Normal platelet.     Patient's overall condition is stable.  She has  generalized weakness.  She needs help with activities of daily living. No worsening of weakness.    Patient gets pain in her fingers and legs.  She has seen orthopedics.  She had trigger finger injections done in June 2025.     No headache.  No dizziness.  Her memory is not very good.  She is more forgetful. No chest pain.  No shortness of breath at rest.  No nausea or vomiting.  No bleeding.       Exam:  Patient is alert and oriented x 3.  She looked weak.  Rest of system not examined.     Labs: Reviewed.       Assessment:  1.  An 84-year-old female with chronic macrocytic anemia.  Anemia is multifactorial from anemia of chronic disease, renal disease deficiency and likely myelodysplastic syndrome.  Anemia is stable.  2.  Mild leukopenia.  No neutropenia.  Leukopenia is likely from rheumatoid arthritis.  MDS is another possibility.  3.  Multiple other medical problems including rheumatoid arthritis, hypertension and chronic kidney disease.     Plan:  -Stop ferrous gluconate.  -Follow-up in 6 months with labs.     Discussion:  1.  Patient's overall condition is stable.  CBC reviewed.  She has mild leukopenia which is stable.  No neutropenia.  She chronic anemia.  Hemoglobin is stable at 8.9.    Anemia is multifactorial including from MDS.  Leukopenia is likely from rheumatoid arthritis.  Other possibility is MDS.    At this time we will simply monitor CBC.  If there is progressive worsening of cytopenia, bone marrow biopsy will be considered depending on her overall health.    Patient does not require any transfusion or growth factor.    2.  Patient has been on oral ferrous gluconate. Last iron studies were normal.  Will stop ferrous gluconate.    3.  I will see her in 6 months time with labs.  Advised her to call us with any questions or concerns.    Total video visit time of 20 minutes.  Time spent in today's visit, review of chart/investigations today and documentation.       Again, thank you for allowing me to  participate in the care of your patient.        Sincerely,        Marquis Camarillo MD    Electronically signed

## (undated) RX ORDER — FENTANYL CITRATE 50 UG/ML
INJECTION, SOLUTION INTRAMUSCULAR; INTRAVENOUS
Status: DISPENSED
Start: 2024-10-18